# Patient Record
Sex: FEMALE | Race: WHITE | Employment: OTHER | URBAN - NONMETROPOLITAN AREA
[De-identification: names, ages, dates, MRNs, and addresses within clinical notes are randomized per-mention and may not be internally consistent; named-entity substitution may affect disease eponyms.]

---

## 2018-03-07 LAB
ALBUMIN SERPL-MCNC: NORMAL G/DL
ALP BLD-CCNC: NORMAL U/L
ALT SERPL-CCNC: NORMAL U/L
ANION GAP SERPL CALCULATED.3IONS-SCNC: NORMAL MMOL/L
AST SERPL-CCNC: NORMAL U/L
AVERAGE GLUCOSE: NORMAL
BILIRUB SERPL-MCNC: NORMAL MG/DL (ref 0.1–1.4)
BUN BLDV-MCNC: NORMAL MG/DL
CALCIUM SERPL-MCNC: NORMAL MG/DL
CHLORIDE BLD-SCNC: NORMAL MMOL/L
CHOLESTEROL, TOTAL: NORMAL MG/DL
CHOLESTEROL/HDL RATIO: NORMAL
CO2: NORMAL MMOL/L
CREAT SERPL-MCNC: NORMAL MG/DL
CREATININE, URINE: NORMAL
GFR CALCULATED: NORMAL
GLUCOSE BLD-MCNC: NORMAL MG/DL
HBA1C MFR BLD: 8.5 %
HDLC SERPL-MCNC: NORMAL MG/DL (ref 35–70)
LDL CHOLESTEROL CALCULATED: NORMAL MG/DL (ref 0–160)
MICROALBUMIN/CREAT 24H UR: NORMAL MG/G{CREAT}
MICROALBUMIN/CREAT UR-RTO: 104
POTASSIUM SERPL-SCNC: NORMAL MMOL/L
SODIUM BLD-SCNC: NORMAL MMOL/L
TOTAL PROTEIN: NORMAL
TRIGL SERPL-MCNC: NORMAL MG/DL
TSH SERPL DL<=0.05 MIU/L-ACNC: NORMAL UIU/ML
VLDLC SERPL CALC-MCNC: NORMAL MG/DL

## 2019-05-21 ENCOUNTER — OFFICE VISIT (OUTPATIENT)
Dept: CHIROPRACTIC MEDICINE | Age: 69
End: 2019-05-21
Payer: MEDICARE

## 2019-05-21 DIAGNOSIS — M53.86 OTHER SPECIFIED DORSOPATHIES, LUMBAR REGION: ICD-10-CM

## 2019-05-21 DIAGNOSIS — M99.03 SEGMENTAL AND SOMATIC DYSFUNCTION OF LUMBAR REGION: Primary | ICD-10-CM

## 2019-05-21 DIAGNOSIS — M53.3 SACROCOCCYGEAL DISORDERS, NOT ELSEWHERE CLASSIFIED: ICD-10-CM

## 2019-05-21 DIAGNOSIS — M99.05 SEGMENTAL AND SOMATIC DYSFUNCTION OF PELVIC REGION: ICD-10-CM

## 2019-05-21 PROCEDURE — 98940 CHIROPRACT MANJ 1-2 REGIONS: CPT | Performed by: CHIROPRACTOR

## 2019-05-21 NOTE — PROGRESS NOTES
19  [place] : 1950 Sex: female  Age: 77 y.o. Chief Complaint   Patient presents with    Lower Back Pain       HPI:   Pain is Worse-no new injury however. She's been out of town for a couple of months, living in [facility]. He returned last week. No new injury. However, her low back pain has returned. . On average, pain is perceived as moderate (4-6 pain scale). Change in quality of symptoms: no.  Associated symptoms: none. She denies any other symptoms. No current outpatient medications on file. Exam: There is mild midline tenderness L3-S1. Tender SI joints bilaterally, left worse than right. Negative Ely's and Sathya tests. There are hypertonic and tender fibers noted today in the lumbar paraspinal muscles. Joint fixation is noted with motion screening at L3-4, bilateral SI joints. Maday Krause was seen today for lower back pain. Diagnoses and all orders for this visit:    Segmental and somatic dysfunction of lumbar region    Segmental and somatic dysfunction of pelvic region    Other specified dorsopathies, lumbar region    Sacrococcygeal disorders, not elsewhere classified        Treatment Plan:  Continued with Berry flexion distraction manipulation at L3, protocol 1. Then mechanically assisted manipulation of the SI joints. Tolerated well. She can follow up with me as needed for care.  Ice, heat, home remedies as needed until an      Seen By:  Malini Greenwood

## 2019-05-23 ENCOUNTER — OFFICE VISIT (OUTPATIENT)
Dept: CHIROPRACTIC MEDICINE | Age: 69
End: 2019-05-23
Payer: MEDICARE

## 2019-05-23 DIAGNOSIS — M99.03 SEGMENTAL AND SOMATIC DYSFUNCTION OF LUMBAR REGION: Primary | ICD-10-CM

## 2019-05-23 DIAGNOSIS — M99.05 SEGMENTAL AND SOMATIC DYSFUNCTION OF PELVIC REGION: ICD-10-CM

## 2019-05-23 DIAGNOSIS — M53.3 SACROCOCCYGEAL DISORDERS, NOT ELSEWHERE CLASSIFIED: ICD-10-CM

## 2019-05-23 DIAGNOSIS — M53.86 OTHER SPECIFIED DORSOPATHIES, LUMBAR REGION: ICD-10-CM

## 2019-05-23 PROCEDURE — 98940 CHIROPRACT MANJ 1-2 REGIONS: CPT | Performed by: CHIROPRACTOR

## 2019-05-23 NOTE — PROGRESS NOTES
19  France aDwson : 1950 Sex: female  Age: 77 y.o. Chief Complaint   Patient presents with    Lower Back Pain       HPI:   Did well with treatment last time. Stated she felt better. However that night she slept on her mattress-thinks it was too soft. Woke up in the morning with back pain. Did not radiate to the lower extremities. She was able to perform most activities as desired yesterday. Did report increased pain with dressing and putting on shoes. No changes otherwise. Pain at 5/10 today. Ibuprofen provides relief        No current outpatient medications on file. Exam: Ambulates that assistance. Decreased lumbar lordosis. Mild midline tenderness L4-S1. Tender SI joints bilaterally, left worse than right. There are hypertonic and tender fibers noted today in the lumbar paraspinal muscles. Joint fixation is noted with motion screening at bilateral SI joints, L3-4    There are no diagnoses linked to this encounter. Treatment Plan:  Mild exacerbation, which is already improving. Berry flexion distraction manipulation to the L 3 segment using protocol 1 was performed today. Mechanically assisted manipulation to the SI joints. Tolerated well. Follow-up with me as needed.         Seen By:  Vita Mclaughlin DC

## 2019-05-28 ENCOUNTER — OFFICE VISIT (OUTPATIENT)
Dept: CHIROPRACTIC MEDICINE | Age: 69
End: 2019-05-28
Payer: MEDICARE

## 2019-05-28 DIAGNOSIS — M99.03 SEGMENTAL AND SOMATIC DYSFUNCTION OF LUMBAR REGION: Primary | ICD-10-CM

## 2019-05-28 DIAGNOSIS — M99.05 SEGMENTAL AND SOMATIC DYSFUNCTION OF PELVIC REGION: ICD-10-CM

## 2019-05-28 DIAGNOSIS — M53.86 OTHER SPECIFIED DORSOPATHIES, LUMBAR REGION: ICD-10-CM

## 2019-05-28 DIAGNOSIS — M53.3 SACROCOCCYGEAL DISORDERS, NOT ELSEWHERE CLASSIFIED: ICD-10-CM

## 2019-05-28 PROCEDURE — 98940 CHIROPRACT MANJ 1-2 REGIONS: CPT | Performed by: CHIROPRACTOR

## 2019-05-28 NOTE — PROGRESS NOTES
19  Arie Gave : 1950 Sex: female  Age: 77 y.o. Chief Complaint   Patient presents with    Lower Back Pain       HPI:   Pain is better. On average, pain is perceived as mild (1-3  pain scale). Change in quality of symptoms: no.  Associated symptoms: none. She denies any other symptoms. Feels she is aggravated herself when she climbs into the truck. She has been applying ice as needed. No current outpatient medications on file. Exam: Tender left SI joint. Nontender right. There are hypertonic and tender fibers noted today in the bilateral lumbar paraspinal muscles. Joint fixation is noted with motion screening at L3-4, bilateral SI joints. Oscar Pfeiffer was seen today for lower back pain. Diagnoses and all orders for this visit:    Segmental and somatic dysfunction of lumbar region    Segmental and somatic dysfunction of pelvic region    Other specified dorsopathies, lumbar region    Sacrococcygeal disorders, not elsewhere classified        Treatment Plan:  Berry flexion distraction manipulation to the L 3 segment using protocol 1 was performed today. Increases to manipulation of the SI joints. Tolerated well. Continue with home-based self-care. Follow-up with me as needed.       Seen By:  Daniela Garcia DC

## 2019-08-26 ENCOUNTER — OFFICE VISIT (OUTPATIENT)
Dept: CHIROPRACTIC MEDICINE | Age: 69
End: 2019-08-26
Payer: MEDICARE

## 2019-08-26 DIAGNOSIS — M99.03 SEGMENTAL AND SOMATIC DYSFUNCTION OF LUMBAR REGION: Primary | ICD-10-CM

## 2019-08-26 DIAGNOSIS — M99.05 SEGMENTAL AND SOMATIC DYSFUNCTION OF PELVIC REGION: ICD-10-CM

## 2019-08-26 DIAGNOSIS — M53.86 OTHER SPECIFIED DORSOPATHIES, LUMBAR REGION: ICD-10-CM

## 2019-08-26 DIAGNOSIS — M53.3 SACROCOCCYGEAL DISORDERS, NOT ELSEWHERE CLASSIFIED: ICD-10-CM

## 2019-08-26 PROCEDURE — 98940 CHIROPRACT MANJ 1-2 REGIONS: CPT | Performed by: CHIROPRACTOR

## 2019-09-05 ENCOUNTER — OFFICE VISIT (OUTPATIENT)
Dept: CHIROPRACTIC MEDICINE | Age: 69
End: 2019-09-05
Payer: MEDICARE

## 2019-09-05 DIAGNOSIS — M53.86 OTHER SPECIFIED DORSOPATHIES, LUMBAR REGION: ICD-10-CM

## 2019-09-05 DIAGNOSIS — M99.03 SEGMENTAL AND SOMATIC DYSFUNCTION OF LUMBAR REGION: Primary | ICD-10-CM

## 2019-09-05 DIAGNOSIS — M53.3 SACROCOCCYGEAL DISORDERS, NOT ELSEWHERE CLASSIFIED: ICD-10-CM

## 2019-09-05 DIAGNOSIS — M99.05 SEGMENTAL AND SOMATIC DYSFUNCTION OF PELVIC REGION: ICD-10-CM

## 2019-09-05 PROCEDURE — 98940 CHIROPRACT MANJ 1-2 REGIONS: CPT | Performed by: CHIROPRACTOR

## 2019-09-12 ENCOUNTER — OFFICE VISIT (OUTPATIENT)
Dept: CHIROPRACTIC MEDICINE | Age: 69
End: 2019-09-12
Payer: MEDICARE

## 2019-09-12 DIAGNOSIS — M53.86 OTHER SPECIFIED DORSOPATHIES, LUMBAR REGION: ICD-10-CM

## 2019-09-12 DIAGNOSIS — M53.3 SACROCOCCYGEAL DISORDERS, NOT ELSEWHERE CLASSIFIED: ICD-10-CM

## 2019-09-12 DIAGNOSIS — M99.03 SEGMENTAL AND SOMATIC DYSFUNCTION OF LUMBAR REGION: Primary | ICD-10-CM

## 2019-09-12 DIAGNOSIS — M99.05 SEGMENTAL AND SOMATIC DYSFUNCTION OF PELVIC REGION: ICD-10-CM

## 2019-09-12 PROCEDURE — 98940 CHIROPRACT MANJ 1-2 REGIONS: CPT | Performed by: CHIROPRACTOR

## 2019-09-16 ENCOUNTER — OFFICE VISIT (OUTPATIENT)
Dept: CHIROPRACTIC MEDICINE | Age: 69
End: 2019-09-16
Payer: MEDICARE

## 2019-09-16 DIAGNOSIS — M53.3 SACROCOCCYGEAL DISORDERS, NOT ELSEWHERE CLASSIFIED: ICD-10-CM

## 2019-09-16 DIAGNOSIS — M99.05 SEGMENTAL AND SOMATIC DYSFUNCTION OF PELVIC REGION: ICD-10-CM

## 2019-09-16 DIAGNOSIS — M54.04 PANNICULITIS AFFECTING REGIONS OF NECK AND BACK, THORACIC REGION: ICD-10-CM

## 2019-09-16 DIAGNOSIS — M99.03 SEGMENTAL AND SOMATIC DYSFUNCTION OF LUMBAR REGION: Primary | ICD-10-CM

## 2019-09-16 DIAGNOSIS — M99.02 SEGMENTAL AND SOMATIC DYSFUNCTION OF THORACIC REGION: ICD-10-CM

## 2019-09-16 DIAGNOSIS — M53.86 OTHER SPECIFIED DORSOPATHIES, LUMBAR REGION: ICD-10-CM

## 2019-09-16 PROCEDURE — 98940 CHIROPRACT MANJ 1-2 REGIONS: CPT | Performed by: CHIROPRACTOR

## 2019-11-02 RX ORDER — OMEPRAZOLE 20 MG/1
20 CAPSULE, DELAYED RELEASE ORAL DAILY
COMMUNITY
End: 2020-12-01

## 2019-11-02 RX ORDER — PRAVASTATIN SODIUM 20 MG
20 TABLET ORAL DAILY
COMMUNITY
End: 2020-03-06 | Stop reason: ALTCHOICE

## 2019-11-02 RX ORDER — ZOLPIDEM TARTRATE 10 MG/1
5 TABLET ORAL NIGHTLY PRN
COMMUNITY
End: 2020-06-16 | Stop reason: SDUPTHER

## 2019-11-02 RX ORDER — GLIPIZIDE 5 MG/1
10 TABLET ORAL
COMMUNITY
End: 2020-03-06 | Stop reason: SDUPTHER

## 2019-11-02 RX ORDER — IBUPROFEN 200 MG
200 TABLET ORAL DAILY
COMMUNITY
End: 2019-12-18

## 2019-11-02 RX ORDER — HYDROCODONE BITARTRATE AND ACETAMINOPHEN 5; 325 MG/1; MG/1
1 TABLET ORAL 4 TIMES DAILY
COMMUNITY
End: 2019-12-18

## 2019-11-02 RX ORDER — CYCLOBENZAPRINE HCL 10 MG
10 TABLET ORAL DAILY
COMMUNITY
End: 2019-12-18

## 2019-12-04 ENCOUNTER — OFFICE VISIT (OUTPATIENT)
Dept: CHIROPRACTIC MEDICINE | Age: 69
End: 2019-12-04
Payer: MEDICARE

## 2019-12-04 VITALS — SYSTOLIC BLOOD PRESSURE: 130 MMHG | OXYGEN SATURATION: 99 % | HEART RATE: 96 BPM | DIASTOLIC BLOOD PRESSURE: 70 MMHG

## 2019-12-04 DIAGNOSIS — M53.86 OTHER SPECIFIED DORSOPATHIES, LUMBAR REGION: ICD-10-CM

## 2019-12-04 DIAGNOSIS — M99.05 SEGMENTAL AND SOMATIC DYSFUNCTION OF PELVIC REGION: ICD-10-CM

## 2019-12-04 DIAGNOSIS — M99.03 SEGMENTAL AND SOMATIC DYSFUNCTION OF LUMBAR REGION: Primary | ICD-10-CM

## 2019-12-04 DIAGNOSIS — M53.3 SACROCOCCYGEAL DISORDERS, NOT ELSEWHERE CLASSIFIED: ICD-10-CM

## 2019-12-04 DIAGNOSIS — M99.02 SEGMENTAL AND SOMATIC DYSFUNCTION OF THORACIC REGION: ICD-10-CM

## 2019-12-04 DIAGNOSIS — M54.04 PANNICULITIS AFFECTING REGIONS OF NECK AND BACK, THORACIC REGION: ICD-10-CM

## 2019-12-04 PROCEDURE — 98941 CHIROPRACT MANJ 3-4 REGIONS: CPT | Performed by: CHIROPRACTOR

## 2019-12-18 ENCOUNTER — OFFICE VISIT (OUTPATIENT)
Dept: FAMILY MEDICINE CLINIC | Age: 69
End: 2019-12-18
Payer: MEDICARE

## 2019-12-18 VITALS
SYSTOLIC BLOOD PRESSURE: 138 MMHG | BODY MASS INDEX: 35.28 KG/M2 | TEMPERATURE: 97.9 F | HEIGHT: 63 IN | WEIGHT: 199.13 LBS | DIASTOLIC BLOOD PRESSURE: 64 MMHG | HEART RATE: 71 BPM | OXYGEN SATURATION: 96 %

## 2019-12-18 DIAGNOSIS — R53.83 OTHER FATIGUE: ICD-10-CM

## 2019-12-18 DIAGNOSIS — R09.89 BILATERAL CAROTID BRUITS: ICD-10-CM

## 2019-12-18 DIAGNOSIS — Z79.4 TYPE 2 DIABETES MELLITUS WITH HYPERGLYCEMIA, WITH LONG-TERM CURRENT USE OF INSULIN (HCC): ICD-10-CM

## 2019-12-18 DIAGNOSIS — Z12.11 SCREENING FOR MALIGNANT NEOPLASM OF COLON: Primary | ICD-10-CM

## 2019-12-18 DIAGNOSIS — K21.9 GASTROESOPHAGEAL REFLUX DISEASE WITHOUT ESOPHAGITIS: ICD-10-CM

## 2019-12-18 DIAGNOSIS — I10 ESSENTIAL HYPERTENSION: ICD-10-CM

## 2019-12-18 DIAGNOSIS — R79.89 ELEVATED LFTS: ICD-10-CM

## 2019-12-18 DIAGNOSIS — G47.09 OTHER INSOMNIA: ICD-10-CM

## 2019-12-18 DIAGNOSIS — R01.1 MURMUR: ICD-10-CM

## 2019-12-18 DIAGNOSIS — E83.42 HYPOMAGNESEMIA: ICD-10-CM

## 2019-12-18 DIAGNOSIS — E55.9 VITAMIN D DEFICIENCY: ICD-10-CM

## 2019-12-18 DIAGNOSIS — Z79.899 LONG TERM CURRENT USE OF THERAPEUTIC DRUG: ICD-10-CM

## 2019-12-18 DIAGNOSIS — E11.65 TYPE 2 DIABETES MELLITUS WITH HYPERGLYCEMIA, WITH LONG-TERM CURRENT USE OF INSULIN (HCC): ICD-10-CM

## 2019-12-18 DIAGNOSIS — E66.09 CLASS 2 OBESITY DUE TO EXCESS CALORIES WITHOUT SERIOUS COMORBIDITY WITH BODY MASS INDEX (BMI) OF 35.0 TO 35.9 IN ADULT: ICD-10-CM

## 2019-12-18 DIAGNOSIS — E78.2 MIXED HYPERLIPIDEMIA: ICD-10-CM

## 2019-12-18 PROBLEM — E66.812 CLASS 2 OBESITY DUE TO EXCESS CALORIES WITHOUT SERIOUS COMORBIDITY WITH BODY MASS INDEX (BMI) OF 35.0 TO 35.9 IN ADULT: Status: ACTIVE | Noted: 2019-12-18

## 2019-12-18 PROCEDURE — 3046F HEMOGLOBIN A1C LEVEL >9.0%: CPT | Performed by: INTERNAL MEDICINE

## 2019-12-18 PROCEDURE — 2022F DILAT RTA XM EVC RTNOPTHY: CPT | Performed by: INTERNAL MEDICINE

## 2019-12-18 PROCEDURE — 99204 OFFICE O/P NEW MOD 45 MIN: CPT | Performed by: INTERNAL MEDICINE

## 2019-12-18 PROCEDURE — 3017F COLORECTAL CA SCREEN DOC REV: CPT | Performed by: INTERNAL MEDICINE

## 2019-12-18 PROCEDURE — G8400 PT W/DXA NO RESULTS DOC: HCPCS | Performed by: INTERNAL MEDICINE

## 2019-12-18 PROCEDURE — 1090F PRES/ABSN URINE INCON ASSESS: CPT | Performed by: INTERNAL MEDICINE

## 2019-12-18 PROCEDURE — 4040F PNEUMOC VAC/ADMIN/RCVD: CPT | Performed by: INTERNAL MEDICINE

## 2019-12-18 PROCEDURE — G8482 FLU IMMUNIZE ORDER/ADMIN: HCPCS | Performed by: INTERNAL MEDICINE

## 2019-12-18 PROCEDURE — G8427 DOCREV CUR MEDS BY ELIG CLIN: HCPCS | Performed by: INTERNAL MEDICINE

## 2019-12-18 PROCEDURE — 1036F TOBACCO NON-USER: CPT | Performed by: INTERNAL MEDICINE

## 2019-12-18 PROCEDURE — G8417 CALC BMI ABV UP PARAM F/U: HCPCS | Performed by: INTERNAL MEDICINE

## 2019-12-18 PROCEDURE — 1123F ACP DISCUSS/DSCN MKR DOCD: CPT | Performed by: INTERNAL MEDICINE

## 2019-12-18 RX ORDER — ACETAMINOPHEN 160 MG
TABLET,DISINTEGRATING ORAL
COMMUNITY
End: 2020-03-06

## 2019-12-18 RX ORDER — UBIDECARENONE 75 MG
50 CAPSULE ORAL DAILY
COMMUNITY
End: 2020-06-16

## 2019-12-18 RX ORDER — LORAZEPAM 0.5 MG/1
0.5 TABLET ORAL NIGHTLY PRN
COMMUNITY
End: 2020-12-01

## 2019-12-18 RX ORDER — LISINOPRIL 10 MG/1
10 TABLET ORAL DAILY
COMMUNITY
End: 2019-12-18

## 2019-12-18 RX ORDER — LISINOPRIL 20 MG/1
TABLET ORAL
Refills: 0 | COMMUNITY
Start: 2019-09-19 | End: 2020-03-06 | Stop reason: SDUPTHER

## 2019-12-18 SDOH — ECONOMIC STABILITY: TRANSPORTATION INSECURITY
IN THE PAST 12 MONTHS, HAS LACK OF TRANSPORTATION KEPT YOU FROM MEETINGS, WORK, OR FROM GETTING THINGS NEEDED FOR DAILY LIVING?: NO

## 2019-12-18 SDOH — ECONOMIC STABILITY: FOOD INSECURITY: WITHIN THE PAST 12 MONTHS, YOU WORRIED THAT YOUR FOOD WOULD RUN OUT BEFORE YOU GOT MONEY TO BUY MORE.: NEVER TRUE

## 2019-12-18 SDOH — ECONOMIC STABILITY: TRANSPORTATION INSECURITY
IN THE PAST 12 MONTHS, HAS THE LACK OF TRANSPORTATION KEPT YOU FROM MEDICAL APPOINTMENTS OR FROM GETTING MEDICATIONS?: NO

## 2019-12-18 SDOH — ECONOMIC STABILITY: INCOME INSECURITY: HOW HARD IS IT FOR YOU TO PAY FOR THE VERY BASICS LIKE FOOD, HOUSING, MEDICAL CARE, AND HEATING?: NOT HARD AT ALL

## 2019-12-18 SDOH — ECONOMIC STABILITY: FOOD INSECURITY: WITHIN THE PAST 12 MONTHS, THE FOOD YOU BOUGHT JUST DIDN'T LAST AND YOU DIDN'T HAVE MONEY TO GET MORE.: NEVER TRUE

## 2019-12-18 ASSESSMENT — ENCOUNTER SYMPTOMS
CONSTIPATION: 0
NAUSEA: 0
ABDOMINAL PAIN: 0
COUGH: 0
BLOOD IN STOOL: 0
DIARRHEA: 0
SORE THROAT: 0
SHORTNESS OF BREATH: 1
CHEST TIGHTNESS: 0
VOMITING: 0
RHINORRHEA: 0
EYE PAIN: 0

## 2020-01-23 ENCOUNTER — OFFICE VISIT (OUTPATIENT)
Dept: CHIROPRACTIC MEDICINE | Age: 70
End: 2020-01-23
Payer: MEDICARE

## 2020-01-23 PROCEDURE — 98941 CHIROPRACT MANJ 3-4 REGIONS: CPT | Performed by: CHIROPRACTOR

## 2020-01-23 NOTE — PROGRESS NOTES
20  Migdalia Schaumann : 1950 Sex: female  Age: 68 y.o. Chief Complaint   Patient presents with    Back Pain       HPI:   No new issues. Once again having right-sided low back pain and \"hip pain\". Identifies the sacroiliac joint as the area of her hip pain. Mild radiation to the gluteal region. Leaving for [facility] next week and wanted some treatment before she left. She does report increased pain with trying to sleep on her right side. But, she is able to get into and out of the truck without additional pain. States it was worse a few days ago when she made the appointment, doing better today. Current Outpatient Medications:     lisinopril (PRINIVIL;ZESTRIL) 20 MG tablet, take 1 tablet by mouth once daily, Disp: , Rfl: 0    Magnesium 100 MG TABS, Take 1 tablet by mouth daily, Disp: , Rfl:     vitamin B-12 (CYANOCOBALAMIN) 100 MCG tablet, Take 50 mcg by mouth daily, Disp: , Rfl:     Cholecalciferol (VITAMIN D3) 50 MCG (2000) CAPS, Take by mouth, Disp: , Rfl:     LORazepam (ATIVAN) 0.5 MG tablet, Take 0.5 mg by mouth nightly as needed (sleep). , Disp: , Rfl:     zolpidem (AMBIEN) 10 MG tablet, Take 10 mg by mouth nightly. , Disp: , Rfl:     metFORMIN (GLUCOPHAGE) 1000 MG tablet, Take 1,000 mg by mouth 2 times daily (with meals), Disp: , Rfl:     insulin glargine (LANTUS SOLOSTAR) 100 UNIT/ML injection pen, Inject 62 Units into the skin every morning , Disp: , Rfl:     Insulin Pen Needle (B-D UF III MINI PEN NEEDLES) 31G X 5 MM MISC, 1 each by Does not apply route daily, Disp: , Rfl:     glipiZIDE (GLUCOTROL) 5 MG tablet, Take 10 mg by mouth 2 by mouth twice a day , Disp: , Rfl:     omeprazole (PRILOSEC) 20 MG delayed release capsule, Take 20 mg by mouth daily, Disp: , Rfl:     pravastatin (PRAVACHOL) 20 MG tablet, Take 20 mg by mouth daily, Disp: , Rfl:     Exam: Tender right SI joint. Trigger point right quadratus lumborum.     There are hypertonic and tender fibers noted

## 2020-02-26 ENCOUNTER — HOSPITAL ENCOUNTER (OUTPATIENT)
Age: 70
Discharge: HOME | End: 2020-02-28
Payer: MEDICARE

## 2020-02-26 LAB
ALBUMIN SERPL-MCNC: 3.8 G/DL (ref 3.5–5.2)
ALP BLD-CCNC: 60 U/L (ref 35–104)
ALT SERPL-CCNC: 27 U/L (ref 0–32)
ANION GAP SERPL CALCULATED.3IONS-SCNC: 18 MMOL/L (ref 7–16)
AST SERPL-CCNC: 18 U/L (ref 0–31)
BASOPHILS ABSOLUTE: 0.06 E9/L (ref 0–0.2)
BASOPHILS RELATIVE PERCENT: 0.6 % (ref 0–2)
BILIRUB SERPL-MCNC: <0.2 MG/DL (ref 0–1.2)
BUN BLDV-MCNC: 18 MG/DL (ref 8–23)
CALCIUM SERPL-MCNC: 9.6 MG/DL (ref 8.6–10.2)
CHLORIDE BLD-SCNC: 103 MMOL/L (ref 98–107)
CHOLESTEROL, FASTING: 220 MG/DL (ref 0–199)
CO2: 19 MMOL/L (ref 22–29)
CREAT SERPL-MCNC: 0.7 MG/DL (ref 0.5–1)
EOSINOPHILS ABSOLUTE: 0.32 E9/L (ref 0.05–0.5)
EOSINOPHILS RELATIVE PERCENT: 3 % (ref 0–6)
FOLATE: >20 NG/ML (ref 4.8–24.2)
GFR AFRICAN AMERICAN: >60
GFR NON-AFRICAN AMERICAN: >60 ML/MIN/1.73
GLUCOSE BLD-MCNC: 126 MG/DL (ref 74–99)
HBA1C MFR BLD: 9.7 % (ref 4–5.6)
HCT VFR BLD CALC: 35 % (ref 34–48)
HDLC SERPL-MCNC: 58 MG/DL
HEMOGLOBIN: 10.8 G/DL (ref 11.5–15.5)
IMMATURE GRANULOCYTES #: 0.04 E9/L
IMMATURE GRANULOCYTES %: 0.4 % (ref 0–5)
LDL CHOLESTEROL CALCULATED: 132 MG/DL (ref 0–99)
LYMPHOCYTES ABSOLUTE: 3.64 E9/L (ref 1.5–4)
LYMPHOCYTES RELATIVE PERCENT: 34.3 % (ref 20–42)
MAGNESIUM: 1.6 MG/DL (ref 1.6–2.6)
MCH RBC QN AUTO: 27 PG (ref 26–35)
MCHC RBC AUTO-ENTMCNC: 30.9 % (ref 32–34.5)
MCV RBC AUTO: 87.5 FL (ref 80–99.9)
MONOCYTES ABSOLUTE: 0.64 E9/L (ref 0.1–0.95)
MONOCYTES RELATIVE PERCENT: 6 % (ref 2–12)
NEUTROPHILS ABSOLUTE: 5.91 E9/L (ref 1.8–7.3)
NEUTROPHILS RELATIVE PERCENT: 55.7 % (ref 43–80)
PDW BLD-RTO: 13.2 FL (ref 11.5–15)
PLATELET # BLD: 483 E9/L (ref 130–450)
PMV BLD AUTO: 9.5 FL (ref 7–12)
POTASSIUM SERPL-SCNC: 4.3 MMOL/L (ref 3.5–5)
RBC # BLD: 4 E12/L (ref 3.5–5.5)
SODIUM BLD-SCNC: 140 MMOL/L (ref 132–146)
TOTAL PROTEIN: 7.2 G/DL (ref 6.4–8.3)
TRIGLYCERIDE, FASTING: 152 MG/DL (ref 0–149)
TSH SERPL DL<=0.05 MIU/L-ACNC: 3.96 UIU/ML (ref 0.27–4.2)
VITAMIN B-12: 930 PG/ML (ref 211–946)
VITAMIN D 25-HYDROXY: 49 NG/ML (ref 30–100)
VLDLC SERPL CALC-MCNC: 30 MG/DL
WBC # BLD: 10.6 E9/L (ref 4.5–11.5)

## 2020-02-26 PROCEDURE — 36415 COLL VENOUS BLD VENIPUNCTURE: CPT

## 2020-02-26 PROCEDURE — 83735 ASSAY OF MAGNESIUM: CPT

## 2020-02-26 PROCEDURE — 82746 ASSAY OF FOLIC ACID SERUM: CPT

## 2020-02-26 PROCEDURE — 83036 HEMOGLOBIN GLYCOSYLATED A1C: CPT

## 2020-02-26 PROCEDURE — 80061 LIPID PANEL: CPT

## 2020-02-26 PROCEDURE — 82607 VITAMIN B-12: CPT

## 2020-02-26 PROCEDURE — 84443 ASSAY THYROID STIM HORMONE: CPT

## 2020-02-26 PROCEDURE — 82306 VITAMIN D 25 HYDROXY: CPT

## 2020-02-26 PROCEDURE — 85025 COMPLETE CBC W/AUTO DIFF WBC: CPT

## 2020-02-26 PROCEDURE — 80053 COMPREHEN METABOLIC PANEL: CPT

## 2020-02-27 ENCOUNTER — TELEPHONE (OUTPATIENT)
Dept: PRIMARY CARE CLINIC | Age: 70
End: 2020-02-27

## 2020-03-06 ENCOUNTER — HOSPITAL ENCOUNTER (OUTPATIENT)
Age: 70
Discharge: HOME | End: 2020-03-08
Payer: MEDICARE

## 2020-03-06 ENCOUNTER — OFFICE VISIT (OUTPATIENT)
Dept: FAMILY MEDICINE CLINIC | Age: 70
End: 2020-03-06
Payer: MEDICARE

## 2020-03-06 VITALS
DIASTOLIC BLOOD PRESSURE: 60 MMHG | BODY MASS INDEX: 33.84 KG/M2 | TEMPERATURE: 97.7 F | HEIGHT: 63 IN | WEIGHT: 191 LBS | OXYGEN SATURATION: 97 % | SYSTOLIC BLOOD PRESSURE: 130 MMHG | HEART RATE: 105 BPM

## 2020-03-06 PROBLEM — D64.9 ANEMIA: Status: ACTIVE | Noted: 2020-03-06

## 2020-03-06 LAB
BASOPHILS ABSOLUTE: 0.08 E9/L (ref 0–0.2)
BASOPHILS RELATIVE PERCENT: 0.7 % (ref 0–2)
EOSINOPHILS ABSOLUTE: 0.26 E9/L (ref 0.05–0.5)
EOSINOPHILS RELATIVE PERCENT: 2.2 % (ref 0–6)
FERRITIN: 15 NG/ML
HCT VFR BLD CALC: 37 % (ref 34–48)
HEMOGLOBIN: 11.3 G/DL (ref 11.5–15.5)
IMMATURE GRANULOCYTES #: 0.04 E9/L
IMMATURE GRANULOCYTES %: 0.3 % (ref 0–5)
IMMATURE RETIC FRACT: 16.1 % (ref 3–15.9)
IRON SATURATION: 13 % (ref 15–50)
IRON: 46 MCG/DL (ref 37–145)
LYMPHOCYTES ABSOLUTE: 4.03 E9/L (ref 1.5–4)
LYMPHOCYTES RELATIVE PERCENT: 33.7 % (ref 20–42)
MCH RBC QN AUTO: 27.1 PG (ref 26–35)
MCHC RBC AUTO-ENTMCNC: 30.5 % (ref 32–34.5)
MCV RBC AUTO: 88.7 FL (ref 80–99.9)
MONOCYTES ABSOLUTE: 0.69 E9/L (ref 0.1–0.95)
MONOCYTES RELATIVE PERCENT: 5.8 % (ref 2–12)
NEUTROPHILS ABSOLUTE: 6.86 E9/L (ref 1.8–7.3)
NEUTROPHILS RELATIVE PERCENT: 57.3 % (ref 43–80)
PDW BLD-RTO: 13.4 FL (ref 11.5–15)
PLATELET # BLD: 510 E9/L (ref 130–450)
PMV BLD AUTO: 9.6 FL (ref 7–12)
RBC # BLD: 4.17 E12/L (ref 3.5–5.5)
RETIC HGB EQUIVALENT: 33.3 PG (ref 28.2–36.6)
RETICULOCYTE ABSOLUTE COUNT: 0.06 E12/L
RETICULOCYTE COUNT PCT: 1.4 % (ref 0.4–1.9)
TOTAL IRON BINDING CAPACITY: 358 MCG/DL (ref 250–450)
WBC # BLD: 12 E9/L (ref 4.5–11.5)

## 2020-03-06 PROCEDURE — 4040F PNEUMOC VAC/ADMIN/RCVD: CPT | Performed by: INTERNAL MEDICINE

## 2020-03-06 PROCEDURE — 36415 COLL VENOUS BLD VENIPUNCTURE: CPT

## 2020-03-06 PROCEDURE — 1123F ACP DISCUSS/DSCN MKR DOCD: CPT | Performed by: INTERNAL MEDICINE

## 2020-03-06 PROCEDURE — 85025 COMPLETE CBC W/AUTO DIFF WBC: CPT

## 2020-03-06 PROCEDURE — 99214 OFFICE O/P EST MOD 30 MIN: CPT | Performed by: INTERNAL MEDICINE

## 2020-03-06 PROCEDURE — 3017F COLORECTAL CA SCREEN DOC REV: CPT | Performed by: INTERNAL MEDICINE

## 2020-03-06 PROCEDURE — 3046F HEMOGLOBIN A1C LEVEL >9.0%: CPT | Performed by: INTERNAL MEDICINE

## 2020-03-06 PROCEDURE — G8427 DOCREV CUR MEDS BY ELIG CLIN: HCPCS | Performed by: INTERNAL MEDICINE

## 2020-03-06 PROCEDURE — 1090F PRES/ABSN URINE INCON ASSESS: CPT | Performed by: INTERNAL MEDICINE

## 2020-03-06 PROCEDURE — G8400 PT W/DXA NO RESULTS DOC: HCPCS | Performed by: INTERNAL MEDICINE

## 2020-03-06 PROCEDURE — G8482 FLU IMMUNIZE ORDER/ADMIN: HCPCS | Performed by: INTERNAL MEDICINE

## 2020-03-06 PROCEDURE — G8417 CALC BMI ABV UP PARAM F/U: HCPCS | Performed by: INTERNAL MEDICINE

## 2020-03-06 PROCEDURE — 83550 IRON BINDING TEST: CPT

## 2020-03-06 PROCEDURE — 2022F DILAT RTA XM EVC RTNOPTHY: CPT | Performed by: INTERNAL MEDICINE

## 2020-03-06 PROCEDURE — 85045 AUTOMATED RETICULOCYTE COUNT: CPT

## 2020-03-06 PROCEDURE — 1036F TOBACCO NON-USER: CPT | Performed by: INTERNAL MEDICINE

## 2020-03-06 PROCEDURE — 82728 ASSAY OF FERRITIN: CPT

## 2020-03-06 PROCEDURE — 83540 ASSAY OF IRON: CPT

## 2020-03-06 RX ORDER — ATORVASTATIN CALCIUM 40 MG/1
40 TABLET, FILM COATED ORAL DAILY
Qty: 90 TABLET | Refills: 1 | Status: SHIPPED
Start: 2020-03-06 | End: 2020-09-16 | Stop reason: SDUPTHER

## 2020-03-06 RX ORDER — LISINOPRIL 20 MG/1
20 TABLET ORAL DAILY
Qty: 30 TABLET | Refills: 5 | Status: SHIPPED
Start: 2020-03-06 | End: 2020-09-16 | Stop reason: SDUPTHER

## 2020-03-06 RX ORDER — GLIPIZIDE 5 MG/1
10 TABLET ORAL
Qty: 120 TABLET | Refills: 5 | Status: SHIPPED
Start: 2020-03-06 | End: 2020-09-16 | Stop reason: ALTCHOICE

## 2020-03-06 ASSESSMENT — ENCOUNTER SYMPTOMS
SORE THROAT: 0
CHEST TIGHTNESS: 0
DIARRHEA: 0
ABDOMINAL PAIN: 0
RHINORRHEA: 0
NAUSEA: 0
CONSTIPATION: 0
EYE PAIN: 0
BLOOD IN STOOL: 0
COUGH: 0
SHORTNESS OF BREATH: 1
VOMITING: 0

## 2020-03-06 ASSESSMENT — PATIENT HEALTH QUESTIONNAIRE - PHQ9
SUM OF ALL RESPONSES TO PHQ QUESTIONS 1-9: 1
SUM OF ALL RESPONSES TO PHQ9 QUESTIONS 1 & 2: 1
2. FEELING DOWN, DEPRESSED OR HOPELESS: 0
1. LITTLE INTEREST OR PLEASURE IN DOING THINGS: 1
SUM OF ALL RESPONSES TO PHQ QUESTIONS 1-9: 1

## 2020-03-06 NOTE — PROGRESS NOTES
[facility]) Physicians   Internal Medicine     3/6/2020  Cullen Perez : 1950 Sex: female  Age:69 y.o. Chief Complaint   Patient presents with    Cough    Anemia    Diabetes        HPI:   Patient presents to office for review and management of chronic medical conditions.    -  has had bronchitis.  was seen in urgent care in [facility].  was given zpack. States still with cough. States was treated 1 month ago. - States having fatigue.     -  has had weight gain in the last 4 months.  was having back issues and was not moving around a lot.  was also travelling.     -  has been having issues with insomnia. Uncontrolled. States stopped taking lorazepam. States has been taking Ambien - decreased ambien. Still with issues sleeping. Discussed medications and interactions.     -  has diabetes. States trying to watch diet. States has been uncontrolled, last A1c was increased to 9.7 (3/2020) from 8.5 (10/2019). States lantus 62 units daily, glipizide 10mg twice a day and metformin 1000mg twice a day. On lisinopril. On pravastatin. States follows with optho     - States has high blood pressure. States occasioanlly checking blood pressure at home, has wrist cuff - 120's/60's. On lisinopril.     -  has high cholesterol. States trying to watch diet. On pravastatin. No reported side effects. Labs still elevated (2020)     -  has had gastric ulcers and GERD. On omeprazole. -  has vitamin D def. On otc supplement     -  has had low magnesium levels on supplement     -  also takes vit b12 supplement     -  was told had elevated LFT and enlarged liver.      Health Maintenance   - immunizations:   Influenza Vaccination - (2019)   Pneumonia Vaccination  Zoster/Shingles Vaccine  Tetanus Vaccination    - Screenings:   Bone Density Scan   Pelvic/Pap Exam  Mammogram - declines     Colonoscopy - declines   FIT     ROS:  Review of Disp: , Rfl:     No Known Allergies    Past Medical History:   Diagnosis Date    Class 2 obesity due to excess calories without serious comorbidity with body mass index (BMI) of 35.0 to 35.9 in adult 2019    Essential hypertension 2019    GERD (gastroesophageal reflux disease)     H pylori ulcer     Hypomagnesemia 2019    Mixed hyperlipidemia 2019    Other insomnia 2019    Peptic ulcer disease     Type [facility] mellitus [facility]     Vitamin D deficiency 2019       Past Surgical History:   Procedure Laterality Date    BUNIONECTOMY Left     HYSTERECTOMY, VAGINAL      partial in the 80s then bilateral oopherectome 2016    RETINAL DETACHMENT SURGERY Right     vitreous surgery    TUBAL LIGATION         Family History   Problem Relation Age of Onset    Diabetes Mother     Stroke Mother     Kidney Disease Father         Renal Failure       Social History     Socioeconomic History    Marital status:      Spouse name: River Sibley Number of children: 2    Years of education: 15    Highest education level: High school graduate   Occupational History    None   Social Needs    Financial resource strain: Not hard at all   [place] insecurity:     Worry: Never true     Inability: Never true    Transportation needs:     Medical: No     Non-medical: No   Tobacco Use    Smoking status: Former Smoker     Packs/day: 1.00     Years: 12.00     Pack years: 12.00     Types: Cigarettes     Last attempt to quit: 1982     Years since quittin.1    Smokeless tobacco: Never Used   Substance and Sexual Activity    Alcohol use: Yes     Comment: rare    Drug use: None    Sexual activity: None   Lifestyle    Physical activity:     Days per week: None     Minutes per session: None    Stress: None   Relationships    Social connections:     Talks on phone: None     Gets together: None     Attends Yazdanism service: None     Active member of club or

## 2020-03-07 ENCOUNTER — TELEPHONE (OUTPATIENT)
Dept: FAMILY MEDICINE CLINIC | Age: 70
End: 2020-03-07

## 2020-03-07 NOTE — TELEPHONE ENCOUNTER
Please let the patient know that blood work results showed    Blood count was still mildly low (anemia) however, improved when compared to previous results. Iron levels were borderline to low and would recommend iron supplementation 325 mg daily.   Would also suggest Colace 100 mg daily as the medication can cause constipation    Peripheral blood smear is still pending at the time of note    Thanks

## 2020-03-09 ENCOUNTER — TELEPHONE (OUTPATIENT)
Dept: FAMILY MEDICINE CLINIC | Age: 70
End: 2020-03-09

## 2020-03-09 LAB — PATHOLOGIST REVIEW: NORMAL

## 2020-05-12 ENCOUNTER — OFFICE VISIT (OUTPATIENT)
Dept: CHIROPRACTIC MEDICINE | Age: 70
End: 2020-05-12
Payer: MEDICARE

## 2020-05-12 VITALS
HEART RATE: 83 BPM | TEMPERATURE: 97.9 F | OXYGEN SATURATION: 97 % | RESPIRATION RATE: 20 BRPM | BODY MASS INDEX: 33.84 KG/M2 | SYSTOLIC BLOOD PRESSURE: 144 MMHG | DIASTOLIC BLOOD PRESSURE: 60 MMHG | HEIGHT: 63 IN | WEIGHT: 191 LBS

## 2020-05-12 PROCEDURE — 98941 CHIROPRACT MANJ 3-4 REGIONS: CPT | Performed by: CHIROPRACTOR

## 2020-06-01 ENCOUNTER — OFFICE VISIT (OUTPATIENT)
Dept: CHIROPRACTIC MEDICINE | Age: 70
End: 2020-06-01
Payer: MEDICARE

## 2020-06-01 VITALS — RESPIRATION RATE: 16 BRPM

## 2020-06-01 PROCEDURE — 98941 CHIROPRACT MANJ 3-4 REGIONS: CPT | Performed by: CHIROPRACTOR

## 2020-06-01 NOTE — PROGRESS NOTES
20  Astrid Roman : 1950 Sex: female  Age: 68 y.o. Chief Complaint   Patient presents with    Lower Back Pain       HPI:   He returns today for further care. She just got back from [facility] this past weekend. No new issues or injuries. She states it never went fully away from her last visit. Describing mild pain only in the right side of her low back to mid back. Not going down the leg today. She states the 700+ mile drive may have aggravated symptoms. But it is not that bad. She just wanted to get some treatment before it got worse. Current Outpatient Medications:     Dulaglutide 0.75 MG/0.5ML SOPN, Inject 0.75 mg into the skin once a week, Disp: 0.5 mL, Rfl: 2    glipiZIDE (GLUCOTROL) 5 MG tablet, Take 2 tablets by mouth 2 times daily (before meals) 2 by mouth twice a day, Disp: 120 tablet, Rfl: 5    lisinopril (PRINIVIL;ZESTRIL) 20 MG tablet, Take 1 tablet by mouth daily, Disp: 30 tablet, Rfl: 5    atorvastatin (LIPITOR) 40 MG tablet, Take 1 tablet by mouth daily, Disp: 90 tablet, Rfl: 1    vitamin B-12 (CYANOCOBALAMIN) 100 MCG tablet, Take 50 mcg by mouth daily, Disp: , Rfl:     LORazepam (ATIVAN) 0.5 MG tablet, Take 0.5 mg by mouth nightly as needed (sleep). , Disp: , Rfl:     zolpidem (AMBIEN) 10 MG tablet, Take 5 mg by mouth nightly as needed. , Disp: , Rfl:     metFORMIN (GLUCOPHAGE) 1000 MG tablet, Take 1,000 mg by mouth 2 times daily (with meals), Disp: , Rfl:     insulin glargine (LANTUS SOLOSTAR) 100 UNIT/ML injection pen, Inject 62 Units into the skin every morning , Disp: , Rfl:     Insulin Pen Needle (B-D UF III MINI PEN NEEDLES) 31G X 5 MM MISC, 1 each by Does not apply route daily, Disp: , Rfl:     omeprazole (PRILOSEC) 20 MG delayed release capsule, Take 20 mg by mouth daily, Disp: , Rfl:     Exam: Mild tenderness right SI joint with palpation. Trigger point right quadratus lumborum.     There are hypertonic and tender fibers noted today in the bilateral

## 2020-06-16 ENCOUNTER — HOSPITAL ENCOUNTER (OUTPATIENT)
Age: 70
Discharge: HOME | End: 2020-06-18
Payer: MEDICARE

## 2020-06-16 ENCOUNTER — OFFICE VISIT (OUTPATIENT)
Dept: FAMILY MEDICINE CLINIC | Age: 70
End: 2020-06-16
Payer: MEDICARE

## 2020-06-16 VITALS
HEART RATE: 74 BPM | TEMPERATURE: 97.5 F | HEIGHT: 63 IN | DIASTOLIC BLOOD PRESSURE: 56 MMHG | OXYGEN SATURATION: 98 % | WEIGHT: 190.13 LBS | BODY MASS INDEX: 33.69 KG/M2 | SYSTOLIC BLOOD PRESSURE: 106 MMHG

## 2020-06-16 LAB
ALBUMIN SERPL-MCNC: 4.1 G/DL (ref 3.5–5.2)
ALP BLD-CCNC: 59 U/L (ref 35–104)
ALT SERPL-CCNC: 38 U/L (ref 0–32)
ANION GAP SERPL CALCULATED.3IONS-SCNC: 14 MMOL/L (ref 7–16)
AST SERPL-CCNC: 21 U/L (ref 0–31)
BASOPHILS ABSOLUTE: 0.07 E9/L (ref 0–0.2)
BASOPHILS RELATIVE PERCENT: 0.6 % (ref 0–2)
BILIRUB SERPL-MCNC: 0.3 MG/DL (ref 0–1.2)
BUN BLDV-MCNC: 31 MG/DL (ref 8–23)
CALCIUM SERPL-MCNC: 10.1 MG/DL (ref 8.6–10.2)
CHLORIDE BLD-SCNC: 105 MMOL/L (ref 98–107)
CHOLESTEROL, FASTING: 167 MG/DL (ref 0–199)
CO2: 21 MMOL/L (ref 22–29)
CREAT SERPL-MCNC: 0.9 MG/DL (ref 0.5–1)
EOSINOPHILS ABSOLUTE: 0.32 E9/L (ref 0.05–0.5)
EOSINOPHILS RELATIVE PERCENT: 2.8 % (ref 0–6)
FERRITIN: 19 NG/ML
FOLATE: >20 NG/ML (ref 4.8–24.2)
GFR AFRICAN AMERICAN: >60
GFR NON-AFRICAN AMERICAN: >60 ML/MIN/1.73
GLUCOSE BLD-MCNC: 164 MG/DL (ref 74–99)
HBA1C MFR BLD: 8.8 % (ref 4–5.6)
HCT VFR BLD CALC: 36.9 % (ref 34–48)
HDLC SERPL-MCNC: 57 MG/DL
HEMOGLOBIN: 11.9 G/DL (ref 11.5–15.5)
IMMATURE GRANULOCYTES #: 0.05 E9/L
IMMATURE GRANULOCYTES %: 0.4 % (ref 0–5)
IRON SATURATION: 28 % (ref 15–50)
IRON: 103 MCG/DL (ref 37–145)
LDL CHOLESTEROL CALCULATED: 81 MG/DL (ref 0–99)
LYMPHOCYTES ABSOLUTE: 3.31 E9/L (ref 1.5–4)
LYMPHOCYTES RELATIVE PERCENT: 29.3 % (ref 20–42)
MAGNESIUM: 1.4 MG/DL (ref 1.6–2.6)
MCH RBC QN AUTO: 28.7 PG (ref 26–35)
MCHC RBC AUTO-ENTMCNC: 32.2 % (ref 32–34.5)
MCV RBC AUTO: 89.1 FL (ref 80–99.9)
MONOCYTES ABSOLUTE: 0.72 E9/L (ref 0.1–0.95)
MONOCYTES RELATIVE PERCENT: 6.4 % (ref 2–12)
NEUTROPHILS ABSOLUTE: 6.84 E9/L (ref 1.8–7.3)
NEUTROPHILS RELATIVE PERCENT: 60.5 % (ref 43–80)
PDW BLD-RTO: 13.4 FL (ref 11.5–15)
PLATELET # BLD: 446 E9/L (ref 130–450)
PMV BLD AUTO: 9.9 FL (ref 7–12)
POTASSIUM SERPL-SCNC: 5.1 MMOL/L (ref 3.5–5)
RBC # BLD: 4.14 E12/L (ref 3.5–5.5)
SODIUM BLD-SCNC: 140 MMOL/L (ref 132–146)
TOTAL IRON BINDING CAPACITY: 362 MCG/DL (ref 250–450)
TOTAL PROTEIN: 7.9 G/DL (ref 6.4–8.3)
TRIGLYCERIDE, FASTING: 143 MG/DL (ref 0–149)
VITAMIN B-12: 1182 PG/ML (ref 211–946)
VLDLC SERPL CALC-MCNC: 29 MG/DL
WBC # BLD: 11.3 E9/L (ref 4.5–11.5)

## 2020-06-16 PROCEDURE — 82728 ASSAY OF FERRITIN: CPT

## 2020-06-16 PROCEDURE — 85025 COMPLETE CBC W/AUTO DIFF WBC: CPT

## 2020-06-16 PROCEDURE — 1123F ACP DISCUSS/DSCN MKR DOCD: CPT | Performed by: INTERNAL MEDICINE

## 2020-06-16 PROCEDURE — 83540 ASSAY OF IRON: CPT

## 2020-06-16 PROCEDURE — 3017F COLORECTAL CA SCREEN DOC REV: CPT | Performed by: INTERNAL MEDICINE

## 2020-06-16 PROCEDURE — 36415 COLL VENOUS BLD VENIPUNCTURE: CPT

## 2020-06-16 PROCEDURE — 4040F PNEUMOC VAC/ADMIN/RCVD: CPT | Performed by: INTERNAL MEDICINE

## 2020-06-16 PROCEDURE — 83550 IRON BINDING TEST: CPT

## 2020-06-16 PROCEDURE — 82746 ASSAY OF FOLIC ACID SERUM: CPT

## 2020-06-16 PROCEDURE — 83036 HEMOGLOBIN GLYCOSYLATED A1C: CPT

## 2020-06-16 PROCEDURE — G0439 PPPS, SUBSEQ VISIT: HCPCS | Performed by: INTERNAL MEDICINE

## 2020-06-16 PROCEDURE — 3046F HEMOGLOBIN A1C LEVEL >9.0%: CPT | Performed by: INTERNAL MEDICINE

## 2020-06-16 PROCEDURE — 80053 COMPREHEN METABOLIC PANEL: CPT

## 2020-06-16 PROCEDURE — 82607 VITAMIN B-12: CPT

## 2020-06-16 PROCEDURE — 83735 ASSAY OF MAGNESIUM: CPT

## 2020-06-16 PROCEDURE — 80061 LIPID PANEL: CPT

## 2020-06-16 RX ORDER — ZOLPIDEM TARTRATE 10 MG/1
5 TABLET ORAL NIGHTLY PRN
Qty: 15 TABLET | Refills: 2 | Status: SHIPPED | OUTPATIENT
Start: 2020-06-16 | End: 2020-12-01

## 2020-06-16 RX ORDER — INSULIN GLARGINE 100 [IU]/ML
62 INJECTION, SOLUTION SUBCUTANEOUS EVERY MORNING
Qty: 5 PEN | Refills: 5 | Status: SHIPPED
Start: 2020-06-16 | End: 2020-12-01 | Stop reason: SDUPTHER

## 2020-06-16 ASSESSMENT — PATIENT HEALTH QUESTIONNAIRE - PHQ9
SUM OF ALL RESPONSES TO PHQ QUESTIONS 1-9: 0
2. FEELING DOWN, DEPRESSED OR HOPELESS: 0
SUM OF ALL RESPONSES TO PHQ9 QUESTIONS 1 & 2: 0
SUM OF ALL RESPONSES TO PHQ QUESTIONS 1-9: 0
1. LITTLE INTEREST OR PLEASURE IN DOING THINGS: 0

## 2020-06-16 ASSESSMENT — ENCOUNTER SYMPTOMS
SORE THROAT: 0
COUGH: 0
SHORTNESS OF BREATH: 1
CHEST TIGHTNESS: 0
BLOOD IN STOOL: 0
CONSTIPATION: 0
ABDOMINAL PAIN: 0
NAUSEA: 0
DIARRHEA: 0
VOMITING: 0
RHINORRHEA: 0
EYE PAIN: 0

## 2020-06-16 ASSESSMENT — LIFESTYLE VARIABLES: HOW OFTEN DO YOU HAVE A DRINK CONTAINING ALCOHOL: 0

## 2020-06-16 NOTE — PROGRESS NOTES
screen  02/26/2021    Potassium monitoring  02/26/2021    Creatinine monitoring  02/26/2021    Flu vaccine  Completed    Hepatitis A vaccine  Aged Out    Hib vaccine  Aged Out    Meningococcal (ACWY) vaccine  Aged Out     Recommendations for [employer] Due: see orders and patient instructions/AVS.  . Recommended screening schedule for the next 5-10 years is provided to the patient in written form: see Patient Instructions/AVS.    [employer] was seen today for medicare aw.     Diagnoses and all orders for this visit:  Routine general medical examination at a health care facility    Health Maintenance   - immunizations:   Influenza Vaccination - (2019)   Pneumonia Vaccination  Zoster/Shingles Vaccine  Tetanus Vaccination     - Screenings:   Bone Density Scan   Pelvic/Pap Exam  Mammogram - declines      Colonoscopy - for colonoscopy (6/2020)   FIT     Electronically signed by Denis Cadena MD on 6/16/2020 at 9:40 AM

## 2020-06-16 NOTE — PROGRESS NOTES
Allergies    Past Medical History:   Diagnosis Date    Class 2 obesity due to excess calories without serious comorbidity with body mass index (BMI) of 35.0 to 35.9 in adult 2019    Essential hypertension 2019    GERD (gastroesophageal reflux disease)     H pylori ulcer     Hypomagnesemia 2019    Mixed hyperlipidemia 2019    Other insomnia 2019    Peptic ulcer disease     Type [facility] diabetes mellitus [facility] complication ([facility]     Vitamin D deficiency 2019       Past Surgical History:   Procedure Laterality Date    BUNIONECTOMY Left     HYSTERECTOMY, VAGINAL      partial in the 80s then bilateral oopherectome 2016    RETINAL DETACHMENT SURGERY Right     vitreous surgery    TUBAL LIGATION         Family History   Problem Relation Age of Onset    Diabetes Mother     Stroke Mother     Kidney Disease Father         Renal Failure       Social History     Socioeconomic History    Marital status:      Spouse name: Pauline Brunner Number of children: 2    Years of education: 15    Highest education level: High school graduate   Occupational History    Not on file   Social Needs    Financial resource strain: Not hard at all   [place] insecurity     Worry: Never true     Inability: Never true    Transportation needs     Medical: No     Non-medical: No   Tobacco Use    Smoking status: Former Smoker     Packs/day: 1.00     Years: 12.00     Pack years: 12.00     Types: Cigarettes     Last attempt to quit: 1982     Years since quittin.3    Smokeless tobacco: Never Used   Substance and Sexual Activity    Alcohol use: Yes     Comment: rare    Drug use: Not on file    Sexual activity: Not on file   Lifestyle    Physical activity     Days per week: Not on file     Minutes per session: Not on file    Stress: Not on file   Relationships    Social connections     Talks on phone: Not on file     Gets together: Not on file     Attends Oriental orthodox service: Not on file uncertain etiology at present   - recheck labs - last lab (2/2020) - improved     Murmur  - will need to consider echo     Bilateral carotid bruits  - will need to check US - declines at present     Return for Medicare Annual Wellness Visit in 1 year. No orders of the defined types were placed in this encounter. Requested Prescriptions     Signed Prescriptions Disp Refills    Dulaglutide 0.75 MG/0.5ML SOPN 0.5 mL 2     Sig: Inject 0.75 mg into the skin once a week    insulin glargine (LANTUS SOLOSTAR) 100 UNIT/ML injection pen 5 pen 5     Sig: Inject 62 Units into the skin every morning    zolpidem (AMBIEN) 10 MG tablet 15 tablet 2     Sig: Take 0.5 tablets by mouth nightly as needed for Sleep for up to 90 days.         Michael Hopper MD  6/16/2020  10:01 AM

## 2020-06-17 ENCOUNTER — TELEPHONE (OUTPATIENT)
Dept: FAMILY MEDICINE CLINIC | Age: 70
End: 2020-06-17

## 2020-06-17 NOTE — TELEPHONE ENCOUNTER
Please let the patient know the blood work results showed    Labs showed elevated potassium level. Uncertain as to cause. Could be related to lab error or could be related to medication. Would recommend recheck    Labs also showed slight kidney change, could be related to dehydration    Magnesium level was low and would recommend magnesium oxide 400 mg daily    Cholesterol levels were fair and improved when compared to previous recommend to continue present medication and continue to watch diet. Sugar was elevated A1c for 3-month sugar control assessment was. Improved to 8.8 but still uncontrolled. Would recommend to continue current medication but would consider referral to endocrinology for opinion to better control sugar    1 liver function test was slightly elevated, borderline and of uncertain significance.   Would recheck with next blood work    Blood count was improved and back in normal range, still low end of normal and borderline to anemia    Iron levels were also improved and back in the normal range but still lower end of normal    Vitamin E46 and folic acid levels were normal    Other electrolytes were normal    Please send a copy of the blood work results to the patient    Thanks    Orders Placed This Encounter   Procedures    Basic Metabolic Panel     Standing Status:   Future     Standing Expiration Date:   6/17/2021     Repeat lab for potassium placed as above

## 2020-06-22 NOTE — TELEPHONE ENCOUNTER
Pt informed. Has already started on Magnesium Oxide. Will come in for repeat lab work. Does not want a referral to endocrinology at this time.

## 2020-07-28 ENCOUNTER — OFFICE VISIT (OUTPATIENT)
Dept: CHIROPRACTIC MEDICINE | Age: 70
End: 2020-07-28
Payer: MEDICARE

## 2020-07-28 VITALS — TEMPERATURE: 97.8 F | OXYGEN SATURATION: 96 % | HEART RATE: 83 BPM

## 2020-07-28 PROCEDURE — 98941 CHIROPRACT MANJ 3-4 REGIONS: CPT | Performed by: CHIROPRACTOR

## 2020-07-28 NOTE — PROGRESS NOTES
20  Jennifer Stephens : 1950 Sex: female  Age: 68 y.o. Chief Complaint   Patient presents with    Lower Back Pain       HPI:   Chintan Vazquez recently got back from [facility]. She states the drive there and back along with the activities while she was there is seem to have caused a mild flareup of her lower back pain. No specific injury. No rating pain to the extremities. No new issues otherwise. Current Outpatient Medications:     Dulaglutide 0.75 MG/0.5ML SOPN, Inject 0.75 mg into the skin once a week, Disp: 0.5 mL, Rfl: 2    insulin glargine (LANTUS SOLOSTAR) 100 UNIT/ML injection pen, Inject 62 Units into the skin every morning, Disp: 5 pen, Rfl: 5    zolpidem (AMBIEN) 10 MG tablet, Take 0.5 tablets by mouth nightly as needed for Sleep for up to 90 days. , Disp: 15 tablet, Rfl: 2    glipiZIDE (GLUCOTROL) 5 MG tablet, Take 2 tablets by mouth 2 times daily (before meals) 2 by mouth twice a day, Disp: 120 tablet, Rfl: 5    lisinopril (PRINIVIL;ZESTRIL) 20 MG tablet, Take 1 tablet by mouth daily, Disp: 30 tablet, Rfl: 5    atorvastatin (LIPITOR) 40 MG tablet, Take 1 tablet by mouth daily, Disp: 90 tablet, Rfl: 1    LORazepam (ATIVAN) 0.5 MG tablet, Take 0.5 mg by mouth nightly as needed (sleep). , Disp: , Rfl:     metFORMIN (GLUCOPHAGE) 1000 MG tablet, Take 1,000 mg by mouth 2 times daily (with meals), Disp: , Rfl:     Insulin Pen Needle (B-D UF III MINI PEN NEEDLES) 31G X 5 MM MISC, 1 each by Does not apply route daily, Disp: , Rfl:     omeprazole (PRILOSEC) 20 MG delayed release capsule, Take 20 mg by mouth daily, Disp: , Rfl:     Exam:   Vitals:    20 1105   Pulse: 83   Temp: 97.8 °F (36.6 °C)   SpO2: 96%     She appears well. No apparent distress. Point tenderness right SI joint. Mild midline pain L3-4 interspace. There are hypertonic and tender fibers noted today in the lumbar and thoracic paraspinal muscles.  Joint fixation is noted with motion screening at T10-12, L3-4 and right SI joint. Nancy Batista was seen today for lower back pain. Diagnoses and all orders for this visit:    Segmental and somatic dysfunction of lumbar region    Segmental and somatic dysfunction of pelvic region    Segmental and somatic dysfunction of thoracic region    Panniculitis affecting regions of neck and back, thoracic region    Sacrococcygeal disorders, not elsewhere classified    Other specified dorsopathies, lumbar region        Treatment Plan: Berry flexion distraction manipulation at L3, protocol 1. Mechanically assisted manipulation to the listed thoracic and SI segments. Tolerated well.   She can follow-up with me as needed for care      Seen By:  Jorge Luis Bruner

## 2020-08-03 ENCOUNTER — TELEPHONE (OUTPATIENT)
Dept: ADMINISTRATIVE | Age: 70
End: 2020-08-03

## 2020-08-03 NOTE — TELEPHONE ENCOUNTER
Orders Placed This Encounter   Procedures   Lance Bashir DO, [facility] ([facility])     Referral Priority:   Routine     Referral Type:   Eval and Treat     Referral Reason:   Specialty Services Required     Referred to Provider:   Calvin Wilson DO     Requested Specialty:   Endocrinology     Number of Visits Requested:   1     Referral placed

## 2020-08-03 NOTE — TELEPHONE ENCOUNTER
Pt called and stated she would like to be referred to an endocrinologist.  She had discussed this with Dr. Shahriar Julian previously and did not want to schedule. She said she is having difficulty controlling blood sugars and would now like to be referred to [facility] endocrinology. She does not have a preference over which provider. Please contact pt.

## 2020-08-05 ENCOUNTER — OFFICE VISIT (OUTPATIENT)
Dept: CHIROPRACTIC MEDICINE | Age: 70
End: 2020-08-05
Payer: MEDICARE

## 2020-08-05 VITALS — TEMPERATURE: 97.8 F | HEART RATE: 100 BPM | OXYGEN SATURATION: 97 %

## 2020-08-05 PROCEDURE — 98940 CHIROPRACT MANJ 1-2 REGIONS: CPT | Performed by: CHIROPRACTOR

## 2020-08-05 NOTE — PROGRESS NOTES
20  Earl Desouza : 1950 Sex: female  Age: 68 y.o. Chief Complaint   Patient presents with    Lower Back Pain       HPI:    states a couple of evenings ago she was playing cards with her friends. She states she noticed a twinge in her lower back, which is gotten progressively worse of the past couple of days. She cannot recall an injury. States it does not travel down into the legs. She is reporting midline low back pain identified near L3-L5. Katharine Torres She has been using ice and ibuprofen, topical gels for relief. She rates her pain today at 6/10. Current Outpatient Medications:     Dulaglutide 0.75 MG/0.5ML SOPN, Inject 0.75 mg into the skin once a week, Disp: 0.5 mL, Rfl: 2    insulin glargine (LANTUS SOLOSTAR) 100 UNIT/ML injection pen, Inject 62 Units into the skin every morning, Disp: 5 pen, Rfl: 5    zolpidem (AMBIEN) 10 MG tablet, Take 0.5 tablets by mouth nightly as needed for Sleep for up to 90 days. , Disp: 15 tablet, Rfl: 2    glipiZIDE (GLUCOTROL) 5 MG tablet, Take 2 tablets by mouth 2 times daily (before meals) 2 by mouth twice a day, Disp: 120 tablet, Rfl: 5    lisinopril (PRINIVIL;ZESTRIL) 20 MG tablet, Take 1 tablet by mouth daily, Disp: 30 tablet, Rfl: 5    atorvastatin (LIPITOR) 40 MG tablet, Take 1 tablet by mouth daily, Disp: 90 tablet, Rfl: 1    LORazepam (ATIVAN) 0.5 MG tablet, Take 0.5 mg by mouth nightly as needed (sleep). , Disp: , Rfl:     metFORMIN (GLUCOPHAGE) 1000 MG tablet, Take 1,000 mg by mouth 2 times daily (with meals), Disp: , Rfl:     Insulin Pen Needle (B-D UF III MINI PEN NEEDLES) 31G X 5 MM MISC, 1 each by Does not apply route daily, Disp: , Rfl:     omeprazole (PRILOSEC) 20 MG delayed release capsule, Take 20 mg by mouth daily, Disp: , Rfl:     Exam:   Vitals:    20 0934   Pulse: 100   Temp: 97.8 °F (36.6 °C)   SpO2: 97%       She appears well. No apparent distress. Alert and oriented x3. Ambulating without assistance.   Reflexes are +1 and symmetrical throughout the lower extremities. Posterior tibial pulses 2/4. Sensation light touch WNL to the distal lower extremity dermatomes. Spinal contours are unchanged. She has increased pain with overpressure at L4 while prone (physician generated extension). Simulated flexion relieves pain. Sathya and Ely's tests are both negative. No sciatic notch tenderness  There are hypertonic and tender fibers noted today in the bilateral lumbar paraspinal muscles. Joint fixation is noted with motion screening at L3-4, bilateral SI joints. Monisha Nascimento was seen today for lower back pain. Diagnoses and all orders for this visit:    Segmental and somatic dysfunction of pelvic region    Segmental and somatic dysfunction of lumbar region    Strain of lumbar region, initial encounter    Sacrococcygeal disorders, not elsewhere classified        Treatment Plan: Possible mild strain in the lumbar region. Though notes are not available, I believe in the past she did have some degenerative changes of the disc at L3-4. For some reason the records of [facility] are not in her current chart. If symptoms persist we may consider updating her imaging. Today, I performed flexion distraction manipulation at L3, protocol 1. Mechanically assisted manipulation of the SI joints. Introduced seated lumbar flexion range of motion exercises. I want her doing 5-10 couple times per day along with her current self-care regimen.   She is going to see how she does over the next week and follow-up with me if symptoms persist.      Seen By:  Mandeep Bernstein

## 2020-08-11 ENCOUNTER — OFFICE VISIT (OUTPATIENT)
Dept: CHIROPRACTIC MEDICINE | Age: 70
End: 2020-08-11
Payer: MEDICARE

## 2020-08-11 VITALS — HEART RATE: 91 BPM | OXYGEN SATURATION: 95 % | TEMPERATURE: 97.8 F

## 2020-08-11 PROCEDURE — 98940 CHIROPRACT MANJ 1-2 REGIONS: CPT | Performed by: CHIROPRACTOR

## 2020-08-11 NOTE — PROGRESS NOTES
20  Dante Citizen : 1950 Sex: female  Age: 68 y.o. Chief Complaint   Patient presents with    Back Pain       HPI:   Pain is has improved. On average, pain is perceived as severe (6-8 pain scale). States she still some difficulty with sitting for too long, getting up from a seated position or doing a lot of walking. But she is feeling better since the other day. She had one episode of a shot of pain down her leg. Current Outpatient Medications:     Dulaglutide 0.75 MG/0.5ML SOPN, Inject 0.75 mg into the skin once a week, Disp: 0.5 mL, Rfl: 2    insulin glargine (LANTUS SOLOSTAR) 100 UNIT/ML injection pen, Inject 62 Units into the skin every morning, Disp: 5 pen, Rfl: 5    zolpidem (AMBIEN) 10 MG tablet, Take 0.5 tablets by mouth nightly as needed for Sleep for up to 90 days. , Disp: 15 tablet, Rfl: 2    glipiZIDE (GLUCOTROL) 5 MG tablet, Take 2 tablets by mouth 2 times daily (before meals) 2 by mouth twice a day, Disp: 120 tablet, Rfl: 5    lisinopril (PRINIVIL;ZESTRIL) 20 MG tablet, Take 1 tablet by mouth daily, Disp: 30 tablet, Rfl: 5    atorvastatin (LIPITOR) 40 MG tablet, Take 1 tablet by mouth daily, Disp: 90 tablet, Rfl: 1    LORazepam (ATIVAN) 0.5 MG tablet, Take 0.5 mg by mouth nightly as needed (sleep). , Disp: , Rfl:     metFORMIN (GLUCOPHAGE) 1000 MG tablet, Take 1,000 mg by mouth 2 times daily (with meals), Disp: , Rfl:     Insulin Pen Needle (B-D UF III MINI PEN NEEDLES) 31G X 5 MM MISC, 1 each by Does not apply route daily, Disp: , Rfl:     omeprazole (PRILOSEC) 20 MG delayed release capsule, Take 20 mg by mouth daily, Disp: , Rfl:     Exam:   Vitals:    20 1034   Pulse: 91   Temp: 97.8 °F (36.6 °C)   SpO2: 95%         There are hypertonic and tender fibers noted today in the bilateral lumbar paraspinal muscles. Joint fixation is noted with motion screening at L3-4, bilateral SI joints, L5-S1    Annella Nose was seen today for back pain.     Diagnoses and all orders for this visit:    Segmental and somatic dysfunction of pelvic region    Segmental and somatic dysfunction of lumbar region    Strain of lumbar region, initial encounter    Sacrococcygeal disorders, not elsewhere classified        Treatment Plan: Berry flexion distraction manipulation at L3, protocol 1. Mechanically assisted manipulation to the SI joints and L5-S1. Tolerated well. Encouraged her to continue with her walking and lumbar exercises.   I will see her back as needed      Seen By:  Dunia Cedillo

## 2020-08-27 ENCOUNTER — TELEPHONE (OUTPATIENT)
Dept: FAMILY MEDICINE CLINIC | Age: 70
End: 2020-08-27

## 2020-08-27 NOTE — TELEPHONE ENCOUNTER
She is calling about a DWO form from [address] [place] [address] for her testing supplies. They tell her that it was sent.

## 2020-08-27 NOTE — TELEPHONE ENCOUNTER
We did not write for testing supplies. I called the pharmacy, they faxed the request to [facility] on 8/19/20. Pt has been updated.

## 2020-09-16 ENCOUNTER — OFFICE VISIT (OUTPATIENT)
Dept: FAMILY MEDICINE CLINIC | Age: 70
End: 2020-09-16
Payer: MEDICARE

## 2020-09-16 VITALS
HEART RATE: 71 BPM | TEMPERATURE: 97.3 F | OXYGEN SATURATION: 98 % | BODY MASS INDEX: 34.27 KG/M2 | WEIGHT: 193.38 LBS | HEIGHT: 63 IN | DIASTOLIC BLOOD PRESSURE: 60 MMHG | SYSTOLIC BLOOD PRESSURE: 110 MMHG

## 2020-09-16 PROCEDURE — G8417 CALC BMI ABV UP PARAM F/U: HCPCS | Performed by: INTERNAL MEDICINE

## 2020-09-16 PROCEDURE — 2022F DILAT RTA XM EVC RTNOPTHY: CPT | Performed by: INTERNAL MEDICINE

## 2020-09-16 PROCEDURE — 4040F PNEUMOC VAC/ADMIN/RCVD: CPT | Performed by: INTERNAL MEDICINE

## 2020-09-16 PROCEDURE — 99214 OFFICE O/P EST MOD 30 MIN: CPT | Performed by: INTERNAL MEDICINE

## 2020-09-16 PROCEDURE — 3052F HG A1C>EQUAL 8.0%<EQUAL 9.0%: CPT | Performed by: INTERNAL MEDICINE

## 2020-09-16 PROCEDURE — 1090F PRES/ABSN URINE INCON ASSESS: CPT | Performed by: INTERNAL MEDICINE

## 2020-09-16 PROCEDURE — 1036F TOBACCO NON-USER: CPT | Performed by: INTERNAL MEDICINE

## 2020-09-16 PROCEDURE — G8427 DOCREV CUR MEDS BY ELIG CLIN: HCPCS | Performed by: INTERNAL MEDICINE

## 2020-09-16 PROCEDURE — G8400 PT W/DXA NO RESULTS DOC: HCPCS | Performed by: INTERNAL MEDICINE

## 2020-09-16 PROCEDURE — 1123F ACP DISCUSS/DSCN MKR DOCD: CPT | Performed by: INTERNAL MEDICINE

## 2020-09-16 PROCEDURE — 3017F COLORECTAL CA SCREEN DOC REV: CPT | Performed by: INTERNAL MEDICINE

## 2020-09-16 RX ORDER — ZOLPIDEM TARTRATE 10 MG/1
TABLET ORAL
COMMUNITY
End: 2020-09-16 | Stop reason: SDUPTHER

## 2020-09-16 RX ORDER — LISINOPRIL 20 MG/1
20 TABLET ORAL DAILY
Qty: 90 TABLET | Refills: 1 | Status: SHIPPED
Start: 2020-09-16 | End: 2021-04-01 | Stop reason: SDUPTHER

## 2020-09-16 RX ORDER — ATORVASTATIN CALCIUM 40 MG/1
40 TABLET, FILM COATED ORAL DAILY
Qty: 90 TABLET | Refills: 1 | Status: SHIPPED
Start: 2020-09-16 | End: 2021-04-01 | Stop reason: SDUPTHER

## 2020-09-16 RX ORDER — INSULIN LISPRO 100 [IU]/ML
INJECTION, SOLUTION SUBCUTANEOUS 3 TIMES DAILY
COMMUNITY
End: 2021-04-01 | Stop reason: SDUPTHER

## 2020-09-16 RX ORDER — ZOLPIDEM TARTRATE 10 MG/1
10 TABLET ORAL NIGHTLY PRN
Qty: 90 TABLET | Refills: 0 | Status: SHIPPED | OUTPATIENT
Start: 2020-09-16 | End: 2020-12-01 | Stop reason: SDUPTHER

## 2020-09-16 ASSESSMENT — ENCOUNTER SYMPTOMS
COUGH: 0
SHORTNESS OF BREATH: 1
CONSTIPATION: 0
BLOOD IN STOOL: 0
EYE PAIN: 0
VOMITING: 0
CHEST TIGHTNESS: 0
RHINORRHEA: 0
DIARRHEA: 0
NAUSEA: 0
ABDOMINAL PAIN: 0
SORE THROAT: 0

## 2020-09-16 NOTE — PROGRESS NOTES
mouth nightly as needed for Sleep for up to 90 days. , Disp: 15 tablet, Rfl: 2    LORazepam (ATIVAN) 0.5 MG tablet, Take 0.5 mg by mouth nightly as needed (sleep). , Disp: , Rfl:     omeprazole (PRILOSEC) 20 MG delayed release capsule, Take 20 mg by mouth daily, Disp: , Rfl:     No Known Allergies    Past Medical History:   Diagnosis Date    Class 2 obesity due to excess calories without serious comorbidity with body mass index (BMI) of 35.0 to 35.9 in adult 2019    Essential hypertension 2019    GERD (gastroesophageal reflux disease)     H pylori ulcer     Hypomagnesemia 2019    Mixed hyperlipidemia 2019    Other insomnia 2019    Peptic ulcer disease     Type 2 diabetes mellitus without complication [facility]     Vitamin D deficiency 2019       Past Surgical History:   Procedure Laterality Date    BUNIONECTOMY Left     HYSTERECTOMY, VAGINAL      partial in the 80s then bilateral oopherectome 2016    RETINAL DETACHMENT SURGERY Right     vitreous surgery    TUBAL LIGATION         Family History   Problem Relation Age of Onset    Diabetes Mother     Stroke Mother     Kidney Disease Father         Renal Failure       Social History     Socioeconomic History    Marital status:      Spouse name: Alireza Dick Number of children: 2    Years of education: 15    Highest education level: High school graduate   Occupational History    None   Social Needs    Financial resource strain: Not hard at all   [place] insecurity     Worry: Never true     Inability: Never true    Transportation needs     Medical: No     Non-medical: No   Tobacco Use    Smoking status: Former Smoker     Packs/day: 1.00     Years: 12.00     Pack years: 12.00     Types: Cigarettes     Last attempt to quit: 1982     Years since quittin.6    Smokeless tobacco: Never Used   Substance and Sexual Activity    Alcohol use: Yes     Comment: rare    Drug use: None    Sexual activity: None Lifestyle    Physical activity     Days per week: None     Minutes per session: None    Stress: None   Relationships    Social connections     Talks on phone: None     Gets together: None     Attends [employer] service: None     Active member of club or organization: None     Attends meetings of clubs or organizations: None     Relationship status: None    Intimate partner violence     Fear of current or ex partner: None     Emotionally abused: None     Physically abused: None     Forced sexual activity: None   Other Topics Concern    None   Social History Narrative    None        Vitals:    09/16/20 0935   BP: 110/60   Pulse: 71   Temp: 97.3 °F (36.3 °C)   SpO2: 98%   Weight: 193 lb 6 oz (87.7 kg)   Height: 5' 3\" (1.6 m)       Exam:  Physical Exam  Constitutional:       Appearance: She is well-developed. HENT:      Head: Normocephalic and atraumatic. Right Ear: External ear normal.      Left Ear: External ear normal.   Eyes:      Pupils: Pupils are equal, round, and reactive to light. Neck:      Musculoskeletal: Normal range of motion and neck supple. Thyroid: No thyromegaly. Vascular: Carotid bruit present. Cardiovascular:      Rate and Rhythm: Normal rate and regular rhythm. Heart sounds: Murmur present. Systolic murmur present with a grade of 2/6. Pulmonary:      Effort: Pulmonary effort is normal.      Breath sounds: Normal breath sounds. No wheezing. Abdominal:      General: Bowel sounds are normal. There is no distension. Palpations: Abdomen is soft. Tenderness: There is no abdominal tenderness. There is no guarding or rebound. Musculoskeletal: Normal range of motion. Lymphadenopathy:      Cervical: No cervical adenopathy. Skin:     General: Skin is warm and dry. Neurological:      Mental Status: She is alert and oriented to person, place, and time. Cranial Nerves: No cranial nerve deficit.    Psychiatric:         Judgment: Judgment normal. supplement   - follow labs     Hypomagnesemia  - Continue present treatment   - on otc supplement   - follow labs     Elevated LFTs  - uncertain etiology at present   - recheck labs - last lab (2/2020) - improved     Murmur  - will need to consider echo     Bilateral carotid bruits  - will need to check US - declines at present     Return in about 3 months (around 12/16/2020) for check up and review. Orders Placed This Encounter   Procedures    Comprehensive Metabolic Panel     Standing Status:   Future     Standing Expiration Date:   9/16/2021    Magnesium     Standing Status:   Future     Standing Expiration Date:   9/16/2021    Lipid, Fasting     Standing Status:   Future     Standing Expiration Date:   9/16/2021    Hemoglobin A1C     Standing Status:   Future     Standing Expiration Date:   9/16/2021    Vitamin D 25 Hydroxy     Standing Status:   Future     Standing Expiration Date:   9/16/2021    TSH without Reflex     Standing Status:   Future     Standing Expiration Date:   12/16/2020    Urinalysis     Standing Status:   Future     Standing Expiration Date:   9/16/2021    Microalbumin, Ur     Standing Status:   Future     Standing Expiration Date:   9/16/2021    CBC Auto Differential     Standing Status:   Future     Standing Expiration Date:   9/16/2021    Iron and TIBC     Standing Status:   Future     Standing Expiration Date:   9/16/2021     Order Specific Question:   Is Patient Fasting? Answer:   yes     Order Specific Question:   No of Hours?      Answer:   8    Ferritin     Standing Status:   Future     Standing Expiration Date:   9/16/2021     Requested Prescriptions     Signed Prescriptions Disp Refills    lisinopril (PRINIVIL;ZESTRIL) 20 MG tablet 90 tablet 1     Sig: Take 1 tablet by mouth daily    atorvastatin (LIPITOR) 40 MG tablet 90 tablet 1     Sig: Take 1 tablet by mouth daily    zolpidem (AMBIEN) 10 MG tablet 90 tablet 0     Sig: Take 1 tablet by mouth nightly as needed for Sleep for up to 90 days.         Ching Wolrey MD  9/16/2020  12:05 PM

## 2020-09-17 ENCOUNTER — OFFICE VISIT (OUTPATIENT)
Dept: CHIROPRACTIC MEDICINE | Age: 70
End: 2020-09-17
Payer: MEDICARE

## 2020-09-17 VITALS — OXYGEN SATURATION: 97 % | TEMPERATURE: 96.2 F | HEART RATE: 73 BPM

## 2020-09-17 PROCEDURE — 98940 CHIROPRACT MANJ 1-2 REGIONS: CPT | Performed by: CHIROPRACTOR

## 2020-09-17 NOTE — PROGRESS NOTES
20  Shania Torres : 1950 Sex: female  Age: 68 y.o. Chief Complaint   Patient presents with    Hip Pain     right hip - having some pain        HPI:   She has had a worsening of lower back pain over the past couple of weeks. She believes this is due to lack of exercise and sitting too much. No specific injury reported. Pain is across the low back, slightly worse left today. Pain goes into the right hip and will travel to the right posterior thigh, consistent with previous episodes. She leaves for [facility] in a couple of days. Wanted some relief before they left. Current Outpatient Medications:     insulin lispro, 0.5 Unit Dial, (HUMALOG ANDREA KWIKPEN) 100 UNIT/ML SOPN, Inject into the skin 3 times daily Sliding Scale 2-3 times a day, Disp: , Rfl:     lisinopril (PRINIVIL;ZESTRIL) 20 MG tablet, Take 1 tablet by mouth daily, Disp: 90 tablet, Rfl: 1    atorvastatin (LIPITOR) 40 MG tablet, Take 1 tablet by mouth daily, Disp: 90 tablet, Rfl: 1    zolpidem (AMBIEN) 10 MG tablet, Take 1 tablet by mouth nightly as needed for Sleep for up to 90 days. , Disp: 90 tablet, Rfl: 0    insulin glargine (LANTUS SOLOSTAR) 100 UNIT/ML injection pen, Inject 62 Units into the skin every morning, Disp: 5 pen, Rfl: 5    zolpidem (AMBIEN) 10 MG tablet, Take 0.5 tablets by mouth nightly as needed for Sleep for up to 90 days. , Disp: 15 tablet, Rfl: 2    LORazepam (ATIVAN) 0.5 MG tablet, Take 0.5 mg by mouth nightly as needed (sleep). , Disp: , Rfl:     metFORMIN (GLUCOPHAGE) 1000 MG tablet, Take 1,000 mg by mouth 2 times daily (with meals), Disp: , Rfl:     Insulin Pen Needle (B-D UF III MINI PEN NEEDLES) 31G X 5 MM MISC, 1 each by Does not apply route daily, Disp: , Rfl:     omeprazole (PRILOSEC) 20 MG delayed release capsule, Take 20 mg by mouth daily, Disp: , Rfl:     Exam:   Vitals:    20 1129   Pulse: 73   Temp: 96.2 °F (35.7 °C)   SpO2: 97%         There are hypertonic and tender fibers noted today in the bilateral lumbar paraspinal muscles. Joint fixation is noted with motion screening at L3-4, bilateral SI joints. Ray Bermudez was seen today for hip pain. Diagnoses and all orders for this visit:    Segmental and somatic dysfunction of pelvic region    Segmental and somatic dysfunction of lumbar region    Sacrococcygeal disorders, not elsewhere classified    Other specified dorsopathies, lumbar region        Treatment Plan: Continued Berry flexion distraction manipulation today at L3, protocol 1. Mechanically assisted manipulation to the SI joints. Tolerated well. I will see her back as needed for care.       Seen By:  Ирина Martínez DC

## 2020-10-15 ENCOUNTER — OFFICE VISIT (OUTPATIENT)
Dept: CHIROPRACTIC MEDICINE | Age: 70
End: 2020-10-15
Payer: MEDICARE

## 2020-10-15 VITALS — OXYGEN SATURATION: 97 % | TEMPERATURE: 97.8 F | HEART RATE: 88 BPM

## 2020-10-15 PROCEDURE — 98940 CHIROPRACT MANJ 1-2 REGIONS: CPT | Performed by: CHIROPRACTOR

## 2020-10-15 NOTE — PROGRESS NOTES
10/15/20  Barbara Lozano : 1950 Sex: female  Age: 68 y.o. Chief Complaint   Patient presents with    Back Pain     lower    Hip Pain     right       HPI:   She got good relief from her last treatment-was able to travel to [facility] without much additional pain. She was there for a while, came home and self quarantine for 10 or 11 days. Had no issues. But the travel in the sitting around seem to have aggravated the low back pain once again. No new issues. She is describing midline low back pain and right gluteal pain today. Current Outpatient Medications:     insulin lispro, 0.5 Unit Dial, (HUMALOG ANDREA KWIKPEN) 100 UNIT/ML SOPN, Inject into the skin 3 times daily Sliding Scale 2-3 times a day, Disp: , Rfl:     lisinopril (PRINIVIL;ZESTRIL) 20 MG tablet, Take 1 tablet by mouth daily, Disp: 90 tablet, Rfl: 1    atorvastatin (LIPITOR) 40 MG tablet, Take 1 tablet by mouth daily, Disp: 90 tablet, Rfl: 1    zolpidem (AMBIEN) 10 MG tablet, Take 1 tablet by mouth nightly as needed for Sleep for up to 90 days. , Disp: 90 tablet, Rfl: 0    insulin glargine (LANTUS SOLOSTAR) 100 UNIT/ML injection pen, Inject 62 Units into the skin every morning, Disp: 5 pen, Rfl: 5    zolpidem (AMBIEN) 10 MG tablet, Take 0.5 tablets by mouth nightly as needed for Sleep for up to 90 days. , Disp: 15 tablet, Rfl: 2    LORazepam (ATIVAN) 0.5 MG tablet, Take 0.5 mg by mouth nightly as needed (sleep). , Disp: , Rfl:     metFORMIN (GLUCOPHAGE) 1000 MG tablet, Take 1,000 mg by mouth 2 times daily (with meals), Disp: , Rfl:     Insulin Pen Needle (B-D UF III MINI PEN NEEDLES) 31G X 5 MM MISC, 1 each by Does not apply route daily, Disp: , Rfl:     omeprazole (PRILOSEC) 20 MG delayed release capsule, Take 20 mg by mouth daily, Disp: , Rfl:     Exam:   Vitals:    10/15/20 1056   Pulse: 88   Temp: 97.8 °F (36.6 °C)   SpO2: 97%       There is mild midline pain with palpation L3-4. Tenderness over the right SI joint.   She appears well otherwise. No apparent distress. There are hypertonic and tender fibers noted today in the bilateral lumbar paraspinal muscles. Joint fixation is noted with motion screening at L3-4, right SI joint. Royanne Klinefelter was seen today for back pain and hip pain. Diagnoses and all orders for this visit:    Segmental and somatic dysfunction of pelvic region    Segmental and somatic dysfunction of lumbar region    Sacrococcygeal disorders, not elsewhere classified    Other specified dorsopathies, lumbar region        Treatment Plan: Continued Berry flexion distraction manipulation at L3, protocol 1. Mechanically assisted manipulation of the SI joint. Tolerated well. She noted relief following care.   She can follow-up with me on an as-needed basis      Seen By:  Eldon Julian DC

## 2020-11-18 LAB
AVERAGE GLUCOSE: NORMAL
HBA1C MFR BLD: 7.6 %

## 2020-12-01 ENCOUNTER — OFFICE VISIT (OUTPATIENT)
Dept: FAMILY MEDICINE CLINIC | Age: 70
End: 2020-12-01
Payer: MEDICARE

## 2020-12-01 VITALS
BODY MASS INDEX: 34.73 KG/M2 | SYSTOLIC BLOOD PRESSURE: 134 MMHG | HEART RATE: 82 BPM | HEIGHT: 63 IN | DIASTOLIC BLOOD PRESSURE: 60 MMHG | OXYGEN SATURATION: 98 % | WEIGHT: 196 LBS | TEMPERATURE: 97.5 F

## 2020-12-01 PROCEDURE — 4040F PNEUMOC VAC/ADMIN/RCVD: CPT | Performed by: INTERNAL MEDICINE

## 2020-12-01 PROCEDURE — G8484 FLU IMMUNIZE NO ADMIN: HCPCS | Performed by: INTERNAL MEDICINE

## 2020-12-01 PROCEDURE — G8417 CALC BMI ABV UP PARAM F/U: HCPCS | Performed by: INTERNAL MEDICINE

## 2020-12-01 PROCEDURE — G8400 PT W/DXA NO RESULTS DOC: HCPCS | Performed by: INTERNAL MEDICINE

## 2020-12-01 PROCEDURE — 2022F DILAT RTA XM EVC RTNOPTHY: CPT | Performed by: INTERNAL MEDICINE

## 2020-12-01 PROCEDURE — 1036F TOBACCO NON-USER: CPT | Performed by: INTERNAL MEDICINE

## 2020-12-01 PROCEDURE — G8427 DOCREV CUR MEDS BY ELIG CLIN: HCPCS | Performed by: INTERNAL MEDICINE

## 2020-12-01 PROCEDURE — 3017F COLORECTAL CA SCREEN DOC REV: CPT | Performed by: INTERNAL MEDICINE

## 2020-12-01 PROCEDURE — 1090F PRES/ABSN URINE INCON ASSESS: CPT | Performed by: INTERNAL MEDICINE

## 2020-12-01 PROCEDURE — 1123F ACP DISCUSS/DSCN MKR DOCD: CPT | Performed by: INTERNAL MEDICINE

## 2020-12-01 PROCEDURE — 3051F HG A1C>EQUAL 7.0%<8.0%: CPT | Performed by: INTERNAL MEDICINE

## 2020-12-01 PROCEDURE — 99214 OFFICE O/P EST MOD 30 MIN: CPT | Performed by: INTERNAL MEDICINE

## 2020-12-01 RX ORDER — ZOLPIDEM TARTRATE 10 MG/1
10 TABLET ORAL NIGHTLY PRN
Qty: 90 TABLET | Refills: 0 | Status: SHIPPED | OUTPATIENT
Start: 2020-12-01 | End: 2021-04-01 | Stop reason: SDUPTHER

## 2020-12-01 RX ORDER — INSULIN GLARGINE 100 [IU]/ML
65 INJECTION, SOLUTION SUBCUTANEOUS EVERY MORNING
Qty: 15 PEN | Refills: 1 | Status: SHIPPED
Start: 2020-12-01 | End: 2021-04-01 | Stop reason: SDUPTHER

## 2020-12-01 RX ORDER — INSULIN LISPRO 100 [IU]/ML
INJECTION, SOLUTION INTRAVENOUS; SUBCUTANEOUS
COMMUNITY
End: 2021-11-17 | Stop reason: SDUPTHER

## 2020-12-01 ASSESSMENT — ENCOUNTER SYMPTOMS
RHINORRHEA: 0
NAUSEA: 0
EYE PAIN: 0
CHEST TIGHTNESS: 0
BLOOD IN STOOL: 0
CONSTIPATION: 0
SORE THROAT: 0
SHORTNESS OF BREATH: 1
DIARRHEA: 0
VOMITING: 0
ABDOMINAL PAIN: 0
COUGH: 0

## 2020-12-01 NOTE — PROGRESS NOTES
[facility]) Physicians   Internal Medicine     2020  Jhon Valencia : 1950 Sex: female  Age:67 y.o. Chief Complaint   Patient presents with    3 Month Follow-Up    Hypertension        HPI:   Patient presents to office for review and management of chronic medical conditions.    - States still having fatigue.     - Labs showed anemia. States was placed on iron - self stopped due to constipation issues. Has seen GI - s/p EGD and colonoscopy. - States has been having issues with insomnia. Uncontrolled. States stopped taking lorazepam. States has been taking Ambien - decreased ambien. Still with issues sleeping. Discussed medications and interactions.     - States has diabetes. States trying to watch diet. States has been uncontrolled, last A1c was 7.6. States lantus increased to 65 units daily, metformin 1000mg twice a day, added humalog slide scale. Stopped trulicity (cost), Endo stopped glipizide. States one reported hypoglycemia. On lisinopril. On pravastatin. States follows with optho     - States has high blood pressure. States occasioanlly checking blood pressure at home, has wrist cuff - 120's/60's. On lisinopril.     - States has high cholesterol. States trying to watch diet. On pravastatin. No reported side effects. Last labs (2020)     - States has had gastric ulcers and GERD. On omeprazole as needed. Stable. - States has vitamin D def. On otc supplement     - States has had low magnesium levels on supplement     - States stopped vit b12 supplement.     - States was told had elevated LFT and enlarged liver.      - last lab (2020)     Health Maintenance   - immunizations:   Influenza Vaccination - (), (20)  Pneumonia Vaccination  Zoster/Shingles Vaccine  Tetanus Vaccination    - Screenings:   Bone Density Scan   Pelvic/Pap Exam  Mammogram - declines     Colonoscopy - () hemorrhoids, diverticular disease, multiple polyps, tubular and hyperplastic per path, follow up in 3 years     EGD (6/20) -normal    ROS:  Review of Systems   Constitutional: Negative for appetite change, chills, fever and unexpected weight change. HENT: Negative for congestion, rhinorrhea and sore throat. Eyes: Negative for pain and visual disturbance. Respiratory: Positive for shortness of breath. Negative for cough and chest tightness. Cardiovascular: Negative for chest pain and palpitations. Gastrointestinal: Negative for abdominal pain, blood in stool, constipation, diarrhea, nausea and vomiting. Genitourinary: Negative for difficulty urinating, dysuria, frequency, pelvic pain, urgency and vaginal bleeding. Musculoskeletal: Negative for arthralgias and myalgias. Skin: Negative for rash. Neurological: Negative for dizziness, syncope, weakness, light-headedness, numbness and headaches. Hematological: Negative for adenopathy. Psychiatric/Behavioral: Negative for suicidal ideas. The patient is not nervous/anxious. Current Outpatient Medications:     insulin lispro, 1 Unit Dial, (HUMALOG KWIKPEN) 100 UNIT/ML SOPN, Humalog KwikPen (U-100) Insulin 100 unit/mL subcutaneous  Inject up to 12 units QAC TID, Disp: , Rfl:     zolpidem (AMBIEN) 10 MG tablet, Take 1 tablet by mouth nightly as needed for Sleep for up to 90 days. , Disp: 90 tablet, Rfl: 0    insulin glargine (LANTUS SOLOSTAR) 100 UNIT/ML injection pen, Inject 65 Units into the skin every morning, Disp: 15 pen, Rfl: 1    insulin lispro, 0.5 Unit Dial, (HUMALOG ANDREA KWIKPEN) 100 UNIT/ML SOPN, Inject into the skin 3 times daily Sliding Scale 2-3 times a day, Disp: , Rfl:     lisinopril (PRINIVIL;ZESTRIL) 20 MG tablet, Take 1 tablet by mouth daily, Disp: 90 tablet, Rfl: 1    atorvastatin (LIPITOR) 40 MG tablet, Take 1 tablet by mouth daily, Disp: 90 tablet, Rfl: 1    metFORMIN (GLUCOPHAGE) 1000 MG tablet, Take 1,000 mg by mouth 2 times daily (with meals), Disp: , Rfl:     Insulin Pen Needle (B-D UF III MINI PEN NEEDLES) 31G X 5 MM MISC, 1 each by Does not apply route daily, Disp: , Rfl:     No Known Allergies    Past Medical History:   Diagnosis Date    Class 2 obesity due to excess calories without serious comorbidity with body mass index (BMI) of 35.0 to 35.9 in adult 2019    Essential hypertension 2019    GERD (gastroesophageal reflux disease)     H pylori ulcer     Hypomagnesemia 2019    Mixed hyperlipidemia 2019    Other insomnia 2019    Peptic ulcer disease     Type [facility]     Vitamin D deficiency 2019       Past Surgical History:   Procedure Laterality Date    BUNIONECTOMY Left     HYSTERECTOMY, VAGINAL      partial in the 80s then bilateral oopherectome 2016    RETINAL DETACHMENT SURGERY Right     vitreous surgery    TUBAL LIGATION         Family History   Problem Relation Age of Onset    Diabetes Mother     Stroke Mother     Kidney Disease Father         Renal Failure       Social History     Socioeconomic History    Marital status:      Spouse name: Nuvia Abad Number of children: 2    Years of education: 15    Highest education level: High school graduate   Occupational History    Not on file   Social Needs    Financial resource strain: Not hard at all   [place] insecurity     Worry: Never true     Inability: Never true    Transportation needs     Medical: No     Non-medical: No   Tobacco Use    Smoking status: Former Smoker     Packs/day: 1.00     Years: 12.00     Pack years: 12.00     Types: Cigarettes     Last attempt to quit: 1982     Years since quittin.8    Smokeless tobacco: Never Used   Substance and Sexual Activity    Alcohol use: Yes     Comment: rare    Drug use: Not on file    Sexual activity: Not on file   Lifestyle    Physical activity     Days per week: Not on file     Minutes per session: Not on file    Stress: Not on file   Relationships    Social connections     Talks on phone: Not on file     Gets together: Not on file     Attends [employer] service: Not on file     Active member of club or organization: Not on file     Attends meetings of clubs or organizations: Not on file     Relationship status: Not on file    Intimate partner violence     Fear of current or ex partner: Not on file     Emotionally abused: Not on file     Physically abused: Not on file     Forced sexual activity: Not on file   Other Topics Concern    Not on file   Social History Narrative    Not on file        Vitals:    12/01/20 1431   BP: 134/60   Pulse: 82   Temp: 97.5 °F (36.4 °C)   SpO2: 98%   Weight: 196 lb (88.9 kg)   Height: 5' 3\" (1.6 m)       Exam:  Physical Exam  Constitutional:       Appearance: She is well-developed. HENT:      Head: Normocephalic and atraumatic. Right Ear: External ear normal.      Left Ear: External ear normal.   Eyes:      Pupils: Pupils are equal, round, and reactive to light. Neck:      Musculoskeletal: Normal range of motion and neck supple. Thyroid: No thyromegaly. Vascular: Carotid bruit present. Cardiovascular:      Rate and Rhythm: Normal rate and regular rhythm. Heart sounds: Murmur present. Systolic murmur present with a grade of 2/6. Pulmonary:      Effort: Pulmonary effort is normal.      Breath sounds: Normal breath sounds. No wheezing. Abdominal:      General: Bowel sounds are normal. There is no distension. Palpations: Abdomen is soft. Tenderness: There is no abdominal tenderness. There is no guarding or rebound. Musculoskeletal: Normal range of motion. Lymphadenopathy:      Cervical: No cervical adenopathy. Skin:     General: Skin is warm and dry. Neurological:      Mental Status: She is alert and oriented to person, place, and time. Cranial Nerves: No cranial nerve deficit.    Psychiatric:         Judgment: Judgment normal.         Assessment and Plan:    Jessa Branham was seen today for diabetes, other and insomnia. Diagnoses and all orders for this visit:    Anemia  - uncertain cause   - M57 and folic acid was normal   - following with GI   - s/p  EGD and colonoscopy (6/2020) - polyps   - has not tolerated iron - constipation - not helped with stool softener - stopped   - last lab improved (6/2020)     Type 2 diabetes mellitus with hyperglycemia, with long-term current use of insulin (HCC)  - Continue present treatment   - watch diet   - monitor blood sugar at home, goals fasting am <120, 2 hours post meal < 180  - on lantus 65 units   - on metformin 1000mg bid  - endocrinology added humalog slide scale - increased last visit.    - stopped glipizide   - stopped trulicity due to cost   - follow A1c - 7.6 (11/2020)   - now following with endocrinology     On ACE  On statin   Not on aspirin - h/o PUD   Needs follow up with optho     Essential hypertension  - Continue present treatment   - watch diet   - monitor blood pressure at home   - on lisinopril   - imporved today      Mixed hyperlipidemia  - Continue present treatment   - watch diet   - stop pravastatin for higher intensity statin   - on atorvastatin   - follow labs     Gastroesophageal reflux disease without esophagitis  - Continue present treatment   - watch diet   - h/o PUD   - on omeprazole prn   - on tums as needed     Other insomnia  - Continue present treatment   - states has had sleep study in the past - did nto tolerate   - On Ambien  - weaned off  lorazepam   - discussed did not recommend these medications   - continue Ambien for now   - sleep habits handout provided   - consider referral to sleep medicine     Class 1 obesity due to excess calories without serious comorbidity with body mass index (BMI) of 34.0 to 34.9 in adult  - Continue present treatment   - watch diet   - discussed diet and exercises     Vitamin D deficiency  - Continue present treatment   - on otc supplement   - follow labs     Hypomagnesemia  - Continue present treatment   - on otc supplement   - follow labs     Elevated LFTs  - uncertain etiology at present   - recheck labs - last lab (2/2020) - improved     Murmur  - will need to consider echo     Bilateral carotid bruits  - will need to check US - declines at present     Return in about 3 months (around 3/1/2021) for check up and review and labs. No orders of the defined types were placed in this encounter. Requested Prescriptions     Signed Prescriptions Disp Refills    zolpidem (AMBIEN) 10 MG tablet 90 tablet 0     Sig: Take 1 tablet by mouth nightly as needed for Sleep for up to 90 days.     insulin glargine (LANTUS SOLOSTAR) 100 UNIT/ML injection pen 15 pen 1     Sig: Inject 65 Units into the skin every morning        Christian Maddox MD  12/1/2020  4:15 PM

## 2020-12-02 ENCOUNTER — OFFICE VISIT (OUTPATIENT)
Dept: CHIROPRACTIC MEDICINE | Age: 70
End: 2020-12-02
Payer: MEDICARE

## 2020-12-02 VITALS
HEIGHT: 63 IN | HEART RATE: 82 BPM | WEIGHT: 196 LBS | OXYGEN SATURATION: 97 % | BODY MASS INDEX: 34.73 KG/M2 | TEMPERATURE: 98.1 F | RESPIRATION RATE: 16 BRPM

## 2020-12-02 PROCEDURE — 98941 CHIROPRACT MANJ 3-4 REGIONS: CPT | Performed by: CHIROPRACTOR

## 2020-12-02 NOTE — PROGRESS NOTES
20  Alburgh Slim : 1950 Sex: female  Age: 65 y.o. Chief Complaint   Patient presents with    Hip Pain     Left Hip Pain        HPI:   Adriana Price returns today. She states for the past week or so her low back/right hip has gotten worse for no apparent reason. Is traveling down the right posterior thigh, not past the knee. She states she was playing cards the other night and particularly noted it as being worse. She tried doing some of her stretches, use some topical gels and it has not gone away. Decided to get back in for some care. She has no new issues reportedly. Current Outpatient Medications:     insulin lispro, 1 Unit Dial, (HUMALOG KWIKPEN) 100 UNIT/ML SOPN, Humalog KwikPen (U-100) Insulin 100 unit/mL subcutaneous  Inject up to 12 units QAC TID, Disp: , Rfl:     zolpidem (AMBIEN) 10 MG tablet, Take 1 tablet by mouth nightly as needed for Sleep for up to 90 days. , Disp: 90 tablet, Rfl: 0    insulin glargine (LANTUS SOLOSTAR) 100 UNIT/ML injection pen, Inject 65 Units into the skin every morning, Disp: 15 pen, Rfl: 1    insulin lispro, 0.5 Unit Dial, (HUMALOG ANDREA KWIKPEN) 100 UNIT/ML SOPN, Inject into the skin 3 times daily Sliding Scale 2-3 times a day, Disp: , Rfl:     lisinopril (PRINIVIL;ZESTRIL) 20 MG tablet, Take 1 tablet by mouth daily, Disp: 90 tablet, Rfl: 1    atorvastatin (LIPITOR) 40 MG tablet, Take 1 tablet by mouth daily, Disp: 90 tablet, Rfl: 1    metFORMIN (GLUCOPHAGE) 1000 MG tablet, Take 1,000 mg by mouth 2 times daily (with meals), Disp: , Rfl:     Insulin Pen Needle (B-D UF III MINI PEN NEEDLES) 31G X 5 MM MISC, 1 each by Does not apply route daily, Disp: , Rfl:     Exam:   Vitals:    20 1258   Pulse: 82   Resp: 16   Temp: 98.1 °F (36.7 °C)   SpO2: 97%     Ambulating without assistance. No antalgia or list.  There is point tenderness over the right SI joint today.     There are hypertonic and tender fibers noted today in the thoracic and lumbar

## 2021-02-12 DIAGNOSIS — E78.2 MIXED HYPERLIPIDEMIA: ICD-10-CM

## 2021-02-12 DIAGNOSIS — E55.9 VITAMIN D DEFICIENCY: ICD-10-CM

## 2021-02-12 DIAGNOSIS — E87.5 HYPERKALEMIA: ICD-10-CM

## 2021-02-12 DIAGNOSIS — R53.83 OTHER FATIGUE: ICD-10-CM

## 2021-02-12 DIAGNOSIS — E11.65 TYPE 2 DIABETES MELLITUS WITH HYPERGLYCEMIA, WITH LONG-TERM CURRENT USE OF INSULIN (HCC): ICD-10-CM

## 2021-02-12 DIAGNOSIS — Z79.4 TYPE 2 DIABETES MELLITUS WITH HYPERGLYCEMIA, WITH LONG-TERM CURRENT USE OF INSULIN (HCC): ICD-10-CM

## 2021-02-12 DIAGNOSIS — D64.9 ANEMIA, UNSPECIFIED TYPE: ICD-10-CM

## 2021-02-12 DIAGNOSIS — E83.42 HYPOMAGNESEMIA: ICD-10-CM

## 2021-02-12 LAB
ALBUMIN SERPL-MCNC: 3.8 G/DL (ref 3.5–5.2)
ALP BLD-CCNC: 57 U/L (ref 35–104)
ALT SERPL-CCNC: 25 U/L (ref 0–32)
ANION GAP SERPL CALCULATED.3IONS-SCNC: 9 MMOL/L (ref 7–16)
AST SERPL-CCNC: 19 U/L (ref 0–31)
BACTERIA: ABNORMAL /HPF
BASOPHILS ABSOLUTE: 0.07 E9/L (ref 0–0.2)
BASOPHILS RELATIVE PERCENT: 0.8 % (ref 0–2)
BILIRUB SERPL-MCNC: 0.2 MG/DL (ref 0–1.2)
BILIRUBIN URINE: NEGATIVE
BLOOD, URINE: NEGATIVE
BUN BLDV-MCNC: 18 MG/DL (ref 8–23)
CALCIUM SERPL-MCNC: 9.4 MG/DL (ref 8.6–10.2)
CHLORIDE BLD-SCNC: 104 MMOL/L (ref 98–107)
CHOLESTEROL, FASTING: 124 MG/DL (ref 0–199)
CLARITY: CLEAR
CO2: 28 MMOL/L (ref 22–29)
COLOR: YELLOW
CREAT SERPL-MCNC: 0.8 MG/DL (ref 0.5–1)
EOSINOPHILS ABSOLUTE: 0.28 E9/L (ref 0.05–0.5)
EOSINOPHILS RELATIVE PERCENT: 3.1 % (ref 0–6)
EPITHELIAL CELLS, UA: ABNORMAL /HPF
FERRITIN: 19 NG/ML
FOLATE: >20 NG/ML (ref 4.8–24.2)
GFR AFRICAN AMERICAN: >60
GFR NON-AFRICAN AMERICAN: >60 ML/MIN/1.73
GLUCOSE BLD-MCNC: 138 MG/DL (ref 74–99)
GLUCOSE URINE: NEGATIVE MG/DL
HBA1C MFR BLD: 8.2 % (ref 4–5.6)
HCT VFR BLD CALC: 37.2 % (ref 34–48)
HDLC SERPL-MCNC: 53 MG/DL
HEMOGLOBIN: 11.9 G/DL (ref 11.5–15.5)
IMMATURE GRANULOCYTES #: 0.02 E9/L
IMMATURE GRANULOCYTES %: 0.2 % (ref 0–5)
IRON SATURATION: 32 % (ref 15–50)
IRON: 103 MCG/DL (ref 37–145)
KETONES, URINE: NEGATIVE MG/DL
LDL CHOLESTEROL CALCULATED: 50 MG/DL (ref 0–99)
LEUKOCYTE ESTERASE, URINE: ABNORMAL
LYMPHOCYTES ABSOLUTE: 3.23 E9/L (ref 1.5–4)
LYMPHOCYTES RELATIVE PERCENT: 35.8 % (ref 20–42)
MAGNESIUM: 1.3 MG/DL (ref 1.6–2.6)
MCH RBC QN AUTO: 29.5 PG (ref 26–35)
MCHC RBC AUTO-ENTMCNC: 32 % (ref 32–34.5)
MCV RBC AUTO: 92.1 FL (ref 80–99.9)
MICROALBUMIN UR-MCNC: <12 MG/L
MONOCYTES ABSOLUTE: 0.7 E9/L (ref 0.1–0.95)
MONOCYTES RELATIVE PERCENT: 7.8 % (ref 2–12)
NEUTROPHILS ABSOLUTE: 4.73 E9/L (ref 1.8–7.3)
NEUTROPHILS RELATIVE PERCENT: 52.3 % (ref 43–80)
NITRITE, URINE: NEGATIVE
PDW BLD-RTO: 12.4 FL (ref 11.5–15)
PH UA: 6 (ref 5–9)
PLATELET # BLD: 382 E9/L (ref 130–450)
PMV BLD AUTO: 10 FL (ref 7–12)
POTASSIUM SERPL-SCNC: 4.6 MMOL/L (ref 3.5–5)
PROTEIN UA: NEGATIVE MG/DL
RBC # BLD: 4.04 E12/L (ref 3.5–5.5)
RBC UA: ABNORMAL /HPF (ref 0–2)
SODIUM BLD-SCNC: 141 MMOL/L (ref 132–146)
SPECIFIC GRAVITY UA: 1.01 (ref 1–1.03)
TOTAL IRON BINDING CAPACITY: 320 MCG/DL (ref 250–450)
TOTAL PROTEIN: 6.8 G/DL (ref 6.4–8.3)
TRIGLYCERIDE, FASTING: 106 MG/DL (ref 0–149)
TSH SERPL DL<=0.05 MIU/L-ACNC: 3.07 UIU/ML (ref 0.27–4.2)
UROBILINOGEN, URINE: 0.2 E.U./DL
VITAMIN B-12: 1822 PG/ML (ref 211–946)
VITAMIN D 25-HYDROXY: 58 NG/ML (ref 30–100)
VLDLC SERPL CALC-MCNC: 21 MG/DL
WBC # BLD: 9 E9/L (ref 4.5–11.5)
WBC UA: >20 /HPF (ref 0–5)

## 2021-02-14 ENCOUNTER — TELEPHONE (OUTPATIENT)
Dept: FAMILY MEDICINE CLINIC | Age: 71
End: 2021-02-14

## 2021-02-14 NOTE — LETTER
[address] [place] [address] Drive  [place]   Phone: [phone]  Fax: Vick Gallegos MD        February 15, 2021     [address] [place] [facility] [address]      Dear Raymond Carrasco:    Below are the results from your recent visit:    Resulted Orders   Magnesium   Result Value Ref Range    Magnesium 1.3 (L) 1.6 - 2.6 mg/dL    Narrative    CALL  doctor   tel. [phone], FAX [phone]   Vitamin B12 & Folate   Result Value Ref Range    Vitamin B-12 1822 (H) 211 - 946 pg/mL    Folate >20.0 4.8 - 24.2 ng/mL    Narrative    CALL  doctor   tel. [phone], FAX [phone]   CBC Auto Differential   Result Value Ref Range    WBC 9.0 4.5 - 11.5 E9/L    RBC 4.04 3.50 - 5.50 E12/L    Hemoglobin 11.9 11.5 - 15.5 g/dL    Hematocrit 37.2 34.0 - 48.0 %    MCV 92.1 80.0 - 99.9 fL    MCH 29.5 26.0 - 35.0 pg    MCHC 32.0 32.0 - 34.5 %    RDW 12.4 11.5 - 15.0 fL    Platelets 179 802 - 540 E9/L    MPV 10.0 7.0 - 12.0 fL    Neutrophils % 52.3 43.0 - 80.0 %    Immature Granulocytes % 0.2 0.0 - 5.0 %    Lymphocytes % 35.8 20.0 - 42.0 %    Monocytes % 7.8 2.0 - 12.0 %    Eosinophils % 3.1 0.0 - 6.0 %    Basophils % 0.8 0.0 - 2.0 %    Neutrophils Absolute 4.73 1.80 - 7.30 E9/L    Immature Granulocytes # 0.02 E9/L    Lymphocytes Absolute 3.23 1.50 - 4.00 E9/L    Monocytes Absolute 0.70 0.10 - 0.95 E9/L    Eosinophils Absolute 0.28 0.05 - 0.50 E9/L    Basophils Absolute 0.07 0.00 - 0.20 E9/L    Narrative    CALL  doctor   tel. [phone], FAX [phone]   Ferritin   Result Value Ref Range    Ferritin 19 ng/mL      Comment:      FERRITIN Reference Ranges:  Adult Males   20 - 60 years:    30 - 400 ng/mL  Adult females 17 - 60 years:    13 - 150 ng/mL  Adults greater than 60 years:   no established reference range  Pediatrics:                     no established reference range      Narrative CALL  doctor   tel. [phone], FAX [phone]   Iron and TIBC   Result Value Ref Range    Iron 103 37 - 145 mcg/dL    TIBC 320 250 - 450 mcg/dL    Iron Saturation 32 15 - 50 %    Narrative    CALL  doctor   tel. [phone], FAX [phone]   Urinalysis   Result Value Ref Range    Color, UA Yellow Straw/Yellow    Clarity, UA Clear Clear    Glucose, Ur Negative Negative mg/dL    Bilirubin Urine Negative Negative    Ketones, Urine Negative Negative mg/dL    Specific Gravity, UA 1.015 1.005 - 1.030    Blood, Urine Negative Negative    pH, UA 6.0 5.0 - 9.0    Protein, UA Negative Negative mg/dL    Urobilinogen, Urine 0.2 <2.0 E.U./dL    Nitrite, Urine Negative Negative    Leukocyte Esterase, Urine LARGE (A) Negative   TSH without Reflex   Result Value Ref Range    TSH 3.070 0.270 - 4.200 uIU/mL    Narrative    CALL  doctor   tel. [phone], [facility] [phone]   Vitamin D 25 Hydroxy   Result Value Ref Range    Vit D, 25-Hydroxy 58 30 - 100 ng/mL      Comment:      <20 ng/mL. ........... Darletta Toi Deficient  20-30 ng/mL. ......... Darletta Toi Insufficient   ng/mL. ........ Darletta Toi Sufficient  >100 ng/mL. .......... Darletta Toi Toxic      Narrative    CALL  doctor   tel. [phone], FAX [phone]   Microalbumin, Ur   Result Value Ref Range    Microalbumin, Random Urine <12.0 Not Established mg/L   Hemoglobin A1C   Result Value Ref Range    Hemoglobin A1C 8.2 (H) 4.0 - 5.6 %    Narrative    CALL  doctor   tel. [phone], FAX [phone]   Lipid, Fasting   Result Value Ref Range    Cholesterol, Fasting 124 0 - 199 mg/dL    Triglyceride, Fasting 106 0 - 149 mg/dL    HDL 53 >40 mg/dL    LDL Calculated 50 0 - 99 mg/dL    VLDL Cholesterol Calculated 21 mg/dL    Narrative    CALL  doctor   tel. [phone], FAX [phone]   Comprehensive Metabolic Panel   Result Value Ref Range    Sodium 141 132 - 146 mmol/L    Potassium 4.6 3.5 - 5.0 mmol/L    Chloride 104 98 - 107 mmol/L    CO2 28 22 - 29 mmol/L    Anion Gap 9 7 - 16 mmol/L Glucose 138 (H) 74 - 99 mg/dL    BUN 18 8 - 23 mg/dL    CREATININE 0.8 0.5 - 1.0 mg/dL    GFR Non-African American >60 >=60 mL/min/1.73      Comment:      Chronic Kidney Disease: less than 60 ml/min/1.73 sq.m. Kidney Failure: less than 15 ml/min/1.73 sq.m. Results valid for patients 18 years and older. GFR  >60     Calcium 9.4 8.6 - 10.2 mg/dL    Total Protein 6.8 6.4 - 8.3 g/dL    Albumin 3.8 3.5 - 5.2 g/dL    Total Bilirubin 0.2 0.0 - 1.2 mg/dL    Alkaline Phosphatase 57 35 - 104 U/L    ALT 25 0 - 32 U/L    AST 19 0 - 31 U/L    Narrative    CALL  doctor   tel. [phone], FAX [phone]   Microscopic Urinalysis   Result Value Ref Range    WBC, UA >20 (A) 0 - 5 /HPF    RBC, UA NONE 0 - 2 /HPF    Epithelial Cells, UA RARE /HPF    Bacteria, UA FEW (A) None Seen /HPF                                         The test results show:    Sugar levels were elevated. Hemoglobin A1c is a measure 3-month sugar control was more uncontrolled when compared to previous and now at 8.2, will need to consider medication adjustment. Recommend to watch diet from carbohydrates and sugars     Magnesium level was low and would recommend supplementation     Other electrolytes, liver function, and kidney function values were normal     Cholesterol levels were fair. Would recommend to continue present medications     Iron levels including ferritin were normal     Vitamin R24 and folic acid levels were normal     Thyroid levels were normal     Vitamin D levels were normal     Urine analysis showed moderate white cells and few bacteria but otherwise was normal with no evidence of microscopic blood or protein     Blood counts were normal    If you have any questions or concerns, please don't hesitate to call.     Sincerely,        Telly Whiting MD

## 2021-02-14 NOTE — TELEPHONE ENCOUNTER
Please let the patient know that blood work results showed    Sugar levels were elevated. Hemoglobin A1c is a measure 3-month sugar control was more uncontrolled when compared to previous and now at 8.2, will need to consider medication adjustment. Recommend to watch diet from carbohydrates and sugars    Magnesium level was low and would recommend supplementation    Other electrolytes, liver function, and kidney function values were normal    Cholesterol levels were fair.   Would recommend to continue present medications    Iron levels including ferritin were normal    Vitamin G93 and folic acid levels were normal    Thyroid levels were normal    Vitamin D levels were normal    Urine analysis showed moderate white cells and few bacteria but otherwise was normal with no evidence of microscopic blood or protein    Blood counts were normal    Thanks

## 2021-02-15 ENCOUNTER — TELEPHONE (OUTPATIENT)
Dept: CHIROPRACTIC MEDICINE | Age: 71
End: 2021-02-15

## 2021-02-15 RX ORDER — MULTIVITAMIN WITH IRON
250 TABLET ORAL 2 TIMES DAILY
COMMUNITY

## 2021-02-15 NOTE — TELEPHONE ENCOUNTER
Pts  left message requesting an appointment for [facility]. Called to schedule. No answer. Left message to return call for scheduling.

## 2021-02-15 NOTE — TELEPHONE ENCOUNTER
Pt informed. She has an appt coming up w/ endocrinology this month, she will discuss A1c w/ them. She was taking magnesium 250mg qd. She will increase magnesium to 500mg qd.

## 2021-02-17 ENCOUNTER — OFFICE VISIT (OUTPATIENT)
Dept: CHIROPRACTIC MEDICINE | Age: 71
End: 2021-02-17
Payer: MEDICARE

## 2021-02-17 VITALS
HEIGHT: 63 IN | WEIGHT: 196 LBS | RESPIRATION RATE: 16 BRPM | HEART RATE: 76 BPM | BODY MASS INDEX: 34.73 KG/M2 | OXYGEN SATURATION: 98 % | TEMPERATURE: 97.2 F

## 2021-02-17 DIAGNOSIS — M99.02 SEGMENTAL AND SOMATIC DYSFUNCTION OF THORACIC REGION: ICD-10-CM

## 2021-02-17 DIAGNOSIS — M99.03 SEGMENTAL AND SOMATIC DYSFUNCTION OF LUMBAR REGION: ICD-10-CM

## 2021-02-17 DIAGNOSIS — M99.05 SEGMENTAL AND SOMATIC DYSFUNCTION OF PELVIC REGION: Primary | ICD-10-CM

## 2021-02-17 DIAGNOSIS — M54.41 CHRONIC RIGHT-SIDED LOW BACK PAIN WITH RIGHT-SIDED SCIATICA: ICD-10-CM

## 2021-02-17 DIAGNOSIS — M53.3 SACROCOCCYGEAL DISORDERS, NOT ELSEWHERE CLASSIFIED: ICD-10-CM

## 2021-02-17 DIAGNOSIS — M99.01 SEGMENTAL AND SOMATIC DYSFUNCTION OF CERVICAL REGION: ICD-10-CM

## 2021-02-17 DIAGNOSIS — G89.29 CHRONIC RIGHT-SIDED LOW BACK PAIN WITH RIGHT-SIDED SCIATICA: ICD-10-CM

## 2021-02-17 DIAGNOSIS — M54.04 PANNICULITIS AFFECTING REGIONS OF NECK AND BACK, THORACIC REGION: ICD-10-CM

## 2021-02-17 DIAGNOSIS — M54.12 CERVICAL NEURALGIA: ICD-10-CM

## 2021-02-17 PROCEDURE — 98941 CHIROPRACT MANJ 3-4 REGIONS: CPT | Performed by: CHIROPRACTOR

## 2021-02-17 NOTE — PATIENT INSTRUCTIONS
Patient Education        Thoracic Outlet Syndrome: Exercises  Introduction  Here are some examples of exercises for you to try. The exercises may be suggested for a condition or for rehabilitation. Start each exercise slowly. Ease off the exercises if you start to have pain. You will be told when to start these exercises and which ones will work best for you. How to do the exercises  Chin tuck   1. Roll up a towel so it is about 2 to 3 inches thick. 2. Lie on the floor and place the rolled towel under your neck. Your head should be touching the floor. Your lips should be closed and your tongue should rest on the roof of your mouth. 3. Gently nod your head, bringing your chin toward your throat. 4. Be sure to keep the back of your head on the floor. 5. Hold for about 6 seconds. 6. Repeat 8 to 12 times. Shoulder strengthening   1. Sit up straight in a chair with your hands in your lap. 2. Pull your shoulder blades down and back against your rib cage. It may help to imagine that you are lifting your breastbone up a few inches. 3. You should feel your body lengthen and your lower shoulder blade muscles tighten. 4. Hold for 6 seconds. 5. Repeat 8 to 12 times. Chest and shoulder stretches   1. Put a few towels on top of one another and roll them together lengthwise. 2. Lie down, and place the roll of towels along the bones of your spine from your neck to your tailbone. Or lie on a foam roller if you have one. 3. Make sure that your head and tailbone area are supported with the roll of towels or on the foam roller. Be sure the towel roll or foam roller is in line with your spine. 4. Bend your knees to support your lower back. 5. Hold this position while moving your arms into the following positions:  6. Hold each arm position for 15 to 30 seconds. 7. Repeat the entire cycle of arm movements 2 to 4 times. 1. Arms at your sides, with the palms facing up. 2. Arms out to your sides in a \"T\" shape. 3. Arms out to your sides with your elbows bent to 90 degrees, as in a \"goalpost\" shape. 4. Arms stretched over your head. Follow-up care is a key part of your treatment and safety. Be sure to make and go to all appointments, and call your doctor if you are having problems. It's also a good idea to know your test results and keep a list of the medicines you take. Where can you learn more? Go to https://[employer]. org and sign in to your [employer] account. Enter K130 in the [employer] box to learn more about \"Thoracic Outlet Syndrome: Exercises. \"     If you do not have an account, please click on the \"Sign Up Now\" link. Current as of: March 2, 2020               Content Version: 12.6  © 1514-4125 [employer]. Care instructions adapted under license by [facility] ([facility]). If you have questions about a medical condition or this instruction, always ask your healthcare professional. [place] [address] any warranty or liability for your use of this information.

## 2021-02-17 NOTE — PROGRESS NOTES
21  [facility] : 1950 Sex: female  Age: 65 y.o. Chief Complaint   Patient presents with    Lower Back Pain     Right sided low back pain        HPI:   Monet Gallegos returns today for continued care of chronic right-sided lower back pain. No changes with this. She has not been in since before [place], was away in [facility] visiting friends. No new injuries with respect to the lower back. Just feels it worsened as she has not had care. She is also reporting some right upper extremity numbness, tingling at times. Admits she has not been as active, and when she is sitting and reading, or doing other activities in this position it seems to be worse. Involves digits 2-4. Denies weakness. No injury. Current Outpatient Medications:     magnesium (MAGNESIUM-OXIDE) 250 MG TABS tablet, Take 500 mg by mouth daily, Disp: , Rfl:     insulin lispro, 1 Unit Dial, (HUMALOG KWIKPEN) 100 UNIT/ML SOPN, Humalog KwikPen (U-100) Insulin 100 unit/mL subcutaneous  Inject up to 12 units QAC TID, Disp: , Rfl:     zolpidem (AMBIEN) 10 MG tablet, Take 1 tablet by mouth nightly as needed for Sleep for up to 90 days. , Disp: 90 tablet, Rfl: 0    insulin glargine (LANTUS SOLOSTAR) 100 UNIT/ML injection pen, Inject 65 Units into the skin every morning, Disp: 15 pen, Rfl: 1    insulin lispro, 0.5 Unit Dial, (HUMALOG ANDREA KWIKPEN) 100 UNIT/ML SOPN, Inject into the skin 3 times daily Sliding Scale 2-3 times a day, Disp: , Rfl:     lisinopril (PRINIVIL;ZESTRIL) 20 MG tablet, Take 1 tablet by mouth daily, Disp: 90 tablet, Rfl: 1    atorvastatin (LIPITOR) 40 MG tablet, Take 1 tablet by mouth daily, Disp: 90 tablet, Rfl: 1    metFORMIN (GLUCOPHAGE) 1000 MG tablet, Take 1,000 mg by mouth 2 times daily (with meals), Disp: , Rfl:     Insulin Pen Needle (B-D UF III MINI PEN NEEDLES) 31G X 5 MM MISC, 1 each by Does not apply route daily, Disp: , Rfl:     Exam:   Vitals:    21 1107   Pulse: 76   Resp: 16

## 2021-03-03 ENCOUNTER — OFFICE VISIT (OUTPATIENT)
Dept: CHIROPRACTIC MEDICINE | Age: 71
End: 2021-03-03
Payer: MEDICARE

## 2021-03-03 VITALS
HEART RATE: 79 BPM | TEMPERATURE: 97.7 F | HEIGHT: 63 IN | WEIGHT: 196 LBS | RESPIRATION RATE: 18 BRPM | BODY MASS INDEX: 34.73 KG/M2 | OXYGEN SATURATION: 97 %

## 2021-03-03 DIAGNOSIS — G89.29 CHRONIC RIGHT-SIDED LOW BACK PAIN WITH RIGHT-SIDED SCIATICA: ICD-10-CM

## 2021-03-03 DIAGNOSIS — M54.12 CERVICAL NEURALGIA: ICD-10-CM

## 2021-03-03 DIAGNOSIS — M99.02 SEGMENTAL AND SOMATIC DYSFUNCTION OF THORACIC REGION: ICD-10-CM

## 2021-03-03 DIAGNOSIS — M54.41 CHRONIC RIGHT-SIDED LOW BACK PAIN WITH RIGHT-SIDED SCIATICA: ICD-10-CM

## 2021-03-03 DIAGNOSIS — M99.01 SEGMENTAL AND SOMATIC DYSFUNCTION OF CERVICAL REGION: ICD-10-CM

## 2021-03-03 DIAGNOSIS — M54.04 PANNICULITIS AFFECTING REGIONS OF NECK AND BACK, THORACIC REGION: ICD-10-CM

## 2021-03-03 DIAGNOSIS — M53.3 SACROCOCCYGEAL DISORDERS, NOT ELSEWHERE CLASSIFIED: ICD-10-CM

## 2021-03-03 DIAGNOSIS — M99.03 SEGMENTAL AND SOMATIC DYSFUNCTION OF LUMBAR REGION: ICD-10-CM

## 2021-03-03 DIAGNOSIS — M99.05 SEGMENTAL AND SOMATIC DYSFUNCTION OF PELVIC REGION: Primary | ICD-10-CM

## 2021-03-03 PROCEDURE — 98941 CHIROPRACT MANJ 3-4 REGIONS: CPT | Performed by: CHIROPRACTOR

## 2021-03-03 NOTE — PROGRESS NOTES
3/3/21  Sarah Salgado : 1950 Sex: female  Age: 65 y.o. Chief Complaint   Patient presents with    Lower Back Pain     low back/ hip f/u        HPI:   Lower back pain persists. It slightly better today than it was. She continues to feel it in her hip. Does travel down the right posterior thigh. She is also reporting some mild tingling in the right hand consistent with previous visits. She had been doing well, slowly came back on. She states she will raise her arm up and shake it out, and it will go away. Current Outpatient Medications:     magnesium (MAGNESIUM-OXIDE) 250 MG TABS tablet, Take 500 mg by mouth daily, Disp: , Rfl:     insulin lispro, 1 Unit Dial, (HUMALOG KWIKPEN) 100 UNIT/ML SOPN, Humalog KwikPen (U-100) Insulin 100 unit/mL subcutaneous  Inject up to 12 units QAC TID, Disp: , Rfl:     zolpidem (AMBIEN) 10 MG tablet, Take 1 tablet by mouth nightly as needed for Sleep for up to 90 days. , Disp: 90 tablet, Rfl: 0    insulin glargine (LANTUS SOLOSTAR) 100 UNIT/ML injection pen, Inject 65 Units into the skin every morning, Disp: 15 pen, Rfl: 1    insulin lispro, 0.5 Unit Dial, (HUMALOG ANDREA KWIKPEN) 100 UNIT/ML SOPN, Inject into the skin 3 times daily Sliding Scale 2-3 times a day, Disp: , Rfl:     lisinopril (PRINIVIL;ZESTRIL) 20 MG tablet, Take 1 tablet by mouth daily, Disp: 90 tablet, Rfl: 1    atorvastatin (LIPITOR) 40 MG tablet, Take 1 tablet by mouth daily, Disp: 90 tablet, Rfl: 1    metFORMIN (GLUCOPHAGE) 1000 MG tablet, Take 1,000 mg by mouth 2 times daily (with meals), Disp: , Rfl:     Insulin Pen Needle (B-D UF III MINI PEN NEEDLES) 31G X 5 MM MISC, 1 each by Does not apply route daily, Disp: , Rfl:     Exam:   Vitals:    21 1113   Pulse: 79   Resp: 18   Temp: 97.7 °F (36.5 °C)   SpO2: 97% Neck is supple nontender in the midline. Neurovascular compression testing and foraminal compression testing are both negative. Wrights hyperabduction maneuver and east/Yudi are both negative. There are hypertonic and tender fibers noted today in the cervical, thoracic and lumbar paraspinal muscles. Joint fixation is noted with motion screening at C6-T1, T5-6, L3-4 and right SI joint. Larissa Orozco was seen today for lower back pain. Diagnoses and all orders for this visit:    Segmental and somatic dysfunction of pelvic region    Segmental and somatic dysfunction of lumbar region    Segmental and somatic dysfunction of thoracic region    Sacrococcygeal disorders, not elsewhere classified    Chronic right-sided low back pain with right-sided sciatica    Panniculitis affecting regions of neck and back, thoracic region        Treatment Plan: Continued with Berry flexion distraction manipulation at L3 today. Mechanically assisted manipulation to the SI joints, thoracic segments and cervical segments listed. Tolerated well. I will see her back in approximately 2 weeks for continued care.       Seen By:  Bhavani Oseguera DC

## 2021-03-17 ENCOUNTER — OFFICE VISIT (OUTPATIENT)
Dept: CHIROPRACTIC MEDICINE | Age: 71
End: 2021-03-17
Payer: MEDICARE

## 2021-03-17 VITALS
HEIGHT: 63 IN | BODY MASS INDEX: 34.73 KG/M2 | TEMPERATURE: 97 F | WEIGHT: 196 LBS | HEART RATE: 78 BPM | RESPIRATION RATE: 16 BRPM | OXYGEN SATURATION: 98 %

## 2021-03-17 DIAGNOSIS — M54.04 PANNICULITIS AFFECTING REGIONS OF NECK AND BACK, THORACIC REGION: ICD-10-CM

## 2021-03-17 DIAGNOSIS — M54.41 CHRONIC RIGHT-SIDED LOW BACK PAIN WITH RIGHT-SIDED SCIATICA: ICD-10-CM

## 2021-03-17 DIAGNOSIS — M99.02 SEGMENTAL AND SOMATIC DYSFUNCTION OF THORACIC REGION: ICD-10-CM

## 2021-03-17 DIAGNOSIS — M99.05 SEGMENTAL AND SOMATIC DYSFUNCTION OF PELVIC REGION: Primary | ICD-10-CM

## 2021-03-17 DIAGNOSIS — M53.3 SACROCOCCYGEAL DISORDERS, NOT ELSEWHERE CLASSIFIED: ICD-10-CM

## 2021-03-17 DIAGNOSIS — M54.12 CERVICAL NEURALGIA: ICD-10-CM

## 2021-03-17 DIAGNOSIS — M99.03 SEGMENTAL AND SOMATIC DYSFUNCTION OF LUMBAR REGION: ICD-10-CM

## 2021-03-17 DIAGNOSIS — G89.29 CHRONIC RIGHT-SIDED LOW BACK PAIN WITH RIGHT-SIDED SCIATICA: ICD-10-CM

## 2021-03-17 DIAGNOSIS — M99.01 SEGMENTAL AND SOMATIC DYSFUNCTION OF CERVICAL REGION: ICD-10-CM

## 2021-03-17 PROCEDURE — 98941 CHIROPRACT MANJ 3-4 REGIONS: CPT | Performed by: CHIROPRACTOR

## 2021-03-17 NOTE — PROGRESS NOTES
[facility] orders for this visit:    Segmental and somatic dysfunction of pelvic region    Segmental and somatic dysfunction of lumbar region    Segmental and somatic dysfunction of thoracic region    Sacrococcygeal disorders, not elsewhere classified    Chronic right-sided low back pain with right-sided sciatica    Panniculitis affecting regions of neck and back, thoracic region    Segmental and somatic dysfunction of cervical region    Cervical neuralgia        Treatment Plan:  Continued with Berry flexion distraction manipulation at L3 today. Mechanically assisted manipulation to the SI joints, thoracic segments and cervical segments listed. Tolerated well.   I will see her back in approximately 2 weeks for continued care      Seen By:  Maciel Butts

## 2021-03-31 ENCOUNTER — OFFICE VISIT (OUTPATIENT)
Dept: CHIROPRACTIC MEDICINE | Age: 71
End: 2021-03-31
Payer: MEDICARE

## 2021-03-31 VITALS — TEMPERATURE: 97.6 F | RESPIRATION RATE: 16 BRPM | HEART RATE: 93 BPM | OXYGEN SATURATION: 96 %

## 2021-03-31 DIAGNOSIS — M99.03 SEGMENTAL AND SOMATIC DYSFUNCTION OF LUMBAR REGION: ICD-10-CM

## 2021-03-31 DIAGNOSIS — M54.41 CHRONIC RIGHT-SIDED LOW BACK PAIN WITH RIGHT-SIDED SCIATICA: ICD-10-CM

## 2021-03-31 DIAGNOSIS — M99.02 SEGMENTAL AND SOMATIC DYSFUNCTION OF THORACIC REGION: ICD-10-CM

## 2021-03-31 DIAGNOSIS — G89.29 CHRONIC RIGHT-SIDED LOW BACK PAIN WITH RIGHT-SIDED SCIATICA: ICD-10-CM

## 2021-03-31 DIAGNOSIS — M99.05 SEGMENTAL AND SOMATIC DYSFUNCTION OF PELVIC REGION: Primary | ICD-10-CM

## 2021-03-31 DIAGNOSIS — M53.3 SACROCOCCYGEAL DISORDERS, NOT ELSEWHERE CLASSIFIED: ICD-10-CM

## 2021-03-31 DIAGNOSIS — M54.04 PANNICULITIS AFFECTING REGIONS OF NECK AND BACK, THORACIC REGION: ICD-10-CM

## 2021-03-31 PROCEDURE — 98941 CHIROPRACT MANJ 3-4 REGIONS: CPT | Performed by: CHIROPRACTOR

## 2021-03-31 NOTE — PROGRESS NOTES
3/31/21  Rajeev Healy : 1950 Sex: female  Age: 65 y.o. Chief Complaint   Patient presents with    Lower Back Pain       HPI:   Pain is has improved. Continues with lower back pain, knots and tenderness right suprascapular region. She states she carries all of her stress in this area and she has been more concerned, as she has a sick grandchild. No new issues today however. Current Outpatient Medications:     magnesium (MAGNESIUM-OXIDE) 250 MG TABS tablet, Take 500 mg by mouth daily, Disp: , Rfl:     insulin lispro, 1 Unit Dial, (HUMALOG KWIKPEN) 100 UNIT/ML SOPN, Humalog KwikPen (U-100) Insulin 100 unit/mL subcutaneous  Inject up to 12 units QAC TID, Disp: , Rfl:     zolpidem (AMBIEN) 10 MG tablet, Take 1 tablet by mouth nightly as needed for Sleep for up to 90 days. , Disp: 90 tablet, Rfl: 0    insulin glargine (LANTUS SOLOSTAR) 100 UNIT/ML injection pen, Inject 65 Units into the skin every morning, Disp: 15 pen, Rfl: 1    insulin lispro, 0.5 Unit Dial, (HUMALOG ANDREA KWIKPEN) 100 UNIT/ML SOPN, Inject into the skin 3 times daily Sliding Scale 2-3 times a day, Disp: , Rfl:     lisinopril (PRINIVIL;ZESTRIL) 20 MG tablet, Take 1 tablet by mouth daily, Disp: 90 tablet, Rfl: 1    atorvastatin (LIPITOR) 40 MG tablet, Take 1 tablet by mouth daily, Disp: 90 tablet, Rfl: 1    metFORMIN (GLUCOPHAGE) 1000 MG tablet, Take 1,000 mg by mouth 2 times daily (with meals), Disp: , Rfl:     Insulin Pen Needle (B-D UF III MINI PEN NEEDLES) 31G X 5 MM MISC, 1 each by Does not apply route daily, Disp: , Rfl:     Exam:   Vitals:    21 0956   Pulse: 93   Resp: 16   Temp: 97.6 °F (36.4 °C)   SpO2: 96%       Continued midline pain L3-4, tenderness bilateral SI joints. Trigger points right trapezius, right levator scapulae. There are hypertonic and tender fibers noted today in the thoracic and lumbar paraspinal muscles.  Joint fixation is noted with motion screening at T3-5, L3-4, bilateral SI joints. Toery Mandel was seen today for lower back pain. Diagnoses and all orders for this visit:    Segmental and somatic dysfunction of pelvic region    Segmental and somatic dysfunction of lumbar region    Segmental and somatic dysfunction of thoracic region    Sacrococcygeal disorders, not elsewhere classified    Chronic right-sided low back pain with right-sided sciatica    Panniculitis affecting regions of neck and back, thoracic region        Treatment Plan: Continued Berry flexion distraction manipulation at L3, protocol 1. Mechanically assisted manipulation of the SI joints and thoracic segments. Tolerated well. I will have her follow-up with me in 1 month or as needed for further care. .      Seen By:  Familia Neumann DC

## 2021-04-01 ENCOUNTER — OFFICE VISIT (OUTPATIENT)
Dept: FAMILY MEDICINE CLINIC | Age: 71
End: 2021-04-01
Payer: MEDICARE

## 2021-04-01 ENCOUNTER — TELEPHONE (OUTPATIENT)
Dept: ADMINISTRATIVE | Age: 71
End: 2021-04-01

## 2021-04-01 VITALS
BODY MASS INDEX: 34.27 KG/M2 | OXYGEN SATURATION: 98 % | TEMPERATURE: 97.6 F | DIASTOLIC BLOOD PRESSURE: 68 MMHG | WEIGHT: 193.38 LBS | HEIGHT: 63 IN | HEART RATE: 68 BPM | SYSTOLIC BLOOD PRESSURE: 114 MMHG

## 2021-04-01 DIAGNOSIS — I10 ESSENTIAL HYPERTENSION: ICD-10-CM

## 2021-04-01 DIAGNOSIS — R79.89 ELEVATED LFTS: ICD-10-CM

## 2021-04-01 DIAGNOSIS — E66.09 CLASS 1 OBESITY DUE TO EXCESS CALORIES WITHOUT SERIOUS COMORBIDITY WITH BODY MASS INDEX (BMI) OF 34.0 TO 34.9 IN ADULT: ICD-10-CM

## 2021-04-01 DIAGNOSIS — E55.9 VITAMIN D DEFICIENCY: ICD-10-CM

## 2021-04-01 DIAGNOSIS — R01.1 MURMUR: ICD-10-CM

## 2021-04-01 DIAGNOSIS — K21.9 GASTROESOPHAGEAL REFLUX DISEASE WITHOUT ESOPHAGITIS: ICD-10-CM

## 2021-04-01 DIAGNOSIS — E78.2 MIXED HYPERLIPIDEMIA: ICD-10-CM

## 2021-04-01 DIAGNOSIS — E11.65 TYPE 2 DIABETES MELLITUS WITH HYPERGLYCEMIA, WITH LONG-TERM CURRENT USE OF INSULIN (HCC): ICD-10-CM

## 2021-04-01 DIAGNOSIS — G47.09 OTHER INSOMNIA: ICD-10-CM

## 2021-04-01 DIAGNOSIS — D64.9 ANEMIA, UNSPECIFIED TYPE: Primary | ICD-10-CM

## 2021-04-01 DIAGNOSIS — E83.42 HYPOMAGNESEMIA: ICD-10-CM

## 2021-04-01 DIAGNOSIS — Z79.4 TYPE 2 DIABETES MELLITUS WITH HYPERGLYCEMIA, WITH LONG-TERM CURRENT USE OF INSULIN (HCC): ICD-10-CM

## 2021-04-01 PROCEDURE — G8400 PT W/DXA NO RESULTS DOC: HCPCS | Performed by: INTERNAL MEDICINE

## 2021-04-01 PROCEDURE — 3017F COLORECTAL CA SCREEN DOC REV: CPT | Performed by: INTERNAL MEDICINE

## 2021-04-01 PROCEDURE — 99214 OFFICE O/P EST MOD 30 MIN: CPT | Performed by: INTERNAL MEDICINE

## 2021-04-01 PROCEDURE — 1036F TOBACCO NON-USER: CPT | Performed by: INTERNAL MEDICINE

## 2021-04-01 PROCEDURE — G8427 DOCREV CUR MEDS BY ELIG CLIN: HCPCS | Performed by: INTERNAL MEDICINE

## 2021-04-01 PROCEDURE — 1123F ACP DISCUSS/DSCN MKR DOCD: CPT | Performed by: INTERNAL MEDICINE

## 2021-04-01 PROCEDURE — 3052F HG A1C>EQUAL 8.0%<EQUAL 9.0%: CPT | Performed by: INTERNAL MEDICINE

## 2021-04-01 PROCEDURE — 2022F DILAT RTA XM EVC RTNOPTHY: CPT | Performed by: INTERNAL MEDICINE

## 2021-04-01 PROCEDURE — G8417 CALC BMI ABV UP PARAM F/U: HCPCS | Performed by: INTERNAL MEDICINE

## 2021-04-01 PROCEDURE — 1090F PRES/ABSN URINE INCON ASSESS: CPT | Performed by: INTERNAL MEDICINE

## 2021-04-01 PROCEDURE — 4040F PNEUMOC VAC/ADMIN/RCVD: CPT | Performed by: INTERNAL MEDICINE

## 2021-04-01 RX ORDER — ZOLPIDEM TARTRATE 10 MG/1
10 TABLET ORAL NIGHTLY PRN
Qty: 90 TABLET | Refills: 0 | Status: SHIPPED | OUTPATIENT
Start: 2021-04-01 | End: 2021-07-12 | Stop reason: SDUPTHER

## 2021-04-01 RX ORDER — ATORVASTATIN CALCIUM 40 MG/1
40 TABLET, FILM COATED ORAL DAILY
Qty: 90 TABLET | Refills: 1 | Status: SHIPPED
Start: 2021-04-01 | End: 2021-07-12 | Stop reason: SDUPTHER

## 2021-04-01 RX ORDER — INSULIN GLARGINE 100 [IU]/ML
65 INJECTION, SOLUTION SUBCUTANEOUS EVERY MORNING
Qty: 15 PEN | Refills: 1 | Status: SHIPPED | OUTPATIENT
Start: 2021-04-01

## 2021-04-01 RX ORDER — LISINOPRIL 20 MG/1
20 TABLET ORAL DAILY
Qty: 90 TABLET | Refills: 1 | Status: SHIPPED
Start: 2021-04-01 | End: 2021-07-12 | Stop reason: SDUPTHER

## 2021-04-01 ASSESSMENT — ENCOUNTER SYMPTOMS
BLOOD IN STOOL: 0
COUGH: 0
EYE PAIN: 0
RHINORRHEA: 0
ABDOMINAL PAIN: 0
CHEST TIGHTNESS: 0
VOMITING: 0
DIARRHEA: 0
SHORTNESS OF BREATH: 1
SORE THROAT: 0
CONSTIPATION: 0
NAUSEA: 0

## 2021-04-01 ASSESSMENT — PATIENT HEALTH QUESTIONNAIRE - PHQ9
SUM OF ALL RESPONSES TO PHQ9 QUESTIONS 1 & 2: 0
2. FEELING DOWN, DEPRESSED OR HOPELESS: 0
SUM OF ALL RESPONSES TO PHQ QUESTIONS 1-9: 0
1. LITTLE INTEREST OR PLEASURE IN DOING THINGS: 0
SUM OF ALL RESPONSES TO PHQ QUESTIONS 1-9: 0

## 2021-04-01 NOTE — PROGRESS NOTES
[facility]) Physicians   Internal Medicine     2021  Elsa File : 1950 Sex: female  Age:67 y.o. Chief Complaint   Patient presents with    3 Month Follow-Up     Over the last month she has no energy. Wants to sit and sleep all day. States that she is sleeping well at night.  Diabetes     2-3 weeks ago fasting blood sugars were high for 3 days in a row (300's) and then returned to normal     Allergies     Spring allergies     Gastroesophageal Reflux    Insomnia        HPI:   Patient presents to office for evaluation of the following medical conditions.    - States still having fatigue.     - Labs showed anemia. States was placed on iron - self stopped due to constipation issues. Has seen GI - s/p EGD and colonoscopy. Last ab (2021) was improved. - States has been having issues with insomnia. Uncontrolled. States stopped taking lorazepam. States has been taking Ambien - decreased ambien. Still with issues sleeping. Discussed medications and interactions.     - States has diabetes. States trying to watch diet. States checking blood sugars at home , did have a few > 200. last A1c was 8.2 (2021). States lantus 65 units daily, metformin 1000mg twice a day, added humalog slide scale. Stopped trulicity (cost), Endo stopped glipizide. States one reported hypoglycemia. On lisinopril. On pravastatin. States follows with optho     - States has high blood pressure. States occasioanlly checking blood pressure at home, has wrist cuff - 120's/60's. On lisinopril.     - States has high cholesterol. States trying to watch diet. On pravastatin. No reported side effects. Last labs (2021)     - States has had gastric ulcers and GERD. On omeprazole as needed. Stable. - States has vitamin D def. On otc supplement     - States has had low magnesium levels on supplement     - States stopped vit b12 supplement.     - States was told had elevated LFT and enlarged liver.      - last lab (2/2021)     Health Maintenance   - immunizations:   Influenza Vaccination - (2019), (9/16/20)  Pneumonia Vaccination  Zoster/Shingles Vaccine  Tetanus Vaccination  covid     - Screenings:   Bone Density Scan   Pelvic/Pap Exam  Mammogram - declines     Colonoscopy - (6/20) hemorrhoids, diverticular disease, multiple polyps, tubular and hyperplastic per path, follow up in 3 years     EGD (6/20) -normal    optho (3/21) - for cataract     ROS:  Review of Systems   Constitutional: Negative for appetite change, chills, fever and unexpected weight change. HENT: Negative for congestion, rhinorrhea and sore throat. Eyes: Negative for pain and visual disturbance. Respiratory: Positive for shortness of breath. Negative for cough and chest tightness. Cardiovascular: Negative for chest pain and palpitations. Gastrointestinal: Negative for abdominal pain, blood in stool, constipation, diarrhea, nausea and vomiting. Genitourinary: Negative for difficulty urinating, dysuria, frequency, pelvic pain, urgency and vaginal bleeding. Musculoskeletal: Negative for arthralgias and myalgias. Skin: Negative for rash. Neurological: Negative for dizziness, syncope, weakness, light-headedness, numbness and headaches. Hematological: Negative for adenopathy. Psychiatric/Behavioral: Negative for suicidal ideas. The patient is not nervous/anxious. Current Outpatient Medications:     ELDERBERRY PO, Take 2 each by mouth every evening Elderberry Zinc and Vit C, Disp: , Rfl:     atorvastatin (LIPITOR) 40 MG tablet, Take 1 tablet by mouth daily, Disp: 90 tablet, Rfl: 1    insulin glargine (LANTUS SOLOSTAR) 100 UNIT/ML injection pen, Inject 65 Units into the skin every morning, Disp: 15 pen, Rfl: 1    lisinopril (PRINIVIL;ZESTRIL) 20 MG tablet, Take 1 tablet by mouth daily, Disp: 90 tablet, Rfl: 1    zolpidem (AMBIEN) 10 MG tablet, Take 1 tablet by mouth nightly as needed for Sleep for up to 90 days. , Disp: 90 tablet, Rfl: 0    metFORMIN (GLUCOPHAGE) 1000 MG tablet, Take 1 tablet by mouth 2 times daily (with meals), Disp: 180 tablet, Rfl: 1    magnesium (MAGNESIUM-OXIDE) 250 MG TABS tablet, Take 500 mg by mouth daily, Disp: , Rfl:     insulin lispro, 1 Unit Dial, (HUMALOG KWIKPEN) 100 UNIT/ML SOPN, Humalog KwikPen (U-100) Insulin 100 unit/mL subcutaneous  Inject up to 12 units QAC TID, Disp: , Rfl:     Insulin Pen Needle (B-D UF III MINI PEN NEEDLES) 31G X 5 MM MISC, 1 each by Does not apply route daily, Disp: , Rfl:     No Known Allergies    Past Medical History:   Diagnosis Date    Class 2 obesity due to excess calories without serious comorbidity with body mass index (BMI) of 35.0 to 35.9 in adult 12/18/2019    Essential hypertension 12/18/2019    GERD (gastroesophageal reflux disease)     H pylori ulcer     Hypomagnesemia 12/18/2019    Mixed hyperlipidemia 12/18/2019    Other insomnia 12/18/2019    Peptic ulcer disease     Type 2 diabetes mellitus without complication ([facility] [address]     Vitamin D deficiency 12/18/2019       Past Surgical History:   Procedure Laterality Date    BUNIONECTOMY Left     HYSTERECTOMY, VAGINAL      partial in the 80s then bilateral oopherectome 2016    RETINAL DETACHMENT SURGERY Right     vitreous surgery    TUBAL LIGATION         Family History   Problem Relation Age of Onset    Diabetes Mother     Stroke Mother     Kidney Disease Father         Renal Failure       Social History     Socioeconomic History    Marital status:      Spouse name: Jose Chang Number of children: 2    Years of education: 15    Highest education level: High school graduate   Occupational History    None   Social Needs    Financial resource strain: Not hard at all   [place] insecurity     Worry: Never true     Inability: Never true    Transportation needs     Medical: No     Non-medical: No   Tobacco Use    Smoking status: Former Smoker     Packs/day: 1.00     Years: 12.00     Pack years: motion. Lymphadenopathy:      Cervical: No cervical adenopathy. Skin:     General: Skin is warm and dry. Neurological:      Mental Status: She is alert and oriented to person, place, and time. Cranial Nerves: No cranial nerve deficit. Psychiatric:         Judgment: Judgment normal.         Assessment and Plan:    Diagnoses and all orders for this visit:    Anemia  - uncertain cause   - E35 and folic acid was normal   - following with GI   - s/p  EGD and colonoscopy (6/2020) - polyps   - has not tolerated iron - constipation - not helped with stool softener - stopped   - last lab improved (2/2021)     Type 2 diabetes mellitus with hyperglycemia, with long-term current use of insulin (HCC)  - watch diet   - monitor blood sugar at home, goals fasting am <120, 2 hours post meal < 180  - following with endocrinology   - on lantus 65 units   - on metformin 1000mg bid  - endocrinology added humalog slide scale - increased last visit.    - stopped glipizide   - stopped trulicity due to cost   - follow A1c - 8.2 (2/2021)       On ACE  On statin   Not on aspirin - h/o PUD   optho (3/2021) - no Retinopathy     Essential hypertension  - watch diet   - monitor blood pressure at home   - on lisinopril   - stable 4/1/2021    Mixed hyperlipidemia  - Continue present treatment   - watch diet   - stop pravastatin for higher intensity statin   - on atorvastatin   - follow labs   - last lab (2/2021) - stable     Gastroesophageal reflux disease without esophagitis   - watch diet   - h/o PUD   - on omeprazole prn   - on tums as needed     Other insomnia  - states has had sleep study in the past - did nto tolerate   - On Ambien  - weaned off  lorazepam   - discussed did not recommend these medications   - continue Ambien for now   - sleep habits handout provided   - consider referral to sleep medicine     Class 1 obesity due to excess calories without serious comorbidity with body mass index (BMI) of 34.0 to 34.9 in adult  -

## 2021-04-01 NOTE — TELEPHONE ENCOUNTER
NP scheduled from the [facility] with Cardiology. Patient Appointment Form:      PCP: Dr. Myesha Stiles  Referring: Dr. Myesha Stiles    Has the Patient:    Seen a Cardiologist? No    Had a heart catheterization? No    Had heart surgery? No    Had a stress test or nuclear stress test? Yes over 10 years ago or more    Had an echocardiogram? No    Had a vascular ultrasound? No    Had a 24/48 heart monitor or extended cardiac event monitor? No    Had recent blood work in the last 6 months? Yes 2/12/21 and future order in Epic - PCP     Had a pacemaker/ICD/ILR implant? No    Seen an Electrophysiologist? No        Will send records via: No prior cardiology recs or hx - stress too old to obtain.         Date & time of appointment:  5/13/21 @ 2:30 with Dr. Miguel Matias - pt taye, May

## 2021-05-04 DIAGNOSIS — E11.65 TYPE 2 DIABETES MELLITUS WITH HYPERGLYCEMIA, WITH LONG-TERM CURRENT USE OF INSULIN (HCC): ICD-10-CM

## 2021-05-04 DIAGNOSIS — Z79.4 TYPE 2 DIABETES MELLITUS WITH HYPERGLYCEMIA, WITH LONG-TERM CURRENT USE OF INSULIN (HCC): ICD-10-CM

## 2021-05-04 DIAGNOSIS — E78.2 MIXED HYPERLIPIDEMIA: ICD-10-CM

## 2021-05-04 LAB
ALBUMIN SERPL-MCNC: 3.7 G/DL (ref 3.5–5.2)
ALP BLD-CCNC: 57 U/L (ref 35–104)
ALT SERPL-CCNC: 27 U/L (ref 0–32)
ANION GAP SERPL CALCULATED.3IONS-SCNC: 15 MMOL/L (ref 7–16)
AST SERPL-CCNC: 18 U/L (ref 0–31)
BASOPHILS ABSOLUTE: 0.09 E9/L (ref 0–0.2)
BASOPHILS RELATIVE PERCENT: 0.8 % (ref 0–2)
BILIRUB SERPL-MCNC: <0.2 MG/DL (ref 0–1.2)
BUN BLDV-MCNC: 23 MG/DL (ref 6–23)
CALCIUM SERPL-MCNC: 10.3 MG/DL (ref 8.6–10.2)
CHLORIDE BLD-SCNC: 103 MMOL/L (ref 98–107)
CO2: 21 MMOL/L (ref 22–29)
CREAT SERPL-MCNC: 0.9 MG/DL (ref 0.5–1)
EOSINOPHILS ABSOLUTE: 0.32 E9/L (ref 0.05–0.5)
EOSINOPHILS RELATIVE PERCENT: 2.9 % (ref 0–6)
GFR AFRICAN AMERICAN: >60
GFR NON-AFRICAN AMERICAN: >60 ML/MIN/1.73
GLUCOSE BLD-MCNC: 119 MG/DL (ref 74–99)
HBA1C MFR BLD: 7.8 % (ref 4–5.6)
HCT VFR BLD CALC: 37.4 % (ref 34–48)
HEMOGLOBIN: 12 G/DL (ref 11.5–15.5)
IMMATURE GRANULOCYTES #: 0.04 E9/L
IMMATURE GRANULOCYTES %: 0.4 % (ref 0–5)
LYMPHOCYTES ABSOLUTE: 4.41 E9/L (ref 1.5–4)
LYMPHOCYTES RELATIVE PERCENT: 39.9 % (ref 20–42)
MAGNESIUM: 1.4 MG/DL (ref 1.6–2.6)
MCH RBC QN AUTO: 29.9 PG (ref 26–35)
MCHC RBC AUTO-ENTMCNC: 32.1 % (ref 32–34.5)
MCV RBC AUTO: 93.3 FL (ref 80–99.9)
MONOCYTES ABSOLUTE: 0.77 E9/L (ref 0.1–0.95)
MONOCYTES RELATIVE PERCENT: 7 % (ref 2–12)
NEUTROPHILS ABSOLUTE: 5.43 E9/L (ref 1.8–7.3)
NEUTROPHILS RELATIVE PERCENT: 49 % (ref 43–80)
PDW BLD-RTO: 12.6 FL (ref 11.5–15)
PLATELET # BLD: 352 E9/L (ref 130–450)
PMV BLD AUTO: 9.6 FL (ref 7–12)
POTASSIUM SERPL-SCNC: 4.6 MMOL/L (ref 3.5–5)
RBC # BLD: 4.01 E12/L (ref 3.5–5.5)
SODIUM BLD-SCNC: 139 MMOL/L (ref 132–146)
TOTAL PROTEIN: 6.7 G/DL (ref 6.4–8.3)
WBC # BLD: 11.1 E9/L (ref 4.5–11.5)

## 2021-05-05 ENCOUNTER — OFFICE VISIT (OUTPATIENT)
Dept: FAMILY MEDICINE CLINIC | Age: 71
End: 2021-05-05
Payer: MEDICARE

## 2021-05-05 VITALS
OXYGEN SATURATION: 98 % | TEMPERATURE: 97.8 F | HEIGHT: 63 IN | BODY MASS INDEX: 34.2 KG/M2 | WEIGHT: 193 LBS | DIASTOLIC BLOOD PRESSURE: 60 MMHG | SYSTOLIC BLOOD PRESSURE: 114 MMHG | HEART RATE: 77 BPM

## 2021-05-05 DIAGNOSIS — Z01.818 PRE-OP EVALUATION: Primary | ICD-10-CM

## 2021-05-05 PROCEDURE — 3017F COLORECTAL CA SCREEN DOC REV: CPT | Performed by: INTERNAL MEDICINE

## 2021-05-05 PROCEDURE — 1123F ACP DISCUSS/DSCN MKR DOCD: CPT | Performed by: INTERNAL MEDICINE

## 2021-05-05 PROCEDURE — 4040F PNEUMOC VAC/ADMIN/RCVD: CPT | Performed by: INTERNAL MEDICINE

## 2021-05-05 PROCEDURE — G8400 PT W/DXA NO RESULTS DOC: HCPCS | Performed by: INTERNAL MEDICINE

## 2021-05-05 PROCEDURE — 1090F PRES/ABSN URINE INCON ASSESS: CPT | Performed by: INTERNAL MEDICINE

## 2021-05-05 PROCEDURE — G8427 DOCREV CUR MEDS BY ELIG CLIN: HCPCS | Performed by: INTERNAL MEDICINE

## 2021-05-05 PROCEDURE — 99214 OFFICE O/P EST MOD 30 MIN: CPT | Performed by: INTERNAL MEDICINE

## 2021-05-05 PROCEDURE — G8417 CALC BMI ABV UP PARAM F/U: HCPCS | Performed by: INTERNAL MEDICINE

## 2021-05-05 PROCEDURE — 1036F TOBACCO NON-USER: CPT | Performed by: INTERNAL MEDICINE

## 2021-05-05 PROCEDURE — 93000 ELECTROCARDIOGRAM COMPLETE: CPT | Performed by: INTERNAL MEDICINE

## 2021-05-05 ASSESSMENT — ENCOUNTER SYMPTOMS
RHINORRHEA: 0
CONSTIPATION: 0
COUGH: 0
NAUSEA: 0
EYE PAIN: 0
DIARRHEA: 0
VOMITING: 0
ABDOMINAL PAIN: 0
BLOOD IN STOOL: 0
CHEST TIGHTNESS: 0
SHORTNESS OF BREATH: 1
SORE THROAT: 0

## 2021-05-05 NOTE — PROGRESS NOTES
[facility]) Physicians   Internal Medicine     2021  Sandi Manpreet : 1950 Sex: female  Age:67 y.o. Chief Complaint   Patient presents with   [place]     Left cataract Dr Tremaine Santoyo 21        HPI:   Patient presents to office for evaluation of the following medical concerns. - States needs preop evaluation for left eye cataract     - States still having fatigue.     - Labs showed anemia. States was placed on iron - self stopped due to constipation issues. Has seen GI - s/p EGD and colonoscopy. Last ab (2021) was improved. - States has been having issues with insomnia. Uncontrolled. States stopped taking lorazepam. States has been taking Ambien - decreased ambien. Still with issues sleeping. Discussed medications and interactions.     - States has diabetes. States trying to watch diet. States checking blood sugars at home , did have a few > 200. last A1c was 8.2 (2021). States lantus 65 units daily, metformin 1000mg twice a day, added humalog slide scale. Stopped trulicity (cost), Endo stopped glipizide. States one reported hypoglycemia. On lisinopril. On pravastatin. States follows with optho     - States has high blood pressure. States occasioanlly checking blood pressure at home, has wrist cuff - 120's/60's. On lisinopril.     - States has high cholesterol. States trying to watch diet. On pravastatin. No reported side effects. Last labs (2021)     - States has had gastric ulcers and GERD. On omeprazole as needed. Stable. - States has vitamin D def. On otc supplement     - States has had low magnesium levels on supplement     - States stopped vit b12 supplement.     - States was told had elevated LFT and enlarged liver.      - last lab from 5/3/2021 reviewed with patient 2021      Health Maintenance   - immunizations:   Influenza Vaccination - (2019), (20)  Pneumonia Vaccination  Zoster/Shingles Vaccine  Tetanus Vaccination  covid     - Screenings:   Bone Density Scan   Pelvic/Pap Exam  Mammogram - declines     Colonoscopy - (6/20) hemorrhoids, diverticular disease, multiple polyps, tubular and hyperplastic per path, follow up in 3 years     EGD (6/20) -normal    optho (3/21) - for cataract     ROS:  Review of Systems   Constitutional: Negative for appetite change, chills, fever and unexpected weight change. HENT: Negative for congestion, rhinorrhea and sore throat. Eyes: Negative for pain and visual disturbance. Respiratory: Positive for shortness of breath. Negative for cough and chest tightness. Cardiovascular: Negative for chest pain and palpitations. Gastrointestinal: Negative for abdominal pain, blood in stool, constipation, diarrhea, nausea and vomiting. Genitourinary: Negative for difficulty urinating, dysuria, frequency, pelvic pain, urgency and vaginal bleeding. Musculoskeletal: Negative for arthralgias and myalgias. Skin: Negative for rash. Neurological: Negative for dizziness, syncope, weakness, light-headedness, numbness and headaches. Hematological: Negative for adenopathy. Psychiatric/Behavioral: Negative for suicidal ideas. The patient is not nervous/anxious. Current Outpatient Medications:     zinc 50 MG CAPS, Take 1 capsule by mouth, Disp: , Rfl:     atorvastatin (LIPITOR) 40 MG tablet, Take 1 tablet by mouth daily, Disp: 90 tablet, Rfl: 1    insulin glargine (LANTUS SOLOSTAR) 100 UNIT/ML injection pen, Inject 65 Units into the skin every morning, Disp: 15 pen, Rfl: 1    lisinopril (PRINIVIL;ZESTRIL) 20 MG tablet, Take 1 tablet by mouth daily, Disp: 90 tablet, Rfl: 1    zolpidem (AMBIEN) 10 MG tablet, Take 1 tablet by mouth nightly as needed for Sleep for up to 90 days. , Disp: 90 tablet, Rfl: 0    metFORMIN (GLUCOPHAGE) 1000 MG tablet, Take 1 tablet by mouth 2 times daily (with meals), Disp: 180 tablet, Rfl: 1    magnesium (MAGNESIUM-OXIDE) 250 MG TABS tablet, Take 250 mg by mouth daily , Disp: , Rfl:    insulin lispro, 1 Unit Dial, (HUMALOG KWIKPEN) 100 UNIT/ML SOPN, Humalog KwikPen (U-100) Insulin 100 unit/mL subcutaneous  Inject up to 12 units QAC TID, Disp: , Rfl:     Insulin Pen Needle (B-D UF III MINI PEN NEEDLES) 31G X 5 MM MISC, 1 each by Does not apply route daily, Disp: , Rfl:     No Known Allergies    Past Medical History:   Diagnosis Date    Class 2 obesity due to excess calories without serious comorbidity with body mass index (BMI) of 35.0 to 35.9 in adult 2019    Essential hypertension 2019    GERD (gastroesophageal reflux disease)     H pylori ulcer     Hypomagnesemia 2019    Mixed hyperlipidemia 2019    Other insomnia 2019    Peptic ulcer disease     [facility]     Vitamin D deficiency 2019       Past Surgical History:   Procedure Laterality Date    BUNIONECTOMY Left     HYSTERECTOMY, VAGINAL      partial in the 80s then bilateral oopherectome 2016    RETINAL DETACHMENT SURGERY Right     vitreous surgery    TUBAL LIGATION         Family History   Problem Relation Age of Onset    Diabetes Mother     Stroke Mother     Kidney Disease Father         Renal Failure       Social History     Socioeconomic History    Marital status:      Spouse name: Gaviota Eason Number of children: 2    Years of education: 15    Highest education level: High school graduate   Occupational History    Not on file   Social Needs    Financial resource strain: Not hard at all   [place] insecurity     Worry: Never true     Inability: Never true    Transportation needs     Medical: No     Non-medical: No   Tobacco Use    Smoking status: Former Smoker     Packs/day: 1.00     Years: 12.00     Pack years: 12.00     Types: Cigarettes     Quit date: 1982     Years since quittin.2    Smokeless tobacco: Never Used   Substance and Sexual Activity    Alcohol use: Yes     Comment: rare    Drug use: Not on file    Sexual Neurological:      Mental Status: She is alert and oriented to person, place, and time. Cranial Nerves: No cranial nerve deficit. Psychiatric:         Judgment: Judgment normal.         Assessment and Plan:    Diagnoses and all orders for this visit:    Pre-op evaluation    - Procedure: left eye cataract     ACC/AHA  - no active cardiac conditions. Moderate MET of 9 based on duke activity index   - EKG: normal sinus rhythm, nonspecific ST and T wave changes, no old to compare  - Revised cardiac index -1 risk factors giving 1.0 to 1.3% risk   - has undiagnosed murmur   - to see cardio  - overall lower risk from cardiovascular standpoint     Pulmonary:  ARISCAT pulmonary risk 3.6% risk of complications - low risk   - low to intermediate sleep apnea risk based on Stop bang score of 2-3    American college of surgeons risk Calculator   - no matching procedure - unable to assess    - overall average risk     Medications:   Ok to take medication up to day of surgery   Ok to take lantus insulin after surgery   Hold aspirin and NSAIDs 1 week before procedure    Ok to proceed with procedure - lower risk    Chronic conditions   Anemia  - uncertain cause   - N41 and folic acid was normal   - following with GI   - s/p  EGD and colonoscopy (6/2020) - polyps   - has not tolerated iron - constipation - not helped with stool softener - stopped   - last lab (5/2021) - normal    Type 2 diabetes mellitus with hyperglycemia, with long-term current use of insulin ([facility])  - watch diet   - monitor blood sugar at home, goals fasting am <120, 2 hours post meal < 180  - following with endocrinology   - on lantus 65 units   - on metformin 1000mg bid  - endocrinology added humalog slide scale - increased last visit.    - stopped glipizide   - stopped trulicity due to cost   - follow A1c - 7.8 (5/2021)       On ACE  On statin   Not on aspirin - h/o PUD   optho (3/2021) - no Retinopathy     Essential hypertension  - watch diet   - monitor blood pressure at home   - on lisinopril   - stable 5/5/2021    Mixed hyperlipidemia  - Continue present treatment   - watch diet   - stop pravastatin for higher intensity statin   - on atorvastatin   - follow labs   - last lab (2/2021) - stable     Gastroesophageal reflux disease without esophagitis   - watch diet   - h/o PUD   - on omeprazole prn   - on tums as needed     Other insomnia  - states has had sleep study in the past - did nto tolerate   - On Ambien  - weaned off  lorazepam   - discussed did not recommend these medications   - continue Ambien for now   - sleep habits handout provided   - consider referral to sleep medicine     Class 1 obesity due to excess calories without serious comorbidity with body mass index (BMI) of 34.0 to 34.9 in adult  - watch diet   - discussed diet and exercises     Vitamin D deficiency  - on otc supplement   - follow labs     Hypomagnesemia  - on otc supplement   - follow labs     Elevated LFTs  - uncertain etiology at present   - recheck labs - last lab (2/2020) - improved     Murmur  - will need to consider echo     Bilateral carotid bruits  - will need to check US - declines at present     Return for check up and review, as scheduled.     Orders Placed This Encounter   Procedures    EKG 12 Lead     Order Specific Question:   Reason for Exam?     Answer:   Chest pain     Requested Prescriptions      No prescriptions requested or ordered in this encounter        Gena Dubon MD  5/5/2021  4:34 PM

## 2021-05-14 ENCOUNTER — OFFICE VISIT (OUTPATIENT)
Dept: CARDIOLOGY CLINIC | Age: 71
End: 2021-05-14
Payer: MEDICARE

## 2021-05-14 VITALS
WEIGHT: 193.7 LBS | OXYGEN SATURATION: 97 % | DIASTOLIC BLOOD PRESSURE: 60 MMHG | HEIGHT: 63 IN | SYSTOLIC BLOOD PRESSURE: 128 MMHG | RESPIRATION RATE: 16 BRPM | BODY MASS INDEX: 34.32 KG/M2 | HEART RATE: 85 BPM

## 2021-05-14 DIAGNOSIS — R01.1 MURMUR: ICD-10-CM

## 2021-05-14 DIAGNOSIS — I77.9 CAROTID ARTERY DISEASE, UNSPECIFIED LATERALITY, UNSPECIFIED TYPE (HCC): ICD-10-CM

## 2021-05-14 DIAGNOSIS — I10 ESSENTIAL HYPERTENSION: Primary | ICD-10-CM

## 2021-05-14 PROCEDURE — 1123F ACP DISCUSS/DSCN MKR DOCD: CPT | Performed by: INTERNAL MEDICINE

## 2021-05-14 PROCEDURE — 4040F PNEUMOC VAC/ADMIN/RCVD: CPT | Performed by: INTERNAL MEDICINE

## 2021-05-14 PROCEDURE — G8417 CALC BMI ABV UP PARAM F/U: HCPCS | Performed by: INTERNAL MEDICINE

## 2021-05-14 PROCEDURE — G8427 DOCREV CUR MEDS BY ELIG CLIN: HCPCS | Performed by: INTERNAL MEDICINE

## 2021-05-14 PROCEDURE — 1036F TOBACCO NON-USER: CPT | Performed by: INTERNAL MEDICINE

## 2021-05-14 PROCEDURE — G8400 PT W/DXA NO RESULTS DOC: HCPCS | Performed by: INTERNAL MEDICINE

## 2021-05-14 PROCEDURE — 99214 OFFICE O/P EST MOD 30 MIN: CPT | Performed by: INTERNAL MEDICINE

## 2021-05-14 PROCEDURE — 3017F COLORECTAL CA SCREEN DOC REV: CPT | Performed by: INTERNAL MEDICINE

## 2021-05-14 PROCEDURE — 93000 ELECTROCARDIOGRAM COMPLETE: CPT | Performed by: INTERNAL MEDICINE

## 2021-05-14 PROCEDURE — 1090F PRES/ABSN URINE INCON ASSESS: CPT | Performed by: INTERNAL MEDICINE

## 2021-05-17 ENCOUNTER — TELEPHONE (OUTPATIENT)
Dept: CARDIOLOGY CLINIC | Age: 71
End: 2021-05-17

## 2021-05-17 DIAGNOSIS — R09.89 BILATERAL CAROTID BRUITS: Primary | ICD-10-CM

## 2021-05-17 NOTE — TELEPHONE ENCOUNTER
Patient is scheduled for a carotid US on 5/20 at 4:00pm  Instructed patient to arrive by 3:30pm to [facility]  No auth required(Medicare Ins.)  Patient was notified and understood instructions.

## 2021-05-20 ENCOUNTER — HOSPITAL ENCOUNTER (OUTPATIENT)
Dept: ULTRASOUND IMAGING | Age: 71
Discharge: HOME | End: 2021-05-22
Payer: MEDICARE

## 2021-05-20 DIAGNOSIS — R09.89 BILATERAL CAROTID BRUITS: ICD-10-CM

## 2021-05-20 PROCEDURE — 93880 EXTRACRANIAL BILAT STUDY: CPT

## 2021-05-21 ENCOUNTER — TELEPHONE (OUTPATIENT)
Dept: CARDIOLOGY CLINIC | Age: 71
End: 2021-05-21

## 2021-05-21 NOTE — TELEPHONE ENCOUNTER
----- Message from Albert Olivarez DO sent at 5/21/2021  9:03 AM EDT -----  Carotids with moderate blockage on both sides, just needs watched. Repeat US in 1 year. Continue atorvastatin. Add ECASA 81 mg daily. Albert Olivarez D.O.   Cardiologist  Cardiology, [address] [facility]

## 2021-06-16 ENCOUNTER — OFFICE VISIT (OUTPATIENT)
Dept: CHIROPRACTIC MEDICINE | Age: 71
End: 2021-06-16
Payer: MEDICARE

## 2021-06-16 VITALS — RESPIRATION RATE: 16 BRPM | TEMPERATURE: 97.9 F | HEART RATE: 83 BPM | OXYGEN SATURATION: 96 %

## 2021-06-16 DIAGNOSIS — M54.41 CHRONIC RIGHT-SIDED LOW BACK PAIN WITH RIGHT-SIDED SCIATICA: ICD-10-CM

## 2021-06-16 DIAGNOSIS — M53.3 SACROCOCCYGEAL DISORDERS, NOT ELSEWHERE CLASSIFIED: ICD-10-CM

## 2021-06-16 DIAGNOSIS — M99.05 SEGMENTAL AND SOMATIC DYSFUNCTION OF PELVIC REGION: ICD-10-CM

## 2021-06-16 DIAGNOSIS — M99.02 SEGMENTAL AND SOMATIC DYSFUNCTION OF THORACIC REGION: ICD-10-CM

## 2021-06-16 DIAGNOSIS — G89.29 CHRONIC RIGHT-SIDED LOW BACK PAIN WITH RIGHT-SIDED SCIATICA: ICD-10-CM

## 2021-06-16 DIAGNOSIS — S93.492A SPRAIN OF ANTERIOR TALOFIBULAR LIGAMENT OF LEFT ANKLE, INITIAL ENCOUNTER: Primary | ICD-10-CM

## 2021-06-16 DIAGNOSIS — M54.04 PANNICULITIS AFFECTING REGIONS OF NECK AND BACK, THORACIC REGION: ICD-10-CM

## 2021-06-16 DIAGNOSIS — M99.03 SEGMENTAL AND SOMATIC DYSFUNCTION OF LUMBAR REGION: ICD-10-CM

## 2021-06-16 PROCEDURE — 98941 CHIROPRACT MANJ 3-4 REGIONS: CPT | Performed by: CHIROPRACTOR

## 2021-06-16 PROCEDURE — 99999 PR OFFICE/OUTPT VISIT,PROCEDURE ONLY: CPT | Performed by: CHIROPRACTOR

## 2021-06-16 NOTE — PROGRESS NOTES
21  Domingo Sykes : 1950 Sex: female  Age: 65 y.o. Chief Complaint   Patient presents with    Lower Back Pain    Back Pain     fell about a week and a half of ago       HPI:   Steve Suarez had a slip and fall about a week ago, wet tile causing her to lose her balance. She is having left lateral ankle pain, pain near the right medial scapular border and lower back pain today. She has not seen anyone for the problem yet. She has been using ice, Ace wrap and elevation at home for the ankle and has given her good relief. She states it is much better than it was, but still has some pain. Definitely not a swollen she notes. Current Outpatient Medications:     ASPIRIN 81 PO, Take 81 mg by mouth daily, Disp: , Rfl:     zinc 50 MG CAPS, Take 1 capsule by mouth, Disp: , Rfl:     atorvastatin (LIPITOR) 40 MG tablet, Take 1 tablet by mouth daily, Disp: 90 tablet, Rfl: 1    insulin glargine (LANTUS SOLOSTAR) 100 UNIT/ML injection pen, Inject 65 Units into the skin every morning, Disp: 15 pen, Rfl: 1    lisinopril (PRINIVIL;ZESTRIL) 20 MG tablet, Take 1 tablet by mouth daily, Disp: 90 tablet, Rfl: 1    zolpidem (AMBIEN) 10 MG tablet, Take 1 tablet by mouth nightly as needed for Sleep for up to 90 days. , Disp: 90 tablet, Rfl: 0    metFORMIN (GLUCOPHAGE) 1000 MG tablet, Take 1 tablet by mouth 2 times daily (with meals), Disp: 180 tablet, Rfl: 1    magnesium (MAGNESIUM-OXIDE) 250 MG TABS tablet, Take 250 mg by mouth daily , Disp: , Rfl:     insulin lispro, 1 Unit Dial, (HUMALOG KWIKPEN) 100 UNIT/ML SOPN, Humalog KwikPen (U-100) Insulin 100 unit/mL subcutaneous  Inject up to 12 units QAC TID, Disp: , Rfl:     Insulin Pen Needle (B-D UF III MINI PEN NEEDLES) 31G X 5 MM MISC, 1 each by Does not apply route daily, Disp: , Rfl:     Exam:   Vitals:    21 1305   Pulse: 83   Resp: 16   Temp: 97.9 °F (36.6 °C)   SpO2: 96%     She is ambulating still favoring the left ankle, with a decreased stance phase of gait with limitation during heel strike and toe off. There is tenderness over the ATFL and CF ligaments. No true edema or discoloration about the ankle. Posterior tibial and dorsal pedis pulses are 2/4. Inversion stress and calcaneal stress tests are both positive. There are hypertonic and tender fibers noted today in the thoracic and lumbar paraspinal muscles. Active trigger point right rhomboid joint fixation is noted with motion screening at right SI joint, L3-4 and T4-6. Sivan Long was seen today for lower back pain and back pain. Diagnoses and all orders for this visit:    Sprain of anterior talofibular ligament of left ankle, initial encounter    Segmental and somatic dysfunction of pelvic region    Segmental and somatic dysfunction of lumbar region    Segmental and somatic dysfunction of thoracic region    Panniculitis affecting regions of neck and back, thoracic region    Chronic right-sided low back pain with right-sided sciatica    Sacrococcygeal disorders, not elsewhere classified        Treatment Plan: I continued Berry flexion distraction manipulation at L3, protocol 1. Mechanically assisted manipulation of the SI joint and thoracic segments listed. Tolerated well. I explained to her that Medicare prohibits me from treating her ankle. She is already doing acute phase care at home. Discussed follow-up with PCP and/or podiatry which was declined today. She is going to continue the home based self-care. Advised her to add in some ankle pumps and ABCs, see how she does.  If not better in a week I want her to contact her PCP      Seen By:  Chadwick Meza DC

## 2021-06-16 NOTE — PATIENT INSTRUCTIONS
Patient Education        Ankle Sprain: Care Instructions  Your Care Instructions     An ankle sprain can happen when you twist your ankle. The ligaments that support the ankle can get stretched and torn. Often the ankle is swollen and painful. Ankle sprains may take from several weeks to several months to heal. Usually, the more pain and swelling you have, the more severe your ankle sprain is and the longer it will take to heal. You can heal faster and regain strength in your ankle with good home treatment. It is very important to give your ankle time to heal completely, so that you do not easily hurt your ankle again. Follow-up care is a key part of your treatment and safety. Be sure to make and go to all appointments, and call your doctor if you are having problems. It's also a good idea to know your test results and keep a list of the medicines you take. How can you care for yourself at home? · Prop up your foot on pillows as much as possible for the next 3 days. Try to keep your ankle above the level of your heart. This will help reduce the swelling. · Follow your doctor's directions for wearing a splint or elastic bandage. Wrapping the ankle may help reduce or prevent swelling. · Your doctor may give you a splint, a brace, an air stirrup, or another form of ankle support to protect your ankle until it is healed. Wear it as directed while your ankle is healing. Do not remove it unless your doctor tells you to. After your ankle has healed, ask your doctor whether you should wear the brace when you exercise. · Put ice or cold packs on your injured ankle for 10 to 20 minutes at a time. Try to do this every 1 to 2 hours for the next 3 days (when you are awake) or until the swelling goes down. Put a thin cloth between the ice and your skin. · You may need to use crutches until you can walk without pain. If you do use crutches, try to bear some weight on your injured ankle if you can do so without pain.  This helps the ankle heal.  · Take pain medicines exactly as directed. ? If the doctor gave you a prescription medicine for pain, take it as prescribed. ? If you are not taking a prescription pain medicine, ask your doctor if you can take an over-the-counter medicine. · If you have been given ankle exercises to do at home, do them exactly as instructed. These can promote healing and help prevent lasting weakness. When should you call for help? Call your doctor now or seek immediate medical care if:    · Your pain is getting worse.     · Your swelling is getting worse.     · Your splint feels too tight or you are unable to loosen it. Watch closely for changes in your health, and be sure to contact your doctor if:    · You are not getting better after 1 week. Where can you learn more? Go to [email or web address] and sign in to your "[employer] account. Enter B012 in the [employer] box to learn more about \"Ankle Sprain: Care Instructions. \"     If you do not have an account, please click on the \"Sign Up Now\" link. Current as of: November 16, 2020               Content Version: 12.9  © 2006-2021 [employer]wise[employer] Incorporated. Care instructions adapted under license by [facility] ([facility]). If you have questions about a medical condition or this instruction, always ask your healthcare professional. [address] any warranty or liability for your use of this information.

## 2021-07-12 ENCOUNTER — TELEPHONE (OUTPATIENT)
Dept: FAMILY MEDICINE CLINIC | Age: 71
End: 2021-07-12

## 2021-07-12 DIAGNOSIS — G47.09 OTHER INSOMNIA: ICD-10-CM

## 2021-07-12 DIAGNOSIS — E78.2 MIXED HYPERLIPIDEMIA: ICD-10-CM

## 2021-07-12 DIAGNOSIS — I10 ESSENTIAL HYPERTENSION: ICD-10-CM

## 2021-07-12 RX ORDER — ZOLPIDEM TARTRATE 10 MG/1
10 TABLET ORAL NIGHTLY PRN
Qty: 90 TABLET | Refills: 0 | Status: SHIPPED
Start: 2021-07-12 | End: 2021-08-26 | Stop reason: SDUPTHER

## 2021-07-12 RX ORDER — LISINOPRIL 20 MG/1
20 TABLET ORAL DAILY
Qty: 90 TABLET | Refills: 1 | Status: SHIPPED
Start: 2021-07-12 | End: 2021-11-17 | Stop reason: SDUPTHER

## 2021-07-12 RX ORDER — ATORVASTATIN CALCIUM 40 MG/1
40 TABLET, FILM COATED ORAL DAILY
Qty: 90 TABLET | Refills: 1 | Status: SHIPPED
Start: 2021-07-12 | End: 2021-11-17 | Stop reason: SDUPTHER

## 2021-07-12 NOTE — TELEPHONE ENCOUNTER
Pt called and said she missed the call from the office. Please call her back. She said she usually has voicemail and not sure why it didn't work.

## 2021-07-12 NOTE — TELEPHONE ENCOUNTER
Requested Prescriptions     Signed Prescriptions Disp Refills    zolpidem (AMBIEN) 10 MG tablet 90 tablet 0     Sig: Take 1 tablet by mouth nightly as needed for Sleep for up to 90 days.      Authorizing Provider: Garrison MCCLURE    atorvastatin (LIPITOR) 40 MG tablet 90 tablet 1     Sig: Take 1 tablet by mouth daily     Authorizing Provider: Romana Kato    lisinopril (PRINIVIL;ZESTRIL) 20 MG tablet 90 tablet 1     Sig: Take 1 tablet by mouth daily     Authorizing Provider: Dorinda Mills

## 2021-07-13 ENCOUNTER — OFFICE VISIT (OUTPATIENT)
Dept: CHIROPRACTIC MEDICINE | Age: 71
End: 2021-07-13
Payer: MEDICARE

## 2021-07-13 VITALS — TEMPERATURE: 97.8 F | HEART RATE: 96 BPM | OXYGEN SATURATION: 96 %

## 2021-07-13 DIAGNOSIS — M99.01 SEGMENTAL AND SOMATIC DYSFUNCTION OF CERVICAL REGION: ICD-10-CM

## 2021-07-13 DIAGNOSIS — M99.05 SEGMENTAL AND SOMATIC DYSFUNCTION OF PELVIC REGION: Primary | ICD-10-CM

## 2021-07-13 DIAGNOSIS — M54.2 CERVICALGIA: ICD-10-CM

## 2021-07-13 DIAGNOSIS — M54.04 PANNICULITIS AFFECTING REGIONS OF NECK AND BACK, THORACIC REGION: ICD-10-CM

## 2021-07-13 PROCEDURE — 99999 PR OFFICE/OUTPT VISIT,PROCEDURE ONLY: CPT | Performed by: CHIROPRACTOR

## 2021-07-13 PROCEDURE — 98940 CHIROPRACT MANJ 1-2 REGIONS: CPT | Performed by: CHIROPRACTOR

## 2021-07-13 NOTE — PROGRESS NOTES
21  Delmita Prince : 1950 Sex: female  Age: 65 y.o. Chief Complaint   Patient presents with    Neck Pain       HPI:   Mina Serra returns today. She states about 3 weeks ago she began to have right-sided shoulder blade pain traveling up the side of her neck. She ended up seeing a physician while she was away in [facility] who provided her with a muscle relaxer which provided mild relief only. Yesterday was the best day she has had in a while. Previously pain was up to 9/10. She has been using heat, ice, gels, OTC NSAIDs and a neck brace while driving in the car. She reportedly has trouble turning her head. She got back last Friday from [facility] and presents today. She is not seeing any other provider since she is back. Current Outpatient Medications:     zolpidem (AMBIEN) 10 MG tablet, Take 1 tablet by mouth nightly as needed for Sleep for up to 90 days. , Disp: 90 tablet, Rfl: 0    atorvastatin (LIPITOR) 40 MG tablet, Take 1 tablet by mouth daily, Disp: 90 tablet, Rfl: 1    lisinopril (PRINIVIL;ZESTRIL) 20 MG tablet, Take 1 tablet by mouth daily, Disp: 90 tablet, Rfl: 1    ASPIRIN 81 PO, Take 81 mg by mouth daily, Disp: , Rfl:     zinc 50 MG CAPS, Take 1 capsule by mouth, Disp: , Rfl:     insulin glargine (LANTUS SOLOSTAR) 100 UNIT/ML injection pen, Inject 65 Units into the skin every morning, Disp: 15 pen, Rfl: 1    metFORMIN (GLUCOPHAGE) 1000 MG tablet, Take 1 tablet by mouth 2 times daily (with meals), Disp: 180 tablet, Rfl: 1    magnesium (MAGNESIUM-OXIDE) 250 MG TABS tablet, Take 250 mg by mouth daily , Disp: , Rfl:     insulin lispro, 1 Unit Dial, (HUMALOG KWIKPEN) 100 UNIT/ML SOPN, Humalog KwikPen (U-100) Insulin 100 unit/mL subcutaneous  Inject up to 12 units QAC TID, Disp: , Rfl:     Insulin Pen Needle (B-D UF III MINI PEN NEEDLES) 31G X 5 MM MISC, 1 each by Does not apply route daily, Disp: , Rfl:     Exam:   Vitals:    21 1314   Pulse: 96   Temp: 97.8 °F (36.6 °C) SpO2: 96%     There is limited motion in the cervical spine in all planes. She appears in no distress. No antalgia. No midline pain in the cervical region. There is an active trigger point in the right levator scapulae today, right trapezius as well. Palpation here reproduces symptoms up into the right side of the neck. There is trigger point as well in the right suboccipital group. There are hypertonic and tender fibers noted today in the cervical and thoracic paraspinal muscles. Joint fixation is noted with motion screening at C6-T2, T4-6. Jessa Branham was seen today for neck pain. Diagnoses and all orders for this visit:    Segmental and somatic dysfunction of pelvic region    Panniculitis affecting regions of neck and back, thoracic region    Segmental and somatic dysfunction of cervical region    Cervicalgia        Treatment Plan: Trigger point myofascial pain noted. Likely had strain/capsulitis now with resultant issues. Vibratory massage is performed today to the thoracic region. Trigger point therapy to the affected muscle, not meeting time requirement for care. Mechanically assisted manipulation to listed cervical and thoracic segments. Tolerated well. Encouraged her to continue with heat for 10-20 minutes or use alternating heat-ice-heat at 10-minute intervals each. Continue with the topical gels. I want her to do some gentle cervical motions as well. Have her follow-up with me in 1 week.       Seen By:  Denise Ogden DC

## 2021-07-20 ENCOUNTER — OFFICE VISIT (OUTPATIENT)
Dept: CHIROPRACTIC MEDICINE | Age: 71
End: 2021-07-20
Payer: MEDICARE

## 2021-07-20 ENCOUNTER — OFFICE VISIT (OUTPATIENT)
Dept: FAMILY MEDICINE CLINIC | Age: 71
End: 2021-07-20
Payer: MEDICARE

## 2021-07-20 VITALS
TEMPERATURE: 97.3 F | SYSTOLIC BLOOD PRESSURE: 140 MMHG | HEART RATE: 100 BPM | BODY MASS INDEX: 34.2 KG/M2 | WEIGHT: 193 LBS | RESPIRATION RATE: 20 BRPM | HEIGHT: 63 IN | OXYGEN SATURATION: 97 % | DIASTOLIC BLOOD PRESSURE: 72 MMHG

## 2021-07-20 VITALS — HEART RATE: 105 BPM | OXYGEN SATURATION: 96 % | TEMPERATURE: 98.7 F | RESPIRATION RATE: 18 BRPM

## 2021-07-20 DIAGNOSIS — M99.02 SEGMENTAL AND SOMATIC DYSFUNCTION OF THORACIC REGION: ICD-10-CM

## 2021-07-20 DIAGNOSIS — M54.2 CERVICALGIA: Primary | ICD-10-CM

## 2021-07-20 DIAGNOSIS — M54.04 PANNICULITIS AFFECTING REGIONS OF NECK AND BACK, THORACIC REGION: ICD-10-CM

## 2021-07-20 DIAGNOSIS — M54.12 CERVICAL RADICULAR PAIN: Primary | ICD-10-CM

## 2021-07-20 DIAGNOSIS — M54.9 UPPER BACK PAIN: ICD-10-CM

## 2021-07-20 DIAGNOSIS — M99.01 SEGMENTAL AND SOMATIC DYSFUNCTION OF CERVICAL REGION: ICD-10-CM

## 2021-07-20 PROCEDURE — 1036F TOBACCO NON-USER: CPT | Performed by: PHYSICIAN ASSISTANT

## 2021-07-20 PROCEDURE — 99203 OFFICE O/P NEW LOW 30 MIN: CPT | Performed by: PHYSICIAN ASSISTANT

## 2021-07-20 PROCEDURE — 1090F PRES/ABSN URINE INCON ASSESS: CPT | Performed by: PHYSICIAN ASSISTANT

## 2021-07-20 PROCEDURE — G8417 CALC BMI ABV UP PARAM F/U: HCPCS | Performed by: PHYSICIAN ASSISTANT

## 2021-07-20 PROCEDURE — 1123F ACP DISCUSS/DSCN MKR DOCD: CPT | Performed by: PHYSICIAN ASSISTANT

## 2021-07-20 PROCEDURE — 4040F PNEUMOC VAC/ADMIN/RCVD: CPT | Performed by: PHYSICIAN ASSISTANT

## 2021-07-20 PROCEDURE — 96372 THER/PROPH/DIAG INJ SC/IM: CPT | Performed by: PHYSICIAN ASSISTANT

## 2021-07-20 PROCEDURE — G8427 DOCREV CUR MEDS BY ELIG CLIN: HCPCS | Performed by: PHYSICIAN ASSISTANT

## 2021-07-20 PROCEDURE — 99999 PR OFFICE/OUTPT VISIT,PROCEDURE ONLY: CPT | Performed by: CHIROPRACTOR

## 2021-07-20 PROCEDURE — G8400 PT W/DXA NO RESULTS DOC: HCPCS | Performed by: PHYSICIAN ASSISTANT

## 2021-07-20 PROCEDURE — 3017F COLORECTAL CA SCREEN DOC REV: CPT | Performed by: PHYSICIAN ASSISTANT

## 2021-07-20 PROCEDURE — 98940 CHIROPRACT MANJ 1-2 REGIONS: CPT | Performed by: CHIROPRACTOR

## 2021-07-20 RX ORDER — METHYLPREDNISOLONE ACETATE 80 MG/ML
60 INJECTION, SUSPENSION INTRA-ARTICULAR; INTRALESIONAL; INTRAMUSCULAR; SOFT TISSUE ONCE
Status: COMPLETED | OUTPATIENT
Start: 2021-07-20 | End: 2021-07-20

## 2021-07-20 RX ORDER — LIDOCAINE 50 MG/G
1 PATCH TOPICAL DAILY
Qty: 10 PATCH | Refills: 0 | Status: SHIPPED | OUTPATIENT
Start: 2021-07-20 | End: 2021-07-30

## 2021-07-20 RX ORDER — TIZANIDINE 4 MG/1
4 TABLET ORAL 4 TIMES DAILY PRN
Qty: 20 TABLET | Refills: 0 | Status: SHIPPED
Start: 2021-07-20 | End: 2021-08-26 | Stop reason: ALTCHOICE

## 2021-07-20 RX ADMIN — METHYLPREDNISOLONE ACETATE 60 MG: 80 INJECTION, SUSPENSION INTRA-ARTICULAR; INTRALESIONAL; INTRAMUSCULAR; SOFT TISSUE at 11:30

## 2021-07-20 NOTE — PROGRESS NOTES
the clavicle, acromioclavicular joint, intertubercular groove of the humerus, greater tuberosity and subacromial space. Neer impingement is negative. Cervical compression is negative. Distraction provides some mild relief. There are hypertonic and tender fibers noted today in the cervical and thoracic paraspinal muscles. Joint fixation is noted with motion screening at C6-T2, T-5-7. Jatinder Hassan was seen today for neck pain. Diagnoses and all orders for this visit:    Cervicalgia    Panniculitis affecting regions of neck and back, thoracic region    Segmental and somatic dysfunction of cervical region    Segmental and somatic dysfunction of thoracic region        Treatment Plan: In a seated position today, I perform mechanically assisted manipulation to the affected segments. I did offer her express care for imaging and medications, and she would like to see someone. Apparently she is not able to see her PCP until mid August.      I will have her follow-up with me after she completes her medication regime and discuss treatment options from there.   She previously had seen physical medicineDrCharly Jeffery at [address] [employer] [facility] By:  Miranda Chaudhari DC

## 2021-07-20 NOTE — PROGRESS NOTES
21  Chelly Suazo : 1950 Sex: female  Age 65 y.o. Subjective:  Chief Complaint   Patient presents with    Neck Pain     neck pain for about a month         HPI:   Chelly Suazo , 65 y.o. female presents to express care for evaluation of right-sided neck and back pain    HPI  57-year-old female with a history of diabetes, GERD, hypomagnesium, hyperlipidemia, peptic ulcer disease presents to express care for evaluation of right-sided back pain. The patient was sent from chiropractor for evaluation of the right-sided back pain. The patient has had the symptoms ongoing for about a month and a half. The patient seems to be largely uncomfortable. She was down in [facility] when this initially started. The patient states that she went to a urgent care there and they had given her a muscle relaxant. From the description of the medication and the frequency and a milligram sounds as if she was on Flexeril. The patient denies any traumatic injury or falls. She does not recall anything that she picked up. ROS:   Unless otherwise stated in this report the patient's positive and negative responses for review of systems for constitutional, eyes, ENT, cardiovascular, respiratory, gastrointestinal, neurological, , musculoskeletal, and integument systems and related systems to the presenting problem are either stated in the history of present illness or were not pertinent or were negative for the symptoms and/or complaints related to the presenting medical problem. Positives and pertinent negatives as per HPI. All others reviewed and are negative.       PMH:     Past Medical History:   Diagnosis Date    Class 2 obesity due to excess calories without serious comorbidity with body mass index (BMI) of 35.0 to 35.9 in adult 2019    Essential hypertension 2019    GERD (gastroesophageal reflux disease)     H pylori ulcer     Hypomagnesemia 2019    Mixed hyperlipidemia 12/18/2019    Other insomnia 12/18/2019    Peptic ulcer disease     Type 2 diabetes mellitus without complication (HCC)     Vitamin D deficiency 12/18/2019       Past Surgical History:   Procedure Laterality Date    BUNIONECTOMY Left     CATARACT REMOVAL WITH IMPLANT      HYSTERECTOMY, VAGINAL      partial in the 80s then bilateral oopherectome 2016    RETINAL DETACHMENT SURGERY Right     vitreous surgery    TUBAL LIGATION         Family History   Problem Relation Age of Onset    Diabetes Mother     Stroke Mother     Kidney Disease Father         Renal Failure       Medications:     Current Outpatient Medications:     tiZANidine (ZANAFLEX) 4 MG tablet, Take 1 tablet by mouth 4 times daily as needed (pain), Disp: 20 tablet, Rfl: 0    lidocaine (LIDODERM) 5 %, Place 1 patch onto the skin daily for 10 days 12 hours on, 12 hours off., Disp: 10 patch, Rfl: 0    zolpidem (AMBIEN) 10 MG tablet, Take 1 tablet by mouth nightly as needed for Sleep for up to 90 days. , Disp: 90 tablet, Rfl: 0    atorvastatin (LIPITOR) 40 MG tablet, Take 1 tablet by mouth daily, Disp: 90 tablet, Rfl: 1    lisinopril (PRINIVIL;ZESTRIL) 20 MG tablet, Take 1 tablet by mouth daily, Disp: 90 tablet, Rfl: 1    ASPIRIN 81 PO, Take 81 mg by mouth daily, Disp: , Rfl:     zinc 50 MG CAPS, Take 1 capsule by mouth, Disp: , Rfl:     insulin glargine (LANTUS SOLOSTAR) 100 UNIT/ML injection pen, Inject 65 Units into the skin every morning, Disp: 15 pen, Rfl: 1    metFORMIN (GLUCOPHAGE) 1000 MG tablet, Take 1 tablet by mouth 2 times daily (with meals), Disp: 180 tablet, Rfl: 1    magnesium (MAGNESIUM-OXIDE) 250 MG TABS tablet, Take 250 mg by mouth daily , Disp: , Rfl:     insulin lispro, 1 Unit Dial, (HUMALOG KWIKPEN) 100 UNIT/ML SOPN, Humalog KwikPen (U-100) Insulin 100 unit/mL subcutaneous  Inject up to 12 units QAC TID, Disp: , Rfl:     Insulin Pen Needle (B-D UF III MINI PEN NEEDLES) 31G X 5 MM MISC, 1 each by Does not apply route daily, Disp: , Rfl:     Allergies:   No Known Allergies    Social History:     Social History     Tobacco Use    Smoking status: Former Smoker     Packs/day: 1.00     Years: 12.00     Pack years: 12.00     Types: Cigarettes     Quit date: 1982     Years since quittin.4    Smokeless tobacco: Never Used   Substance Use Topics    Alcohol use: Yes     Comment: rare    Drug use: Not on file       Patient lives at home. Physical Exam:     Vitals:    21 1119   BP: (!) 140/72   Pulse: 100   Resp: 20   Temp: 97.3 °F (36.3 °C)   SpO2: 97%   Weight: 193 lb (87.5 kg)   Height: 5' 3\" (1.6 m)       Exam:  Physical Exam  Nurses note and vital signs reviewed and patient is not hypoxic. General: The patient appears well and in no apparent distress. Patient is resting comfortably on cart. Skin: Warm, dry, no pallor noted. There is no rash noted. Head: Normocephalic, atraumatic. Eye: Normal conjunctiva  Ears, Nose, Mouth, and Throat: Wearing a mask  Neck: The patient does have reproducible pain to the right paracervical musculature into the right trapezius. The patient also had some pain down into the right parathoracic musculature. There is no significant swelling otherwise noted. There is no lymphadenopathy noted. Cardiovascular: Regular Rate and Rhythm  Respiratory: Patient is in no distress, no accessory muscle use, lungs are clear to auscultation, no wheezing, crackles or rhonchi  Back: No obvious deformity noted to the back area, no rashes detected. The patient does have reproducible pain to the right para thoracic and para cervical musculature. It does seem to go into the trapezius area. There is some tenderness along the scapular area as well.   Musculoskeletal: Normal equal  strength, pulses symmetrical at radius 2+, does have some increased pain with movement of the right upper extremity  Neurological: A&O x4, normal speech      Testing:     XR CERVICAL SPINE (2-3 VIEWS)    Result Date:

## 2021-07-29 ENCOUNTER — OFFICE VISIT (OUTPATIENT)
Dept: CHIROPRACTIC MEDICINE | Age: 71
End: 2021-07-29
Payer: MEDICARE

## 2021-07-29 VITALS — TEMPERATURE: 98.3 F | RESPIRATION RATE: 16 BRPM | OXYGEN SATURATION: 95 % | HEART RATE: 95 BPM

## 2021-07-29 DIAGNOSIS — M99.02 SEGMENTAL AND SOMATIC DYSFUNCTION OF THORACIC REGION: ICD-10-CM

## 2021-07-29 DIAGNOSIS — M47.22 CERVICAL SPONDYLOSIS WITH RADICULOPATHY: ICD-10-CM

## 2021-07-29 DIAGNOSIS — M99.01 SEGMENTAL AND SOMATIC DYSFUNCTION OF CERVICAL REGION: Primary | ICD-10-CM

## 2021-07-29 DIAGNOSIS — M54.04 PANNICULITIS AFFECTING REGIONS OF NECK AND BACK, THORACIC REGION: ICD-10-CM

## 2021-07-29 PROCEDURE — 98940 CHIROPRACT MANJ 1-2 REGIONS: CPT | Performed by: CHIROPRACTOR

## 2021-07-29 PROCEDURE — 99999 PR OFFICE/OUTPT VISIT,PROCEDURE ONLY: CPT | Performed by: CHIROPRACTOR

## 2021-07-29 NOTE — PROGRESS NOTES
21  Melvin Perez : 1950 Sex: female  Age: 65 y.o. Chief Complaint   Patient presents with    Neck Pain       HPI:   Grace Rinne returns today. She did see express care following her last visit. She had x-rays taken of her cervical and thoracic regions. She continues to have pain in the midline cervical thoracic region traveling to the right lateral shoulder. She also has some pain right medial scapular border. She has no pain down the arm. No loss of function in the arms, numbness or weakness. She was provided with an IM injection of a steroid she believes. She started take a muscle relaxer as well, but has stopped as it made her feel groggy. She was on some topical Lidoderm patches, and states she had a skin reaction/rash and has stopped using them for the past 4 days. She has been using some topical cortisone over the rash. She has switched to some ice and heat. She has tried some neck rotation/range of motion exercises. Despite this, pain level still near 8/10. Current Outpatient Medications:     tiZANidine (ZANAFLEX) 4 MG tablet, Take 1 tablet by mouth 4 times daily as needed (pain), Disp: 20 tablet, Rfl: 0    lidocaine (LIDODERM) 5 %, Place 1 patch onto the skin daily for 10 days 12 hours on, 12 hours off., Disp: 10 patch, Rfl: 0    zolpidem (AMBIEN) 10 MG tablet, Take 1 tablet by mouth nightly as needed for Sleep for up to 90 days. , Disp: 90 tablet, Rfl: 0    atorvastatin (LIPITOR) 40 MG tablet, Take 1 tablet by mouth daily, Disp: 90 tablet, Rfl: 1    lisinopril (PRINIVIL;ZESTRIL) 20 MG tablet, Take 1 tablet by mouth daily, Disp: 90 tablet, Rfl: 1    ASPIRIN 81 PO, Take 81 mg by mouth daily, Disp: , Rfl:     zinc 50 MG CAPS, Take 1 capsule by mouth, Disp: , Rfl:     insulin glargine (LANTUS SOLOSTAR) 100 UNIT/ML injection pen, Inject 65 Units into the skin every morning, Disp: 15 pen, Rfl: 1    metFORMIN (GLUCOPHAGE) 1000 MG tablet, Take 1 tablet by mouth 2 times daily (with meals), Disp: 180 tablet, Rfl: 1    magnesium (MAGNESIUM-OXIDE) 250 MG TABS tablet, Take 250 mg by mouth daily , Disp: , Rfl:     insulin lispro, 1 Unit Dial, (HUMALOG KWIKPEN) 100 UNIT/ML SOPN, Humalog KwikPen (U-100) Insulin 100 unit/mL subcutaneous  Inject up to 12 units QAC TID, Disp: , Rfl:     Insulin Pen Needle (B-D UF III MINI PEN NEEDLES) 31G X 5 MM MISC, 1 each by Does not apply route daily, Disp: , Rfl:     Exam:   Vitals:    07/29/21 1348   Pulse: 95   Resp: 16   Temp: 98.3 °F (36.8 °C)   SpO2: 95%     She appears in no acute distress. She does have limited cervical active range of motion throughout, with rotation most affected at approximately 45 degrees bilaterally with endrange pain reported. She has some midline palpatory discomfort over the T1-3 spinous processes as well as the interspinous spaces. Active trigger point right levator scapulae today. Hypertonic and tender fibers cervical and thoracic paraspinal muscles. Joint fixation with motion screening at T3-5. Henry Villanueva was seen today for neck pain. Diagnoses and all orders for this visit:    Segmental and somatic dysfunction of cervical region  -     [facility] - Physical Therapy, Arash Diaz  -     External Referral To Physical Medicine Rehab    Segmental and somatic dysfunction of thoracic region  -     [facility] - Physical Therapy, Arash Diaz  -     External Referral To Physical Medicine Rehab    Cervical spondylosis with radiculopathy  -     [facility] - Physical Therapy, Arash Diaz  -     External Referral To Physical Medicine Rehab    Panniculitis affecting regions of neck and back, thoracic region  -     [facility] - Physical Therapy, Arash Diaz  -     External Referral To Physical Medicine Rehab    I did review the cervical and thoracic plain films taken on 7/20/2021. Treatment Plan: I did perform mechanically assisted manipulation to the affected segments and vibratory massage to the thoracic musculature. Tolerated well.  They are aware that with their Medicare program, I am limited to manipulation only. I am going to refer her over to physical therapy in [place] for some additional concurrent treatment. Also make a special referral to Dr. Hlaima Castillo at [facility]  has seen him in the past and she would prefer to see him again. I will send the referral to their office, and she was provided with a paper copy. If she needs x-rays, she should let us know and we will have a DVD made.       Seen By:  Nicki Solorzano DC

## 2021-08-04 ENCOUNTER — EVALUATION (OUTPATIENT)
Dept: PHYSICAL THERAPY | Age: 71
End: 2021-08-04
Payer: MEDICARE

## 2021-08-04 DIAGNOSIS — M99.02 SEGMENTAL AND SOMATIC DYSFUNCTION OF THORACIC REGION: ICD-10-CM

## 2021-08-04 DIAGNOSIS — M47.22 OTHER SPONDYLOSIS WITH RADICULOPATHY, CERVICAL REGION: ICD-10-CM

## 2021-08-04 DIAGNOSIS — M99.01 SEGMENTAL AND SOMATIC DYSFUNCTION OF CERVICAL REGION: Primary | ICD-10-CM

## 2021-08-04 DIAGNOSIS — M54.04 PANNICULITIS AFFECTING REGIONS OF NECK AND BACK, THORACIC REGION: ICD-10-CM

## 2021-08-04 PROCEDURE — 97161 PT EVAL LOW COMPLEX 20 MIN: CPT | Performed by: PHYSICAL THERAPIST

## 2021-08-04 NOTE — PROGRESS NOTES
[address] [facility]   Phone: [phone]   Fax: [phone]      Physical Therapy Daily Treatment Note    Date: 2021  Patient Name: Miguel Angel Rainey  : 1950   MRN: [id number]  DOInjury: 2 months   DOSx: NA   Referring Provider: Miranda Chaudhari, [facility],  [facility]     Medical Diagnosis:   M99.01 (ICD-10-CM) - Segmental and somatic dysfunction of cervical region   M99.02 (ICD-10-CM) - Segmental and somatic dysfunction of thoracic region   M47.22 (ICD-10-CM) - Cervical spondylosis with radiculopathy   M54.04 (ICD-10-CM) - Panniculitis affecting regions of neck and back, thoracic region       Outcome Measure:  NDI 24%    S: See eval.   O:  Time 1007- 1034     Visit  Repeat outcome measure at mid point and end. Pain 7/10     ROM      Modalities      MH + ES      Ice            Manual                  Stretch                              Exercise      UBE  or      Bike      ROWS: H      ROWS: M      ROWS: L      Shoulder ER      Cross country ski      Shrugs      Shoulder Press      Cervical isometrics      Chin tucks  1 x 10 supine  1 x 10 sitting  HEP te         A:  Tolerated well. Above added to written HEP. Pt instructed to do 1 x 10 roughly every 2 hours awake either supine or sitting and to discontinue if symptoms worsen down back or arm with exercise. Pt demonstrates understanding.     P: Continue with rehab plan  Kaylyn Cramer, PT DPT, PT [id number]     Treatment Charges: Mins Units   Initial Evaluation 22 1   Re-Evaluation     Ther Exercise         TE 5    Manual Therapy     MT     Ther Activities        TA     Gait Training          GT     Neuro Re-education NR     Modalities     Non-Billable Service Time     Other     Total Time/Units 27 1

## 2021-08-04 NOTE — PROGRESS NOTES
sitting in the recliner, ibuprofen , biofreeze     Disturbed Sleep: yes; reports doesn't sleep well normally but now worse than normal.     Imaging results: XR CERVICAL SPINE (2-3 VIEWS)    Result Date: 7/20/2021  EXAMINATION: XRAY VIEWS OF THE CERVICAL SPINE 7/20/2021 11:50 am COMPARISON: None. HISTORY: ORDERING SYSTEM PROVIDED HISTORY: Cervical radicular pain TECHNOLOGIST PROVIDED HISTORY: Reason for exam:->right sided neck and shoulder pain FINDINGS: No evidence of fracture or malalignment of the cervical spine. Large anterior osteophytes are present at C4/C5, C5/C6, and C6/C7. No prevertebral soft tissue edema. Vertebral body height and interspaces are well maintained. Calcifications are located in left neck soft tissue. Odontoid view is unremarkable. 1. No fracture or malalignment. 2. Calcifications are associated with left neck soft tissue likely related to atherosclerotic calcifications at level of left carotid artery. XR THORACIC SPINE (3 VIEWS)    Result Date: 7/20/2021  EXAMINATION: THREE XRAY VIEWS OF THE THORACIC SPINE 7/20/2021 11:50 am COMPARISON: None. HISTORY: ORDERING SYSTEM PROVIDED HISTORY: Upper back pain TECHNOLOGIST PROVIDED HISTORY: Reason for exam:->upper back pain FINDINGS: Radiographs of the thoracic spine demonstrate no evidence of fracture or focal lesion. Moderate endplate osteophytosis is seen in the mid and lower thoracic spine. Mild dextroscoliotic curvature of the thoracic spine is noted. 1. No fracture or joint dislocation is seen. 2. Degenerative changes, as described.        Past Medical History:  Past Medical History:   Diagnosis Date    Class 2 obesity due to excess calories without serious comorbidity with body mass index (BMI) of 35.0 to 35.9 in adult 12/18/2019    Essential hypertension 12/18/2019    GERD (gastroesophageal reflux disease)     H pylori ulcer     Hypomagnesemia 12/18/2019    Mixed hyperlipidemia 12/18/2019    Other insomnia 12/18/2019  Peptic ulcer disease     Type 2 diabetes mellitus without complication ([facility] [address]     Vitamin D deficiency 12/18/2019     Past Surgical History:   Procedure Laterality Date    BUNIONECTOMY Left     CATARACT REMOVAL WITH IMPLANT      HYSTERECTOMY, VAGINAL      partial in the 80s then bilateral oopherectome 2016    RETINAL DETACHMENT SURGERY Right     vitreous surgery    TUBAL LIGATION         Medications:   Current Outpatient Medications   Medication Sig Dispense Refill    tiZANidine (ZANAFLEX) 4 MG tablet Take 1 tablet by mouth 4 times daily as needed (pain) 20 tablet 0    zolpidem (AMBIEN) 10 MG tablet Take 1 tablet by mouth nightly as needed for Sleep for up to 90 days. 90 tablet 0    atorvastatin (LIPITOR) 40 MG tablet Take 1 tablet by mouth daily 90 tablet 1    lisinopril (PRINIVIL;ZESTRIL) 20 MG tablet Take 1 tablet by mouth daily 90 tablet 1    ASPIRIN 81 PO Take 81 mg by mouth daily      zinc 50 MG CAPS Take 1 capsule by mouth      insulin glargine (LANTUS SOLOSTAR) 100 UNIT/ML injection pen Inject 65 Units into the skin every morning 15 pen 1    metFORMIN (GLUCOPHAGE) 1000 MG tablet Take 1 tablet by mouth 2 times daily (with meals) 180 tablet 1    magnesium (MAGNESIUM-OXIDE) 250 MG TABS tablet Take 250 mg by mouth daily       insulin lispro, 1 Unit Dial, (HUMALOG KWIKPEN) 100 UNIT/ML SOPN Humalog KwikPen (U-100) Insulin 100 unit/mL subcutaneous   Inject up to 12 units QAC TID      Insulin Pen Needle (B-D UF III MINI PEN NEEDLES) 31G X 5 MM MISC 1 each by Does not apply route daily       No current facility-administered medications for this visit. Occupation: retired.      Exercise regimen: none    Hobbies: buying/selling antiques     Patient Goals: pain relief    Contraindications/Precautions: none    OBJECTIVE:     Observations: well nourished female        Range of Motion:    Neck: note pt's resting posture is slight tilt to L while sitting/resting   Flexion:  [x] Normal   [] Limited Extension:  [] Normal   [x] Limited 50%    Right Rotation: [] Normal   [x] Limited 30%   Left Rotation:  [] Normal   [x] Limited 30%   Right Side Bending: [] Normal   [x] Limited 50%   Left Side Bending: [] Normal   [x] Limited 30% pain with     Upper Extremity:   Right:   [x] Normal   [] Limited    Left:   [x] Normal   [] Limited     Strength:     Neck: decreased ROM, decreased strength   R UE: 4/5   L UE: 4/5    Palpation: Tender to palpation base of neck into B upper traps and between B scapula worse on R than L ,     Sensation: intact to light touch and temperature. Special Tests:   [] Nerve Root Compression           Right []+ / [] -    Left []+ / [] -  [] Cervical Distraction [x]+ / [] -    [] Spurling's Test           Right [x]+ / [] -    Left []+ / [x] -     [] Flexion/rotation test :           Right []+ / [] -    Left []+ / [] -    [] Other: []+ / [] -        ASSESSMENT     Outcome Measure:   Neck Disability Index 24% disability     Problems:    Pain reported 5-8/10   ROM decreased   Strength decreased    Decreased functional ability with walking, sitting, standing, sleep    [x] There are no barriers affecting plan of care or recovery    [] Barriers to this patient's plan of care or recovery include. Domestic Concerns:  [x] No  [] Yes:    Short Term goals (3 weeks)   Decrease reported pain to 0-5/10   Increase ROM by 10%   Increase Strength to 4+/5    Able to perform/complete the following functions/tasks: pt able to sleep 3+ hours with minor pain/limitation. Pt able to walk 20+ minutes with minor pain/limitation. Pt able to sit 30+ minutes with minor pain/limitation.  Neck Disability Index (NDI) 20%    Long Term goals (6 weeks)   Decrease reported pain to 0-3/10   Increase ROM to WNL   Increase Strength to 5-/5    Able to perform/complete the following functions/tasks: pt able to sleep 5+ hours with no pain/limitation. Pt able to walk 30+ minutes with no pain/limitation.   Pt able to sit 60+ minutes with no pain/limitation.  NDI 15%   Independent with Home Exercise Programs    Rehab Potential: [x] Good  [] Fair  [] Poor    PLAN       Treatment Plan:   [x] Therapeutic Exercise  [x] Therapeutic Activity  [x] Neuromuscular Re-education   [] Gait Training  [] Balance Training  [] Aerobic conditioning  [x] Manual Therapy  [x] Massage/Fascial release   [] Work/Sport specific activities    [] Pain Neuroscience [x] Cold/hotpack  [] Vasocompression  [x] Electrical Stimulation  [x] Lumbar/Cervical Traction  [x] Ultrasound   [] Iontophoresis: 4 mg/mL Dexamethasone Sodium Phosphate 40-80 mAmin        [x] Instruction in HEP      []  Medication allergies reviewed for use of Dexamethasone Sodium Phosphate 4mg/ml  with iontophoresis treatments. Patient is not allergic. The following CPT codes are likely to be used in the care of this patient: 55831 PT Evaluation: Low Complexity , 12336 PT Re-Evaluation , 79244 Therapeutic Exercise , 23596 Neuromuscular Re-Education , 84385 Therapeutic Activities , 68749 Manual Therapy , 66258 Mechanical Traction ,  Electrical Stimulation, 87975 US      Suggested Professional Referral: [x] No  [] Yes:     Patient Education:  [x] Plans/Goals, Risks/Benefits discussed  [x] Home exercise program  Method of Education: [x] Verbal  [x] Demo  [x] Written  Comprehension of Education:  [x] Verbalizes understanding. [x] Demonstrates understanding. [] Needs Review. [] Demonstrates/verbalizes understanding of HEP/Ed previously given. Frequency:  2 days per week for 6 weeks    Patient understands diagnosis/prognosis and consents to treatment, plan and goals: [x] Yes    [] No     Thank you for the opportunity to work with your patient. If you have questions or comments, please contact me at number listed above. Electronically signed by: Denny Bazan, PT PT DPT [id number]         Please sign Physician's Certification and return to:   [facility] [address] [facility] PT  [address] [facility] [phone] [id number]  Dept: [phone]  Dept Fax: [phone] PT , DPT [id number]    MANJEET Certification / Comments     Frequency/Duration 2 days per week for 6 weeks. Certification period from 8/4/2021  to 9/17/2021. I have reviewed the Plan of Care established for skilled therapy services and certify that the services are required and that they will be provided while the patient is under my care.     Physician's Comments/Revisions:               Physician's Printed Name:                                           [de-identified] Signature:                                                               Date:

## 2021-08-05 ENCOUNTER — TREATMENT (OUTPATIENT)
Dept: PHYSICAL THERAPY | Age: 71
End: 2021-08-05
Payer: MEDICARE

## 2021-08-05 DIAGNOSIS — M99.01 SEGMENTAL AND SOMATIC DYSFUNCTION OF CERVICAL REGION: Primary | ICD-10-CM

## 2021-08-05 DIAGNOSIS — M99.02 SEGMENTAL AND SOMATIC DYSFUNCTION OF THORACIC REGION: ICD-10-CM

## 2021-08-05 PROCEDURE — 97110 THERAPEUTIC EXERCISES: CPT

## 2021-08-05 NOTE — PROGRESS NOTES
[address] [facility]   Phone: [phone]   Fax: [phone]      Physical Therapy Daily Treatment Note    Date: 2021  Patient Name: Chelly Suazo  : 1950   MRN: [id number]  DOInjury: 2 months   DOSx: NA   Referring Provider: Alayna Mccormick, [address] [place] [address] [address]     Medical Diagnosis:   M99.01 (ICD-10-CM) - Segmental and somatic dysfunction of cervical region   M99.02 (ICD-10-CM) - Segmental and somatic dysfunction of thoracic region   M47.22 (ICD-10-CM) - Cervical spondylosis with radiculopathy   M54.04 (ICD-10-CM) - Panniculitis affecting regions of neck and back, thoracic region       Outcome Measure:  NDI 24%    S: 6/10 in neck and upper back. O:  Time 1421- 1459     Visit 12 Repeat outcome measure at mid point and end. Pain 6/10     ROM      Modalities      MH + ES      Ice            Manual      Massage CFM and trigger point upper and mid trap.  5 min. Stretch      Upper trap stretch 10 x 10s hold     Rhomboid stretch seated hands clasped 10 x 10s hold                 Exercise      UBE  or      Bike      Lat pull down 2 x 10 OTB    ROWS: M 2 x 10 OTB    ROWS: L      Shoulder ER      Scap retraction 2 x 10     Shrugs 2 x 10     Shoulder rolls fwd/bk X 10 ea     Back extension with ball behind back seated  X 10     Cervical isometrics X 10 each     Chin tucks  1 x 10 supine  1 x 10 sitting  HEP te         A:  Tolerated well. Pt reports after treatment \"I feel loosened up\". Sensitivity noted in R mid/upper trap. Pt pleasant and motivated. Added above to HEP with written hand out.        P: Continue with rehab plan    Khadra Barcenas, PTA [id number]     Treatment Charges: Mins Units   Initial Evaluation     Re-Evaluation     Ther Exercise         TE 38 3   Manual Therapy     MT     Ther Activities        TA     Gait Training          GT     Neuro Re-education NR     Modalities     Non-Billable Service Time     Other     Total Time/Units 38 3

## 2021-08-10 ENCOUNTER — TELEPHONE (OUTPATIENT)
Dept: CARDIOLOGY | Age: 71
End: 2021-08-10

## 2021-08-10 ENCOUNTER — TREATMENT (OUTPATIENT)
Dept: PHYSICAL THERAPY | Age: 71
End: 2021-08-10
Payer: MEDICARE

## 2021-08-10 DIAGNOSIS — M99.01 SEGMENTAL AND SOMATIC DYSFUNCTION OF CERVICAL REGION: Primary | ICD-10-CM

## 2021-08-10 PROCEDURE — 97140 MANUAL THERAPY 1/> REGIONS: CPT

## 2021-08-10 PROCEDURE — 97110 THERAPEUTIC EXERCISES: CPT

## 2021-08-10 NOTE — PROGRESS NOTES
[facility] [facility]   Phone: [phone]   Fax: [phone]      Physical Therapy Daily Treatment Note    Date: 8/10/2021  Patient Name: Barbara Lozano  : 1950   MRN: [id number]  DOInjury: 2 months   DOSx: NA   Referring Provider: Max West, [address] [place] [address],  [facility] E Sabi      Medical Diagnosis:   M99.01 (ICD-10-CM) - Segmental and somatic dysfunction of cervical region   M99.02 (ICD-10-CM) - Segmental and somatic dysfunction of thoracic region   M47.22 (ICD-10-CM) - Cervical spondylosis with radiculopathy   M54.04 (ICD-10-CM) - Panniculitis affecting regions of neck and back, thoracic region       Outcome Measure:  NDI 24%    S: 4-5/10 in neck and upper back. Reports pain overall is improved at home. Pt also this date reports pain in L wrist/hand. O:  Time 1121- 1157     Visit 3/12 Repeat outcome measure at mid point and end. Pain 4-5/10     ROM      Modalities      MH + ES      Ice            Manual      Massage CFM and trigger point upper and mid trap. 10 min. Stretch      Upper trap stretch 10 x 10s hold     Rhomboid stretch seated hands clasped 10 x 10s hold                 Exercise      UBE  or      Bike      Lat pull down 2 x 10 OTB    ROWS: M 2 x 10 OTB    ROWS: L      Shoulder ER      Scap retraction 2 x 10     Shrugs 2 x 10     Shoulder rolls fwd/bk X 10 ea     Back extension with ball behind back seated       Cervical isometrics X 10 each     Chin tucks    1 x 10 sitting  HEP te         A:  Tolerated well. Pt reports after treatment feels loosened up and pain is about the same.    P: Continue with rehab plan    Irina Haywood, PTA [id number]     Treatment Charges: Mins Units   Initial Evaluation     Re-Evaluation     Ther Exercise         TE 26 1   Manual Therapy     MT 10 1   Ther Activities        TA     Gait Training          GT     Neuro Re-education NR     Modalities     Non-Billable Service Time     Other     Total Time/Units

## 2021-08-12 ENCOUNTER — TREATMENT (OUTPATIENT)
Dept: PHYSICAL THERAPY | Age: 71
End: 2021-08-12
Payer: MEDICARE

## 2021-08-12 DIAGNOSIS — M99.01 SEGMENTAL AND SOMATIC DYSFUNCTION OF CERVICAL REGION: Primary | ICD-10-CM

## 2021-08-12 DIAGNOSIS — M47.22 OTHER SPONDYLOSIS WITH RADICULOPATHY, CERVICAL REGION: ICD-10-CM

## 2021-08-12 DIAGNOSIS — M99.02 SEGMENTAL AND SOMATIC DYSFUNCTION OF THORACIC REGION: ICD-10-CM

## 2021-08-12 DIAGNOSIS — M54.04 PANNICULITIS AFFECTING REGIONS OF NECK AND BACK, THORACIC REGION: ICD-10-CM

## 2021-08-12 PROCEDURE — 97110 THERAPEUTIC EXERCISES: CPT | Performed by: PHYSICAL THERAPIST

## 2021-08-12 PROCEDURE — 97140 MANUAL THERAPY 1/> REGIONS: CPT | Performed by: PHYSICAL THERAPIST

## 2021-08-12 NOTE — PROGRESS NOTES
[address] [facility]   Phone: [phone]   Fax: [phone]      Physical Therapy Daily Treatment Note    Date: 2021  Patient Name: Mark Anthony Hayes  : 1950   MRN: [id number]  DOInjury: 2 months   DOSx: NA   Referring Provider: Betina Calvo, [facility],  [facility] E [facility]     Medical Diagnosis:   M99.01 (ICD-10-CM) - Segmental and somatic dysfunction of cervical region   M99.02 (ICD-10-CM) - Segmental and somatic dysfunction of thoracic region   M47.22 (ICD-10-CM) - Cervical spondylosis with radiculopathy   M54.04 (ICD-10-CM) - Panniculitis affecting regions of neck and back, thoracic region       Outcome Measure:  NDI 24%    S: pt reports felt she was improving but thinks d/t barometric pressure that neck hurts more today. No number provided. O:  Time 1200- 1236     Visit  Repeat outcome measure at mid point and end. Pain See above. ROM      Modalities      MH + ES      Ice            Manual      Massage CFM and trigger point upper and mid trap. 10 min. Stretch      Upper trap stretch 10 x 10s hold     Rhomboid stretch seated hands clasped 10 x 10s hold                 Exercise      UBE  or      Bike      Lat pull down 2 x 10 OTB    ROWS: M 2 x 10 OTB    ROWS: L      Shoulder ER      Scap retraction 2 x 10     Shrugs 2 x 10     Shoulder rolls fwd/bk X 10 ea     Back extension with ball behind back seated       Cervical isometrics X 10 each     Chin tucks    1 x 10 sitting  HEP te   Traction  5 minutes      A:  Tolerated well. Reports more loosened up end of session.      P: Continue with rehab plan    [place], [place] [id number]     Treatment Charges: Mins Units   Initial Evaluation     Re-Evaluation     Ther Exercise         TE 26 1   Manual Therapy     MT 10 1   Ther Activities        TA     Gait Training          GT     Neuro Re-education NR     Modalities     Non-Billable Service Time     Other     Total Time/Units 36 2

## 2021-08-16 ENCOUNTER — TREATMENT (OUTPATIENT)
Dept: PHYSICAL THERAPY | Age: 71
End: 2021-08-16
Payer: MEDICARE

## 2021-08-16 DIAGNOSIS — M99.01 SEGMENTAL AND SOMATIC DYSFUNCTION OF CERVICAL REGION: Primary | ICD-10-CM

## 2021-08-16 DIAGNOSIS — M99.02 SEGMENTAL AND SOMATIC DYSFUNCTION OF THORACIC REGION: ICD-10-CM

## 2021-08-16 PROCEDURE — 97110 THERAPEUTIC EXERCISES: CPT | Performed by: PHYSICAL THERAPIST

## 2021-08-16 PROCEDURE — 97140 MANUAL THERAPY 1/> REGIONS: CPT | Performed by: PHYSICAL THERAPIST

## 2021-08-16 NOTE — PROGRESS NOTES
[address] [facility]   Phone: [phone]   Fax: [phone]      Physical Therapy Daily Treatment Note    Date: 2021  Patient Name: Timothy Becerra  : 1950   MRN: [id number]  DOInjury: 2 months   DOSx: NA   Referring Provider: Osei Sheth, [address] [facility],  [facility]     Medical Diagnosis:   M99.01 (ICD-10-CM) - Segmental and somatic dysfunction of cervical region   M99.02 (ICD-10-CM) - Segmental and somatic dysfunction of thoracic region   M47.22 (ICD-10-CM) - Cervical spondylosis with radiculopathy   M54.04 (ICD-10-CM) - Panniculitis affecting regions of neck and back, thoracic region       Outcome Measure:  NDI 24%    S: pt reports neck \"feels good, today despite the rain\"   O:  Time 1120- 1158     Visit  Repeat outcome measure at mid point and end. Pain See above. ROM      Modalities      MH + ES      Ice            Manual      Massage CFM and trigger point upper and mid trap. 10 min. Stretch      Upper trap stretch 10 x 10s hold     Rhomboid stretch seated hands clasped 10 x 10s hold                 Exercise      UBE  or      Bike      Lat pull down 2 x 10 GTB    ROWS: M 2 x 10 GTB    ROWS: L      Shoulder ER      Scap retraction 2 x 10     Shrugs 2 x 10     Shoulder rolls fwd/bk X 10 ea     Back extension with ball behind back seated  X 10     Cervical isometrics X 10 each     Chin tucks    1 x 10 sitting  HEP te   Cervical Traction device 10 minutes      A:  Tolerated well. Pt sensitive with massage this date.      P: Continue with rehab plan    Maya Espino, PTA 22346     Treatment Charges: Mins Units   Initial Evaluation     Re-Evaluation     Ther Exercise         TE 28 2   Manual Therapy     MT 10 1   Ther Activities        TA     Gait Training          GT     Neuro Re-education NR     Modalities     Non-Billable Service Time     Other     Total Time/Units 38 3

## 2021-08-19 ENCOUNTER — TREATMENT (OUTPATIENT)
Dept: PHYSICAL THERAPY | Age: 71
End: 2021-08-19
Payer: MEDICARE

## 2021-08-19 DIAGNOSIS — M99.01 SEGMENTAL AND SOMATIC DYSFUNCTION OF CERVICAL REGION: Primary | ICD-10-CM

## 2021-08-19 PROCEDURE — 97140 MANUAL THERAPY 1/> REGIONS: CPT

## 2021-08-19 PROCEDURE — 97110 THERAPEUTIC EXERCISES: CPT

## 2021-08-19 NOTE — PROGRESS NOTES
[facility] [facility]   Phone: [phone]   Fax: [phone]      Physical Therapy Daily Treatment Note    Date: 2021  Patient Name: Mark Anthony Hayes  : 1950   MRN: [id number]  DOInjury: 2 months   DOSx: NA   Referring Provider: Betina Calvo, [address] [place] [address]     Medical Diagnosis:   M99.01 (ICD-10-CM) - Segmental and somatic dysfunction of cervical region   M99.02 (ICD-10-CM) - Segmental and somatic dysfunction of thoracic region   M47.22 (ICD-10-CM) - Cervical spondylosis with radiculopathy   M54.04 (ICD-10-CM) - Panniculitis affecting regions of neck and back, thoracic region       Outcome Measure:  NDI 24%    S: pt reports neck \"soreness\" bilaterally this date. O:  Time 1120- 1158     Visit  Repeat outcome measure at mid point and end. Pain See above. ROM      Modalities      MH + ES      Ice            Manual      Massage CFM and trigger point upper and mid trap. 10 min. Stretch      Upper trap stretch 10 x 10s hold     Rhomboid stretch seated hands clasped 10 x 10s hold                 Exercise      UBE  or      Bike      Lat pull down 2 x 10 GTB    ROWS: M 2 x 10 GTB    ROWS: L      SB roll up  X 10     Scap retraction 2 x 10     Shrugs 2 x 10     Shoulder rolls fwd/bk X 10 ea     Back extension with ball behind back seated  X 10     Cervical flexion/extension X 10 each     cervical rotation  X 10 each     Cervical isometrics X 10 each     Chin tucks    1 x 10 sitting  HEP te   Cervical Traction device      A:  Tolerated well. No c/o increased pain.      P: Continue with rehab plan    Ro Coleman, PTA [address]     Treatment Charges: Mins Units   Initial Evaluation     Re-Evaluation     Ther Exercise         TE 28 2   Manual Therapy     MT 10 1   Ther Activities        TA     Gait Training          GT     Neuro Re-education NR     Modalities     Non-Billable Service Time     Other     Total Time/Units 38 3

## 2021-08-23 ENCOUNTER — HOSPITAL ENCOUNTER (OUTPATIENT)
Dept: CARDIOLOGY | Age: 71
Discharge: HOME | End: 2021-08-23
Payer: MEDICARE

## 2021-08-23 DIAGNOSIS — R01.1 MURMUR: ICD-10-CM

## 2021-08-23 LAB
LV EF: 63 %
LVEF MODALITY: NORMAL

## 2021-08-23 PROCEDURE — 93306 TTE W/DOPPLER COMPLETE: CPT | Performed by: PSYCHIATRY & NEUROLOGY

## 2021-08-26 ENCOUNTER — OFFICE VISIT (OUTPATIENT)
Dept: FAMILY MEDICINE CLINIC | Age: 71
End: 2021-08-26
Payer: MEDICARE

## 2021-08-26 ENCOUNTER — OFFICE VISIT (OUTPATIENT)
Dept: CHIROPRACTIC MEDICINE | Age: 71
End: 2021-08-26
Payer: MEDICARE

## 2021-08-26 VITALS — HEART RATE: 68 BPM | RESPIRATION RATE: 16 BRPM | OXYGEN SATURATION: 97 % | TEMPERATURE: 98 F

## 2021-08-26 VITALS
TEMPERATURE: 97.5 F | OXYGEN SATURATION: 97 % | SYSTOLIC BLOOD PRESSURE: 134 MMHG | HEART RATE: 97 BPM | HEIGHT: 63 IN | DIASTOLIC BLOOD PRESSURE: 64 MMHG | WEIGHT: 196 LBS | BODY MASS INDEX: 34.73 KG/M2

## 2021-08-26 DIAGNOSIS — E78.2 MIXED HYPERLIPIDEMIA: ICD-10-CM

## 2021-08-26 DIAGNOSIS — M99.01 SEGMENTAL AND SOMATIC DYSFUNCTION OF CERVICAL REGION: Primary | ICD-10-CM

## 2021-08-26 DIAGNOSIS — R79.89 ELEVATED LFTS: ICD-10-CM

## 2021-08-26 DIAGNOSIS — M25.572 ACUTE LEFT ANKLE PAIN: Primary | ICD-10-CM

## 2021-08-26 DIAGNOSIS — M99.03 SEGMENTAL AND SOMATIC DYSFUNCTION OF LUMBAR REGION: ICD-10-CM

## 2021-08-26 DIAGNOSIS — G89.29 CHRONIC RIGHT-SIDED LOW BACK PAIN WITH RIGHT-SIDED SCIATICA: ICD-10-CM

## 2021-08-26 DIAGNOSIS — R53.83 OTHER FATIGUE: ICD-10-CM

## 2021-08-26 DIAGNOSIS — E11.65 TYPE 2 DIABETES MELLITUS WITH HYPERGLYCEMIA, WITH LONG-TERM CURRENT USE OF INSULIN (HCC): ICD-10-CM

## 2021-08-26 DIAGNOSIS — G47.09 OTHER INSOMNIA: ICD-10-CM

## 2021-08-26 DIAGNOSIS — K21.9 GASTROESOPHAGEAL REFLUX DISEASE WITHOUT ESOPHAGITIS: ICD-10-CM

## 2021-08-26 DIAGNOSIS — E66.09 CLASS 1 OBESITY DUE TO EXCESS CALORIES WITHOUT SERIOUS COMORBIDITY WITH BODY MASS INDEX (BMI) OF 34.0 TO 34.9 IN ADULT: ICD-10-CM

## 2021-08-26 DIAGNOSIS — M54.41 CHRONIC RIGHT-SIDED LOW BACK PAIN WITH RIGHT-SIDED SCIATICA: ICD-10-CM

## 2021-08-26 DIAGNOSIS — M99.02 SEGMENTAL AND SOMATIC DYSFUNCTION OF THORACIC REGION: ICD-10-CM

## 2021-08-26 DIAGNOSIS — R09.89 BILATERAL CAROTID BRUITS: ICD-10-CM

## 2021-08-26 DIAGNOSIS — D64.9 ANEMIA, UNSPECIFIED TYPE: ICD-10-CM

## 2021-08-26 DIAGNOSIS — E83.42 HYPOMAGNESEMIA: ICD-10-CM

## 2021-08-26 DIAGNOSIS — I10 ESSENTIAL HYPERTENSION: ICD-10-CM

## 2021-08-26 DIAGNOSIS — M47.22 CERVICAL SPONDYLOSIS WITH RADICULOPATHY: ICD-10-CM

## 2021-08-26 DIAGNOSIS — M54.04 PANNICULITIS AFFECTING REGIONS OF NECK AND BACK, THORACIC REGION: ICD-10-CM

## 2021-08-26 DIAGNOSIS — E55.9 VITAMIN D DEFICIENCY: ICD-10-CM

## 2021-08-26 DIAGNOSIS — R01.1 MURMUR: ICD-10-CM

## 2021-08-26 DIAGNOSIS — Z79.4 TYPE 2 DIABETES MELLITUS WITH HYPERGLYCEMIA, WITH LONG-TERM CURRENT USE OF INSULIN (HCC): ICD-10-CM

## 2021-08-26 PROCEDURE — 99214 OFFICE O/P EST MOD 30 MIN: CPT | Performed by: INTERNAL MEDICINE

## 2021-08-26 PROCEDURE — 3017F COLORECTAL CA SCREEN DOC REV: CPT | Performed by: INTERNAL MEDICINE

## 2021-08-26 PROCEDURE — 1123F ACP DISCUSS/DSCN MKR DOCD: CPT | Performed by: INTERNAL MEDICINE

## 2021-08-26 PROCEDURE — 2022F DILAT RTA XM EVC RTNOPTHY: CPT | Performed by: INTERNAL MEDICINE

## 2021-08-26 PROCEDURE — 3051F HG A1C>EQUAL 7.0%<8.0%: CPT | Performed by: INTERNAL MEDICINE

## 2021-08-26 PROCEDURE — 1036F TOBACCO NON-USER: CPT | Performed by: INTERNAL MEDICINE

## 2021-08-26 PROCEDURE — G8427 DOCREV CUR MEDS BY ELIG CLIN: HCPCS | Performed by: INTERNAL MEDICINE

## 2021-08-26 PROCEDURE — 4040F PNEUMOC VAC/ADMIN/RCVD: CPT | Performed by: INTERNAL MEDICINE

## 2021-08-26 PROCEDURE — 98941 CHIROPRACT MANJ 3-4 REGIONS: CPT | Performed by: CHIROPRACTOR

## 2021-08-26 PROCEDURE — 1090F PRES/ABSN URINE INCON ASSESS: CPT | Performed by: INTERNAL MEDICINE

## 2021-08-26 PROCEDURE — G8400 PT W/DXA NO RESULTS DOC: HCPCS | Performed by: INTERNAL MEDICINE

## 2021-08-26 PROCEDURE — G8417 CALC BMI ABV UP PARAM F/U: HCPCS | Performed by: INTERNAL MEDICINE

## 2021-08-26 RX ORDER — LANOLIN ALCOHOL/MO/W.PET/CERES
1000 CREAM (GRAM) TOPICAL DAILY
COMMUNITY
End: 2022-04-06

## 2021-08-26 RX ORDER — ZOLPIDEM TARTRATE 10 MG/1
10 TABLET ORAL NIGHTLY PRN
Qty: 90 TABLET | Refills: 0 | Status: SHIPPED | OUTPATIENT
Start: 2021-08-26 | End: 2021-11-17 | Stop reason: SDUPTHER

## 2021-08-26 SDOH — ECONOMIC STABILITY: FOOD INSECURITY: WITHIN THE PAST 12 MONTHS, THE FOOD YOU BOUGHT JUST DIDN'T LAST AND YOU DIDN'T HAVE MONEY TO GET MORE.: NEVER TRUE

## 2021-08-26 SDOH — ECONOMIC STABILITY: FOOD INSECURITY: WITHIN THE PAST 12 MONTHS, YOU WORRIED THAT YOUR FOOD WOULD RUN OUT BEFORE YOU GOT MONEY TO BUY MORE.: NEVER TRUE

## 2021-08-26 ASSESSMENT — ENCOUNTER SYMPTOMS
SHORTNESS OF BREATH: 1
DIARRHEA: 0
VOMITING: 0
ABDOMINAL PAIN: 0
SORE THROAT: 0
CHEST TIGHTNESS: 0
EYE PAIN: 0
NAUSEA: 0
RHINORRHEA: 0
CONSTIPATION: 0
BLOOD IN STOOL: 0
COUGH: 0

## 2021-08-26 ASSESSMENT — SOCIAL DETERMINANTS OF HEALTH (SDOH): HOW HARD IS IT FOR YOU TO PAY FOR THE VERY BASICS LIKE FOOD, HOUSING, MEDICAL CARE, AND HEATING?: NOT HARD AT ALL

## 2021-08-26 NOTE — PATIENT INSTRUCTIONS
Patient Education        Ankle Sprain: Rehab Exercises  Introduction  Here are some examples of exercises for you to try. The exercises may be suggested for a condition or for rehabilitation. Start each exercise slowly. Ease off the exercises if you start to have pain. You will be told when to start these exercises and which ones will work best for you. How to do the exercises  'Alphabet' exercise   1. Trace the alphabet with your toe. This helps your ankle move in all directions. Side-to-side knee swing exercise   1. Sit in a chair with your foot flat on the floor. 2. Slowly move your knee from side to side. Keep your foot pressed flat. 3. Continue this exercise for 2 to 3 minutes. Towel curl   1. While sitting, place your foot on a towel on the floor. Scrunch the towel toward you with your toes. 2. Then use your toes to push the towel away from you. 3. To make this exercise more challenging you can put something on the other end of the towel. A can of soup is about the right weight for this. Towel stretch   1. Sit with your legs extended and knees straight. 2. Place a towel around your foot just under the toes. 3. Hold each end of the towel in each hand, with your hands above your knees. 4. Pull back with the towel so that your foot stretches toward you. 5. Hold the position for at least 15 to 30 seconds. 6. Repeat 2 to 4 times a session. Do up to 5 sessions a day. Ankle eversion exercise   1. Start by sitting with your foot flat on the floor. Push your foot outward against a wall or a piece of furniture that doesn't move. Hold for about 6 seconds, and relax. Repeat 8 to 12 times. 2. After you feel comfortable with this, try using rubber tubing looped around the outside of your feet for resistance. Push your foot out to the side against the tubing, and then count to 10 as you slowly bring your foot back to the middle. Repeat 8 to 12 times. Isometric opposition exercises   1.  While sitting, put your feet together flat on the floor. 2. Press your injured foot inward against your other foot. Hold for about 6 seconds, and relax. Repeat 8 to 12 times. 3. Then place the heel of your other foot on top of the injured one. Push down with the top heel while trying to push up with your injured foot. Hold for about 6 seconds, and relax. Repeat 8 to 12 times. Resisted ankle inversion   1. Sit on the floor with your good leg crossed over your other leg. 2. Hold both ends of an exercise band and loop the band around the inside of your affected foot. Then press your other foot against the band. 3. Keeping your legs crossed, slowly push your affected foot against the band so that foot moves away from your other foot. Then slowly relax. 4. Repeat 8 to 12 times. Resisted ankle eversion   1. Sit on the floor with your legs straight. 2. Hold both ends of an exercise band and loop the band around the outside of your affected foot. Then press your other foot against the band. 3. Keeping your leg straight, slowly push your affected foot outward against the band and away from your other foot without letting your leg rotate. Then slowly relax. 4. Repeat 8 to 12 times. Resisted ankle dorsiflexion   1. Tie the ends of an exercise band together to form a loop. Attach one end of the loop to a secure object or shut a door on it to hold it in place. (Or you can have someone hold one end of the loop to provide resistance.)  2. While sitting on the floor or in a chair, loop the other end of the band over the top of your affected foot. 3. Keeping your knee and leg straight, slowly flex your foot to pull back on the exercise band, and then slowly relax. 4. Repeat 8 to 12 times. Single-leg balance   1. Stand on a flat surface with your arms stretched out to your sides like you are making the letter \"T. \" Then lift your good leg off the floor, bending it at the knee.  If you are not steady on your feet, use one hand to hold on to a chair, counter, or wall. 2. Standing on the leg with your affected ankle, keep that knee straight. Try to balance on that leg for up to 30 seconds. Then rest for up to 10 seconds. 3. Repeat 6 to 8 times. 4. When you can balance on your affected leg for 30 seconds with your eyes open, try to balance on it with your eyes closed. 5. When you can do this exercise with your eyes closed for 30 seconds and with ease and no pain, try standing on a pillow or piece of foam, and repeat steps 1 through 4. Follow-up care is a key part of your treatment and safety. Be sure to make and go to all appointments, and call your doctor if you are having problems. It's also a good idea to know your test results and keep a list of the medicines you take. Where can you learn more? Go to [email or web address] and sign in to your [email or web address] account. Enter Ilan Mariano in the [employer] box to learn more about \"Ankle Sprain: Rehab Exercises. \"     If you do not have an account, please click on the \"Sign Up Now\" link. Current as of: November 16, 2020               Content Version: 12.9  © 2006-2021 Healthwise, Incorporated. Care instructions adapted under license by [facility] [place] ([facility]). If you have questions about a medical condition or this instruction, always ask your healthcare professional. [place] [address] any warranty or liability for your use of this information.

## 2021-08-26 NOTE — PROGRESS NOTES
On lisinopril.     - States has high cholesterol. States trying to watch diet. On pravastatin. No reported side effects. Last labs (2/2021)     - States has had gastric ulcers and GERD. On omeprazole as needed. Stable. - States has vitamin D def. On otc supplement     - States has had low magnesium levels on supplement     - States stopped vit b12 supplement.     - States was told had elevated LFT and enlarged liver. - last lab from 5/3/2021 reviewed with patient 8/26/2021      Health Maintenance   - immunizations:   Influenza Vaccination - (2019), (9/16/20)  Pneumonia Vaccination  Zoster/Shingles Vaccine  Tetanus Vaccination  covid (3/4/2021) #1, (4/1/2021) #2 - moderna     - Screenings:   Bone Density Scan   Pelvic/Pap Exam  Mammogram - declines     Colonoscopy - (6/20) hemorrhoids, diverticular disease, multiple polyps, tubular and hyperplastic per path, follow up in 3 years     EGD (6/20) -normal    optho (3/21) - for cataract     ROS:  Review of Systems   Constitutional: Negative for appetite change, chills, fever and unexpected weight change. HENT: Negative for congestion, rhinorrhea and sore throat. Eyes: Negative for pain and visual disturbance. Respiratory: Positive for shortness of breath. Negative for cough and chest tightness. Cardiovascular: Negative for chest pain and palpitations. Gastrointestinal: Negative for abdominal pain, blood in stool, constipation, diarrhea, nausea and vomiting. Genitourinary: Negative for difficulty urinating, dysuria, frequency, pelvic pain, urgency and vaginal bleeding. Musculoskeletal: Negative for arthralgias and myalgias. Skin: Negative for rash. Neurological: Negative for dizziness, syncope, weakness, light-headedness, numbness and headaches. Hematological: Negative for adenopathy. Psychiatric/Behavioral: Negative for suicidal ideas. The patient is not nervous/anxious.           Current Outpatient Medications:     vitamin B-12 (CYANOCOBALAMIN) 1000 MCG tablet, Take 1,000 mcg by mouth daily, Disp: , Rfl:     zolpidem (AMBIEN) 10 MG tablet, Take 1 tablet by mouth nightly as needed for Sleep for up to 90 days. , Disp: 90 tablet, Rfl: 0    atorvastatin (LIPITOR) 40 MG tablet, Take 1 tablet by mouth daily, Disp: 90 tablet, Rfl: 1    lisinopril (PRINIVIL;ZESTRIL) 20 MG tablet, Take 1 tablet by mouth daily, Disp: 90 tablet, Rfl: 1    zinc 50 MG CAPS, Take 1 capsule by mouth three times a week , Disp: , Rfl:     insulin glargine (LANTUS SOLOSTAR) 100 UNIT/ML injection pen, Inject 65 Units into the skin every morning, Disp: 15 pen, Rfl: 1    metFORMIN (GLUCOPHAGE) 1000 MG tablet, Take 1 tablet by mouth 2 times daily (with meals), Disp: 180 tablet, Rfl: 1    magnesium (MAGNESIUM-OXIDE) 250 MG TABS tablet, Take 250 mg by mouth daily , Disp: , Rfl:     insulin lispro, 1 Unit Dial, (HUMALOG KWIKPEN) 100 UNIT/ML SOPN, Humalog KwikPen (U-100) Insulin 100 unit/mL subcutaneous  Inject up to 12 units QAC TID, Disp: , Rfl:     Insulin Pen Needle (B-D UF III MINI PEN NEEDLES) 31G X 5 MM MISC, 1 each by Does not apply route daily, Disp: , Rfl:     No Known Allergies    Past Medical History:   Diagnosis Date    Class 2 obesity due to excess calories without serious comorbidity with body mass index (BMI) of 35.0 to 35.9 in adult 12/18/2019    Essential hypertension 12/18/2019    GERD (gastroesophageal reflux disease)     H pylori ulcer     Hypomagnesemia 12/18/2019    Mixed hyperlipidemia 12/18/2019    Other insomnia 12/18/2019    Peptic ulcer disease     Type 2 diabetes mellitus without complication ([facility] [address]     Vitamin D deficiency 12/18/2019       Past Surgical History:   Procedure Laterality Date    BUNIONECTOMY Left     CATARACT REMOVAL WITH IMPLANT      HYSTERECTOMY, VAGINAL      partial in the 80s then bilateral oopherectome 2016    RETINAL DETACHMENT SURGERY Right     vitreous surgery    TUBAL LIGATION         Family History Problem Relation Age of Onset    Diabetes Mother     Stroke Mother     Kidney Disease Father         Renal Failure       Social History     Socioeconomic History    Marital status:      Spouse name: Ramesh Rodríguez Number of children: 2    Years of education: 15    Highest education level: High school graduate   Occupational History    Not on file   Tobacco Use    Smoking status: Former Smoker     Packs/day: 1.00     Years: 12.00     Pack years: 12.00     Types: Cigarettes     Quit date: 1982     Years since quittin.5    Smokeless tobacco: Never Used   Substance and Sexual Activity    Alcohol use: Yes     Comment: rare    Drug use: Not on file    Sexual activity: Not on file   Other Topics Concern    Not on file   Social History Narrative    Not on file     Social Determinants of Health     Financial Resource Strain: Low Risk     Difficulty of Paying Living Expenses: Not hard at all   Food Insecurity: No Food Insecurity    Worried About [address] in the Last Year: Never true    [facility] in the Last Year: Never true   Transportation Needs:     Lack of Transportation (Medical):      Lack of Transportation (Non-Medical):    Physical Activity:     Days of Exercise per Week:     Minutes of Exercise per Session:    Stress:     Feeling of Stress :    Social Connections:     Frequency of Communication with Friends and Family:     Frequency of Social Gatherings with Friends and Family:     Attends [employer] Services:     Active Member of Clubs or Organizations:     Attends Club or Organization Meetings:     Marital Status:    Intimate Partner Violence:     Fear of Current or Ex-Partner:     Emotionally Abused:     Physically Abused:     Sexually Abused:         Vitals:    21 1453   BP: 134/64   Site: Left Upper Arm   Position: Sitting   Cuff Size: Large Adult   Pulse: 97   Temp: 97.5 °F (36.4 °C)   SpO2: 97%   Weight: 196 lb (88.9 kg)   Height: 5' 3\" (1.6 m) Exam:  Physical Exam  Constitutional:       Appearance: She is well-developed. HENT:      Head: Normocephalic and atraumatic. Right Ear: External ear normal.      Left Ear: External ear normal.   Eyes:      Pupils: Pupils are equal, round, and reactive to light. Neck:      Thyroid: No thyromegaly. Vascular: Carotid bruit present. Cardiovascular:      Rate and Rhythm: Normal rate and regular rhythm. Heart sounds: Murmur heard. Systolic murmur is present with a grade of 2/6. Pulmonary:      Effort: Pulmonary effort is normal.      Breath sounds: Normal breath sounds. No wheezing. Abdominal:      General: Bowel sounds are normal. There is no distension. Palpations: Abdomen is soft. Tenderness: There is no abdominal tenderness. There is no guarding or rebound. Musculoskeletal:         General: Normal range of motion. Cervical back: Normal range of motion and neck supple. Lymphadenopathy:      Cervical: No cervical adenopathy. Skin:     General: Skin is warm and dry. Neurological:      Mental Status: She is alert and oriented to person, place, and time. Cranial Nerves: No cranial nerve deficit.    Psychiatric:         Judgment: Judgment normal.         Assessment and Plan:    Diagnoses and all orders for this visit:    Acute left ankle pain  - declines xray   - declines referral   - will give home exercises   - continue ankle brace     Anemia  - uncertain cause   - S53 and folic acid was normal   - following with GI   - s/p  EGD and colonoscopy (6/2020) - polyps   - has not tolerated iron - constipation - not helped with stool softener - stopped   - last lab (5/2021) - normal    Type 2 diabetes mellitus with hyperglycemia, with long-term current use of insulin ([facility])  - watch diet   - monitor blood sugar at home, goals fasting am <120, 2 hours post meal < 180  - following with endocrinology   - on lantus 65 units   - on metformin 1000mg bid  - endocrinology added humalog slide scale  - stopped glipizide   - stopped trulicity due to cost   - follow A1c - 7.8 (5/2021)     On ACE  On statin   Not on aspirin - h/o PUD   optho (3/2021) - no Retinopathy     Essential hypertension  - watch diet   - monitor blood pressure at home   - on lisinopril   - stable 8/26/2021    Mixed hyperlipidemia  - Continue present treatment   - watch diet   - stop pravastatin for higher intensity statin   - on atorvastatin   - follow labs   - last lab (2/2021) - stable     Gastroesophageal reflux disease without esophagitis   - watch diet   - h/o PUD   - on omeprazole prn   - on tums as needed     Other insomnia  - states has had sleep study in the past - did nto tolerate   - On Ambien  - weaned off lorazepam   - discussed did not recommend these medications   - continue Ambien for now   - sleep habits handout provided   - consider referral to sleep medicine     Class 1 obesity due to excess calories without serious comorbidity with body mass index (BMI) of 34.0 to 34.9 in adult  - watch diet   - discussed diet and exercises     Vitamin D deficiency  - on otc supplement   - follow labs     Hypomagnesemia  - on otc supplement   - follow labs     Elevated LFTs  - uncertain etiology at present   - recheck labs - last lab (2/2020) - improved     Murmur  - has seen cardiology (5/2021)   - had echo (8/2021) - ef 60-65%, no wall motion abnormalities, trace MR     Bilateral carotid bruits  -US carotid (5/2021) - 50-69% stenosis b/l    - repeat in 1 year     Return in about 3 months (around 11/26/2021) for check up and review and labs. No orders of the defined types were placed in this encounter. Requested Prescriptions     Pending Prescriptions Disp Refills    zolpidem (AMBIEN) 10 MG tablet 90 tablet 0     Sig: Take 1 tablet by mouth nightly as needed for Sleep for up to 90 days.         Radha Saenz MD  8/26/2021  3:54 PM

## 2021-08-26 NOTE — PROGRESS NOTES
spondylosis with radiculopathy    Panniculitis affecting regions of neck and back, thoracic region    Segmental and somatic dysfunction of lumbar region    Chronic right-sided low back pain with right-sided sciatica        Treatment Plan: Berry flexion distraction manipulation at L3 today, protocol 1. Mechanically assisted manipulation of the SI joint and thoracic, cervical segments listed. Tolerated well.   She can follow-up with me as needed for further care      Seen By:  Ирина Holt

## 2021-08-30 LAB
AVERAGE GLUCOSE: NORMAL
HBA1C MFR BLD: 7.5 %

## 2021-10-13 ENCOUNTER — TELEPHONE (OUTPATIENT)
Dept: FAMILY MEDICINE CLINIC | Age: 71
End: 2021-10-13

## 2021-10-13 NOTE — TELEPHONE ENCOUNTER
Please let the patient know that blood work results showed    Magnesium level was still low and would consider increase in supplement    Urine analysis was abnormal showing white blood cells, bacteria, and microscopic blood and slight amount of protein    Bicarbonate level was slightly low of uncertain cause or significance    Albumin protein level was also mildly low which could be an assessment of nutrition    Other electrolytes, liver functions, kidney functions were normal    Thyroid function was normal    Vitamin D levels were normal and unchanged when compared to previous    Cholesterol levels were fair, HDL or good cholesterol is borderline to beneficial.  Would recommend to continue present medications and continue to watch diet    Blood counts were normal with some just nonspecific changes in particular amounts of cells    Please send a copy of reports to the patient    Thanks

## 2021-10-14 NOTE — TELEPHONE ENCOUNTER
No specific concerns    However if having symptoms of increased frequency urgency, pain or burning with urination, lower abdominal pressure, fever chills, would consider sending urine for culture to look for infection    If having persistent issues would consider urology referral

## 2021-10-14 NOTE — TELEPHONE ENCOUNTER
Notified patient. Pt wanted to know more about: Urine analysis was abnormal showing white blood cells, bacteria, and microscopic blood and slight amount of protein. Pt wanted to know if you had any other concerns? Mailed copy of labs to patient.

## 2021-11-03 ENCOUNTER — OFFICE VISIT (OUTPATIENT)
Dept: CHIROPRACTIC MEDICINE | Age: 71
End: 2021-11-03
Payer: MEDICARE

## 2021-11-03 VITALS
TEMPERATURE: 98 F | BODY MASS INDEX: 34.73 KG/M2 | OXYGEN SATURATION: 97 % | HEIGHT: 63 IN | HEART RATE: 83 BPM | WEIGHT: 196 LBS

## 2021-11-03 DIAGNOSIS — M79.641 PAIN IN BOTH HANDS: ICD-10-CM

## 2021-11-03 DIAGNOSIS — M53.3 SACROCOCCYGEAL DISORDERS, NOT ELSEWHERE CLASSIFIED: ICD-10-CM

## 2021-11-03 DIAGNOSIS — M79.642 PAIN IN BOTH HANDS: ICD-10-CM

## 2021-11-03 DIAGNOSIS — M54.41 CHRONIC RIGHT-SIDED LOW BACK PAIN WITH RIGHT-SIDED SCIATICA: ICD-10-CM

## 2021-11-03 DIAGNOSIS — G89.29 CHRONIC RIGHT-SIDED LOW BACK PAIN WITH RIGHT-SIDED SCIATICA: ICD-10-CM

## 2021-11-03 DIAGNOSIS — M99.03 SEGMENTAL AND SOMATIC DYSFUNCTION OF LUMBAR REGION: Primary | ICD-10-CM

## 2021-11-03 DIAGNOSIS — M99.05 SEGMENTAL AND SOMATIC DYSFUNCTION OF PELVIC REGION: ICD-10-CM

## 2021-11-03 PROCEDURE — 98940 CHIROPRACT MANJ 1-2 REGIONS: CPT | Performed by: CHIROPRACTOR

## 2021-11-03 NOTE — PROGRESS NOTES
Patient is here for adjustment.  Bisi Rajan MD  Electronically signed by Liz Rocha LPN on 54/1/4093 at 3:76 PM

## 2021-11-03 NOTE — PROGRESS NOTES
11/3/21  Quinn Kellogg : 1950 Sex: female  Age: 66 y.o. Chief Complaint   Patient presents with    Other     adjustment    Lower Back Pain    Hand Pain     both       HPI:   Yara Haile returns a for continued care of chronic lower back complaints. She states it is worse on her right side, feels driving back from the [place] made it worse. She has had no new activities or injuries to account for increased pain. She is also reporting issues involving the upper extremities. She states that particularly yesterday she was having pains from the elbow down into her hands, and weakness with her  strength. She took some Tylenol arthritis and it seems to have improved. She denies injury. Denies swelling or redness of the digits at the wrist and hand. Current Outpatient Medications:     vitamin B-12 (CYANOCOBALAMIN) 1000 MCG tablet, Take 1,000 mcg by mouth daily, Disp: , Rfl:     zolpidem (AMBIEN) 10 MG tablet, Take 1 tablet by mouth nightly as needed for Sleep for up to 90 days. , Disp: 90 tablet, Rfl: 0    atorvastatin (LIPITOR) 40 MG tablet, Take 1 tablet by mouth daily, Disp: 90 tablet, Rfl: 1    lisinopril (PRINIVIL;ZESTRIL) 20 MG tablet, Take 1 tablet by mouth daily, Disp: 90 tablet, Rfl: 1    zinc 50 MG CAPS, Take 1 capsule by mouth three times a week , Disp: , Rfl:     insulin glargine (LANTUS SOLOSTAR) 100 UNIT/ML injection pen, Inject 65 Units into the skin every morning, Disp: 15 pen, Rfl: 1    metFORMIN (GLUCOPHAGE) 1000 MG tablet, Take 1 tablet by mouth 2 times daily (with meals), Disp: 180 tablet, Rfl: 1    magnesium (MAGNESIUM-OXIDE) 250 MG TABS tablet, Take 250 mg by mouth daily , Disp: , Rfl:     insulin lispro, 1 Unit Dial, (HUMALOG KWIKPEN) 100 UNIT/ML SOPN, Humalog KwikPen (U-100) Insulin 100 unit/mL subcutaneous  Inject up to 12 units QAC TID, Disp: , Rfl:     Insulin Pen Needle (B-D UF III MINI PEN NEEDLES) 31G X 5 MM MISC, 1 each by Does not apply route daily, Disp: , Rfl:     Exam:   Vitals:    11/03/21 1502   Pulse: 83   Temp: 98 °F (36.7 °C)   SpO2: 97%     She appears well per no apparent distress per alert and oriented x3. Ambulating without difficulty. Continued midline low back pain L4-S1. Tender right SI joint. Nontender greater trochanters. Hypertonic and tender fibers lumbar paraspinals bilateral.  Joint fixation L3-4, bilateral SI joints. In the upper extremities, she has tenderness over the first metacarpal phalangeal joints. Mild weakness with pinch strength and  strength is noted. Finger abductor's are 5 5. Mild tenderness in the extensor wad's. Negative Rincon and Cozen's tests b/l. Salina Valenzuela was seen today for other, lower back pain and hand pain. Diagnoses and all orders for this visit:    Segmental and somatic dysfunction of lumbar region    Chronic right-sided low back pain with right-sided sciatica    Segmental and somatic dysfunction of pelvic region    Sacrococcygeal disorders, not elsewhere classified        Treatment Plan: Today continued with Berry flexion distraction manipulation at L3, protocol 1. Mechanically assisted manipulation of the SI joints. Tolerated well. With respect to her upper extremity complaints-she is doing better today. She is going to monitor symptoms. She is scheduled to see Dr. Bibi Oviedo on the 17th. Any worsening symptoms I want her to contact him sooner. I explained that with Medicare, imaging and/or lab work would need to be performed by him.       Seen By:  Sharonda Fournier DC

## 2021-11-17 ENCOUNTER — OFFICE VISIT (OUTPATIENT)
Dept: FAMILY MEDICINE CLINIC | Age: 71
End: 2021-11-17
Payer: MEDICARE

## 2021-11-17 VITALS
DIASTOLIC BLOOD PRESSURE: 64 MMHG | SYSTOLIC BLOOD PRESSURE: 120 MMHG | HEIGHT: 63 IN | BODY MASS INDEX: 34.95 KG/M2 | HEART RATE: 84 BPM | TEMPERATURE: 97.3 F | WEIGHT: 197.25 LBS

## 2021-11-17 VITALS
WEIGHT: 197.25 LBS | HEART RATE: 84 BPM | SYSTOLIC BLOOD PRESSURE: 120 MMHG | TEMPERATURE: 97.3 F | HEIGHT: 63 IN | OXYGEN SATURATION: 98 % | BODY MASS INDEX: 34.95 KG/M2 | DIASTOLIC BLOOD PRESSURE: 64 MMHG

## 2021-11-17 DIAGNOSIS — E78.2 MIXED HYPERLIPIDEMIA: ICD-10-CM

## 2021-11-17 DIAGNOSIS — Z79.4 TYPE 2 DIABETES MELLITUS WITH HYPERGLYCEMIA, WITH LONG-TERM CURRENT USE OF INSULIN (HCC): ICD-10-CM

## 2021-11-17 DIAGNOSIS — E83.42 HYPOMAGNESEMIA: ICD-10-CM

## 2021-11-17 DIAGNOSIS — E55.9 VITAMIN D DEFICIENCY: ICD-10-CM

## 2021-11-17 DIAGNOSIS — R79.89 ELEVATED LFTS: ICD-10-CM

## 2021-11-17 DIAGNOSIS — R01.1 MURMUR: ICD-10-CM

## 2021-11-17 DIAGNOSIS — M25.572 ACUTE LEFT ANKLE PAIN: ICD-10-CM

## 2021-11-17 DIAGNOSIS — G47.09 OTHER INSOMNIA: ICD-10-CM

## 2021-11-17 DIAGNOSIS — R09.89 BILATERAL CAROTID BRUITS: ICD-10-CM

## 2021-11-17 DIAGNOSIS — Z00.00 ROUTINE GENERAL MEDICAL EXAMINATION AT A HEALTH CARE FACILITY: Primary | ICD-10-CM

## 2021-11-17 DIAGNOSIS — M79.601 PAIN IN BOTH UPPER EXTREMITIES: Primary | ICD-10-CM

## 2021-11-17 DIAGNOSIS — K21.9 GASTROESOPHAGEAL REFLUX DISEASE WITHOUT ESOPHAGITIS: ICD-10-CM

## 2021-11-17 DIAGNOSIS — F41.9 ANXIETY: ICD-10-CM

## 2021-11-17 DIAGNOSIS — E66.09 CLASS 1 OBESITY DUE TO EXCESS CALORIES WITHOUT SERIOUS COMORBIDITY WITH BODY MASS INDEX (BMI) OF 34.0 TO 34.9 IN ADULT: ICD-10-CM

## 2021-11-17 DIAGNOSIS — E11.65 TYPE 2 DIABETES MELLITUS WITH HYPERGLYCEMIA, WITH LONG-TERM CURRENT USE OF INSULIN (HCC): ICD-10-CM

## 2021-11-17 DIAGNOSIS — D64.9 ANEMIA, UNSPECIFIED TYPE: ICD-10-CM

## 2021-11-17 DIAGNOSIS — M79.602 PAIN IN BOTH UPPER EXTREMITIES: Primary | ICD-10-CM

## 2021-11-17 DIAGNOSIS — I10 ESSENTIAL HYPERTENSION: ICD-10-CM

## 2021-11-17 PROCEDURE — 4040F PNEUMOC VAC/ADMIN/RCVD: CPT | Performed by: INTERNAL MEDICINE

## 2021-11-17 PROCEDURE — 1090F PRES/ABSN URINE INCON ASSESS: CPT | Performed by: INTERNAL MEDICINE

## 2021-11-17 PROCEDURE — G8400 PT W/DXA NO RESULTS DOC: HCPCS | Performed by: INTERNAL MEDICINE

## 2021-11-17 PROCEDURE — 1036F TOBACCO NON-USER: CPT | Performed by: INTERNAL MEDICINE

## 2021-11-17 PROCEDURE — 2022F DILAT RTA XM EVC RTNOPTHY: CPT | Performed by: INTERNAL MEDICINE

## 2021-11-17 PROCEDURE — G8484 FLU IMMUNIZE NO ADMIN: HCPCS | Performed by: INTERNAL MEDICINE

## 2021-11-17 PROCEDURE — 1123F ACP DISCUSS/DSCN MKR DOCD: CPT | Performed by: INTERNAL MEDICINE

## 2021-11-17 PROCEDURE — 99214 OFFICE O/P EST MOD 30 MIN: CPT | Performed by: INTERNAL MEDICINE

## 2021-11-17 PROCEDURE — 3051F HG A1C>EQUAL 7.0%<8.0%: CPT | Performed by: INTERNAL MEDICINE

## 2021-11-17 PROCEDURE — 3017F COLORECTAL CA SCREEN DOC REV: CPT | Performed by: INTERNAL MEDICINE

## 2021-11-17 PROCEDURE — G8417 CALC BMI ABV UP PARAM F/U: HCPCS | Performed by: INTERNAL MEDICINE

## 2021-11-17 PROCEDURE — G0439 PPPS, SUBSEQ VISIT: HCPCS | Performed by: INTERNAL MEDICINE

## 2021-11-17 PROCEDURE — G8427 DOCREV CUR MEDS BY ELIG CLIN: HCPCS | Performed by: INTERNAL MEDICINE

## 2021-11-17 RX ORDER — ALPRAZOLAM 0.25 MG/1
0.25 TABLET ORAL DAILY PRN
Qty: 4 TABLET | Refills: 0 | Status: SHIPPED | OUTPATIENT
Start: 2021-11-17 | End: 2021-12-17

## 2021-11-17 RX ORDER — ATORVASTATIN CALCIUM 40 MG/1
40 TABLET, FILM COATED ORAL DAILY
Qty: 90 TABLET | Refills: 1 | Status: SHIPPED
Start: 2021-11-17 | End: 2022-01-04 | Stop reason: SDUPTHER

## 2021-11-17 RX ORDER — INSULIN LISPRO 100 [IU]/ML
INJECTION, SOLUTION INTRAVENOUS; SUBCUTANEOUS
Qty: 15 PEN | Refills: 1 | Status: SHIPPED | OUTPATIENT
Start: 2021-11-17

## 2021-11-17 RX ORDER — ZOLPIDEM TARTRATE 10 MG/1
10 TABLET ORAL NIGHTLY PRN
Qty: 90 TABLET | Refills: 0 | Status: SHIPPED | OUTPATIENT
Start: 2021-11-17 | End: 2022-01-11 | Stop reason: SDUPTHER

## 2021-11-17 RX ORDER — ASPIRIN 81 MG/1
81 TABLET ORAL DAILY
COMMUNITY

## 2021-11-17 RX ORDER — LISINOPRIL 20 MG/1
20 TABLET ORAL DAILY
Qty: 90 TABLET | Refills: 1 | Status: SHIPPED
Start: 2021-11-17 | End: 2022-07-11 | Stop reason: SDUPTHER

## 2021-11-17 ASSESSMENT — ENCOUNTER SYMPTOMS
BLOOD IN STOOL: 0
ABDOMINAL PAIN: 0
DIARRHEA: 0
SORE THROAT: 0
CHEST TIGHTNESS: 0
CONSTIPATION: 0
EYE PAIN: 0
COUGH: 0
SHORTNESS OF BREATH: 1
NAUSEA: 0
RHINORRHEA: 0
VOMITING: 0

## 2021-11-17 ASSESSMENT — PATIENT HEALTH QUESTIONNAIRE - PHQ9
SUM OF ALL RESPONSES TO PHQ9 QUESTIONS 1 & 2: 0
SUM OF ALL RESPONSES TO PHQ QUESTIONS 1-9: 0
1. LITTLE INTEREST OR PLEASURE IN DOING THINGS: 0
SUM OF ALL RESPONSES TO PHQ QUESTIONS 1-9: 0
SUM OF ALL RESPONSES TO PHQ QUESTIONS 1-9: 0
2. FEELING DOWN, DEPRESSED OR HOPELESS: 0

## 2021-11-17 ASSESSMENT — LIFESTYLE VARIABLES: HOW OFTEN DO YOU HAVE A DRINK CONTAINING ALCOHOL: 0

## 2021-11-17 NOTE — PROGRESS NOTES
[facility]) Physicians   Internal Medicine     2021  Amy Marino : 1950 Sex: female  Age:74 y.o. Chief Complaint   Patient presents with    3 Month Follow-Up    Arm Pain     Bilateral arm pain. Starts at the elbow and extends into the fingers. Comes and goes. Has a hard time holding objects/does not have enough strength    Edema     B/L edema in feet L>R. Worse on days when she walks. HPI:   Patient presents to office for evaluation of the following medical concerns. -  has had pain in b/l UE. States pain from elbow to fingers. States started 3 weeks ago. States describes has ache type pain. States also weakness. States varied in intensity. States had had difficulty gripping things. States needs to hod cup with hands. No trauma or injury.     - States feels has been retaining water. States swelling in Left ankle. States has noticed more since started walking. No pain in legs or foot. States did had a fall in grocery store. States sprained ankle. States did not seek care. - asking for anxiety medication for stress of driving on long trip to [place]. - States had neck pain. States had decreased ROM. States was seen by chiropractor. Referred to to urgent care. States recommend physical therapy and has helped. - States still having fatigue. States having body aches. No fever or chills     - States has murmur. Following with cardiology. Last visit was (2021). Last Echo (2021)  EF 60-65%. - Has carotid bruit. Had US carotid (2021) - b/l stenosis 50-69%. - Labs showed anemia.  was placed on iron - self stopped due to constipation issues. Has seen GI - s/p EGD and colonoscopy. Last lab (10/2021) wasstable . - States has been having issues with insomnia. Uncontrolled. States stopped taking lorazepam. States has been taking Ambien - decreased ambien. Still with issues sleeping. Discussed medications and interactions.     -  has diabetes. States trying to watch diet. States checking blood sugars at home , did have a few > 200. States lantus 65 units daily, metformin 1000mg twice a day, added humalog slide scale. Stopped trulicity (cost), . States occasional reported hypoglycemia. On lisinopril. On pravastatin. States follows with optho. Last visit with optho per reviewed note (8/21) - A1c 7.5, continue Lantus and insulin sliding scale    - States has high blood pressure. States occasioanlly checking blood pressure at home, has wrist cuff - 120's/60's. On lisinopril.     - States has high cholesterol. States trying to watch diet. On pravastatin. No reported side effects. Last labs (10/2021)     - States has had gastric ulcers and GERD. On omeprazole as needed. Stable. - States has vitamin D def. On otc supplement     - States has had low magnesium levels on supplement. Last lab (10/2021) - increased to twice     - States stopped vit b12 supplement.     - States was told had elevated LFT and enlarged liver. - last lab from 10/12/2021 reviewed with patient 11/17/2021    Health Maintenance   - immunizations:   Influenza Vaccination - (2019), (9/16/20)  Pneumonia Vaccination  Zoster/Shingles Vaccine  Tetanus Vaccination  covid (3/4/2021) #1, (4/1/2021) #2 - moderna     - Screenings:   Bone Density Scan   Pelvic/Pap Exam  Mammogram - declines     Colonoscopy - (6/20) hemorrhoids, diverticular disease, multiple polyps, tubular and hyperplastic per path, follow up in 3 years     EGD (6/20) -normal    optho (3/21) - for cataract     ROS:  Review of Systems   Constitutional: Negative for appetite change, chills, fever and unexpected weight change. HENT: Negative for congestion, rhinorrhea and sore throat. Eyes: Negative for pain and visual disturbance. Respiratory: Positive for shortness of breath. Negative for cough and chest tightness. Cardiovascular: Negative for chest pain and palpitations.    Gastrointestinal: Negative for abdominal pain, blood in stool, constipation, diarrhea, nausea and vomiting. Genitourinary: Negative for difficulty urinating, dysuria, frequency, pelvic pain, urgency and vaginal bleeding. Musculoskeletal: Negative for arthralgias and myalgias. Skin: Negative for rash. Neurological: Negative for dizziness, syncope, weakness, light-headedness, numbness and headaches. Hematological: Negative for adenopathy. Psychiatric/Behavioral: Negative for suicidal ideas. The patient is not nervous/anxious. Current Outpatient Medications:     aspirin 81 MG EC tablet, Take 81 mg by mouth daily, Disp: , Rfl:     metFORMIN (GLUCOPHAGE) 1000 MG tablet, Take 1 tablet by mouth 2 times daily (with meals), Disp: 180 tablet, Rfl: 1    zolpidem (AMBIEN) 10 MG tablet, Take 1 tablet by mouth nightly as needed for Sleep for up to 90 days. , Disp: 90 tablet, Rfl: 0    lisinopril (PRINIVIL;ZESTRIL) 20 MG tablet, Take 1 tablet by mouth daily, Disp: 90 tablet, Rfl: 1    atorvastatin (LIPITOR) 40 MG tablet, Take 1 tablet by mouth daily, Disp: 90 tablet, Rfl: 1    insulin lispro, 1 Unit Dial, (HUMALOG KWIKPEN) 100 UNIT/ML SOPN, Inject up to 12 units QAC TID, Disp: 15 pen, Rfl: 1    ALPRAZolam (XANAX) 0.25 MG tablet, Take 1 tablet by mouth daily as needed for Anxiety for up to 30 days. , Disp: 4 tablet, Rfl: 0    zinc 50 MG CAPS, Take 1 capsule by mouth three times a week , Disp: , Rfl:     insulin glargine (LANTUS SOLOSTAR) 100 UNIT/ML injection pen, Inject 65 Units into the skin every morning, Disp: 15 pen, Rfl: 1    magnesium (MAGNESIUM-OXIDE) 250 MG TABS tablet, Take 250 mg by mouth daily , Disp: , Rfl:     Insulin Pen Needle (B-D UF III MINI PEN NEEDLES) 31G X 5 MM MISC, 1 each by Does not apply route daily, Disp: , Rfl:     vitamin B-12 (CYANOCOBALAMIN) 1000 MCG tablet, Take 1,000 mcg by mouth daily (Patient not taking: Reported on 11/17/2021), Disp: , Rfl:     No Known Allergies    Past Medical History:   Diagnosis Date  Class 2 obesity due to excess calories without serious comorbidity with body mass index (BMI) of 35.0 to 35.9 in adult 2019    Essential hypertension 2019    GERD (gastroesophageal reflux disease)     H pylori ulcer     Hypomagnesemia 2019    Mixed hyperlipidemia 2019    Other insomnia 2019    Peptic ulcer disease     Type 2 diabetes mellitus without complication ([facility] [address]     Vitamin D deficiency 2019       Past Surgical History:   Procedure Laterality Date    BUNIONECTOMY Left     CATARACT REMOVAL WITH IMPLANT      HYSTERECTOMY, VAGINAL      partial in the 80s then bilateral oopherectome 2016    RETINAL DETACHMENT SURGERY Right     vitreous surgery    TUBAL LIGATION         Family History   Problem Relation Age of Onset    Diabetes Mother     Stroke Mother     Kidney Disease Father         Renal Failure       Social History     Socioeconomic History    Marital status:      Spouse name: Jacquelyn Tirado Number of children: 2    Years of education: 15    Highest education level: High school graduate   Occupational History    Not on file   Tobacco Use    Smoking status: Former Smoker     Packs/day: 1.00     Years: 12.00     Pack years: 12.00     Types: Cigarettes     Quit date: 1982     Years since quittin.8    Smokeless tobacco: Never Used   Substance and Sexual Activity    Alcohol use: Yes     Comment: rare    Drug use: Not on file    Sexual activity: Not on file   Other Topics Concern    Not on file   Social History Narrative    Not on file     Social Determinants of Health     Financial Resource Strain: Low Risk     Difficulty of Paying Living Expenses: Not hard at all   Food Insecurity: No Food Insecurity    Worried About Running Out of Food in the Last Year: Never true    [address] in the Last Year: Never true   Transportation Needs:     Lack of Transportation (Medical): Not on file    Lack of Transportation (Non-Medical):  Not on file   Physical Activity:     Days of Exercise per Week: Not on file    Minutes of Exercise per Session: Not on file   Stress:     Feeling of Stress : Not on file   Social Connections:     Frequency of Communication with Friends and Family: Not on file    Frequency of Social Gatherings with Friends and Family: Not on file    Attends [employer]ars[employer] Services: Not on file    Active Member of [address] [place] [address] or Organizations: Not on file    Attends Club or Organization Meetings: Not on file    Marital Status: Not on file   Intimate Partner Violence:     Fear of Current or Ex-Partner: Not on file    Emotionally Abused: Not on file    Physically Abused: Not on file    Sexually Abused: Not on file   Housing Stability:     Unable to Pay for Housing in the Last Year: Not on file    Number of [place] in the Last Year: Not on file    Unstable Housing in the Last Year: Not on file        Vitals:    11/17/21 1510   BP: 120/64   Pulse: 84   Temp: 97.3 °F (36.3 °C)   SpO2: 98%   Weight: 197 lb 4 oz (89.5 kg)   Height: 5' 3\" (1.6 m)       Exam:  Physical Exam  Constitutional:       Appearance: She is well-developed. HENT:      Head: Normocephalic and atraumatic. Right Ear: External ear normal.      Left Ear: External ear normal.   Eyes:      Pupils: Pupils are equal, round, and reactive to light. Neck:      Thyroid: No thyromegaly. Vascular: Carotid bruit present. Cardiovascular:      Rate and Rhythm: Normal rate and regular rhythm. Heart sounds: Murmur heard. Systolic murmur is present with a grade of 2/6. Pulmonary:      Effort: Pulmonary effort is normal.      Breath sounds: Normal breath sounds. No wheezing. Abdominal:      General: Bowel sounds are normal. There is no distension. Palpations: Abdomen is soft. Tenderness: There is no abdominal tenderness. There is no guarding or rebound. Musculoskeletal:         General: Normal range of motion.       Cervical back: Normal range of motion and neck supple. Lymphadenopathy:      Cervical: No cervical adenopathy. Skin:     General: Skin is warm and dry. Neurological:      Mental Status: She is alert and oriented to person, place, and time. Cranial Nerves: No cranial nerve deficit.    Psychiatric:         Judgment: Judgment normal.         Assessment and Plan:    Diagnoses and all orders for this visit:    Anxiety   - situational   - xanax prn travel     Pain in both upper extremities  - uncertain etiology   - r/o carpal tunnel or other - order EMG   - xrays   - call if changes or worsening     Acute left ankle pain  - declines xray   - declines referral   - will give home exercises   - continue ankle brace   - now having swelling   - ice   - elevated, compression     Anemia  - uncertain cause   - Z62 and folic acid was normal   - following with GI   - s/p  EGD and colonoscopy (6/2020) - polyps   - has not tolerated iron - constipation - not helped with stool softener - stopped   - last lab (10/2021) - normal    Type 2 diabetes mellitus with hyperglycemia, with long-term current use of insulin ([facility] [address]  - watch diet   - monitor blood sugar at home, goals fasting am <120, 2 hours post meal < 180  - following with endocrinology   - on lantus 65 units   - on metformin 1000mg bid  - endocrinology added humalog slide scale  - stopped glipizide   - stopped trulicity due to cost   - follow A1c - 7.5 (8/2021)     On ACE  On statin   Not on aspirin - h/o PUD   optho (3/2021) - no Retinopathy     Essential hypertension  - watch diet   - monitor blood pressure at home   - on lisinopril   - stable 11/17/2021    Mixed hyperlipidemia  - Continue present treatment   - watch diet   - stop pravastatin for higher intensity statin   - on atorvastatin   - follow labs   - last lab (10/2021) - stable     Gastroesophageal reflux disease without esophagitis   - watch diet   - h/o PUD   - on omeprazole prn   - on tums as needed     Other insomnia  - states has had sleep study in the past - did nto tolerate   - On Ambien  - weaned off lorazepam   - discussed did not recommend these medications   - continue Ambien for now   - sleep habits handout provided   - consider referral to sleep medicine   - discussed sleep apnea testing - declines at present (11/2021)    Class 1 obesity due to excess calories without serious comorbidity with body mass index (BMI) of 34.0 to 34.9 in adult  - watch diet   - discussed diet and exercises     Vitamin D deficiency  - on otc supplement   - follow labs     Hypomagnesemia  - on otc supplement   - follow labs   - last lab (10/2021) - low   - increased magnesium     Elevated LFTs  - uncertain etiology at present   - recheck labs - last lab (10/2020) - improved     Murmur  - has seen cardiology (5/2021)   - had echo (8/2021) - ef 60-65%, no wall motion abnormalities, trace MR     Bilateral carotid bruits  -US carotid (5/2021) - 50-69% stenosis b/l    - repeat in 1 year     Return for check up and review.     Orders Placed This Encounter   Procedures    XR ELBOW RIGHT (MIN 3 VIEWS)     Standing Status:   Future     Number of Occurrences:   1     Standing Expiration Date:   11/17/2022     Order Specific Question:   Reason for exam:     Answer:   pain and weakness    XR HAND LEFT (MIN 3 VIEWS)     Standing Status:   Future     Number of Occurrences:   1     Standing Expiration Date:   11/17/2022     Order Specific Question:   Reason for exam:     Answer:   pain and weakness    XR HAND RIGHT (MIN 3 VIEWS)     Standing Status:   Future     Number of Occurrences:   1     Standing Expiration Date:   11/17/2022     Order Specific Question:   Reason for exam:     Answer:   pain and weakness    XR ELBOW LEFT (MIN 3 VIEWS)     Standing Status:   Future     Number of Occurrences:   1     Standing Expiration Date:   11/17/2022     Order Specific Question:   Reason for exam:     Answer:   pain and weakness    XR ANKLE LEFT (MIN 3 VIEWS)     Standing Status:   Future     Number of Occurrences:   1     Standing Expiration Date:   11/17/2022     Order Specific Question:   Reason for exam:     Answer:   pain and sweelling    EMG     Standing Status:   Future     Standing Expiration Date:   1/16/2022     Order Specific Question:   Which body part? Answer:   bilateral Upper extremity     Requested Prescriptions     Signed Prescriptions Disp Refills    metFORMIN (GLUCOPHAGE) 1000 MG tablet 180 tablet 1     Sig: Take 1 tablet by mouth 2 times daily (with meals)    zolpidem (AMBIEN) 10 MG tablet 90 tablet 0     Sig: Take 1 tablet by mouth nightly as needed for Sleep for up to 90 days.  lisinopril (PRINIVIL;ZESTRIL) 20 MG tablet 90 tablet 1     Sig: Take 1 tablet by mouth daily    atorvastatin (LIPITOR) 40 MG tablet 90 tablet 1     Sig: Take 1 tablet by mouth daily    insulin lispro, 1 Unit Dial, (HUMALOG KWIKPEN) 100 UNIT/ML SOPN 15 pen 1     Sig: Inject up to 12 units QAC TID    ALPRAZolam (XANAX) 0.25 MG tablet 4 tablet 0     Sig: Take 1 tablet by mouth daily as needed for Anxiety for up to 30 days.         Shannan Gates MD  11/17/2021  4:56 PM

## 2021-11-17 NOTE — PATIENT INSTRUCTIONS
Personalized Preventive Plan for Aye Negron - 11/17/2021  Medicare offers a range of preventive health benefits. Some of the tests and screenings are paid in full while other may be subject to a deductible, co-insurance, and/or copay. Some of these benefits include a comprehensive review of your medical history including lifestyle, illnesses that may run in your family, and various assessments and screenings as appropriate. After reviewing your medical record and screening and assessments performed today your provider may have ordered immunizations, labs, imaging, and/or referrals for you. A list of these orders (if applicable) as well as your Preventive Care list are included within your After Visit Summary for your review. Other Preventive Recommendations:    · A preventive eye exam performed by an eye specialist is recommended every 1-2 years to screen for glaucoma; cataracts, macular degeneration, and other eye disorders. · A preventive dental visit is recommended every 6 months. · Try to get at least 150 minutes of exercise per week or 10,000 steps per day on a pedometer . · Order or download the FREE \"Exercise & Physical Activity: Your Everyday Guide\" from The "[employer]. Call 3-317.726.6365 or search The "[employer] online. · You need 8527-4502 mg of calcium and 1770-7599 IU of vitamin D per day. It is possible to meet your calcium requirement with diet alone, but a vitamin D supplement is usually necessary to meet this goal.  · When exposed to the sun, use a sunscreen that protects against both UVA and UVB radiation with an SPF of 30 or greater. Reapply every 2 to 3 hours or after sweating, drying off with a towel, or swimming. · Always wear a seat belt when traveling in a car. Always wear a helmet when riding a bicycle or motorcycle. Heart-Healthy Diet   Sodium, Fat, and Cholesterol Controlled Diet       What Is a Heart Healthy Diet?    A heart-healthy diet is one that limits sodium , certain types of fat , and cholesterol . This type of diet is recommended for:   People with any form of cardiovascular disease (eg, coronary heart disease , peripheral vascular disease , previous heart attack , previous stroke )   People with risk factors for cardiovascular disease, such as high blood pressure , high cholesterol , or diabetes   Anyone who wants to lower their risk of developing cardiovascular disease   Sodium    Sodium is a mineral found in many foods. In general, most people consume much more sodium than they need. Diets high in sodium can increase blood pressure and lead to edema (water retention). On a heart-healthy diet, you should consume no more than 2,300 mg (milligrams) of sodium per dayabout the amount in one teaspoon of table salt. The foods highest in sodium include table salt (about 50% sodium), processed foods, convenience foods, and preserved foods. Cholesterol    Cholesterol is a fat-like, waxy substance in your blood. Our bodies make some cholesterol. It is also found in animal products, with the highest amounts in fatty meat, egg yolks, whole milk, cheese, shellfish, and organ meats. On a heart-healthy diet, you should limit your cholesterol intake to less than 200 mg per day. It is normal and important to have some cholesterol in your bloodstream. But too much cholesterol can cause plaque to build up within your arteries, which can eventually lead to a heart attack or stroke. The two types of cholesterol that are most commonly referred to are:   Low-density lipoprotein (LDL) cholesterol  Also known as bad cholesterol, this is the cholesterol that tends to build up along your arteries. Bad cholesterol levels are increased by eating fats that are saturated or hydrogenated. Optimal level of this cholesterol is less than 100. Over 130 starts to get risky for heart disease.    High-density lipoprotein (HDL) cholesterol  Also known as good cholesterol, this type of cholesterol actually carries cholesterol away from your arteries and may, therefore, help lower your risk of having a heart attack. You want this level to be high (ideally greater than 60). It is a risk to have a level less than 40. You can raise this good cholesterol by eating olive oil, canola oil, avocados, or nuts. Exercise raises this level, too. Fat    Fat is calorie dense and packs a lot of calories into a small amount of food. Even though fats should be limited due to their high calorie content, not all fats are bad. In fact, some fats are quite healthful. Fat can be broken down into four main types. The good-for-you fats are:   Monounsaturated fat  found in oils such as olive and canola, avocados, and nuts and natural nut butters; can decrease cholesterol levels, while keeping levels of HDL cholesterol high   Polyunsaturated fat  found in oils such as safflower, sunflower, soybean, corn, and sesame; can decrease total cholesterol and LDL cholesterol   Omega-3 fatty acids  particularly those found in fatty fish (such as salmon, trout, tuna, mackerel, herring, and sardines); can decrease risk of arrhythmias, decrease triglyceride levels, and slightly lower blood pressure   The fats that you want to limit are:   Saturated fat  found in animal products, many fast foods, and a few vegetables; increases total blood cholesterol, including LDL levels   Animal fats that are saturated include: butter, lard, whole-milk dairy products, meat fat, and poultry skin   Vegetable fats that are saturated include: hydrogenated shortening, palm oil, coconut oil, cocoa butter   Hydrogenated or trans fat  found in margarine and vegetable shortening, most shelf stable snack foods, and fried foods; increases LDL and decreases HDL     It is generally recommended that you limit your total fat for the day to less than 30% of your total calories.  If you follow an 1800-calorie heart healthy diet, for example, this would mean 60 grams of fat or less per day. Saturated fat and trans fat in your diet raises your blood cholesterol the most, much more than dietary cholesterol does. For this reason, on a heart-healthy diet, less than 7% of your calories should come from saturated fat and ideally 0% from trans fat. On an 1800-calorie diet, this translates into less than 14 grams of saturated fat per day, leaving 46 grams of fat to come from mono- and polyunsaturated fats.    Food Choices on a Heart Healthy Diet   Food Category   Foods Recommended   Foods to Avoid   Grains   Breads and rolls without salted tops Most dry and cooked cereals Unsalted crackers and breadsticks Low-sodium or homemade breadcrumbs or stuffing All rice and pastas   Breads, rolls, and crackers with salted tops High-fat baked goods (eg, muffins, donuts, pastries) Quick breads, self-rising flour, and biscuit mixes Regular bread crumbs Instant hot cereals Commercially prepared rice, pasta, or stuffing mixes   Vegetables   Most fresh, frozen, and low-sodium canned vegetables Low-sodium and salt-free vegetable juices Canned vegetables if unsalted or rinsed   Regular canned vegetables and juices, including sauerkraut and pickled vegetables Frozen vegetables with sauces Commercially prepared potato and vegetable mixes   Fruits   Most fresh, frozen, and canned fruits All fruit juices   Fruits processed with salt or sodium   Milk   Nonfat or low-fat (1%) milk Nonfat or low-fat yogurt Cottage cheese, low-fat ricotta, cheeses labeled as low-fat and low-sodium   Whole milk Reduced-fat (2%) milk Malted and chocolate milk Full fat yogurt Most cheeses (unless low-fat and low salt) Buttermilk (no more than 1 cup per week)   Meats and Beans   Lean cuts of fresh or frozen beef, veal, lamb, or pork (look for the word loin) Fresh or frozen poultry without the skin Fresh or frozen fish and some shellfish Egg whites and egg substitutes (Limit whole eggs to three per week) Tofu Nuts or seeds (unsalted, dry-roasted), low-sodium peanut butter Dried peas, beans, and lentils   Any smoked, cured, salted, or canned meat, fish, or poultry (including doyle, chipped beef, cold cuts, hot dogs, sausages, sardines, and anchovies) Poultry skins Breaded and/or fried fish or meats Canned peas, beans, and lentils Salted nuts   Fats and Oils   Olive oil and canola oil Low-sodium, low-fat salad dressings and mayonnaise   Butter, margarine, coconut and palm oils, doyle fat   Snacks, Sweets, and Condiments   Low-sodium or unsalted versions of broths, soups, soy sauce, and condiments Pepper, herbs, and spices; vinegar, lemon, or lime juice Low-fat frozen desserts (yogurt, sherbet, fruit bars) Sugar, cocoa powder, honey, syrup, jam, and preserves Low-fat, trans-fat free cookies, cakes, and pies Jan and animal crackers, fig bars, jillian snaps   High-fat desserts Broth, soups, gravies, and sauces, made from instant mixes or other high-sodium ingredients Salted snack foods Canned olives Meat tenderizers, seasoning salt, and most flavored vinegars   Beverages   Low-sodium carbonated beverages Tea and coffee in moderation Soy milk   Commercially softened water   Suggestions   Make whole grains, fruits, and vegetables the base of your diet. Choose heart-healthy fats such as canola, olive, and flaxseed oil, and foods high in heart-healthy fats, such as nuts, seeds, soybeans, tofu, and fish. Eat fish at least twice per week; the fish highest in omega-3 fatty acids and lowest in mercury include salmon, herring, mackerel, sardines, and canned chunk light tuna. If you eat fish less than twice per week or have high triglycerides, talk to your doctor about taking fish oil supplements. Read food labels.    For products low in fat and cholesterol, look for fat free, low-fat, cholesterol free, saturated fat free, and trans fat freeAlso scan the Nutrition Facts Label, which lists saturated fat, trans fat, and cholesterol amounts. For products low in sodium, look for sodium free, very low sodium, low sodium, no added salt, and unsalted   Skip the salt when cooking or at the table; if food needs more flavor, get creative and try out different herbs and spices. Garlic and onion also add substantial flavor to foods. Trim any visible fat off meat and poultry before cooking, and drain the fat off after etienne. Use cooking methods that require little or no added fat, such as grilling, boiling, baking, poaching, broiling, roasting, steaming, stir-frying, and sauting. Avoid fast food and convenience food. They tend to be high in saturated and trans fat and have a lot of added salt. Talk to a registered dietitian for individualized diet advice. Last Reviewed: March 2011 Johnny Christian MS, MPH, RD   Updated: 3/29/2011   ·     Keep Your Memory Bruno Corado       Many factors can affect your ability to remembera hectic lifestyle, aging, stress, chronic disease, and certain medicines. But, there are steps you can take to sharpen your mind and help preserve your memory. Challenge Your Brain   Regularly challenging your mind may help keeps it in top shape. Good mental exercises include:   Crossword puzzlesUse a dictionary if you need it; you will learn more that way. Brainteasers Try some! Crafts, such as wood working and sewing   Hobbies, such as gardening and building model airplanes   SocializingVisit old friends or join groups to meet new ones. Reading   Learning a new language   Taking a class, whether it be art history or carlo chi   TravelingExperience the food, history, and culture of your destination   Learning to use a computer   Going to museums, the theater, or thought-provoking movies   Changing things in your daily life, such as reversing your pattern in the grocery store or brushing your teeth using your nondominant hand   Use Memory Aids   There is no need to remember every detail on your own.  These memory aids can help:   Calendars and day planners   Electronic organizers to store all sorts of helpful informationThese devices can \"beep\" to remind you of appointments. A book of days to record birthdays, anniversaries, and other occasions that occur on the same date every year   Detailed \"to-do\" lists and strategically placed sticky notes   Quick \"study\" sessionsBefore a gathering, review who will be there so their names will be fresh in your mind. Establish routinesFor example, keep your keys, wallet, and umbrella in the same place all the time or take medicine with your 8:00 AM glass of juice   Live a Healthy Life   Many actions that will keep your body strong will do the same for your mind. For example:   Talk to Your Doctor About Herbs and Supplements    Malnutrition and vitamin deficiencies can impair your mental function. For example, vitamin B12 deficiency can cause a range of symptoms, including confusion. But, what if your nutritional needs are being met? Can herbs and supplements still offer a benefit? Researchers have investigated a range of natural remedies, such as ginkgo , ginseng , and the supplement phosphatidylserine (PS). So far, though, the evidence is inconsistent as to whether these products can improve memory or thinking. If you are interested in taking herbs and supplements, talk to your doctor first because they may interact with other medicines that you are taking. Exercise Regularly    Among the many benefits of regular exercise are increased blood flow to the brain and decreased risk of certain diseases that can interfere with memory function. One study found that even moderate exercise has a beneficial effect. Examples of \"moderate\" exercise include:   Playing 18 holes of golf once a week, without a cart   Playing tennis twice a week   Walking one mile per day   Manage Stress    It can be tough to remember what is important when your mind is cluttered.  Make time for relaxation. Choose activities that calm you down, and make it routine. Manage Chronic Conditions    Side effects of high blood pressure , diabetes, and heart disease can interfere with mental function. Many of the lifestyle steps discussed here can help manage these conditions. Strive to eat a healthy diet, exercise regularly, get stress under control, and follow your doctor's advice for your condition. Minimize Medications    Talk to your doctor about the medicines that you take. Some may be unnecessary. Also, healthy lifestyle habits may lower the need for certain drugs. Last Reviewed: April 2010 Jae Lincoln MD   Updated: 4/13/2010   ·     [address]       As we get older, changes in balance, gait, strength, vision, hearing, and cognition make even the most youthful senior more prone to accidents. Falls are one of the leading health risks for older people. This increased risk of falling is related to:   Aging process (eg, decreased muscle strength, slowed reflexes)   Higher incidence of chronic health problems (eg, arthritis, diabetes) that may limit mobility, agility or sensory awareness   Side effects of medicine (eg, dizziness, blurred vision)especially medicines like prescription pain medicines and drugs used to treat mental health conditions   Depending on the brittleness of your bones, the consequences of a fall can be serious and long lasting. Home Life   Research by the [employer] [facility]) shows that some home accidents among older adults can be prevented by making simple lifestyle changes and basic modifications and repairs to the home environment. Here are some lifestyle changes that experts recommend:   Have your hearing and vision checked regularly. Be sure to wear prescription glasses that are right for you. Speak to your doctor or pharmacist about the possible side effects of your medicines. A number of medicines can cause dizziness.    If you have problems with sleep, talk to your doctor. Limit your intake of alcohol. If necessary, use a cane or walker to help maintain your balance. Wear supportive, rubber-soled shoes, even at home. If you live in a region that gets wintry weather, you may want to put special cleats on your shoes to prevent you from slipping on the snow and ice. Exercise regularly to help maintain muscle tone, agility, and balance. Always hold the banister when going up or down stairs. Also, use  bars when getting in or out of the bath or shower, or using the toilet. To avoid dizziness, get up slowly from a lying down position. Sit up first, dangling your legs for a minute or two before rising to a standing position. Overall Home Safety Check   According to the Consumer [employer] Safety [employer]'s \"Older Consumer Home Safety Checklist,\" it is important to check for potential hazards in each room. And remember, proper lighting is an essential factor in home safety. If you cannot see clearly, you are more likely to fall. Important questions to ask yourself include:   Are lamp, electric, extension, and telephone cords placed out of the flow of traffic and maintained in good condition? Have frayed cords been replaced? Are all small rugs and runners slip resistant? If not, you can secure them to the floor with a special double-sided carpet tape. Are smoke detectors properly locatedone on every floor of your home and one outside of every sleeping area? Are they in good working order? Are batteries replaced at least once a year? Do you have a well-maintained carbon monoxide detector outside every sleeping are in your home? Does your furniture layout leave plenty of space to maneuver between and around chairs, tables, beds, and sofas? Are hallways, stairs and passages between rooms well lit? Can you reach a lamp without getting out of bed? Are floor surfaces well maintained?  Shag rugs, high-pile carpeting, tile floors, and polished wood floors can be particularly slippery. Stairs should always have handrails and be carpeted or fitted with a non-skid tread. Is your telephone easily reachable. Is the cord safely tucked away? Room by Room   According to the Association of Aging, bathrooms and joyce are the two most potentially hazardous rooms in your home. In the Kitchen    Be sure your stove is in proper working order and always make sure burners and the oven are off before you go out or go to sleep. Keep pots on the back burners, turn handles away from the front of the stove, and keep stove clean and free of grease build-up. Kitchen ventilation systems and range exhausts should be working properly. Keep flammable objects such as towels and pot holders away from the cooking area except when in use. Make sure kitchen curtains are tied back. Move cords and appliances away from the sink and hot surfaces. If extension cords are needed, install wiring guides so they do not hang over the sink, range, or working areas. Look for coffee pots, kettles and toaster ovens with automatic shut-offs. Keep a mop handy in the kitchen so you can wipe up spills instantly. You should also have a small fire extinguisher. Arrange your kitchen with frequently used items on lower shelves to avoid the need to stand on a stepstool to reach them. Make sure countertops are well-lit to avoid injuries while cutting and preparing food. In the Bathroom    Use a non-slip mat or decals in the tub and shower, since wet, soapy tile or porcelain surfaces are extremely slippery. Make sure bathroom rugs are non-skid or tape them firmly to the floor. Bathtubs should have at least one, preferably two, grab bars, firmly attached to structural supports in the wall. (Do not use built-in soap holders or glass shower doors as grab bars.)    Tub seats fitted with non-slip material on the legs allow you to wash sitting down.  For people with limited mobility, bathtub transfer benches allow you to slide safely into the tub. Raised toilet seats and toilet safety rails are helpful for those with knee or hip problems. In the [facility]    Make sure you use a nightlight and that the area around your bed is clear of potential obstacles. Be careful with electric blankets and never go to sleep with a heating pad, which can cause serious burns even if on a low setting. Use fire-resistant mattress covers and pillows, and NEVER smoke in bed. Keep a phone next to the bed that is programmed to dial 911 at the push of a button. If you have a chronic condition, you may want to sign on with an automatic call-in service. Typically the system includes a small pendant that connects directly to an emergency medical voice-response system. You should also make arrangements to stay in contact with someonefriend, neighbor, family memberon a regular schedule. Fire Prevention   According to the [employer] (Smoke Alarms for Every) [address] [place] [address], senior citizens are one of the two highest risk groups for death and serious injuries due to residential fires. When cooking, wear short-sleeved items, never a bulky long-sleeved robe. The [facility]'s Safety Checklist for Older Consumers emphasizes the importance of checking basements, garages, workshops and storage areas for fire hazards, such as volatile liquids, piles of old rags or clothing and overloaded circuits. Never smoke in bed or when lying down on a couch or recliner chair. Small portable electric or kerosene heaters are responsible for many home fires and should be used cautiously if at all. If you do use one, be sure to keep them away from flammable materials. In case of fire, make sure you have a pre-established emergency exit plan. Have a professional check your fireplace and other fuel-burning appliances yearly.     Helping Hands   Baby boomers entering the christopher years will continue to see the development of new products to help older adults live safely and independently in spite of age-related changes. Making Life More Livable  , by Leonidas Conteh, lists over 1,000 products for \"living well in the mature years,\" such as bathing and mobility aids, household security devices, ergonomically designed knives and peelers, and faucet valves and knobs for temperature control. Medical supply stores and organizations are good sources of information about products that improve your quality of life and insure your safety. Last Reviewed: November 2009 Kyle Gibbs MD   Updated: 3/7/2011     ·        Advance Care Planning: Care Instructions  Your Care Instructions     It can be hard to live with an illness that cannot be cured. But if your health is getting worse, you may want to make decisions about end-of-life care. Planning for the end of your life does not mean that you are giving up. It is a way to make sure that your wishes are met. Clearly stating your wishes can make it easier for your loved ones. Making plans while you are still able may also ease your mind and make your final days less stressful and more meaningful. Follow-up care is a key part of your treatment and safety. Be sure to make and go to all appointments, and call your doctor if you are having problems. It's also a good idea to know your test results and keep a list of the medicines you take. What can you do to plan for the end of life? You can bring these issues up with your doctor. You do not need to wait until your doctor starts the conversation. You might start with, \"What makes life worth living for me is. Laquita Valencia \" And then follow it with, \"I would not be willing to live with . Laquita Valencia \" When you complete this sentence it helps your doctor understand your wishes. Talk openly and honestly with your doctor. This is the best way to understand the decisions you will need to make as your health changes.  Know that you can always change your mind.  Ask your doctor about commonly used life-support measures. These include tube feedings, breathing machines, and fluids given through a vein (IV). Understanding these treatments will help you decide whether you want them. You may choose to have these life-supporting treatments for a limited time. This allows a trial period to see whether they will help you. You may also decide that you want your doctor to take only certain measures to keep you alive. It may help to think about the big picture, like what makes life worth living for you or what your values and goals are. Talk to your doctor about how long you are likely to live. Your doctor may be able to give you an idea of what usually happens with your specific illness. Think about preparing papers that state your wishes. These papers are called advance directives. If you do this early and review them often, there will not be any confusion about what you want. You can change your instructions at any time. Which papers should you prepare? Advance directives are legal papers that tell doctors how you want to be cared for at the end of your life. You do not need a  to write these papers. Ask your doctor or your state [facility] department for information on how to write your advance directives. They may have the forms for each of these types of papers. Make sure your doctor has a copy of these on file, and give a copy to a family member or close friend. Consider a do-not-resuscitate order (DNR). This order asks that no extra treatments be done if your heart stops or you stop breathing. Extra treatments may include cardiopulmonary resuscitation (CPR), electrical shock to restart your heart, or a machine to breathe for you. If you decide to have a DNR order, ask your doctor to explain and write it. Place the order in your home where everyone can easily see it. Consider a living will.  A living will explains your wishes about life support and other treatments at the end of your life if you become unable to speak for yourself. Living mahoney tell doctors to use or not use treatments that would keep you alive. You must have one or two witnesses or a notary present when you sign this form. A living will may be called something else in your state. Consider a medical power of . This form allows you to name a person to make decisions about your care if you are not able to. Most people ask a close friend or family member. Talk to this person about the kinds of treatments you want and those that you do not want. Make sure this person understands your wishes. A medical power of  may be called something else in your state. These legal papers are simple to change. Tell your doctor what you want to change, and ask him or her to make a note in your medical file. Give your family updated copies of the papers. Where can you learn more? Go to https://MillicanpeFactor Technology Groupeweb.Haolianluo. org and sign in to your [employer] account. Enter P184 in the [employer] box to learn more about \"Advance Care Planning: Care Instructions. \"     If you do not have an account, please click on the \"Sign Up Now\" link. Current as of: March 17, 2021               Content Version: 13.0  © 9441-5068 Healthwise, Incorporated. Care instructions adapted under license by [facility] ([facility]). If you have questions about a medical condition or this instruction, always ask your healthcare professional. [place] [address] any warranty or liability for your use of this information. ·        Learning About Living Carrie Lozano  What is a living will? A living will, also called a declaration, is a legal form. It tells your family and your doctor your wishes when you can't speak for yourself. It's used by the health professionals who will treat you as you near the end of your life or if you get seriously hurt or ill.   If you put your wishes in writing, your loved ones and others will know what kind of care you want. They won't need to guess. This can ease your mind and be helpful to others. And you can change or cancel your living will at any time. A living will is not the same as an estate or property will. An estate will explains what you want to happen with your money and property after you die. How do you use it? A living will is used to describe the kinds of treatment or life support you want as you near the end of your life or if you get seriously hurt or ill. Keep these facts in mind about living mahoney. Your living will is used only if you can't speak or make decisions for yourself. Most often, one or more doctors must certify that you can't speak or decide for yourself before your living will takes effect. If you get better and can speak for yourself again, you can accept or refuse any treatment. It doesn't matter what you said in your living will. Some states may limit your right to refuse treatment in certain cases. For example, you may need to clearly state in your living will that you don't want artificial hydration and nutrition, such as being fed through a tube. Is a living will a legal document? A living will is a legal document. Each state has its own laws about living mahoney. And a living will may be called something else in your state. Here are some things to know about living mhaoney. You don't need an  to complete a living will. But legal advice can be helpful if your state's laws are unclear. It can also help if your health history is complicated or your family can't agree on what should be in your living will. You can change your living will at any time. Some people find that their wishes about end-of-life care change as their health changes. If you make big changes to your living will, complete a new form. If you move to another state, make sure that your living will is legal in the state where you now live.  In most cases, doctors will respect your wishes even if you have a form from a different state. You might use a universal form that has been approved by many states. This kind of form can sometimes be filled out and stored online. Your digital copy will then be available wherever you have a connection to the internet. The doctors and nurses who need to treat you can find it right away. Your state may offer an online registry. This is another place where you can store your living will online. It's a good idea to get your living will notarized. This means using a person called a [employer] to watch two people sign, or witness, your living will. What should you know when you create a living will? Here are some questions to ask yourself as you make your living will:  Do you know enough about life support methods that might be used? If not, talk to your doctor so you know what might be done if you can't breathe on your own, your heart stops, or you can't swallow. What things would you still want to be able to do after you receive life-support methods? Would you want to be able to walk? To speak? To eat on your own? To live without the help of machines? Do you want certain [employer] practices performed if you become very ill? If you have a choice, where do you want to be cared for? In your home? At a hospital or nursing home? If you have a choice at the end of your life, where would you prefer to die? At home? In a hospital or nursing home? Somewhere else? Would you prefer to be buried or cremated? Do you want your organs to be donated after you die? What should you do with your living will? Make sure that your family members and your health care agent have copies of your living will (also called a declaration). Give your doctor a copy of your living will. Ask him or her to keep it as part of your medical record. If you have more than one doctor, make sure that each one has a copy. Put a copy of your living will where it can be easily found. For example, some people may put a copy on their refrigerator door. If you are using a digital copy, be sure your doctor, family members, and health care agent know how to find and access it. Where can you learn more? Go to https://chpesaeeweb.[email or web address] Mapper. org and sign in to your [employer] account. Enter G729 in the [facility] box to learn more about \"Learning About Living Manny Perry. \"     If you do not have an account, please click on the \"Sign Up Now\" link. Current as of: March 17, 2021               Content Version: 13.0  © 1919-3098 [employer]. Care instructions adapted under license by [facility] ([facility]). If you have questions about a medical condition or this instruction, always ask your healthcare professional. [address] any warranty or liability for your use of this information. ·        Learning About Medical Power of   What is a medical power of ? A medical power of , also called a durable power of  for health care, is one type of the legal forms called advance directives. It lets you name the person you want to make treatment decisions for you if you can't speak or decide for yourself. The person you choose is called your health care agent. This person is also called a health care proxy or health care surrogate. A medical power of  may be called something else in your state. How do you choose a health care agent? Choose your health care agent carefully. This person may or may not be a family member. Talk to the person before you make your final decision. Make sure he or she is comfortable with this responsibility. It's a good idea to choose someone who:  Is at least 25years old. Knows you well and understands what makes life meaningful for you. Understands your Yarsani and moral values. Will do what you want, not what he or she wants. Will be able to make difficult choices at a stressful time.   Will be able to refuse or stop treatment, if that is what you would want, even if you could die. Will be firm and confident with health professionals if needed. Will ask questions to get needed information. Lives near you or agrees to travel to you if needed. Your family may help you make medical decisions while you can still be part of that process. But it's important to choose one person to be your health care agent in case you aren't able to make decisions for yourself. If you don't fill out the legal form and name a health care agent, the decisions your family can make may be limited. A health care agent may be called something else in your state. Who will make decisions for you if you don't have a health care agent? If you don't have a health care agent or a living will, you may not get the care you want. Decisions may be made by family members who disagree about your medical care. Or decisions may be made by a medical professional who doesn't know you well. In some cases, a  makes the decisions. When you name a health care agent, it is very clear who has the power to make health decisions for you. How do you name a health care agent? You name your health care agent on a legal form. This form is usually called a medical power of . Ask your hospital, state bar association, or office on aging where to find these forms. You must sign the form to make it legal. Some states require you to get the form notarized. This means that a person called a  watches you sign the form and then he or she signs the form. Some states also require that two or more witnesses sign the form. Be sure to tell your family members and doctors who your health care agent is. Where can you learn more? Go to https://demetrice.healthMuseStorm. org and sign in to your [employer] account. Enter [id number] in the Pigeonly box to learn more about \"Learning About [address]. \"     If you do not have an account, please click on the \"Sign Up Now\" link. Current as of: March 17, 2021               Content Version: 13.0  © 2006-2021 Healthwise, Incorporated. Care instructions adapted under license by [facility] ([facility]). If you have questions about a medical condition or this instruction, always ask your healthcare professional. [address] any warranty or liability for your use of this information.     ·

## 2021-11-17 NOTE — PROGRESS NOTES
Medicare Annual Wellness Visit  Name: Dino Vinson Date: 2021   MRN: [id number] Sex: Female   Age: 66 y.o. Ethnicity: Non- / Non    : 1950 Race: White (non-)      Hiro Lezama is here for Medicare AWV    Screenings for behavioral, psychosocial and functional/safety risks, and cognitive dysfunction are all negative except as indicated below. These results, as well as other patient data from the [address] form, are documented in Flowsheets linked to this Encounter. No Known Allergies    Prior to Visit Medications    Medication Sig Taking? Authorizing Provider   aspirin 81 MG EC tablet Take 81 mg by mouth daily  Historical Provider, MD   metFORMIN (GLUCOPHAGE) 1000 MG tablet Take 1 tablet by mouth 2 times daily (with meals)  Kannan Roberts MD   zolpidem (AMBIEN) 10 MG tablet Take 1 tablet by mouth nightly as needed for Sleep for up to 90 days. Kannan Roberts MD   lisinopril (PRINIVIL;ZESTRIL) 20 MG tablet Take 1 tablet by mouth daily  Kannan Roberts MD   atorvastatin (LIPITOR) 40 MG tablet Take 1 tablet by mouth daily  Kannan Roberts MD   insulin lispro, 1 Unit Dial, (HUMALOG KWIKPEN) 100 UNIT/ML SOPN Inject up to 12 units QAC TID  Kannan Roberts MD   ALPRAZolam Valaria Paling) 0.25 MG tablet Take 1 tablet by mouth daily as needed for Anxiety for up to 30 days.   Kannan Roberts MD   vitamin B-12 (CYANOCOBALAMIN) 1000 MCG tablet Take 1,000 mcg by mouth daily  Patient not taking: Reported on 2021  Historical Provider, MD   zinc 50 MG CAPS Take 1 capsule by mouth three times a week   Historical Provider, MD   insulin glargine (LANTUS SOLOSTAR) 100 UNIT/ML injection pen Inject 65 Units into the skin every morning  Kannan Roberts MD   magnesium (MAGNESIUM-OXIDE) 250 MG TABS tablet Take 250 mg by mouth daily   Historical Provider, MD   Insulin Pen Needle (B-D UF III MINI PEN NEEDLES) 31G X 5 MM MISC 1 each by Does not apply route daily  Historical Provider, MD       Past Medical History:   Diagnosis Date    Class 2 obesity due to excess calories without serious comorbidity with body mass index (BMI) of 35.0 to 35.9 in adult 12/18/2019    Essential hypertension 12/18/2019    GERD (gastroesophageal reflux disease)     H pylori ulcer     Hypomagnesemia 12/18/2019    Mixed hyperlipidemia 12/18/2019    Other insomnia 12/18/2019    Peptic ulcer disease     Type 2 diabetes mellitus without complication ([facility] [address]     Vitamin D deficiency 12/18/2019       Past Surgical History:   Procedure Laterality Date    BUNIONECTOMY Left     CATARACT REMOVAL WITH IMPLANT      HYSTERECTOMY, VAGINAL      partial in the 80s then bilateral oopherectome 2016    RETINAL DETACHMENT SURGERY Right     vitreous surgery    TUBAL LIGATION         Family History   Problem Relation Age of Onset    Diabetes Mother     Stroke Mother     Kidney Disease Father         Renal Failure       CareTeam (Including outside providers/suppliers regularly involved in providing care):   Patient Care Team:  Makayla Ragland MD as PCP - General (Internal Medicine)  Makayla Ragland MD as PCP - [facility] Empaneled Provider    Wt Readings from Last 3 Encounters:   11/17/21 197 lb 4 oz (89.5 kg)   11/17/21 197 lb 4 oz (89.5 kg)   11/03/21 196 lb (88.9 kg)     Vitals:    11/17/21 1522   BP: 120/64   Pulse: 84   Temp: 97.3 °F (36.3 °C)   Weight: 197 lb 4 oz (89.5 kg)   Height: 5' 3\" (1.6 m)     Body mass index is 34.94 kg/m². Based upon direct observation of the patient, evaluation of cognition reveals recent and remote memory intact. Patient's complete Health Risk Assessment and screening values have been reviewed and are found in Flowsheets. The following problems were reviewed today and where indicated follow up appointments were made and/or referrals ordered. Positive Risk Factor Screenings with Interventions:     Fall Risk:  Timed Up and Go Test > 12 seconds?  (Complete if either Fall Risk answers are Yes): no  2 or more falls in past year?: no  Fall with injury in past year?: (!) yes  Fall Risk Interventions:    · Home safety tips provided        General Health and ACP:  General  In general, how would you say your health is?: Fair  In the past 7 days, have you experienced any of the following?  New or Increased Pain, New or Increased Fatigue, Loneliness, Social Isolation, Stress or Anger?: (!) New or Increased Pain (Elbow to fingers)  Do you get the social and emotional support that you need?: Yes  Do you have a Living Will?: (!) No  Advance Directives     Power of  Living Will ACP-Advance Directive ACP-Power of     Not on File Not on File Not on File Not on File      General Health Risk Interventions:  · Pain issues: xray and EMg ordered   · No Living Will: information provided     Health Habits/Nutrition:  Health Habits/Nutrition  Do you exercise for at least 20 minutes 2-3 times per week?: (!) No  Have you lost any weight without trying in the past 3 months?: No  Do you eat only one meal per day?: No  Have you seen the dentist within the past year?: Yes  Body mass index: (!) 34.94  Health Habits/Nutrition Interventions:  · Nutritional issues:  educational materials to promote weight loss provided     Safety:  Safety  Do you have working smoke detectors?: Yes  Have all throw rugs been removed or fastened?: Yes  Do you have non-slip mats or surfaces in all bathtubs/showers?: (!) No  Do all of your stairways have a railing or banister?: Yes  Are your doorways, halls and stairs free of clutter?: Yes  Do you always fasten your seatbelt when you are in a car?: Yes  Safety Interventions:  · Home safety tips provided     Personalized Preventive Plan   Current Health Maintenance Status  Immunization History   Administered Date(s) Administered    Influenza Vaccine, unspecified formulation 09/07/2017    Influenza Virus Vaccine 09/07/2017, 09/16/2020    Influenza, Triv, inactivated, subunit, adjuvanted, IM (Fluad 65 yrs and older) 09/25/2019        Health Maintenance   Topic Date Due    Hepatitis C screen  Never done    Diabetic foot exam  Never done    Diabetic retinal exam  Never done    COVID-19 Vaccine (1) Never done    DTaP/Tdap/Td vaccine (1 - Tdap) Never done    Breast cancer screen  Never done    Shingles Vaccine (1 of 2) Never done    DEXA (modify frequency per FRAX score)  Never done    Pneumococcal 65+ years Vaccine (1 of 1 - PPSV23) Never done   [employer] Visit (AWV)  06/17/2021    A1C test (Diabetic or Prediabetic)  08/30/2022    Diabetic microalbuminuria test  10/12/2022    Lipid screen  10/12/2022    Potassium monitoring  10/12/2022    Creatinine monitoring  10/12/2022    Colon cancer screen colonoscopy  06/24/2023    Flu vaccine  Completed    Hepatitis A vaccine  Aged Out    Hib vaccine  Aged Out    Meningococcal (ACWY) vaccine  Aged Out     Recommendations for [facility] Due: see orders and patient instructions/AVS.  . Recommended screening schedule for the next 5-10 years is provided to the patient in written form: see Patient Instructions/AVS.    There are no diagnoses linked to this encounter. Health Maintenance   - immunizations:   Influenza Vaccination - (2019), (9/16/20), (2021)   Pneumonia Vaccination  Zoster/Shingles Vaccine  Tetanus Vaccination  covid (3/4/2021) #1, (4/1/2021) #2 - moderna     - Screenings:   Bone Density Scan   Pelvic/Pap Exam  Mammogram - declines     Colonoscopy - (6/20) hemorrhoids, diverticular disease, multiple polyps, tubular and hyperplastic per path, follow up in 3 years     No follow-ups on file. No orders of the defined types were placed in this encounter.     Requested Prescriptions      No prescriptions requested or ordered in this encounter     Electronically signed by Brynn Clarke MD on 11/17/2021 at 4:00 PM

## 2021-11-18 ENCOUNTER — TELEPHONE (OUTPATIENT)
Dept: FAMILY MEDICINE CLINIC | Age: 71
End: 2021-11-18

## 2021-11-18 NOTE — TELEPHONE ENCOUNTER
Patient was informed and gave a verbal understanding.  Patient stated that she is going to [facility] for Thanksgiving and when she gets back she will call our office back regarding the podiatry, EMG, and ortho hand specialist.

## 2021-11-30 ENCOUNTER — TELEPHONE (OUTPATIENT)
Dept: FAMILY MEDICINE CLINIC | Age: 71
End: 2021-11-30

## 2021-11-30 NOTE — TELEPHONE ENCOUNTER
Patient called. She said she was around her grandchildren who have bronchitis. She now has it. She is  Coughing, phlegm is yellow, and she is  fatigued. Wants something called into [facility] in [place] if possible.

## 2021-12-01 ENCOUNTER — OFFICE VISIT (OUTPATIENT)
Dept: FAMILY MEDICINE CLINIC | Age: 71
End: 2021-12-01
Payer: MEDICARE

## 2021-12-01 VITALS
OXYGEN SATURATION: 96 % | SYSTOLIC BLOOD PRESSURE: 144 MMHG | HEART RATE: 110 BPM | TEMPERATURE: 97.7 F | DIASTOLIC BLOOD PRESSURE: 78 MMHG

## 2021-12-01 DIAGNOSIS — Z20.822 SUSPECTED COVID-19 VIRUS INFECTION: Primary | ICD-10-CM

## 2021-12-01 LAB
Lab: NORMAL
PERFORMING INSTRUMENT: NORMAL
QC PASS/FAIL: NORMAL
SARS-COV-2, POC: NORMAL

## 2021-12-01 PROCEDURE — 1090F PRES/ABSN URINE INCON ASSESS: CPT | Performed by: FAMILY MEDICINE

## 2021-12-01 PROCEDURE — 1036F TOBACCO NON-USER: CPT | Performed by: FAMILY MEDICINE

## 2021-12-01 PROCEDURE — G8484 FLU IMMUNIZE NO ADMIN: HCPCS | Performed by: FAMILY MEDICINE

## 2021-12-01 PROCEDURE — 4040F PNEUMOC VAC/ADMIN/RCVD: CPT | Performed by: FAMILY MEDICINE

## 2021-12-01 PROCEDURE — 87426 SARSCOV CORONAVIRUS AG IA: CPT | Performed by: FAMILY MEDICINE

## 2021-12-01 PROCEDURE — G8417 CALC BMI ABV UP PARAM F/U: HCPCS | Performed by: FAMILY MEDICINE

## 2021-12-01 PROCEDURE — G8427 DOCREV CUR MEDS BY ELIG CLIN: HCPCS | Performed by: FAMILY MEDICINE

## 2021-12-01 PROCEDURE — 3017F COLORECTAL CA SCREEN DOC REV: CPT | Performed by: FAMILY MEDICINE

## 2021-12-01 PROCEDURE — G8400 PT W/DXA NO RESULTS DOC: HCPCS | Performed by: FAMILY MEDICINE

## 2021-12-01 PROCEDURE — 1123F ACP DISCUSS/DSCN MKR DOCD: CPT | Performed by: FAMILY MEDICINE

## 2021-12-01 PROCEDURE — 99213 OFFICE O/P EST LOW 20 MIN: CPT | Performed by: FAMILY MEDICINE

## 2021-12-01 RX ORDER — AZITHROMYCIN 250 MG/1
250 TABLET, FILM COATED ORAL SEE ADMIN INSTRUCTIONS
Qty: 6 TABLET | Refills: 0 | Status: SHIPPED | OUTPATIENT
Start: 2021-12-01 | End: 2021-12-06

## 2021-12-01 RX ORDER — METHYLPREDNISOLONE 4 MG/1
TABLET ORAL
Qty: 1 KIT | Refills: 0 | Status: SHIPPED
Start: 2021-12-01 | End: 2022-02-28 | Stop reason: ALTCHOICE

## 2021-12-01 RX ORDER — GUAIFENESIN AND CODEINE PHOSPHATE 100; 10 MG/5ML; MG/5ML
5 SOLUTION ORAL EVERY 4 HOURS PRN
Qty: 237 ML | Refills: 0 | Status: SHIPPED | OUTPATIENT
Start: 2021-12-01 | End: 2021-12-09

## 2021-12-01 NOTE — PROGRESS NOTES
Endy Zhang (:  1950) is a 66 y.o. female,Established patient, here for evaluation of the following chief complaint(s):  Cough (sxs started 4 days ago - exposed 10 days ago), Pharyngitis, and Chest Congestion         ASSESSMENT/PLAN:  1. Suspected COVID-19 virus infection  -     POCT COVID-19, Antigen  -     COVID-19 Ambulatory; Future  -     azithromycin (ZITHROMAX) 250 MG tablet; Take 1 tablet by mouth See Admin Instructions for 5 days 500mg on day 1 followed by 250mg on days 2 - 5, Disp-6 tablet, R-0Normal  -     methylPREDNISolone (MEDROL DOSEPACK) 4 MG tablet; Take by mouth., Disp-1 kit, R-0Normal  -     guaiFENesin-codeine (CHERATUSSIN AC) 100-10 MG/5ML syrup; Take 5 mLs by mouth every 4 hours as needed for Cough for up to 8 days. , Disp-237 mL, R-0Normal  In office testing negative. Send out test obtained. Quarantine until results have returned. Red flags discussed with patient. If these occur she is to go directly to the emergency department. Patient voiced understanding. No follow-ups on file. Subjective   SUBJECTIVE/OBJECTIVE:  HPI  Patient presents today for evaluation of 4 days of cough, sore throat, and chest congestion. Patient states she was potentially exposed roughly 10 days ago but had no symptoms initially. Patient denies any fever chills. Denies any nausea vomiting or diarrhea. Denies any loss of taste or smell. Review of Systems   Constitutional: Negative for fever. HENT: Positive for congestion, postnasal drip, sinus pressure, sinus pain and sore throat. Negative for trouble swallowing. Eyes: Negative. Respiratory: Positive for cough. Cardiovascular: Negative. Gastrointestinal: Negative. Musculoskeletal: Negative for neck pain. Skin: Negative for rash. Neurological: Negative for headaches. Hematological: Negative for adenopathy. All other systems reviewed and are negative.          Current Outpatient Medications:     azithromycin (ZITHROMAX) 250 MG tablet, Take 1 tablet by mouth See Admin Instructions for 5 days 500mg on day 1 followed by 250mg on days 2 - 5, Disp: 6 tablet, Rfl: 0    methylPREDNISolone (MEDROL DOSEPACK) 4 MG tablet, Take by mouth., Disp: 1 kit, Rfl: 0    guaiFENesin-codeine (CHERATUSSIN AC) 100-10 MG/5ML syrup, Take 5 mLs by mouth every 4 hours as needed for Cough for up to 8 days. , Disp: 237 mL, Rfl: 0    aspirin 81 MG EC tablet, Take 81 mg by mouth daily, Disp: , Rfl:     metFORMIN (GLUCOPHAGE) 1000 MG tablet, Take 1 tablet by mouth 2 times daily (with meals), Disp: 180 tablet, Rfl: 1    zolpidem (AMBIEN) 10 MG tablet, Take 1 tablet by mouth nightly as needed for Sleep for up to 90 days. , Disp: 90 tablet, Rfl: 0    lisinopril (PRINIVIL;ZESTRIL) 20 MG tablet, Take 1 tablet by mouth daily, Disp: 90 tablet, Rfl: 1    atorvastatin (LIPITOR) 40 MG tablet, Take 1 tablet by mouth daily, Disp: 90 tablet, Rfl: 1    insulin lispro, 1 Unit Dial, (HUMALOG KWIKPEN) 100 UNIT/ML SOPN, Inject up to 12 units QAC TID, Disp: 15 pen, Rfl: 1    zinc 50 MG CAPS, Take 1 capsule by mouth three times a week , Disp: , Rfl:     insulin glargine (LANTUS SOLOSTAR) 100 UNIT/ML injection pen, Inject 65 Units into the skin every morning, Disp: 15 pen, Rfl: 1    magnesium (MAGNESIUM-OXIDE) 250 MG TABS tablet, Take 250 mg by mouth daily , Disp: , Rfl:     Insulin Pen Needle (B-D UF III MINI PEN NEEDLES) 31G X 5 MM MISC, 1 each by Does not apply route daily, Disp: , Rfl:     ALPRAZolam (XANAX) 0.25 MG tablet, Take 1 tablet by mouth daily as needed for Anxiety for up to 30 days.  (Patient not taking: Reported on 12/1/2021), Disp: 4 tablet, Rfl: 0    vitamin B-12 (CYANOCOBALAMIN) 1000 MCG tablet, Take 1,000 mcg by mouth daily (Patient not taking: Reported on 11/17/2021), Disp: , Rfl:    Patient Active Problem List   Diagnosis    Type 2 diabetes mellitus with hyperglycemia, with long-term current use of insulin ([facility]    Essential hypertension    Mixed hyperlipidemia    Gastroesophageal reflux disease without esophagitis    Other insomnia    Class 2 obesity due to excess calories without serious comorbidity with body mass index (BMI) of 35.0 to 35.9 in adult    Vitamin D deficiency    Hypomagnesemia    Elevated LFTs    Murmur    Bilateral carotid bruits    Anemia    Suspected COVID-19 virus infection     Past Medical History:   Diagnosis Date    Class 2 obesity due to excess calories without serious comorbidity with body mass index (BMI) of 35.0 to 35.9 in adult 2019    Essential hypertension 2019    GERD (gastroesophageal reflux disease)     H pylori ulcer     Hypomagnesemia 2019    Mixed hyperlipidemia 2019    Other insomnia 2019    Peptic ulcer disease     Type 2 diabetes mellitus without complication ([facility]     Vitamin D deficiency 2019     Past Surgical History:   Procedure Laterality Date    BUNIONECTOMY Left     CATARACT REMOVAL WITH IMPLANT      HYSTERECTOMY, VAGINAL      partial in the 80s then bilateral oopherectome 2016    RETINAL DETACHMENT SURGERY Right     vitreous surgery    TUBAL LIGATION       Social History     Socioeconomic History    Marital status:      Spouse name: Germán Fonseca Number of children: 2    Years of education: 15    Highest education level: High school graduate   Occupational History    Not on file   Tobacco Use    Smoking status: Former Smoker     Packs/day: 1.00     Years: 12.00     Pack years: 12.00     Types: Cigarettes     Quit date: 1982     Years since quittin.8    Smokeless tobacco: Never Used   Substance and Sexual Activity    Alcohol use: Yes     Comment: rare    Drug use: Not on file    Sexual activity: Not on file   Other Topics Concern    Not on file   Social History Narrative    Not on file     Social Determinants of Health     Financial Resource Strain: Low Risk     Difficulty of Paying Living Expenses: Not hard at all   Food Insecurity: No Food Insecurity    Worried About Running Out of Food in the Last Year: Never true    Ran Out of Food in the Last Year: Never true   Transportation Needs:     Lack of Transportation (Medical): Not on file    Lack of Transportation (Non-Medical): Not on file   Physical Activity:     Days of Exercise per Week: Not on file    Minutes of Exercise per Session: Not on file   Stress:     Feeling of Stress : Not on file   Social Connections:     Frequency of Communication with Friends and Family: Not on file    Frequency of Social Gatherings with Friends and Family: Not on file    Attends Orthodoxy Services: Not on file    Active Member of [address] [place] [address] [employer] or Organizations: Not on file    Attends Club or Organization Meetings: Not on file    Marital Status: Not on file   Intimate Partner Violence:     Fear of Current or Ex-Partner: Not on file    Emotionally Abused: Not on file    Physically Abused: Not on file    Sexually Abused: Not on file   Housing Stability:     Unable to Pay for Housing in the Last Year: Not on file    Number of [place] in the Last Year: Not on file    Unstable Housing in the Last Year: Not on file     Family History   Problem Relation Age of Onset    Diabetes Mother     Stroke Mother     Kidney Disease Father         Renal Failure      There are no preventive care reminders to display for this patient.   Health Maintenance Due   Topic Date Due    DEXA (modify frequency per FRAX score)  Never done      Diabetes Management   Topic Date Due    Diabetic foot exam  Never done    Diabetic retinal exam  Never done      Health Maintenance Due   Topic    DTaP/Tdap/Td vaccine (1 - Tdap)    Shingles Vaccine (1 of 2)    Pneumococcal 65+ years Vaccine (1 of 1 - PPSV23)      Health Maintenance   Topic Date Due    Hepatitis C screen  Never done    Diabetic foot exam  Never done    Diabetic retinal exam  Never done    DTaP/Tdap/Td vaccine (1 - Tdap) Never done    Breast cancer screen  Never done    Shingles Vaccine (1 of 2) Never done    DEXA (modify frequency per FRAX score)  Never done    Pneumococcal 65+ years Vaccine (1 of 1 - PPSV23) Never done    COVID-19 Vaccine (3 - Booster for Moderna series) 10/01/2021    A1C test (Diabetic or Prediabetic)  08/30/2022    Diabetic microalbuminuria test  10/12/2022    Lipid screen  10/12/2022    Potassium monitoring  10/12/2022    Creatinine monitoring  10/12/2022    Annual Wellness Visit (AWV)  11/18/2022    Colon cancer screen colonoscopy  06/24/2023    Flu vaccine  Completed    Hepatitis A vaccine  Aged Out    Hib vaccine  Aged Out    Meningococcal (ACWY) vaccine  Aged Out      There are no preventive care reminders to display for this patient. There are no preventive care reminders to display for this patient. BP (!) 144/78   Pulse 110   Temp 97.7 °F (36.5 °C)   SpO2 96%     Objective   Physical Exam  Vitals reviewed. Constitutional:       Appearance: She is well-developed. She is obese. She is ill-appearing. HENT:      Head: Normocephalic and atraumatic. Right Ear: External ear normal.      Left Ear: External ear normal.      Nose: Nose normal.      Mouth/Throat:      Pharynx: Posterior oropharyngeal erythema present. Eyes:      Conjunctiva/sclera: Conjunctivae normal.      Pupils: Pupils are equal, round, and reactive to light. Cardiovascular:      Rate and Rhythm: Regular rhythm. Tachycardia present. Heart sounds: Normal heart sounds. Pulmonary:      Effort: Pulmonary effort is normal.      Breath sounds: Decreased breath sounds and wheezing present. Abdominal:      General: Bowel sounds are normal.      Palpations: Abdomen is soft. Musculoskeletal:         General: Normal range of motion. Cervical back: Normal range of motion and neck supple. Skin:     General: Skin is warm and dry. Findings: No erythema.    Neurological:      Mental Status: She is alert and oriented to person, place, and time. Cranial Nerves: No cranial nerve deficit. Psychiatric:         Behavior: Behavior normal.         Judgment: Judgment normal.                  An electronic signature was used to authenticate this note.     --Koki Pacheco, DO

## 2021-12-02 DIAGNOSIS — Z20.822 SUSPECTED COVID-19 VIRUS INFECTION: ICD-10-CM

## 2021-12-02 ASSESSMENT — ENCOUNTER SYMPTOMS
SINUS PAIN: 1
EYES NEGATIVE: 1
GASTROINTESTINAL NEGATIVE: 1
SORE THROAT: 1
COUGH: 1
SINUS PRESSURE: 1
TROUBLE SWALLOWING: 0

## 2021-12-03 LAB
SARS-COV-2: NOT DETECTED
SOURCE: NORMAL

## 2021-12-07 ENCOUNTER — OFFICE VISIT (OUTPATIENT)
Dept: NEUROLOGY | Age: 71
End: 2021-12-07
Payer: MEDICARE

## 2021-12-07 ENCOUNTER — TELEPHONE (OUTPATIENT)
Dept: FAMILY MEDICINE CLINIC | Age: 71
End: 2021-12-07

## 2021-12-07 VITALS
WEIGHT: 194 LBS | HEIGHT: 63 IN | SYSTOLIC BLOOD PRESSURE: 140 MMHG | DIASTOLIC BLOOD PRESSURE: 60 MMHG | BODY MASS INDEX: 34.38 KG/M2

## 2021-12-07 DIAGNOSIS — M79.602 PAIN IN BOTH UPPER EXTREMITIES: ICD-10-CM

## 2021-12-07 DIAGNOSIS — M47.812 CERVICAL SPONDYLOSIS: Chronic | ICD-10-CM

## 2021-12-07 DIAGNOSIS — M79.601 PAIN IN BOTH UPPER EXTREMITIES: ICD-10-CM

## 2021-12-07 DIAGNOSIS — G56.21 ULNAR NEUROPATHY OF RIGHT UPPER EXTREMITY: Primary | ICD-10-CM

## 2021-12-07 PROCEDURE — 95909 NRV CNDJ TST 5-6 STUDIES: CPT | Performed by: PSYCHIATRY & NEUROLOGY

## 2021-12-07 NOTE — TELEPHONE ENCOUNTER
Please let the patient know that EMG per neurology report suggested    Chronic right ulnar nerve (elbow) neuropathy    Also suggesting possible right carpal tunnel type syndrome versus a lower neck nerve pinch    Given the finding of the elbow and possibly carpal tunnel would recommend referral to Ortho hand    May need to consider physical therapy regarding the neck and or further evaluation of the neck

## 2021-12-07 NOTE — PROGRESS NOTES
[facility]  Electrodiagnostic Laboratory  *Accredited by the [facility] with exemplary status  [address] [facility]  Phone: [phone]  Fax: [phone]    Referring Provider: Kandi Brunner, MD  Primary Care Physician: Jimmie Joyce MD  Patient Name: Dev Avina  Patient YOB: 1950  Gender: female  BMI: Body mass index is 34.37 kg/m². Blood pressure (!) 140/60, height 5' 3\" (1.6 m), weight 194 lb (88 kg). 12/7/2021    Description of clinical problem: Bilt. UE study; pain in both arms; hx IDDM. Chief Complaint   Patient presents with    Procedure     EMG BUE     Pain Yes-previous in right hand but denies pain currently   ; Numbness/tingling  No; Weakness  No     Brief physical exam:   Sensory deficit No; Weakness No; Atrophy  No.    Note: Patient did not want to complete NCS study nor rest of exam and did not tolerate nerve conduction studies due to pain, withdrawal of limb. Study Limitations: pain, anxiety. Full Name: Zenia Silva Gender: Female  MRN: [id number] YOB: 1950      Visit Date: 12/7/2021 10:47  Age: 66 Years 3 Months Old  Examining Physician: Dr. Jesus Harrison   Referring Physician: Dr. Jomar Espinal  Technician: [employer]   Height: 5 feet 3 inch  Weight: 197 lbs  Notes: BUE pain      Motor NCS      Nerve / Sites Latency Amplitude Amp. 1-2 Distance Lat Diff Velocity Temp.    ms mV % cm ms m/s °C   R Median - APB      Wrist 5.52 5.2 100 8   32.5      Elbow 10.42 4.3 82.9 19 4.90 39 32.5   R Ulnar - ADM      Wrist 1.56 5.9 100 8   33      B. Elbow 4.95 5.6 95.7 17 3.39 50 33      A. Elbow 6.93 5.6 96.2 10 1.98 51 33           Sensory NCS      Nerve / Sites Onset Lat Peak Lat PP Amp Amp. 1-2 Distance Velocity Temp.    ms ms µV % cm m/s °C   R Median - Digit II (Antidromic)      Mid Palm 1.51 2.40 9.2 100 7 46 33      Wrist NR NR NR NR 14 NR 33   R Ulnar - Digit V (Antidromic)      Wrist 3.02 3.75 11.1 100 14 46 33   R Radial - Anatomical  (Forearm)      Forearm 2.03 2.66 20.7 100 10 49 33             F  Wave      Nerve F Lat M Lat F-M Lat    ms ms ms   R Median- APB 30.1 3.1 27.0   R Ulnar- ADM 28.5 2.4 26.1       Summary of Findings:   Nerve conduction studies:   · The following nerve conduction studies were abnormal:   · The right median sensory nerve conduction recording from the second digit could not be tested as patient withdrew her arm and response could not be recorded. · The right ulnar and radial sensory nerve conductions are normal in distal latency and amplitude. · The right median motor nerve conductions (recording from the abductor pollicis brevis muscle) is prolonged in distal latency with normal amplitude and slowed motor nerve conduction velocity. · The right ulnar motor nerve conductions (recording from the abductor digit minimi muscle) is normal in distal latency with reduced amplitudes and normal motor nerve conduction velocities. · All other nerve conduction studies listed in the table above were normal in latency, amplitude and conduction velocity. Needle EMG:   · Needle EMG was not performed as patient refused further testing. Diagnostic Interpretation: This study was abnormal.   Electrodiagnosis: Limited nerve conduction studies performed of the right upper extremity due to the patient's intolerance of pain, discomfort related to nerve conduction study testing and withdrawal of limb shows electrodiagnostic evidence suggestive of the followin. A chronic right ulnar neuropathy. 2. A chronic right median mononeuropathy at the wrist (I.e., carpal tunnel syndrome) versus a chronic right C8/T1 cervical radiculopathy cannot be excluded underlying the above. Clinical correlation is recommended. Previous Study: None recent known.     Technologist: SC  Physician: Poli Soares MD    Nerve conduction studies and electromyography were performed according to our laboratory policies and procedures which can be provided upon request. All abnormal values are identified in the table.  Laboratory normal values can also be provided upon request.       Cc: MD Leonides Miller MD

## 2021-12-07 NOTE — TELEPHONE ENCOUNTER
Reviewed results with Liliana. She wants to hold off on a referral and physical for now. States that the EMG was painful and would like time to recover. She will let us know when she is ready.

## 2022-01-04 ENCOUNTER — TELEPHONE (OUTPATIENT)
Dept: FAMILY MEDICINE CLINIC | Age: 72
End: 2022-01-04

## 2022-01-04 DIAGNOSIS — G56.21 ULNAR NEUROPATHY OF RIGHT UPPER EXTREMITY: ICD-10-CM

## 2022-01-04 DIAGNOSIS — G56.01 CARPAL TUNNEL SYNDROME OF RIGHT WRIST: Primary | ICD-10-CM

## 2022-01-04 DIAGNOSIS — E78.2 MIXED HYPERLIPIDEMIA: ICD-10-CM

## 2022-01-04 RX ORDER — ATORVASTATIN CALCIUM 40 MG/1
40 TABLET, FILM COATED ORAL DAILY
Qty: 90 TABLET | Refills: 1 | Status: SHIPPED
Start: 2022-01-04 | End: 2022-06-30 | Stop reason: SDUPTHER

## 2022-01-04 NOTE — TELEPHONE ENCOUNTER
Orders Placed This Encounter   Procedures    Mel Magdaleno MD, Orthopaedics (hand & upper extremities), [place]     Referral Priority:   Routine     Referral Type:   Eval and Treat     Referral Reason:   Specialty Services Required     Referred to Provider:   Megan Awad MD     Requested Specialty:   Hand Surgery     Number of Visits Requested:   1     Referral placed

## 2022-01-04 NOTE — TELEPHONE ENCOUNTER
Last Appointment:  11/17/2021  Future Appointments   Date Time Provider Dionna Riddle   2/28/2022  9:30 AM Katherine Aj  [address] [facility] [address]

## 2022-01-04 NOTE — TELEPHONE ENCOUNTER
Patient called and stated she had an EMG test done. She said she is ready for the neurology referral to be sent and put in. Please advise.

## 2022-01-10 ENCOUNTER — TELEPHONE (OUTPATIENT)
Dept: FAMILY MEDICINE CLINIC | Age: 72
End: 2022-01-10

## 2022-01-10 DIAGNOSIS — E11.65 TYPE 2 DIABETES MELLITUS WITH HYPERGLYCEMIA, WITH LONG-TERM CURRENT USE OF INSULIN (HCC): ICD-10-CM

## 2022-01-10 DIAGNOSIS — R53.83 OTHER FATIGUE: ICD-10-CM

## 2022-01-10 DIAGNOSIS — E55.9 VITAMIN D DEFICIENCY: ICD-10-CM

## 2022-01-10 DIAGNOSIS — Z79.4 TYPE 2 DIABETES MELLITUS WITH HYPERGLYCEMIA, WITH LONG-TERM CURRENT USE OF INSULIN (HCC): ICD-10-CM

## 2022-01-10 DIAGNOSIS — E78.2 MIXED HYPERLIPIDEMIA: Primary | ICD-10-CM

## 2022-01-10 NOTE — TELEPHONE ENCOUNTER
----- Message from Ayaz Moserler sent at 1/10/2022 10:52 AM EST -----  Subject: Referral Request    QUESTIONS   Reason for referral request? Patient is requesting to have bloodwork done   before her appointment. Has the physician seen you for this condition before? No   Preferred Specialist (if applicable)? Do you already have an appointment scheduled? Yes  Select Scheduled Date? 2022-02-28  Select Scheduled Physician? Additional Information for Provider?   ---------------------------------------------------------------------------  --------------  CALL BACK INFO  What is the best way for the office to contact you?  OK to leave message on   voicemail  Preferred Call Back Phone Number? [phone]

## 2022-01-10 NOTE — TELEPHONE ENCOUNTER
Orders Placed This Encounter   Procedures    Comprehensive Metabolic Panel     Standing Status:   Future     Standing Expiration Date:   1/10/2023    Magnesium     Standing Status:   Future     Standing Expiration Date:   1/10/2023    Lipid, Fasting     Standing Status:   Future     Standing Expiration Date:   1/10/2023    Hemoglobin A1C     Standing Status:   Future     Standing Expiration Date:   1/10/2023    Vitamin D 25 Hydroxy     Standing Status:   Future     Standing Expiration Date:   1/10/2023    TSH without Reflex     Standing Status:   Future     Standing Expiration Date:   1/10/2023    Urinalysis     Standing Status:   Future     Standing Expiration Date:   1/10/2023    Microalbumin, Ur     Standing Status:   Future     Standing Expiration Date:   1/10/2023    CBC Auto Differential     Standing Status:   Future     Standing Expiration Date:   1/10/2023     Orders placed

## 2022-01-11 DIAGNOSIS — G47.09 OTHER INSOMNIA: ICD-10-CM

## 2022-01-11 RX ORDER — ZOLPIDEM TARTRATE 10 MG/1
10 TABLET ORAL NIGHTLY PRN
Qty: 90 TABLET | Refills: 0 | Status: SHIPPED
Start: 2022-01-11 | End: 2022-02-28 | Stop reason: SDUPTHER

## 2022-01-13 LAB — DIABETIC RETINOPATHY: POSITIVE

## 2022-01-24 ENCOUNTER — OFFICE VISIT (OUTPATIENT)
Dept: CHIROPRACTIC MEDICINE | Age: 72
End: 2022-01-24
Payer: MEDICARE

## 2022-01-24 VITALS
WEIGHT: 194 LBS | TEMPERATURE: 97 F | BODY MASS INDEX: 34.38 KG/M2 | OXYGEN SATURATION: 97 % | HEIGHT: 63 IN | HEART RATE: 83 BPM

## 2022-01-24 DIAGNOSIS — M99.05 SEGMENTAL AND SOMATIC DYSFUNCTION OF PELVIC REGION: ICD-10-CM

## 2022-01-24 DIAGNOSIS — M54.04 PANNICULITIS AFFECTING REGIONS OF NECK AND BACK, THORACIC REGION: ICD-10-CM

## 2022-01-24 DIAGNOSIS — M99.02 SEGMENTAL AND SOMATIC DYSFUNCTION OF THORACIC REGION: ICD-10-CM

## 2022-01-24 DIAGNOSIS — M99.03 SEGMENTAL AND SOMATIC DYSFUNCTION OF LUMBAR REGION: Primary | ICD-10-CM

## 2022-01-24 DIAGNOSIS — G89.29 CHRONIC RIGHT-SIDED LOW BACK PAIN WITH RIGHT-SIDED SCIATICA: ICD-10-CM

## 2022-01-24 DIAGNOSIS — M53.3 SACROCOCCYGEAL DISORDERS, NOT ELSEWHERE CLASSIFIED: ICD-10-CM

## 2022-01-24 DIAGNOSIS — M54.41 CHRONIC RIGHT-SIDED LOW BACK PAIN WITH RIGHT-SIDED SCIATICA: ICD-10-CM

## 2022-01-24 PROCEDURE — 99999 PR OFFICE/OUTPT VISIT,PROCEDURE ONLY: CPT | Performed by: CHIROPRACTOR

## 2022-01-24 PROCEDURE — 98941 CHIROPRACT MANJ 3-4 REGIONS: CPT | Performed by: CHIROPRACTOR

## 2022-01-24 NOTE — PROGRESS NOTES
SpO2: 97%       There are hypertonic and tender fibers noted today in the thoracic, lumbar paraspinal muscles. Joint fixation is noted with motion screening at L3-4, bilateral SI joints, T2-5. Suhail Gimenez was seen today for lower back pain. Diagnoses and all orders for this visit:    Segmental and somatic dysfunction of lumbar region    Chronic right-sided low back pain with right-sided sciatica    Segmental and somatic dysfunction of pelvic region    Sacrococcygeal disorders, not elsewhere classified    Segmental and somatic dysfunction of thoracic region    Panniculitis affecting regions of neck and back, thoracic region        Treatment Plan: Continued with Berry flexion distraction manipulation at L3, protocol to today. Mechanically assisted manipulation of the thoracic segments, SI joints. Tolerated well.   I will see her back as needed for further care      Seen By:  Trice Fraga

## 2022-02-09 ENCOUNTER — TELEPHONE (OUTPATIENT)
Dept: FAMILY MEDICINE CLINIC | Age: 72
End: 2022-02-09

## 2022-02-09 DIAGNOSIS — E11.65 TYPE 2 DIABETES MELLITUS WITH HYPERGLYCEMIA, WITH LONG-TERM CURRENT USE OF INSULIN (HCC): ICD-10-CM

## 2022-02-09 DIAGNOSIS — Z79.4 TYPE 2 DIABETES MELLITUS WITH HYPERGLYCEMIA, WITH LONG-TERM CURRENT USE OF INSULIN (HCC): ICD-10-CM

## 2022-02-09 LAB
AVERAGE GLUCOSE: NORMAL
HBA1C MFR BLD: 8.7 %

## 2022-02-09 NOTE — TELEPHONE ENCOUNTER
Please let the patient know that at hemoglobin A1c is a measure 3-month sugar control was more uncontrolled now at 8.7. We will need to review medications and potentially adjust to try to get A1c better controlled.   Recommend to watch diet from carbohydrates and sugars

## 2022-02-11 NOTE — TELEPHONE ENCOUNTER
Kimberli Elias called back and was advised. She saw Dr Chelsea Mathew on Wednesday for this. She is going to treat her Diabetes going forward.

## 2022-02-24 DIAGNOSIS — R53.83 OTHER FATIGUE: ICD-10-CM

## 2022-02-24 DIAGNOSIS — E78.2 MIXED HYPERLIPIDEMIA: ICD-10-CM

## 2022-02-24 DIAGNOSIS — E11.65 TYPE 2 DIABETES MELLITUS WITH HYPERGLYCEMIA, WITH LONG-TERM CURRENT USE OF INSULIN (HCC): ICD-10-CM

## 2022-02-24 DIAGNOSIS — Z79.4 TYPE 2 DIABETES MELLITUS WITH HYPERGLYCEMIA, WITH LONG-TERM CURRENT USE OF INSULIN (HCC): ICD-10-CM

## 2022-02-24 DIAGNOSIS — E55.9 VITAMIN D DEFICIENCY: ICD-10-CM

## 2022-02-24 LAB
ALBUMIN SERPL-MCNC: 3.6 G/DL (ref 3.5–5.2)
ALP BLD-CCNC: 77 U/L (ref 35–104)
ALT SERPL-CCNC: 29 U/L (ref 0–32)
ANION GAP SERPL CALCULATED.3IONS-SCNC: 15 MMOL/L (ref 7–16)
AST SERPL-CCNC: 25 U/L (ref 0–31)
BACTERIA: ABNORMAL /HPF
BASOPHILS ABSOLUTE: 0.07 E9/L (ref 0–0.2)
BASOPHILS RELATIVE PERCENT: 0.6 % (ref 0–2)
BILIRUB SERPL-MCNC: <0.2 MG/DL (ref 0–1.2)
BILIRUBIN URINE: NEGATIVE
BLOOD, URINE: NEGATIVE
BUN BLDV-MCNC: 17 MG/DL (ref 6–23)
CALCIUM SERPL-MCNC: 9.2 MG/DL (ref 8.6–10.2)
CHLORIDE BLD-SCNC: 103 MMOL/L (ref 98–107)
CHOLESTEROL, FASTING: 141 MG/DL (ref 0–199)
CLARITY: CLEAR
CO2: 20 MMOL/L (ref 22–29)
COLOR: YELLOW
CREAT SERPL-MCNC: 0.7 MG/DL (ref 0.5–1)
CREATININE URINE: 44 MG/DL (ref 29–226)
EOSINOPHILS ABSOLUTE: 0.32 E9/L (ref 0.05–0.5)
EOSINOPHILS RELATIVE PERCENT: 2.7 % (ref 0–6)
GFR AFRICAN AMERICAN: >60
GFR NON-AFRICAN AMERICAN: >60 ML/MIN/1.73
GLUCOSE BLD-MCNC: 144 MG/DL (ref 74–99)
GLUCOSE URINE: NEGATIVE MG/DL
HCT VFR BLD CALC: 37.3 % (ref 34–48)
HDLC SERPL-MCNC: 49 MG/DL
HEMOGLOBIN: 11.4 G/DL (ref 11.5–15.5)
IMMATURE GRANULOCYTES #: 0.04 E9/L
IMMATURE GRANULOCYTES %: 0.3 % (ref 0–5)
KETONES, URINE: NEGATIVE MG/DL
LDL CHOLESTEROL CALCULATED: 76 MG/DL (ref 0–99)
LEUKOCYTE ESTERASE, URINE: ABNORMAL
LYMPHOCYTES ABSOLUTE: 3.84 E9/L (ref 1.5–4)
LYMPHOCYTES RELATIVE PERCENT: 32 % (ref 20–42)
MAGNESIUM: 1.5 MG/DL (ref 1.6–2.6)
MCH RBC QN AUTO: 29.3 PG (ref 26–35)
MCHC RBC AUTO-ENTMCNC: 30.6 % (ref 32–34.5)
MCV RBC AUTO: 95.9 FL (ref 80–99.9)
MICROALBUMIN UR-MCNC: <12 MG/L
MICROALBUMIN/CREAT UR-RTO: ABNORMAL (ref 0–30)
MONOCYTES ABSOLUTE: 0.69 E9/L (ref 0.1–0.95)
MONOCYTES RELATIVE PERCENT: 5.7 % (ref 2–12)
NEUTROPHILS ABSOLUTE: 7.05 E9/L (ref 1.8–7.3)
NEUTROPHILS RELATIVE PERCENT: 58.7 % (ref 43–80)
NITRITE, URINE: NEGATIVE
PDW BLD-RTO: 13 FL (ref 11.5–15)
PH UA: 6 (ref 5–9)
PLATELET # BLD: 434 E9/L (ref 130–450)
PMV BLD AUTO: 9.7 FL (ref 7–12)
POTASSIUM SERPL-SCNC: 5.1 MMOL/L (ref 3.5–5)
PROTEIN UA: NEGATIVE MG/DL
RBC # BLD: 3.89 E12/L (ref 3.5–5.5)
RBC UA: ABNORMAL /HPF (ref 0–2)
SODIUM BLD-SCNC: 138 MMOL/L (ref 132–146)
SPECIFIC GRAVITY UA: <=1.005 (ref 1–1.03)
TOTAL PROTEIN: 6.9 G/DL (ref 6.4–8.3)
TRIGLYCERIDE, FASTING: 79 MG/DL (ref 0–149)
TSH SERPL DL<=0.05 MIU/L-ACNC: 3.77 UIU/ML (ref 0.27–4.2)
UROBILINOGEN, URINE: 0.2 E.U./DL
VITAMIN D 25-HYDROXY: 70 NG/ML (ref 30–100)
VLDLC SERPL CALC-MCNC: 16 MG/DL
WBC # BLD: 12 E9/L (ref 4.5–11.5)
WBC UA: ABNORMAL /HPF (ref 0–5)

## 2022-02-28 ENCOUNTER — OFFICE VISIT (OUTPATIENT)
Dept: FAMILY MEDICINE CLINIC | Age: 72
End: 2022-02-28
Payer: MEDICARE

## 2022-02-28 VITALS
TEMPERATURE: 97.7 F | HEIGHT: 63 IN | WEIGHT: 197.13 LBS | HEART RATE: 73 BPM | BODY MASS INDEX: 34.93 KG/M2 | SYSTOLIC BLOOD PRESSURE: 134 MMHG | OXYGEN SATURATION: 97 % | DIASTOLIC BLOOD PRESSURE: 66 MMHG

## 2022-02-28 DIAGNOSIS — R79.89 ELEVATED LFTS: ICD-10-CM

## 2022-02-28 DIAGNOSIS — I10 ESSENTIAL HYPERTENSION: ICD-10-CM

## 2022-02-28 DIAGNOSIS — I77.9 CAROTID ARTERY DISEASE, UNSPECIFIED LATERALITY, UNSPECIFIED TYPE (HCC): ICD-10-CM

## 2022-02-28 DIAGNOSIS — E83.42 HYPOMAGNESEMIA: ICD-10-CM

## 2022-02-28 DIAGNOSIS — D64.9 ANEMIA, UNSPECIFIED TYPE: ICD-10-CM

## 2022-02-28 DIAGNOSIS — M79.602 PAIN IN BOTH UPPER EXTREMITIES: Primary | ICD-10-CM

## 2022-02-28 DIAGNOSIS — E11.65 TYPE 2 DIABETES MELLITUS WITH HYPERGLYCEMIA, WITH LONG-TERM CURRENT USE OF INSULIN (HCC): ICD-10-CM

## 2022-02-28 DIAGNOSIS — E66.09 CLASS 1 OBESITY DUE TO EXCESS CALORIES WITHOUT SERIOUS COMORBIDITY WITH BODY MASS INDEX (BMI) OF 34.0 TO 34.9 IN ADULT: ICD-10-CM

## 2022-02-28 DIAGNOSIS — K21.9 GASTROESOPHAGEAL REFLUX DISEASE WITHOUT ESOPHAGITIS: ICD-10-CM

## 2022-02-28 DIAGNOSIS — Z79.4 TYPE 2 DIABETES MELLITUS WITH HYPERGLYCEMIA, WITH LONG-TERM CURRENT USE OF INSULIN (HCC): ICD-10-CM

## 2022-02-28 DIAGNOSIS — E78.2 MIXED HYPERLIPIDEMIA: ICD-10-CM

## 2022-02-28 DIAGNOSIS — G47.33 OSA (OBSTRUCTIVE SLEEP APNEA): ICD-10-CM

## 2022-02-28 DIAGNOSIS — R01.1 MURMUR: ICD-10-CM

## 2022-02-28 DIAGNOSIS — G47.09 OTHER INSOMNIA: ICD-10-CM

## 2022-02-28 DIAGNOSIS — M79.601 PAIN IN BOTH UPPER EXTREMITIES: Primary | ICD-10-CM

## 2022-02-28 PROCEDURE — 1036F TOBACCO NON-USER: CPT | Performed by: INTERNAL MEDICINE

## 2022-02-28 PROCEDURE — 3052F HG A1C>EQUAL 8.0%<EQUAL 9.0%: CPT | Performed by: INTERNAL MEDICINE

## 2022-02-28 PROCEDURE — G8417 CALC BMI ABV UP PARAM F/U: HCPCS | Performed by: INTERNAL MEDICINE

## 2022-02-28 PROCEDURE — 2022F DILAT RTA XM EVC RTNOPTHY: CPT | Performed by: INTERNAL MEDICINE

## 2022-02-28 PROCEDURE — G8427 DOCREV CUR MEDS BY ELIG CLIN: HCPCS | Performed by: INTERNAL MEDICINE

## 2022-02-28 PROCEDURE — G8484 FLU IMMUNIZE NO ADMIN: HCPCS | Performed by: INTERNAL MEDICINE

## 2022-02-28 PROCEDURE — 99214 OFFICE O/P EST MOD 30 MIN: CPT | Performed by: INTERNAL MEDICINE

## 2022-02-28 PROCEDURE — 4040F PNEUMOC VAC/ADMIN/RCVD: CPT | Performed by: INTERNAL MEDICINE

## 2022-02-28 PROCEDURE — G8400 PT W/DXA NO RESULTS DOC: HCPCS | Performed by: INTERNAL MEDICINE

## 2022-02-28 PROCEDURE — 1090F PRES/ABSN URINE INCON ASSESS: CPT | Performed by: INTERNAL MEDICINE

## 2022-02-28 PROCEDURE — 3017F COLORECTAL CA SCREEN DOC REV: CPT | Performed by: INTERNAL MEDICINE

## 2022-02-28 PROCEDURE — 1123F ACP DISCUSS/DSCN MKR DOCD: CPT | Performed by: INTERNAL MEDICINE

## 2022-02-28 RX ORDER — ZOLPIDEM TARTRATE 10 MG/1
10 TABLET ORAL NIGHTLY PRN
Qty: 90 TABLET | Refills: 0 | Status: SHIPPED
Start: 2022-02-28 | End: 2022-05-23 | Stop reason: SDUPTHER

## 2022-02-28 ASSESSMENT — ENCOUNTER SYMPTOMS
BLOOD IN STOOL: 0
CHEST TIGHTNESS: 0
SORE THROAT: 0
EYE PAIN: 0
NAUSEA: 0
DIARRHEA: 0
CONSTIPATION: 0
VOMITING: 0
COUGH: 0
SHORTNESS OF BREATH: 1
RHINORRHEA: 0
ABDOMINAL PAIN: 0

## 2022-02-28 NOTE — PROGRESS NOTES
[facility]) Physicians   Internal Medicine     2022  Salomon Muniz : 1950 Sex: female  Age:74 y.o. Chief Complaint   Patient presents with   Sondra Montero Diabetes     Seeing Dr Gurvinder Siddiqui    Hypertension    Hyperlipidemia    Insomnia        HPI:   Patient presents to office for evaluation of the following medical concerns. - States has had some depression since last visit. States became less complaint with treatment of medical conditions. Last PHQ score was 0 (2021). Discussed treatment - psychology and or medication - declines (2022)     - States has had pain in b/l UE. States pain from elbow to fingers. States started 3 weeks ago. States describes has ache type pain. States also weakness. States varied in intensity. States had had difficulty gripping things. States needs to hod cup with hands. No trauma or injury. EMG () - A chronic right ulnar neuropathy. A chronic right median mononeuropathy at the wrist (I.e., carpal tunnel syndrome) versus a chronic right C8/T1 cervical radiculopathy cannot be excluded underlying the above    - States had neck pain. States had decreased ROM. States was seen by chiropractor. Referred to to urgent care. States s/pphysical therapy and has helped. - States still having fatigue. States having body aches. No fever or chills     - States has murmur. Following with cardiology. Last visit was (2021). Last Echo (2021)  EF 60-65%. - Has carotid bruit. Had US carotid (2021) - b/l stenosis 50-69%. - Labs showed anemia. States was placed on iron - self stopped due to constipation issues. Has seen GI - s/p EGD and colonoscopy. Last lab (2022) was stable . - States has been having issues with insomnia. Uncontrolled. States stopped taking lorazepam. States has been taking Ambien - decreased ambien. Still with issues sleeping. Discussed medications and interactions. Has sleep apnea. Not wearing cpap. - States has diabetes. States trying to watch diet. States checking blood sugars at home , did have a few > 200. States lantus 65 units daily, metformin 1000mg twice a day, added humalog slide scale. Stopped trulicity (cost). States occasional reported hypoglycemia. On lisinopril. On pravastatin. States follows with optho. Last visit with endo per reviewed note (2/2022) - A1c 8.7, continue Lantus and insulin sliding scale    - States has high blood pressure. States occasioanlly checking blood pressure at home, has wrist cuff - 120's/60's. On lisinopril.     - States has high cholesterol. States trying to watch diet. On pravastatin. No reported side effects. Last labs (2/2022)     - States has had gastric ulcers and GERD. On omeprazole as needed. Stable. - States has vitamin D def. On otc supplement     - States has had low magnesium levels on supplement. Last lab low  (2/2022) - increased to twice (51/4957) - uncertain if taking     - States stopped vit b12 supplement.     - States was told had elevated LFT and enlarged liver. Last lab (2/2022) was normal.     optho (1/22) -mild bilateral diabetic retinopathy without significant edema damage due to macular edema in the left eye after cataract surgery no significant edema present    - States feels has been retaining water. States swelling in Left ankle. States has noticed more since started walking. No pain in legs or foot. States did had a fall in grocery store. States sprained ankle. States did not seek care.     - last lab from 10/12/2021 reviewed with patient 2/28/2022    Health Maintenance   - immunizations:   Influenza Vaccination - (2019), (9/16/20)  Pneumonia Vaccination  Zoster/Shingles Vaccine  Tetanus Vaccination  covid (3/4/2021) #1, (4/1/2021) #2 - moderna     - Screenings:   Bone Density Scan   Pelvic/Pap Exam  Mammogram - declines     Colonoscopy - (6/20) hemorrhoids, diverticular disease, multiple polyps, tubular and hyperplastic per path, follow up in 3 years EGD (6/20) -normal    optho (3/21) - for cataract     ROS:  Review of Systems   Constitutional: Negative for appetite change, chills, fever and unexpected weight change. HENT: Negative for congestion, rhinorrhea and sore throat. Eyes: Negative for pain and visual disturbance. Respiratory: Positive for shortness of breath. Negative for cough and chest tightness. Cardiovascular: Negative for chest pain and palpitations. Gastrointestinal: Negative for abdominal pain, blood in stool, constipation, diarrhea, nausea and vomiting. Genitourinary: Negative for difficulty urinating, dysuria, frequency, pelvic pain, urgency and vaginal bleeding. Musculoskeletal: Negative for arthralgias and myalgias. Skin: Negative for rash. Neurological: Negative for dizziness, syncope, weakness, light-headedness, numbness and headaches. Hematological: Negative for adenopathy. Psychiatric/Behavioral: Negative for suicidal ideas. The patient is not nervous/anxious. Current Outpatient Medications:     zolpidem (AMBIEN) 10 MG tablet, Take 1 tablet by mouth nightly as needed for Sleep for up to 90 days. , Disp: 90 tablet, Rfl: 0    atorvastatin (LIPITOR) 40 MG tablet, Take 1 tablet by mouth daily, Disp: 90 tablet, Rfl: 1    aspirin 81 MG EC tablet, Take 81 mg by mouth daily, Disp: , Rfl:     metFORMIN (GLUCOPHAGE) 1000 MG tablet, Take 1 tablet by mouth 2 times daily (with meals), Disp: 180 tablet, Rfl: 1    lisinopril (PRINIVIL;ZESTRIL) 20 MG tablet, Take 1 tablet by mouth daily, Disp: 90 tablet, Rfl: 1    insulin lispro, 1 Unit Dial, (HUMALOG KWIKPEN) 100 UNIT/ML SOPN, Inject up to 12 units QAC TID, Disp: 15 pen, Rfl: 1    vitamin B-12 (CYANOCOBALAMIN) 1000 MCG tablet, Take 1,000 mcg by mouth daily , Disp: , Rfl:     zinc 50 MG CAPS, Take 1 capsule by mouth three times a week , Disp: , Rfl:     insulin glargine (LANTUS SOLOSTAR) 100 UNIT/ML injection pen, Inject 65 Units into the skin every morning, Disp: 15 pen, Rfl: 1    magnesium (MAGNESIUM-OXIDE) 250 MG TABS tablet, Take 250 mg by mouth in the morning and at bedtime, Disp: , Rfl:     Insulin Pen Needle (B-D UF III MINI PEN NEEDLES) 31G X 5 MM MISC, 1 each by Does not apply route daily, Disp: , Rfl:     No Known Allergies    Past Medical History:   Diagnosis Date    Cervical spondylosis 2021    Class 2 obesity due to excess calories without serious comorbidity with body mass index (BMI) of 35.0 to 35.9 in adult 2019    Essential hypertension 2019    GERD (gastroesophageal reflux disease)     H pylori ulcer     Hypomagnesemia 2019    Mixed hyperlipidemia 2019    Other insomnia 2019    Peptic ulcer disease     Type [facility] mellitus [facility]     Vitamin D deficiency 2019       Past Surgical History:   Procedure Laterality Date    BUNIONECTOMY Left     CATARACT REMOVAL WITH IMPLANT      HYSTERECTOMY, VAGINAL      partial in the 80s then bilateral oopherectome 2016    RETINAL DETACHMENT SURGERY Right     vitreous surgery    TUBAL LIGATION         Family History   Problem Relation Age of Onset    Diabetes Mother     Stroke Mother     Kidney Disease Father         Renal Failure       Social History     Socioeconomic History    Marital status:      Spouse name: Nissa Reynolds Number of children: 2    Years of education: 15    Highest education level: High school graduate   Occupational History    Not on file   Tobacco Use    Smoking status: Former Smoker     Packs/day: 1.00     Years: 12.00     Pack years: 12.00     Types: Cigarettes     Quit date: 1982     Years since quittin.1    Smokeless tobacco: Never Used   Substance and Sexual Activity    Alcohol use: Yes     Comment: rare    Drug use: Not on file    Sexual activity: Not on file   Other Topics Concern    Not on file   Social History Narrative    Not on file     Social Determinants of Health     Financial Resource Strain: Low Risk     Difficulty of Paying Living Expenses: Not hard at all   Food Insecurity: No Food Insecurity    Worried About Running Out of Food in the Last Year: Never true    Shaniqua of Food in the Last Year: Never true   Transportation Needs:     Lack of Transportation (Medical): Not on file    Lack of Transportation (Non-Medical): Not on file   Physical Activity:     Days of Exercise per Week: Not on file    Minutes of Exercise per Session: Not on file   Stress:     Feeling of Stress : Not on file   Social Connections:     Frequency of Communication with Friends and Family: Not on file    Frequency of Social Gatherings with Friends and Family: Not on file    Attends Confucianism Services: Not on file    Active Member of [address] [place] [address] [employer] or Organizations: Not on file    Attends Club or Organization Meetings: Not on file    Marital Status: Not on file   Intimate Partner Violence:     Fear of Current or Ex-Partner: Not on file    Emotionally Abused: Not on file    Physically Abused: Not on file    Sexually Abused: Not on file   Housing Stability:     Unable to Pay for Housing in the Last Year: Not on file    Number of [place] in the Last Year: Not on file    Unstable Housing in the Last Year: Not on file        Vitals:    02/28/22 0927   BP: 134/66   Pulse: 73   Temp: 97.7 °F (36.5 °C)   SpO2: 97%   Weight: 197 lb 2 oz (89.4 kg)   Height: 5' 3\" (1.6 m)       Exam:  Physical Exam  Constitutional:       Appearance: She is well-developed. HENT:      Head: Normocephalic and atraumatic. Right Ear: External ear normal.      Left Ear: External ear normal.   Eyes:      Pupils: Pupils are equal, round, and reactive to light. Neck:      Thyroid: No thyromegaly. Vascular: Carotid bruit present. Cardiovascular:      Rate and Rhythm: Normal rate and regular rhythm. Heart sounds: Murmur heard. Systolic murmur is present with a grade of 2/6.       Pulmonary:      Effort: Pulmonary effort is normal.      Breath sounds: Normal breath sounds. No wheezing. Abdominal:      General: Bowel sounds are normal. There is no distension. Palpations: Abdomen is soft. Tenderness: There is no abdominal tenderness. There is no guarding or rebound. Musculoskeletal:         General: Normal range of motion. Cervical back: Normal range of motion and neck supple. Lymphadenopathy:      Cervical: No cervical adenopathy. Skin:     General: Skin is warm and dry. Neurological:      Mental Status: She is alert and oriented to person, place, and time. Cranial Nerves: No cranial nerve deficit. Psychiatric:         Judgment: Judgment normal.         Assessment and Plan:    Diagnoses and all orders for this visit:    Pain in both upper extremities  - uncertain etiology   - EMG (12/21) - A chronic right ulnar neuropathy.   A chronic right median mononeuropathy at the wrist (I.e., carpal tunnel syndrome) versus a chronic right C8/T1 cervical radiculopathy cannot be excluded underlying the above   - to see ortho hand      Anemia  - uncertain cause   - Y30 and folic acid was normal   - following with GI   - s/p  EGD and colonoscopy (6/2020) - polyps   - has not tolerated iron - constipation - not helped with stool softener - stopped   - last lab (10/2021) - normal    Type 2 diabetes mellitus with hyperglycemia, with long-term current use of insulin ([facility])  - watch diet   - monitor blood sugar at home, goals fasting am <120, 2 hours post meal < 180  - following with endocrinology   - on lantus 65 units   - on metformin 1000mg bid  - endocrinology added humalog slide scale  - stopped glipizide   - stopped trulicity due to cost   - follow A1c - 8.7 (2/2022)     On ACE  On statin   Not on aspirin - h/o PUD   optho (1/22) -mild bilateral diabetic retinopathy without significant edema receptor damage due to macular edema in the left eye after cataract surgery no significant edema present    Essential hypertension  - watch diet   - monitor blood pressure at home   - on lisinopril   - stable 2/28/2022    Mixed hyperlipidemia  - Continue present treatment   - watch diet   - stop pravastatin for higher intensity statin   - on atorvastatin   - follow labs   - last lab (10/2021) - stable     Gastroesophageal reflux disease without esophagitis   - watch diet   - h/o PUD   - on omeprazole prn   - on tums as needed     Other insomnia  - states has had sleep study in the past - did nto tolerate   - On Ambien  - weaned off lorazepam   - discussed did not recommend these medications   - continue Ambien for now   - sleep habits handout provided   - referral to sleep medicine   - discussed sleep apnea testing  - referral placed     Class 1 obesity due to excess calories without serious comorbidity with body mass index (BMI) of 34.0 to 34.9 in adult  - watch diet   - discussed diet and exercises     Vitamin D deficiency  - on otc supplement   - follow labs     Hypomagnesemia  - on otc supplement   - follow labs   - last lab (10/2021) - low   - increased magnesium supplement to twice a day     Elevated LFTs  - uncertain etiology at present   - recheck labs - last lab (2/2022) - Stable and normal     Murmur  - has seen cardiology (5/2021)   - had echo (8/2021) - ef 60-65%, no wall motion abnormalities, trace MR     Carotid artery disease, unspecified laterality, unspecified type ([facility]  -US carotid (5/2021) - 50-69% stenosis b/l    - repeat in 1 year     Return in about 3 months (around 5/28/2022) for check up and review.     Orders Placed This Encounter   Procedures   [address], [place], Sleep Medicine, [facility]     Referral Priority:   Routine     Referral Type:   Eval and Treat     Referral Reason:   Specialty Services Required     Referred to Provider:   Mona Mane MD     Requested Specialty:   Sleep Medicine     Number of Visits Requested:   1     Requested Prescriptions     Signed Prescriptions Disp Refills    zolpidem (AMBIEN) 10 MG tablet 90 tablet 0     Sig: Take 1 tablet by mouth nightly as needed for Sleep for up to 90 days.         Donny Sweeney MD  2/28/2022  10:32 AM

## 2022-03-03 ENCOUNTER — OFFICE VISIT (OUTPATIENT)
Dept: ORTHOPEDIC SURGERY | Age: 72
End: 2022-03-03
Payer: MEDICARE

## 2022-03-03 VITALS — BODY MASS INDEX: 34.91 KG/M2 | HEIGHT: 63 IN | RESPIRATION RATE: 20 BRPM | WEIGHT: 197 LBS

## 2022-03-03 DIAGNOSIS — M79.643 INTERMITTENT PAIN AND SWELLING OF HAND: Primary | ICD-10-CM

## 2022-03-03 DIAGNOSIS — M24.131 DEGENERATIVE TEAR OF TRIANGULAR FIBROCARTILAGE COMPLEX (TFCC) OF RIGHT WRIST: ICD-10-CM

## 2022-03-03 DIAGNOSIS — G56.01 RIGHT CARPAL TUNNEL SYNDROME: ICD-10-CM

## 2022-03-03 DIAGNOSIS — M79.89 INTERMITTENT PAIN AND SWELLING OF HAND: Primary | ICD-10-CM

## 2022-03-03 DIAGNOSIS — M15.2 DEGENERATIVE ARTHRITIS OF PROXIMAL INTERPHALANGEAL JOINT OF INDEX FINGER OF RIGHT HAND: ICD-10-CM

## 2022-03-03 DIAGNOSIS — G56.21 ULNAR NEUROPATHY AT ELBOW OF RIGHT UPPER EXTREMITY: ICD-10-CM

## 2022-03-03 PROCEDURE — 99204 OFFICE O/P NEW MOD 45 MIN: CPT | Performed by: ORTHOPAEDIC SURGERY

## 2022-03-03 PROCEDURE — 1090F PRES/ABSN URINE INCON ASSESS: CPT | Performed by: ORTHOPAEDIC SURGERY

## 2022-03-03 PROCEDURE — G8484 FLU IMMUNIZE NO ADMIN: HCPCS | Performed by: ORTHOPAEDIC SURGERY

## 2022-03-03 PROCEDURE — 1036F TOBACCO NON-USER: CPT | Performed by: ORTHOPAEDIC SURGERY

## 2022-03-03 PROCEDURE — 1123F ACP DISCUSS/DSCN MKR DOCD: CPT | Performed by: ORTHOPAEDIC SURGERY

## 2022-03-03 PROCEDURE — 3017F COLORECTAL CA SCREEN DOC REV: CPT | Performed by: ORTHOPAEDIC SURGERY

## 2022-03-03 PROCEDURE — 4040F PNEUMOC VAC/ADMIN/RCVD: CPT | Performed by: ORTHOPAEDIC SURGERY

## 2022-03-03 PROCEDURE — G8400 PT W/DXA NO RESULTS DOC: HCPCS | Performed by: ORTHOPAEDIC SURGERY

## 2022-03-03 PROCEDURE — G8427 DOCREV CUR MEDS BY ELIG CLIN: HCPCS | Performed by: ORTHOPAEDIC SURGERY

## 2022-03-03 PROCEDURE — G8417 CALC BMI ABV UP PARAM F/U: HCPCS | Performed by: ORTHOPAEDIC SURGERY

## 2022-03-03 NOTE — PATIENT INSTRUCTIONS
Patient Education        Carpal Tunnel Syndrome: Care Instructions  Overview     Carpal tunnel syndrome is numbness, tingling, weakness, and pain in your hand, wrist, and sometimes forearm. It is caused by pressure on the median nerve. This nerve and several tough tissues called tendons run through a space in the wrist. This space is called the carpal tunnel. The repeated hand motions used in work and some hobbies and sports can put pressure on the median nerve. Pregnancy can cause carpal tunnel syndrome. Several conditions, such as diabetes, arthritis, and an underactive thyroid, can also cause it. You may be able to limit an activity or change the way you do it to reduce your symptoms. You also can take other steps to feel better. If your symptoms are mild, 1 to 2 weeks of home treatment are likely to ease your pain. Surgery is needed only if other treatments do not work. Follow-up care is a key part of your treatment and safety. Be sure to make and go to all appointments, and call your doctor if you are having problems. It's also a good idea to know your test results and keep a list of the medicines you take. How can you care for yourself at home? · If possible, stop or reduce the activity that causes your symptoms. If you cannot stop the activity, take frequent breaks to rest and stretch or change hand positions to do a task. Try switching hands, such as when using a computer mouse. · Try to avoid bending or twisting your wrists. · Ask your doctor if you can take an over-the-counter pain medicine, such as acetaminophen (Tylenol), ibuprofen (Advil, Motrin), or naproxen (Aleve). Be safe with medicines. Read and follow all instructions on the label. · If your doctor prescribes corticosteroid medicine to help reduce pain and swelling, take it exactly as prescribed. Call your doctor if you think you are having a problem with your medicine.   · Put ice or a cold pack on your wrist for 10 to 20 minutes at a time to ease pain. Put a thin cloth between the ice and your skin. · If your doctor or your physical or occupational therapist tells you to wear a wrist splint, wear it as directed to keep your wrist in a neutral position. This also eases pressure on your median nerve. · Ask your doctor whether you should have physical or occupational therapy to learn how to do tasks differently. · Try a yoga class to stretch your muscles and build strength in your hands and wrists. Yoga has been shown to ease carpal tunnel symptoms. To prevent carpal tunnel  · When working at a computer, keep your hands and wrists in line with your forearms. Hold your elbows close to your sides. Take a break every 10 to 15 minutes. · Try these exercises:  ? Warm up: Rotate your wrist up, down, and from side to side. Repeat this 4 times. Stretch your fingers far apart, relax them, then stretch them again. Repeat 4 times. Stretch your thumb by pulling it back gently, holding it, and then releasing it. Repeat 4 times. ? Prayer stretch: Start with your palms together in front of your chest just below your chin. Slowly lower your hands toward your waistline while keeping your hands close to your stomach and your palms together until you feel a mild to moderate stretch under your forearms. Hold for 10 to 20 seconds. Repeat 4 times. ? Wrist flexor stretch: Hold your arm in front of you with your palm up. Bend your wrist, pointing your hand toward the floor. With your other hand, gently bend your wrist further until you feel a mild to moderate stretch in your forearm. Hold for 10 to 20 seconds. Repeat 4 times. ? Wrist extensor stretch: Repeat the steps for the wrist flexor stretch, but begin with your extended hand palm down. · Squeeze a rubber exercise ball several times a day to keep your hands and fingers strong. · Avoid holding objects (such as a book) in one position for a long time. When possible, use your whole hand to grasp an object.  Using just the thumb and index finger can put stress on the wrist.  · Do not smoke. It can make this condition worse by reducing blood flow to the median nerve. If you need help quitting, talk to your doctor about stop-smoking programs and medicines. These can increase your chances of quitting for good. When should you call for help? Watch closely for changes in your health, and be sure to contact your doctor if:    · Your pain or other problems do not get better with home care.     · You want more information about physical or occupational therapy.     · You have side effects of your corticosteroid medicine, such as:  ? Weight gain. ? Mood changes. ? Trouble sleeping. ? Bruising easily.     · You have any other problems with your medicine. Where can you learn more? Go to [email or web address] and sign in to your [employer] account. Enter R432 in the [employer].com box to learn more about \"Carpal Tunnel Syndrome: Care Instructions. \"     If you do not have an account, please click on the \"Sign Up Now\" link. Current as of: July 1, 2021               Content Version: 13.1  © 0600-3669 [employer]. Care instructions adapted under license by [employer]. If you have questions about a medical condition or this instruction, always ask your healthcare professional. [place] [address] any warranty or liability for your use of this information. Patient Education        Carpal Tunnel Syndrome: Exercises  Introduction  Here are some examples of exercises for you to try. The exercises may be suggested for a condition or for rehabilitation. Start each exercise slowly. Ease off the exercises if you start to have pain. You will be told when to start these exercises and which ones will work best for you. Warm-up stretches  When you no longer have pain or numbness, you can do exercises to help prevent carpal tunnel syndrome from coming back.  Do not do any stretch or any warranty or liability for your use of this information. Patient Education        Carpal Tunnel Release: Before Your Surgery  What is carpal tunnel release? Carpal tunnel surgery reduces the pressure on a nerve in the wrist. Your doctor will cut a ligament that presses on the nerve. This lets the nerve pass freely through the tunnel without being squeezed. This is also called carpal tunnel release surgery. The surgery can be open or endoscopic. In open surgery, your doctor makes a small cut in the palm of your hand. This cut is called an incision. In endoscopic surgery, your doctor makes one small incision in the wrist. Or you may have one small incision in the wrist and one in the palm. Your doctor puts a thin tube with a camera attached (endoscope) into the incision. Surgical tools are put in along with the endoscope. In both types of surgeries, the incisions are closed with stitches. The incisions leave scars that usually fade in time. You may be asleep during the surgery. Or you may be awake and have medicine to numb your hand and arm so you won't feel pain. After surgery, your wrist and hand pain should start to go away. It usually takes 3 to 4 months to recover and 1 year before your hand strength returns. How much hand strength returns is different for each person. You will go home the same day as the surgery. When you can go back to work depends on the type of work you do. Follow-up care is a key part of your treatment and safety. Be sure to make and go to all appointments, and call your doctor if you are having problems. It's also a good idea to know your test results and keep a list of the medicines you take. How do you prepare for surgery? Surgery can be stressful. This information will help you understand what you can expect. And it will help you safely prepare for surgery. Preparing for surgery    · Be sure you have someone to take you home.  Anesthesia and pain medicine will make it unsafe for you to drive or get home on your own.     · Understand exactly what surgery is planned, along with the risks, benefits, and other options.     · Tell your doctor ALL the medicines, vitamins, supplements, and herbal remedies you take. Some may increase the risk of problems during your surgery. Your doctor will tell you if you should stop taking any of them before the surgery and how soon to do it.     · If you take aspirin or some other blood thinner, ask your doctor if you should stop taking it before your surgery. Make sure that you understand exactly what your doctor wants you to do. These medicines increase the risk of bleeding.     · Make sure your doctor and the hospital have a copy of your advance directive. If you don't have one, you may want to prepare one. It lets others know your health care wishes. It's a good thing to have before any type of surgery or procedure. What happens on the day of surgery? · Follow the instructions exactly about when to stop eating and drinking. If you don't, your surgery may be canceled. If your doctor told you to take your medicines on the day of surgery, take them with only a sip of water.     · Take a bath or shower before you come in for your surgery. Do not apply lotions, perfumes, deodorants, or nail polish.     · Do not shave the surgical site yourself.     · Take off all jewelry and piercings. And take out contact lenses, if you wear them. At the hospital or surgery center   · Bring a picture ID.     · The area for surgery is often marked to make sure there are no errors.     · You will be kept comfortable and safe by your anesthesia provider. The anesthesia may make you sleep. Or it may just numb the area being worked on.     · The surgery will take about 15 to 60 minutes. When should you call your doctor?    · You have questions or concerns.     · You don't understand how to prepare for your surgery.     · You become ill before the surgery (such as fever, flu, or a cold).     · You need to reschedule or have changed your mind about having the surgery. Where can you learn more? Go to [email or web address]://[email or web address] org and sign in to your [employer] account. Enter O903 in the [facility] box to learn more about \"Carpal Tunnel Release: Before Your Surgery. \"     If you do not have an account, please click on the \"Sign Up Now\" link. Current as of: July 1, 2021               Content Version: 13.1  © 2006-2021 [employer]. Care instructions adapted under license by [facility] ([facility]). If you have questions about a medical condition or this instruction, always ask your healthcare professional. [address] any warranty or liability for your use of this information. Patient Education        Wrist Injury (Triangular Fibrocartilage Complex): Rehab Exercises  Introduction  Here are some examples of exercises for you to try. The exercises may be suggested for a condition or for rehabilitation. Start each exercise slowly. Ease off the exercises if you start to have pain. You will be told when to start these exercises and which ones will work best for you. How to do the exercises  Wrist flexion and extension    1. Place your forearm on a table. Your affected hand and wrist should extend beyond the table, palm down. 2. Bend your wrist to move your hand upward and allow your hand to close into a fist. Now lower your hand and allow your fingers to relax. Hold each position for about 6 seconds. 3. Repeat 8 to 12 times. Hand flips    1. While seated, place your forearm and injured wrist on your thigh. Your palm should face down. 2. Flip your hand over so the back of your hand rests on your thigh and your palm is up. Alternate between palm up and palm down while keeping your forearm on your thigh. 3. Repeat 8 to 12 times. Wrist radial and ulnar deviation    1. Place your forearm on a table.  Hold your hand and affected wrist over the edge of the table. 2. Gently bend your wrist to one side. Don't move your forearm. 3. Hold for about 6 seconds. 4. Gently bend the wrist to the other side. 5. Repeat 8 to 12 times. Follow-up care is a key part of your treatment and safety. Be sure to make and go to all appointments, and call your doctor if you are having problems. It's also a good idea to know your test results and keep a list of the medicines you take. Where can you learn more? Go to [email or web address] and sign in to your [employer] account. Enter M683 in the [employer] box to learn more about \"Wrist Injury (Triangular Fibrocartilage Complex): Rehab Exercises. \"     If you do not have an account, please click on the \"Sign Up Now\" link. Current as of: July 1, 2021               Content Version: 13.1  © 2006-2021 [employer]. Care instructions adapted under license by [facility] ([facility]). If you have questions about a medical condition or this instruction, always ask your healthcare professional. [place] [address] any warranty or liability for your use of this information.

## 2022-03-03 NOTE — PROGRESS NOTES
Department of Orthopedic Surgery  History and Physical      CHIEF COMPLAINT:  Right hand pain, tingling     HISTORY OF PRESENT ILLNESS:                The patient is a 66 y.o. female, right hand dominant, who presents with several months of right hand and wrist pain which extends up to the elbow. She did have a fall in her bathroom where she landed on outstretched hands approximately one year prior but does not feel it that this specifically led to her symptoms. Her main complaint today is tingling in the digits of the hand and pain which radiates to the ulnar side of the elbow. She has had weakness of her right hand causing her to frequently drop things. She is occasionally awoken at night due to symptoms in the hand. She has had no intervention for her symptoms, but did complete a nerve conduction study on 12/7/2021 suggestive of carpal tunnel syndrome and questionable ulnar nerve compressive neuropathy. She was unable to complete the EMG portion at that time due to discomfort. Medical history is significant for depression and diabetes with a recent hemoglobin A1c of 8.7. No personal or family history of inflammatory arthritis.     Patient had an EMG/NCS dated 12/7/21    Right median nerve motor latency: 5.52  Left median nerve motor latency: Not completed  Right ulnar nerve velocity: 50 below elbow, 51 above elbow  Left ulnar nerve velocity: Not completed  Right median nerve sensory latency: 2.40  Left median nerve sensory latency: Not completed    EMG portion of the exam was not completed as patient did not tolerate    Interpretation of the NCS included chronic right ulnar neuropathy, chronic right median mononeuropathy versus/and chronic right C8/T1 cervical radiculopathy      Past Medical History:        Diagnosis Date    Cervical spondylosis 12/7/2021    Class 2 obesity due to excess calories without serious comorbidity with body mass index (BMI) of 35.0 to 35.9 in adult 12/18/2019   [facility] Essential hypertension 12/18/2019    GERD (gastroesophageal reflux disease)     H pylori ulcer     Hypomagnesemia 12/18/2019    Mixed hyperlipidemia 12/18/2019    Other insomnia 12/18/2019    Peptic ulcer disease     [facility]     Vitamin D deficiency 12/18/2019     Past Surgical History:        Procedure Laterality Date    BUNIONECTOMY Left     CATARACT REMOVAL WITH IMPLANT      HYSTERECTOMY, VAGINAL      partial in the 80s then bilateral oopherectome 2016    RETINAL DETACHMENT SURGERY Right     vitreous surgery    TUBAL LIGATION       Current Medications:   No current facility-administered medications for this visit. Allergies:  Patient has no known allergies. Social History:   TOBACCO:   reports that she quit smoking about 40 years ago. Her smoking use included cigarettes. She has a 12.00 pack-year smoking history. She has never used smokeless tobacco.  ETOH:   reports current alcohol use. DRUGS:   has no history on file for drug use.   ACTIVITIES OF DAILY LIVING: Community ambulator  OCCUPATION: Retired from /[employer]     Family History:       Problem Relation Age of Onset    Diabetes Mother     Stroke Mother     Kidney Disease Father         Renal Failure       REVIEW OF SYSTEMS:  CONSTITUTIONAL:  negative  RESPIRATORY:  negative  CARDIOVASCULAR:  negative  MUSCULOSKELETAL:  positive for tingling and weakness in the hand, pain radiating proximally to the medial elbow     PHYSICAL EXAM:    VITALS:  Resp 20   Ht 5' 3\" (1.6 m)   Wt 197 lb (89.4 kg)   BMI 34.90 kg/m²   CONSTITUTIONAL:  awake, alert, cooperative, no apparent distress, and appears stated age  EYES:  Lids and lashes normal, pupils equal, round and reactive to light, extra ocular muscles intact, sclera clear, conjunctiva normal  ENT:  Normocephalic, without obvious abnormality, atraumatic, sinuses nontender on palpation, external ears without lesions, oral pharynx with moist mucus Inflammatory arthritis involving small joints, calcifications in the TFCC    Xray: x-rays of the right elbow from 11/17 were reviewed with the patient. 3 views: AP, lateral and oblique : demonstrate no acute fractures or dislocations. Calcifications about the radiocapitellar joint with calcific changes at the lateral epicondyle. Chronic appearing changes also present at the biceps tuberosity of the proximal radius    Impression: Calcifications of the radiocapitellar joint and lateral epicondyle     IMPRESSION:  · Right carpal tunnel syndrome  · Right cubital tunnel syndrome  · Right small digit inflammatory arthritis, with acute involvement of index finger PIPJ  · Right TFCC chondrocalcinosis  · Diabetes  · Depression    PLAN:  Physical examination and imaging findings were discussed with the patient. We discussed both surgical and non-surgical treatments with her. She was offered a corticosteroid injection for the right index PIPJ and right TFCC today which she declined. We discussed surgical treatment of her right carpal tunnel and cubital tunnel syndrome for which she was agreeable. If her wrist and index finger are symptomatic at the time of surgery, we would proceed with injections of corticosteroid at the time of surgery. I explained the risks, benefits, alternatives and complications of surgery with the patient including but not limited to the risks of infection, possible damage to nerves, vessels, or tendons, stiffness, loss of range of motion, scar sensitivity, wound healing complications, worsening symptoms, possible need for therapy, as well as the possible need further surgery and unanticipated complications. The patient voiced understanding and all questions were answered. The patient elected to proceed with surgical intervention. Jillian Jones DO  3/3/2022      I have seen and evaluated the patient and agree with the above assessment and plan on today's visit.  I have performed the key components of the history and physical examination with significant findings of severe end-stage right carpal tunnel syndrome, right ulnar neuropathy, right ulnar-sided wrist pain consistent with TFCC pathology, and right index finger chronic proximal phalangeal joint inflammation. Treatment option explained discussed per Dr. Keon Nascimento electrocerebral surgical invention from a right carpal tunnel release, right in situ ulnar nerve decompression at elbow with possible transposition, TFCC cortisone injection and index finger PIP joint cortisone injections. . I concur with the findings and plan as documented.     Ho Ho MD  3/3/2022

## 2022-03-04 ENCOUNTER — TELEPHONE (OUTPATIENT)
Dept: CARDIOLOGY CLINIC | Age: 72
End: 2022-03-04

## 2022-03-04 NOTE — TELEPHONE ENCOUNTER
Dr Vargas Orcamila Requesting surgical clearance for right carpal tunnel release on 5/4/22. Please advise.

## 2022-03-15 ENCOUNTER — TELEPHONE (OUTPATIENT)
Dept: FAMILY MEDICINE CLINIC | Age: 72
End: 2022-03-15

## 2022-03-15 NOTE — TELEPHONE ENCOUNTER
----- Message from Chilo Smith sent at 3/15/2022  9:20 AM EDT -----  Subject: Message to Provider    QUESTIONS  Information for Provider? Pt is calling in about her sleep study test. Pt   is calling in for information that she have not received .   ---------------------------------------------------------------------------  --------------  CALL BACK INFO  What is the best way for the office to contact you?  OK to leave message on   voicemail  Preferred Call Back Phone Number? [phone]  ---------------------------------------------------------------------------  --------------  SCRIPT ANSWERS  undefined

## 2022-04-06 ENCOUNTER — OFFICE VISIT (OUTPATIENT)
Dept: FAMILY MEDICINE CLINIC | Age: 72
End: 2022-04-06
Payer: MEDICARE

## 2022-04-06 VITALS
HEIGHT: 63 IN | BODY MASS INDEX: 34.55 KG/M2 | SYSTOLIC BLOOD PRESSURE: 132 MMHG | OXYGEN SATURATION: 97 % | WEIGHT: 195 LBS | TEMPERATURE: 97.1 F | RESPIRATION RATE: 16 BRPM | DIASTOLIC BLOOD PRESSURE: 60 MMHG | HEART RATE: 84 BPM

## 2022-04-06 DIAGNOSIS — M79.89 INTERMITTENT PAIN AND SWELLING OF HAND: ICD-10-CM

## 2022-04-06 DIAGNOSIS — Z01.818 PREOP EXAMINATION: Primary | ICD-10-CM

## 2022-04-06 DIAGNOSIS — M79.643 INTERMITTENT PAIN AND SWELLING OF HAND: ICD-10-CM

## 2022-04-06 LAB
ALBUMIN SERPL-MCNC: 4 G/DL (ref 3.5–5.2)
ALP BLD-CCNC: 70 U/L (ref 35–104)
ALT SERPL-CCNC: 30 U/L (ref 0–32)
ANION GAP SERPL CALCULATED.3IONS-SCNC: 17 MMOL/L (ref 7–16)
AST SERPL-CCNC: 18 U/L (ref 0–31)
BILIRUB SERPL-MCNC: <0.2 MG/DL (ref 0–1.2)
BILIRUBIN DIRECT: <0.2 MG/DL (ref 0–0.3)
BILIRUBIN, INDIRECT: NORMAL MG/DL (ref 0–1)
BUN BLDV-MCNC: 24 MG/DL (ref 6–23)
C-REACTIVE PROTEIN: 0.4 MG/DL (ref 0–0.4)
CALCIUM SERPL-MCNC: 10.2 MG/DL (ref 8.6–10.2)
CHLORIDE BLD-SCNC: 101 MMOL/L (ref 98–107)
CO2: 20 MMOL/L (ref 22–29)
CREAT SERPL-MCNC: 0.8 MG/DL (ref 0.5–1)
GFR AFRICAN AMERICAN: >60
GFR NON-AFRICAN AMERICAN: >60 ML/MIN/1.73
GLUCOSE BLD-MCNC: 153 MG/DL (ref 74–99)
HCT VFR BLD CALC: 37.6 % (ref 34–48)
HEMOGLOBIN: 12.1 G/DL (ref 11.5–15.5)
MCH RBC QN AUTO: 29.5 PG (ref 26–35)
MCHC RBC AUTO-ENTMCNC: 32.2 % (ref 32–34.5)
MCV RBC AUTO: 91.7 FL (ref 80–99.9)
PDW BLD-RTO: 12.6 FL (ref 11.5–15)
PLATELET # BLD: 461 E9/L (ref 130–450)
PMV BLD AUTO: 9.6 FL (ref 7–12)
POTASSIUM SERPL-SCNC: 4.9 MMOL/L (ref 3.5–5)
RBC # BLD: 4.1 E12/L (ref 3.5–5.5)
RHEUMATOID FACTOR: <10 IU/ML (ref 0–13)
SEDIMENTATION RATE, ERYTHROCYTE: 47 MM/HR (ref 0–20)
SODIUM BLD-SCNC: 138 MMOL/L (ref 132–146)
TOTAL PROTEIN: 7.2 G/DL (ref 6.4–8.3)
URIC ACID, SERUM: 6.4 MG/DL (ref 2.4–5.7)
WBC # BLD: 11.3 E9/L (ref 4.5–11.5)

## 2022-04-06 PROCEDURE — 93000 ELECTROCARDIOGRAM COMPLETE: CPT | Performed by: INTERNAL MEDICINE

## 2022-04-06 PROCEDURE — G8417 CALC BMI ABV UP PARAM F/U: HCPCS | Performed by: INTERNAL MEDICINE

## 2022-04-06 PROCEDURE — G8400 PT W/DXA NO RESULTS DOC: HCPCS | Performed by: INTERNAL MEDICINE

## 2022-04-06 PROCEDURE — 4040F PNEUMOC VAC/ADMIN/RCVD: CPT | Performed by: INTERNAL MEDICINE

## 2022-04-06 PROCEDURE — 1036F TOBACCO NON-USER: CPT | Performed by: INTERNAL MEDICINE

## 2022-04-06 PROCEDURE — G8427 DOCREV CUR MEDS BY ELIG CLIN: HCPCS | Performed by: INTERNAL MEDICINE

## 2022-04-06 PROCEDURE — 1090F PRES/ABSN URINE INCON ASSESS: CPT | Performed by: INTERNAL MEDICINE

## 2022-04-06 PROCEDURE — 3017F COLORECTAL CA SCREEN DOC REV: CPT | Performed by: INTERNAL MEDICINE

## 2022-04-06 PROCEDURE — 99213 OFFICE O/P EST LOW 20 MIN: CPT | Performed by: INTERNAL MEDICINE

## 2022-04-06 PROCEDURE — 1123F ACP DISCUSS/DSCN MKR DOCD: CPT | Performed by: INTERNAL MEDICINE

## 2022-04-06 ASSESSMENT — ENCOUNTER SYMPTOMS
BLOOD IN STOOL: 0
SORE THROAT: 0
ABDOMINAL PAIN: 0
CONSTIPATION: 0
EYE PAIN: 0
SHORTNESS OF BREATH: 1
CHEST TIGHTNESS: 0
DIARRHEA: 0
RHINORRHEA: 0
COUGH: 0
NAUSEA: 0
VOMITING: 0

## 2022-04-06 NOTE — PROGRESS NOTES
[facility]   Internal Medicine     2022  Zane Nguyen : 1950 Sex: female  Age:74 y.o. Chief Complaint   Patient presents with   Leah Montemayor        HPI:   Patient presents to office for evaluation of the following medical concerns. - States has had pain in b/l UE. States pain from elbow to fingers. States started 3 weeks ago. States describes has ache type pain. States also weakness. States varied in intensity. States had had difficulty gripping things. States needs to hod cup with hands. No trauma or injury. EMG () - A chronic right ulnar neuropathy. A chronic right median mononeuropathy at the wrist (I.e., carpal tunnel syndrome) versus a chronic right C8/T1 cervical radiculopathy cannot be excluded underlying the above. For right side carpal tunnel and cunital tunnel release     - States has had some depression since last visit. States became less complaint with treatment of medical conditions. Last PHQ score was 0 (2021). Discussed treatment - psychology and or medication - declines (2022)      - States had neck pain. States had decreased ROM. States was seen by chiropractor. Referred to to urgent care. States s/pphysical therapy and has helped. - States still having fatigue. States having body aches. No fever or chills     - States has murmur. Following with cardiology. Last visit was (2021). Last Echo (2021)  EF 60-65%. - Has carotid bruit. Had US carotid (2021) - b/l stenosis 50-69%. - Labs showed anemia. States was placed on iron - self stopped due to constipation issues. Has seen GI - s/p EGD and colonoscopy. Last lab (2022) was stable . - States has been having issues with insomnia. Uncontrolled. States stopped taking lorazepam. States has been taking Ambien - decreased ambien. Still with issues sleeping. Discussed medications and interactions. Has sleep apnea. Not wearing cpap. - States has diabetes.  States trying to watch diet. States checking blood sugars at home , did have a few > 200. States lantus 65 units daily, metformin 1000mg twice a day, added humalog slide scale. Stopped trulicity (cost). States occasional reported hypoglycemia. On lisinopril. On pravastatin. States follows with optho. Last visit with endo per reviewed note (2/2022) - A1c 8.7, continue Lantus and insulin sliding scale    - States has high blood pressure. States occasioanlly checking blood pressure at home, has wrist cuff - 120's/60's. On lisinopril.     - States has high cholesterol. States trying to watch diet. On pravastatin. No reported side effects. Last labs (2/2022)     - States has had gastric ulcers and GERD. On omeprazole as needed. Stable. - States has vitamin D def. On otc supplement     - States has had low magnesium levels on supplement. Last lab low  (2/2022) - increased to twice (88/7151) - uncertain if taking     - States stopped vit b12 supplement.     - States was told had elevated LFT and enlarged liver. Last lab (2/2022) was normal.     optho (1/22) -mild bilateral diabetic retinopathy without significant edema damage due to macular edema in the left eye after cataract surgery no significant edema present    - States feels has been retaining water. States swelling in Left ankle. States has noticed more since started walking. No pain in legs or foot. States did had a fall in grocery store. States sprained ankle. States did not seek care.     - last lab from 10/12/2021 reviewed with patient 4/6/2022    Health Maintenance   - immunizations:   Influenza Vaccination - (2019), (9/16/20)  Pneumonia Vaccination  Zoster/Shingles Vaccine  Tetanus Vaccination  covid (3/4/2021) #1, (4/1/2021) #2 - moderna     - Screenings:   Bone Density Scan   Pelvic/Pap Exam  Mammogram - declines     Colonoscopy - (6/20) hemorrhoids, diverticular disease, multiple polyps, tubular and hyperplastic per path, follow up in 3 years     EGD (6/20) -normal    optho (3/21) - for cataract     ROS:  Review of Systems   Constitutional: Negative for appetite change, chills, fever and unexpected weight change. HENT: Negative for congestion, rhinorrhea and sore throat. Eyes: Negative for pain and visual disturbance. Respiratory: Positive for shortness of breath. Negative for cough and chest tightness. Cardiovascular: Negative for chest pain and palpitations. Gastrointestinal: Negative for abdominal pain, blood in stool, constipation, diarrhea, nausea and vomiting. Genitourinary: Negative for difficulty urinating, dysuria, frequency, pelvic pain, urgency and vaginal bleeding. Musculoskeletal: Negative for arthralgias and myalgias. Skin: Negative for rash. Neurological: Negative for dizziness, syncope, weakness, light-headedness, numbness and headaches. Hematological: Negative for adenopathy. Psychiatric/Behavioral: Negative for suicidal ideas. The patient is not nervous/anxious. Current Outpatient Medications:     zolpidem (AMBIEN) 10 MG tablet, Take 1 tablet by mouth nightly as needed for Sleep for up to 90 days. , Disp: 90 tablet, Rfl: 0    atorvastatin (LIPITOR) 40 MG tablet, Take 1 tablet by mouth daily, Disp: 90 tablet, Rfl: 1    aspirin 81 MG EC tablet, Take 81 mg by mouth daily, Disp: , Rfl:     metFORMIN (GLUCOPHAGE) 1000 MG tablet, Take 1 tablet by mouth 2 times daily (with meals), Disp: 180 tablet, Rfl: 1    lisinopril (PRINIVIL;ZESTRIL) 20 MG tablet, Take 1 tablet by mouth daily, Disp: 90 tablet, Rfl: 1    insulin lispro, 1 Unit Dial, (HUMALOG KWIKPEN) 100 UNIT/ML SOPN, Inject up to 12 units QAC TID, Disp: 15 pen, Rfl: 1    zinc 50 MG CAPS, Take 1 capsule by mouth three times a week , Disp: , Rfl:     insulin glargine (LANTUS SOLOSTAR) 100 UNIT/ML injection pen, Inject 65 Units into the skin every morning, Disp: 15 pen, Rfl: 1    magnesium (MAGNESIUM-OXIDE) 250 MG TABS tablet, Take 250 mg by mouth in the morning and at bedtime, Disp: , Rfl:     Insulin Pen Needle (B-D UF III MINI PEN NEEDLES) 31G X 5 MM MISC, 1 each by Does not apply route daily, Disp: , Rfl:     No Known Allergies    Past Medical History:   Diagnosis Date    Cervical spondylosis 2021    Class 2 obesity due to excess calories without serious comorbidity with body mass index (BMI) of 35.0 to 35.9 in adult 2019    Essential hypertension 2019    GERD (gastroesophageal reflux disease)     H pylori ulcer     Hypomagnesemia 2019    Mixed hyperlipidemia 2019    Other insomnia 2019    Peptic ulcer disease     Type 2 diabetes mellitus without complication ([facility] [address]     Vitamin D deficiency 2019       Past Surgical History:   Procedure Laterality Date    BUNIONECTOMY Left     CATARACT REMOVAL WITH IMPLANT      HYSTERECTOMY, VAGINAL      partial in the 80s then bilateral oopherectome 2016    RETINAL DETACHMENT SURGERY Right     vitreous surgery    TUBAL LIGATION         Family History   Problem Relation Age of Onset    Diabetes Mother     Stroke Mother     Kidney Disease Father         Renal Failure       Social History     Socioeconomic History    Marital status:      Spouse name: Carina Shirley Number of children: 2    Years of education: 15    Highest education level: High school graduate   Occupational History    None   Tobacco Use    Smoking status: Former Smoker     Packs/day: 1.00     Years: 12.00     Pack years: 12.00     Types: Cigarettes     Quit date: 1982     Years since quittin.2    Smokeless tobacco: Never Used   Substance and Sexual Activity    Alcohol use: Yes     Comment: rare    Drug use: None    Sexual activity: None   Other Topics Concern    None   Social History Narrative    None     Social Determinants of Health     Financial Resource Strain: Low Risk     Difficulty of Paying Living Expenses: Not hard at all   Food Insecurity: No Food Insecurity    Worried About Running Out of Food in the Last Year: Never true    Ran Out of Food in the Last Year: Never true   Transportation Needs:     Lack of Transportation (Medical): Not on file    Lack of Transportation (Non-Medical): Not on file   Physical Activity:     Days of Exercise per Week: Not on file    Minutes of Exercise per Session: Not on file   Stress:     Feeling of Stress : Not on file   Social Connections:     Frequency of Communication with Friends and Family: Not on file    Frequency of Social Gatherings with Friends and Family: Not on file    Attends Ten[employer] Services: Not on file    Active Member of [address] [place] [address] [employer] or Organizations: Not on file    Attends Club or Organization Meetings: Not on file    Marital Status: Not on file   Intimate Partner Violence:     Fear of Current or Ex-Partner: Not on file    Emotionally Abused: Not on file    Physically Abused: Not on file    Sexually Abused: Not on file   Housing Stability:     Unable to Pay for Housing in the Last Year: Not on file    Number of [place] in the Last Year: Not on file    Unstable Housing in the Last Year: Not on file        Vitals:    04/06/22 0912   BP: 132/60   Pulse: 84   Resp: 16   Temp: 97.1 °F (36.2 °C)   TempSrc: Temporal   SpO2: 97%   Weight: 195 lb (88.5 kg)   Height: 5' 3\" (1.6 m)       Exam:  Physical Exam  Constitutional:       Appearance: She is well-developed. HENT:      Head: Normocephalic and atraumatic. Right Ear: External ear normal.      Left Ear: External ear normal.   Eyes:      Pupils: Pupils are equal, round, and reactive to light. Neck:      Thyroid: No thyromegaly. Vascular: Carotid bruit present. Cardiovascular:      Rate and Rhythm: Normal rate and regular rhythm. Heart sounds: Murmur heard. Systolic murmur is present with a grade of 2/6. Pulmonary:      Effort: Pulmonary effort is normal.      Breath sounds: Normal breath sounds. No wheezing.    Abdominal:      General: Bowel sounds are normal. There is no distension. Palpations: Abdomen is soft. Tenderness: There is no abdominal tenderness. There is no guarding or rebound. Musculoskeletal:         General: Normal range of motion. Cervical back: Normal range of motion and neck supple. Lymphadenopathy:      Cervical: No cervical adenopathy. Skin:     General: Skin is warm and dry. Neurological:      Mental Status: She is alert and oriented to person, place, and time. Cranial Nerves: No cranial nerve deficit. Psychiatric:         Judgment: Judgment normal.         Assessment and Plan:    Diagnoses and all orders for this visit:    - Procedure: right carpal tunnel and cubital tunnel     ACC/AHA  - no active cardiac conditions. Moderate MET of 9 based on duke activity index  - EKG: normal sinus rhythm with non specific st and t wave changes   - Revised cardiac index -1 risk factors giving 1.0 to 1.3% risk   - lower to intermediate risk from cardiovascular standpoint     Pulmonary:  ARISCAT pulmonary risk 2.7% risk of complications - low risk   -  Possible sleep apnea risk - referred to sleep medicine, testing pending     American college of surgeons risk Calculator   - unable to match procedure to assess risk     Medications: To talk with endocrinology regarding insulin   Ok to continue other meds up to day before surgery  Hold aspirin and NSAIDs 1 week before procedure    Ok to proceed with procedure - lower to intermediate risk    Chronic conditions:   Pain in both upper extremities  - uncertain etiology   - EMG (12/21) - A chronic right ulnar neuropathy.   A chronic right median mononeuropathy at the wrist (I.e., carpal tunnel syndrome) versus a chronic right C8/T1 cervical radiculopathy cannot be excluded underlying the above   - for surgery      Anemia  - uncertain cause   - H74 and folic acid was normal   - following with GI   - s/p  EGD and colonoscopy (6/2020) - polyps   - has not tolerated iron - constipation - not helped with stool softener - stopped   - last lab (10/2021) - normal    Type 2 diabetes mellitus with hyperglycemia, with long-term current use of insulin (HCC)  - watch diet   - monitor blood sugar at home, goals fasting am <120, 2 hours post meal < 180  - following with endocrinology   - on lantus 65 units   - on metformin 1000mg bid  - endocrinology added humalog slide scale  - stopped glipizide   - stopped trulicity due to cost   - follow A1c - 8.7 (2/2022)     On ACE  On statin   Not on aspirin - h/o PUD   optho (1/22) -mild bilateral diabetic retinopathy without significant edema receptor damage due to macular edema in the left eye after cataract surgery no significant edema present    Essential hypertension  - watch diet   - monitor blood pressure at home   - on lisinopril   - stable 4/6/2022    Mixed hyperlipidemia  - Continue present treatment   - watch diet   - stop pravastatin for higher intensity statin   - on atorvastatin   - follow labs   - last lab (10/2021) - stable     Gastroesophageal reflux disease without esophagitis   - watch diet   - h/o PUD   - on omeprazole prn   - on tums as needed     Other insomnia  - states has had sleep study in the past - did nto tolerate   - On Ambien  - weaned off lorazepam   - discussed did not recommend these medications   - continue Ambien for now   - sleep habits handout provided   - referral to sleep medicine   - discussed sleep apnea testing  - referral placed     Class 1 obesity due to excess calories without serious comorbidity with body mass index (BMI) of 34.0 to 34.9 in adult  - watch diet   - discussed diet and exercises     Vitamin D deficiency  - on otc supplement   - follow labs     Hypomagnesemia  - on otc supplement   - follow labs   - last lab (10/2021) - low   - increased magnesium supplement to twice a day     Elevated LFTs  - uncertain etiology at present   - recheck labs - last lab (2/2022) - Stable and normal     Murmur  - has seen cardiology (5/2021)   - had echo (8/2021) - ef 60-65%, no wall motion abnormalities, trace MR     Carotid artery disease, unspecified laterality, unspecified type ([address]  -US carotid (5/2021) - 50-69% stenosis b/l    - repeat in 1 year     Return for check up and review, as scheduled. Orders Placed This Encounter   Procedures    EKG 12 lead     Standing Status:   Future     Number of Occurrences:   1     Standing Expiration Date:   6/5/2022     Order Specific Question:   Reason for Exam?     Answer:    Other     Comments:   pre-op     Requested Prescriptions      No prescriptions requested or ordered in this encounter        Wendy Fonseca MD  4/6/2022  9:59 AM

## 2022-04-07 LAB — ANTI-NUCLEAR ANTIBODY (ANA): NEGATIVE

## 2022-04-09 LAB — LYME, EIA: 0.16 LIV (ref 0–1.2)

## 2022-04-11 ENCOUNTER — TELEPHONE (OUTPATIENT)
Dept: FAMILY MEDICINE CLINIC | Age: 72
End: 2022-04-11

## 2022-04-11 LAB — CYCLIC CITRULLINATED PEPTIDE ANTIBODY IGG: 2.5 U/ML (ref 0–7)

## 2022-04-11 NOTE — TELEPHONE ENCOUNTER
Please let the patient know that blood work results showed    Bicarbonate level was borderline low of uncertain cause could be related to dehydration and/or medications    BUN level, a measure of kidney function was also elevated which could be related to dehydration    Other kidney function values were normal    Other electrolytes and liver functions were normal    ESR a measure of nonspecific elevation was slightly elevated    CRP another measure of nonspecific inflammation was normal    Uric acid was borderline increased    Platelet count was borderline increased of uncertain cause or significance    Other blood counts were normal    Rheumatoid factor and anti-CCP antibodies, markers of rheumatoid arthritis were negative    DANG, measure of nonspecific autoimmune illnesses was also negative    Lyme's panel was negative    Please let orthopedic office staff that I feel some of these findings are nonspecific in nature    Thanks

## 2022-04-28 ENCOUNTER — PREP FOR PROCEDURE (OUTPATIENT)
Dept: ORTHOPEDIC SURGERY | Age: 72
End: 2022-04-28

## 2022-04-28 RX ORDER — SODIUM CHLORIDE 9 MG/ML
INJECTION, SOLUTION INTRAVENOUS CONTINUOUS
Status: CANCELLED | OUTPATIENT
Start: 2022-04-28

## 2022-04-28 RX ORDER — SODIUM CHLORIDE 0.9 % (FLUSH) 0.9 %
5-40 SYRINGE (ML) INJECTION EVERY 12 HOURS SCHEDULED
Status: CANCELLED | OUTPATIENT
Start: 2022-04-28

## 2022-04-28 RX ORDER — SODIUM CHLORIDE 0.9 % (FLUSH) 0.9 %
5-40 SYRINGE (ML) INJECTION PRN
Status: CANCELLED | OUTPATIENT
Start: 2022-04-28

## 2022-04-28 RX ORDER — SODIUM CHLORIDE 9 MG/ML
INJECTION, SOLUTION INTRAVENOUS PRN
Status: CANCELLED | OUTPATIENT
Start: 2022-04-28

## 2022-04-28 NOTE — PROGRESS NOTES
Have you been tested for COVID  Yes           Have you been told you were positive for COVID No  Have you had any known exposure to someone that is positive for COVID No  Do you have a cough                   No              Do you have shortness of breath No                 Do you have a sore throat            No                Are you having chills                    No                Are you having muscle aches. No                    Please come to the hospital wearing a mask and have your significant other wear a mask as well. Both of you should check your temperature before leaving to come here,  if it is 100 or higher please call [phone] for instruction.

## 2022-04-28 NOTE — PROGRESS NOTES
[facility] PRE-ADMISSION TESTING INSTRUCTIONS    The Preadmission Testing patient is instructed accordingly using the following criteria (check applicable):    ARRIVAL INSTRUCTIONS:  [x] Parking the day of Surgery is located in the Main Entrance lot. Upon entering the door, make an immediate right to the surgery reception desk    [x] Bring photo ID and insurance card    [] Bring in a copy of Living will or Durable Power of  papers. [x] Please be sure to arrange for responsible adult to provide transportation to and from the hospital    [x] Please arrange for responsible adult to be with you for the 24 hour period post procedure due to having anesthesia      GENERAL INSTRUCTIONS:    [x] Nothing by mouth after midnight, including gum, candy, mints or water    [x] You may brush your teeth, but do not swallow any water    [x] Take medications as instructed with 1-2 oz of water    [x] Stop herbal supplements and vitamins 5 days prior to procedure    [x] Follow preop dosing of blood thinners per physician instructions    [] Take 1/2 dose of evening insulin, but no insulin after midnight    [x] No oral diabetic medications after midnight    [x] If diabetic and have low blood sugar or feel symptomatic, take 1-2oz apple juice only    [] Bring inhalers day of surgery    [] Bring C-PAP/ Bi-Pap day of surgery    [] Bring urine specimen day of surgery    [x] Shower or bath with soap, lather and rinse well, AM of Surgery, no lotion, powders or creams to surgical site    [] Follow bowel prep as instructed per surgeon    [x] No tobacco products within 24 hours of surgery     [x] No alcohol or illegal drug use within 24 hours of surgery.     [x] Jewelry, body piercing's, eyeglasses, contact lenses and dentures are not permitted into surgery (bring cases)      [x] Please do not wear any nail polish, make up or hair products on the day of surgery    [x] You can expect a call the business day prior to procedure to notify you if your arrival time changes    [x] If you receive a survey after surgery we would greatly appreciate your comments    [] Parent/guardian of a minor must accompany their child and remain on the premises  the entire time they are under our care     [] Pediatric patients may bring favorite toy, blanket or comfort item with them    [] A caregiver or family member must remain with the patient during their stay if they are mentally handicapped, have dementia, disoriented or unable to use a call light or would be a safety concern if left unattended    [x] Please notify surgeon if you develop any illness between now and time of surgery (cold, cough, sore throat, fever, nausea, vomiting) or any signs of infections  including skin, wounds, and dental.    [x]  The Outpatient Pharmacy is available to fill your prescription here on your day of surgery, ask your preop nurse for details    [] Other instructions    EDUCATIONAL MATERIALS PROVIDED:    [] PAT Preoperative Education Packet/Booklet     [] Medication List    [] Transfusion bracelet applied with instructions    [] Shower with soap, lather and rinse well, and use CHG wipes provided the evening before surgery as instructed    [] Incentive spirometer with instructions

## 2022-05-03 ENCOUNTER — ANESTHESIA EVENT (OUTPATIENT)
Dept: OPERATING ROOM | Age: 72
End: 2022-05-03
Payer: MEDICARE

## 2022-05-04 ENCOUNTER — HOSPITAL ENCOUNTER (OUTPATIENT)
Age: 72
Setting detail: OUTPATIENT SURGERY
Discharge: HOME | End: 2022-05-04
Attending: ORTHOPAEDIC SURGERY | Admitting: ORTHOPAEDIC SURGERY
Payer: MEDICARE

## 2022-05-04 ENCOUNTER — ANESTHESIA (OUTPATIENT)
Dept: OPERATING ROOM | Age: 72
End: 2022-05-04
Payer: MEDICARE

## 2022-05-04 ENCOUNTER — HOSPITAL ENCOUNTER (OUTPATIENT)
Dept: GENERAL RADIOLOGY | Age: 72
Setting detail: OUTPATIENT SURGERY
Discharge: HOME | End: 2022-05-06
Attending: ORTHOPAEDIC SURGERY
Payer: MEDICARE

## 2022-05-04 VITALS
HEIGHT: 63 IN | RESPIRATION RATE: 16 BRPM | DIASTOLIC BLOOD PRESSURE: 79 MMHG | HEART RATE: 82 BPM | WEIGHT: 195 LBS | TEMPERATURE: 97.2 F | BODY MASS INDEX: 34.55 KG/M2 | SYSTOLIC BLOOD PRESSURE: 148 MMHG | OXYGEN SATURATION: 94 %

## 2022-05-04 VITALS — OXYGEN SATURATION: 98 % | TEMPERATURE: 97 F | SYSTOLIC BLOOD PRESSURE: 114 MMHG | DIASTOLIC BLOOD PRESSURE: 56 MMHG

## 2022-05-04 DIAGNOSIS — R52 PAIN: ICD-10-CM

## 2022-05-04 DIAGNOSIS — G89.18 POST-OPERATIVE PAIN: Primary | ICD-10-CM

## 2022-05-04 LAB
ANION GAP SERPL CALCULATED.3IONS-SCNC: 12 MMOL/L (ref 7–16)
BUN BLDV-MCNC: 17 MG/DL (ref 6–23)
CALCIUM SERPL-MCNC: 9.5 MG/DL (ref 8.6–10.2)
CHLORIDE BLD-SCNC: 102 MMOL/L (ref 98–107)
CO2: 24 MMOL/L (ref 22–29)
CREAT SERPL-MCNC: 0.8 MG/DL (ref 0.5–1)
GFR AFRICAN AMERICAN: >60
GFR NON-AFRICAN AMERICAN: >60 ML/MIN/1.73
GLUCOSE BLD-MCNC: 226 MG/DL (ref 74–99)
HCT VFR BLD CALC: 35.4 % (ref 34–48)
HEMOGLOBIN: 11.8 G/DL (ref 11.5–15.5)
MCH RBC QN AUTO: 28.9 PG (ref 26–35)
MCHC RBC AUTO-ENTMCNC: 33.3 % (ref 32–34.5)
MCV RBC AUTO: 86.8 FL (ref 80–99.9)
PDW BLD-RTO: 12.4 FL (ref 11.5–15)
PLATELET # BLD: 395 E9/L (ref 130–450)
PMV BLD AUTO: 9 FL (ref 7–12)
POTASSIUM SERPL-SCNC: 4.6 MMOL/L (ref 3.5–5)
RBC # BLD: 4.08 E12/L (ref 3.5–5.5)
SODIUM BLD-SCNC: 138 MMOL/L (ref 132–146)
WBC # BLD: 10.9 E9/L (ref 4.5–11.5)

## 2022-05-04 PROCEDURE — 3600000012 HC SURGERY LEVEL 2 ADDTL 15MIN: Performed by: ORTHOPAEDIC SURGERY

## 2022-05-04 PROCEDURE — 80048 BASIC METABOLIC PNL TOTAL CA: CPT

## 2022-05-04 PROCEDURE — 7100000001 HC PACU RECOVERY - ADDTL 15 MIN: Performed by: ORTHOPAEDIC SURGERY

## 2022-05-04 PROCEDURE — 6360000002 HC RX W HCPCS: Performed by: NURSE ANESTHETIST, CERTIFIED REGISTERED

## 2022-05-04 PROCEDURE — 36415 COLL VENOUS BLD VENIPUNCTURE: CPT

## 2022-05-04 PROCEDURE — 7100000011 HC PHASE II RECOVERY - ADDTL 15 MIN: Performed by: ORTHOPAEDIC SURGERY

## 2022-05-04 PROCEDURE — 64718 REVISE ULNAR NERVE AT ELBOW: CPT | Performed by: ORTHOPAEDIC SURGERY

## 2022-05-04 PROCEDURE — 7100000010 HC PHASE II RECOVERY - FIRST 15 MIN: Performed by: ORTHOPAEDIC SURGERY

## 2022-05-04 PROCEDURE — 85027 COMPLETE CBC AUTOMATED: CPT

## 2022-05-04 PROCEDURE — 2500000003 HC RX 250 WO HCPCS: Performed by: ORTHOPAEDIC SURGERY

## 2022-05-04 PROCEDURE — 64721 CARPAL TUNNEL SURGERY: CPT | Performed by: ORTHOPAEDIC SURGERY

## 2022-05-04 PROCEDURE — 6360000002 HC RX W HCPCS: Performed by: PHYSICIAN ASSISTANT

## 2022-05-04 PROCEDURE — 20600 DRAIN/INJ JOINT/BURSA W/O US: CPT | Performed by: ORTHOPAEDIC SURGERY

## 2022-05-04 PROCEDURE — 3700000001 HC ADD 15 MINUTES (ANESTHESIA): Performed by: ORTHOPAEDIC SURGERY

## 2022-05-04 PROCEDURE — 3700000000 HC ANESTHESIA ATTENDED CARE: Performed by: ORTHOPAEDIC SURGERY

## 2022-05-04 PROCEDURE — 2709999900 HC NON-CHARGEABLE SUPPLY: Performed by: ORTHOPAEDIC SURGERY

## 2022-05-04 PROCEDURE — 20605 DRAIN/INJ JOINT/BURSA W/O US: CPT | Performed by: ORTHOPAEDIC SURGERY

## 2022-05-04 PROCEDURE — 6360000002 HC RX W HCPCS: Performed by: ORTHOPAEDIC SURGERY

## 2022-05-04 PROCEDURE — 7100000000 HC PACU RECOVERY - FIRST 15 MIN: Performed by: ORTHOPAEDIC SURGERY

## 2022-05-04 PROCEDURE — 2500000003 HC RX 250 WO HCPCS: Performed by: NURSE ANESTHETIST, CERTIFIED REGISTERED

## 2022-05-04 PROCEDURE — 2580000003 HC RX 258: Performed by: PHYSICIAN ASSISTANT

## 2022-05-04 PROCEDURE — 99999 PR OFFICE/OUTPT VISIT,PROCEDURE ONLY: CPT | Performed by: PHYSICIAN ASSISTANT

## 2022-05-04 PROCEDURE — 3600000002 HC SURGERY LEVEL 2 BASE: Performed by: ORTHOPAEDIC SURGERY

## 2022-05-04 RX ORDER — FENTANYL CITRATE 50 UG/ML
25 INJECTION, SOLUTION INTRAMUSCULAR; INTRAVENOUS EVERY 5 MIN PRN
Status: CANCELLED | OUTPATIENT
Start: 2022-05-04

## 2022-05-04 RX ORDER — PROPOFOL 10 MG/ML
INJECTION, EMULSION INTRAVENOUS PRN
Status: DISCONTINUED | OUTPATIENT
Start: 2022-05-04 | End: 2022-05-04 | Stop reason: SDUPTHER

## 2022-05-04 RX ORDER — FENTANYL CITRATE 50 UG/ML
INJECTION, SOLUTION INTRAMUSCULAR; INTRAVENOUS PRN
Status: DISCONTINUED | OUTPATIENT
Start: 2022-05-04 | End: 2022-05-04 | Stop reason: SDUPTHER

## 2022-05-04 RX ORDER — OXYCODONE HYDROCHLORIDE AND ACETAMINOPHEN 5; 325 MG/1; MG/1
1 TABLET ORAL EVERY 6 HOURS PRN
Qty: 28 TABLET | Refills: 0 | Status: SHIPPED | OUTPATIENT
Start: 2022-05-04 | End: 2022-05-11

## 2022-05-04 RX ORDER — SODIUM CHLORIDE 0.9 % (FLUSH) 0.9 %
5-40 SYRINGE (ML) INJECTION PRN
Status: CANCELLED | OUTPATIENT
Start: 2022-05-04

## 2022-05-04 RX ORDER — SODIUM CHLORIDE 9 MG/ML
INJECTION, SOLUTION INTRAVENOUS PRN
Status: DISCONTINUED | OUTPATIENT
Start: 2022-05-04 | End: 2022-05-04 | Stop reason: HOSPADM

## 2022-05-04 RX ORDER — OXYCODONE HYDROCHLORIDE 5 MG/1
5 TABLET ORAL PRN
Status: CANCELLED | OUTPATIENT
Start: 2022-05-04 | End: 2022-05-04

## 2022-05-04 RX ORDER — SODIUM CHLORIDE 9 MG/ML
INJECTION, SOLUTION INTRAVENOUS PRN
Status: CANCELLED | OUTPATIENT
Start: 2022-05-04

## 2022-05-04 RX ORDER — SODIUM CHLORIDE 0.9 % (FLUSH) 0.9 %
5-40 SYRINGE (ML) INJECTION EVERY 12 HOURS SCHEDULED
Status: DISCONTINUED | OUTPATIENT
Start: 2022-05-04 | End: 2022-05-04 | Stop reason: HOSPADM

## 2022-05-04 RX ORDER — ONDANSETRON 2 MG/ML
INJECTION INTRAMUSCULAR; INTRAVENOUS PRN
Status: DISCONTINUED | OUTPATIENT
Start: 2022-05-04 | End: 2022-05-04 | Stop reason: SDUPTHER

## 2022-05-04 RX ORDER — OXYCODONE HYDROCHLORIDE 5 MG/1
10 TABLET ORAL PRN
Status: CANCELLED | OUTPATIENT
Start: 2022-05-04 | End: 2022-05-04

## 2022-05-04 RX ORDER — SODIUM CHLORIDE 0.9 % (FLUSH) 0.9 %
5-40 SYRINGE (ML) INJECTION EVERY 12 HOURS SCHEDULED
Status: CANCELLED | OUTPATIENT
Start: 2022-05-04

## 2022-05-04 RX ORDER — GLYCOPYRROLATE 1 MG/5 ML
SYRINGE (ML) INTRAVENOUS PRN
Status: DISCONTINUED | OUTPATIENT
Start: 2022-05-04 | End: 2022-05-04 | Stop reason: SDUPTHER

## 2022-05-04 RX ORDER — SODIUM CHLORIDE 9 MG/ML
INJECTION, SOLUTION INTRAVENOUS CONTINUOUS
Status: DISCONTINUED | OUTPATIENT
Start: 2022-05-04 | End: 2022-05-04 | Stop reason: HOSPADM

## 2022-05-04 RX ORDER — DEXAMETHASONE SODIUM PHOSPHATE 4 MG/ML
INJECTION, SOLUTION INTRA-ARTICULAR; INTRALESIONAL; INTRAMUSCULAR; INTRAVENOUS; SOFT TISSUE PRN
Status: DISCONTINUED | OUTPATIENT
Start: 2022-05-04 | End: 2022-05-04 | Stop reason: SDUPTHER

## 2022-05-04 RX ORDER — LIDOCAINE HYDROCHLORIDE 20 MG/ML
INJECTION, SOLUTION EPIDURAL; INFILTRATION; INTRACAUDAL; PERINEURAL PRN
Status: DISCONTINUED | OUTPATIENT
Start: 2022-05-04 | End: 2022-05-04 | Stop reason: SDUPTHER

## 2022-05-04 RX ORDER — SODIUM CHLORIDE 0.9 % (FLUSH) 0.9 %
5-40 SYRINGE (ML) INJECTION PRN
Status: DISCONTINUED | OUTPATIENT
Start: 2022-05-04 | End: 2022-05-04 | Stop reason: HOSPADM

## 2022-05-04 RX ORDER — BUPIVACAINE HYDROCHLORIDE AND EPINEPHRINE 5; 5 MG/ML; UG/ML
INJECTION, SOLUTION EPIDURAL; INTRACAUDAL; PERINEURAL PRN
Status: DISCONTINUED | OUTPATIENT
Start: 2022-05-04 | End: 2022-05-04 | Stop reason: ALTCHOICE

## 2022-05-04 RX ORDER — MIDAZOLAM HYDROCHLORIDE 1 MG/ML
INJECTION INTRAMUSCULAR; INTRAVENOUS PRN
Status: DISCONTINUED | OUTPATIENT
Start: 2022-05-04 | End: 2022-05-04 | Stop reason: SDUPTHER

## 2022-05-04 RX ADMIN — DEXAMETHASONE SODIUM PHOSPHATE 10 MG: 4 INJECTION INTRA-ARTICULAR; INTRALESIONAL; INTRAMUSCULAR; INTRAVENOUS; SOFT TISSUE at 09:39

## 2022-05-04 RX ADMIN — SODIUM CHLORIDE: 9 INJECTION, SOLUTION INTRAVENOUS at 09:27

## 2022-05-04 RX ADMIN — FENTANYL CITRATE 50 MCG: 50 INJECTION, SOLUTION INTRAMUSCULAR; INTRAVENOUS at 09:35

## 2022-05-04 RX ADMIN — FENTANYL CITRATE 25 MCG: 50 INJECTION, SOLUTION INTRAMUSCULAR; INTRAVENOUS at 09:44

## 2022-05-04 RX ADMIN — Medication 0.2 MG: at 09:34

## 2022-05-04 RX ADMIN — MIDAZOLAM 2 MG: 1 INJECTION INTRAMUSCULAR; INTRAVENOUS at 09:27

## 2022-05-04 RX ADMIN — ONDANSETRON 4 MG: 2 INJECTION INTRAMUSCULAR; INTRAVENOUS at 09:44

## 2022-05-04 RX ADMIN — FENTANYL CITRATE 25 MCG: 50 INJECTION, SOLUTION INTRAMUSCULAR; INTRAVENOUS at 10:02

## 2022-05-04 RX ADMIN — PROPOFOL 150 MG: 10 INJECTION, EMULSION INTRAVENOUS at 09:35

## 2022-05-04 RX ADMIN — Medication 2000 MG: at 09:40

## 2022-05-04 RX ADMIN — LIDOCAINE HYDROCHLORIDE 50 MG: 20 INJECTION, SOLUTION EPIDURAL; INFILTRATION; INTRACAUDAL; PERINEURAL at 09:35

## 2022-05-04 ASSESSMENT — PAIN DESCRIPTION - DESCRIPTORS
DESCRIPTORS: DISCOMFORT
DESCRIPTORS: ACHING

## 2022-05-04 ASSESSMENT — PAIN DESCRIPTION - FREQUENCY
FREQUENCY: CONTINUOUS

## 2022-05-04 ASSESSMENT — PULMONARY FUNCTION TESTS
PIF_VALUE: 13
PIF_VALUE: 17
PIF_VALUE: 15
PIF_VALUE: 17
PIF_VALUE: 18
PIF_VALUE: 15
PIF_VALUE: 1
PIF_VALUE: 9
PIF_VALUE: 12
PIF_VALUE: 18
PIF_VALUE: 17
PIF_VALUE: 1
PIF_VALUE: 14
PIF_VALUE: 12
PIF_VALUE: 17
PIF_VALUE: 1
PIF_VALUE: 17
PIF_VALUE: 12
PIF_VALUE: 12
PIF_VALUE: 22
PIF_VALUE: 1
PIF_VALUE: 16
PIF_VALUE: 17
PIF_VALUE: 17
PIF_VALUE: 5
PIF_VALUE: 17
PIF_VALUE: 2
PIF_VALUE: 0
PIF_VALUE: 23
PIF_VALUE: 17
PIF_VALUE: 17
PIF_VALUE: 1
PIF_VALUE: 17
PIF_VALUE: 17
PIF_VALUE: 12
PIF_VALUE: 17
PIF_VALUE: 12
PIF_VALUE: 14
PIF_VALUE: 17
PIF_VALUE: 13
PIF_VALUE: 2
PIF_VALUE: 1
PIF_VALUE: 14
PIF_VALUE: 2
PIF_VALUE: 16
PIF_VALUE: 17

## 2022-05-04 ASSESSMENT — PAIN DESCRIPTION - ORIENTATION
ORIENTATION: RIGHT

## 2022-05-04 ASSESSMENT — PAIN SCALES - GENERAL
PAINLEVEL_OUTOF10: 3

## 2022-05-04 ASSESSMENT — PAIN DESCRIPTION - ONSET
ONSET: ON-GOING

## 2022-05-04 ASSESSMENT — PAIN DESCRIPTION - LOCATION
LOCATION: HAND;ARM
LOCATION: ARM;HAND

## 2022-05-04 ASSESSMENT — PAIN - FUNCTIONAL ASSESSMENT
PAIN_FUNCTIONAL_ASSESSMENT: 0-10
PAIN_FUNCTIONAL_ASSESSMENT: ACTIVITIES ARE NOT PREVENTED

## 2022-05-04 ASSESSMENT — PAIN DESCRIPTION - PAIN TYPE
TYPE: SURGICAL PAIN
TYPE: SURGICAL PAIN

## 2022-05-04 NOTE — OP NOTE
Operative Note      Patient: Melvin Perez  YOB: 1950  MRN: [id number]    Date of Procedure: 5/4/2022    Pre-Op Diagnosis: RIGHT CARPAL TUNNEL SYNDROME, RIGHT ULNAR NEUROPATHY    Post-Op Diagnosis: Same       Procedure(s):  RIGHT CARPAL TUNNEL RELEASE  RIGHT ULNAR NERVE IN SITU DECOMPRESSION AT ELBOW,  RIGHT WRIST TRIANGULAR FIBROCARTILAGE COMPLEX INJECTION, RIGHT INDEX PROXIMAL INTERPHALANGEAL JOINT INJECTION    Surgeon(s):  Vivienne George MD    Assistant:   Physician Assistant: GLENDY Romo  Resident: Joshua Moeller DO    Anesthesia: General    Estimated Blood Loss (mL): Minimal    Complications: None    Specimens:   * No specimens in log *    Implants:  * No implants in log *      Drains: * No LDAs found *    Findings: see details    Detailed Description of Procedure:   OPERATIVE REPORT       DATE OF PROCEDURE:   5/4/2022       PREOPERATIVE DIAGNOSES:  1.  right carpal tunnel syndrome. 2.  right ulnar neuropathy at the elbow. 3. Right TFCC tear  4. Right index finger arthritis     POSTOPERATIVE DIAGNOSES:same      PROCEDURES PERFORMED:  1. Right carpal tunnel release. 2.  right ulnar nerve in situ decompression. 3. Right wrist cortisone injection  4. Right index finger PIP joint cortisone injection       SURGEON:  Javed Velazquez M.D.     ANESTHESIA:  1. General.  2.  Local anesthetic by surgeon consisting of approximately 20 mL of 0.25%  Marcaine with epinephrine.     ASSISTANT:  1. Dr Wade Halsted, orthopedic surgery resident. 2.  Roverto Christian, physician assistant certified. She was present  throughout the procedure. Her assistance was used for preoperative  positioning, intraoperative retraction, closure, and dressing application.    Her assistance expedited the case and decreased the surgical time.     TOTAL TOURNIQUET TIME:  Approximately 12 minutes at 250 mmHg of brachial  tourniquet.     FINDINGS:  1.  Evidence of median nerve compression beneath the transverse carpal  ligament that was adequately decompressed with release of the transverse  carpal ligament. Status post decompression, the nerve was visualized  throughout its visualized course and it was decompressed including the  distal forearm fascia through the carpal tunnel to its stratification  distally. 2.  Evidence of ulnar nerve entrapment at the cubital tunnel. Status post  cubital tunnel decompression, the nerve was fully decompressed without any  significant instability.     COMPLICATIONS:  None.     DISPOSITION:  The patient remained stable throughout the procedure.     OPERATIVE INDICATIONS:  The patient presents with persistent  recalcitrant right upper extremity complaints. After failing conservative  management, she wished to proceed with surgical intervention. The risks,  benefits, alternatives, and complications were fully outlined and explained  to her including, but not limited to the risks of infection; damage to  nerve, vessels, or tendons; failure to improve her symptoms; worsening  symptoms; pillar pain; thenar pain; hypothenar pain, scar sensitivity; and  possible need for further surgery and/or therapy. She understood and  wished to proceed.     SURGERY IN DETAIL:  The patient was identified in the holding area. Her  right upper extremity was identified as the surgical site. She was then  seen by anesthesia, taken to the operating room, placed supine on the  operating table, and underwent general anesthesia per anesthesia  department. All bony prominences were well padded. A well-padded arm  tourniquet was placed. The extremity was prepped and draped in  the standard sterile fashion. The arm was exsanguinated. The tourniquet  was inflated to 250 mmHg. Total tourniquet time was approximately one-half  hour. Procedure Note Wrist Cortisone Injection    The right wrist was identified as the injection site.  Under sterile conditions, the wrist TFCC was injected with a mixture of 1 mL of 1% Lidocaine and 1 mL of 6 mg/mL Betamethasone without complication. Procedure Note Finger Joint Injection    The right Index finger PIP joint dorsal aspect was identified as the injection site. The joint was injected with a mixture of 0.5 mL of 1% Lidocaine and 0.5 mL of 6 mg/mL Betamethasone without complication. An incision in line with the radial border of the ring finger extending  from Wood's cardinal line to within 1 cm of the volar wrist flexor crease  was then created followed by blunt dissection and identification of the  superficial palmar fascia site longitudinally followed by blunt dissection  and identification of the distal margin of the transverse carpal ligament  as well as superficial palmar arch and underlying median nerve. Proximal  dissection was performed to identify the contents of Guyon's canal as well  as the distal forearm fascia. The distal fascia was then incised and  extended distally. Decompressing the median nerve from proximally to  distally. Blunt dissection was performed distally to confirm complete  division of the transverse carpal ligament distally as well as preservation  of the superficial palmar arch as well as visualizing the stratification of  the median nerve. Proximally, the remaining portions of the transverse  carpal ligament and distal forearm fascia were then released using  blunt-tipped Metzenbaum scissors in a distal to proximal slide technique  fully decompressing the nerve. The nerve was then fully visualized and had  evidence of median nerve compression that was adequately decompressed with  release of the transverse carpal ligament. The nerve was intact throughout  its visualized course. The wound was copiously irrigated out. Local  anesthetic was infiltrated into the soft tissues. Skin closed with nylon  suture.     Attention was directed towards the ulnar nerve at the elbow.   A curvilinear  incision just posterior to the medial epicondyle was then created, followed  by blunt dissection, identification, and preservation of medial  antebrachial cutaneous nerve branch, which was reflected anteriorly. Proximal dissection was used to identify the ulnar nerve just posterior to  the medial intermuscular septum. The nerve was decompressed approximately  12 cm above the medial epicondyle. The nerve was then traced through the  cubital tunnel. There was a significant entrapment of the nerve through  this level, which was meticulously dissected out. The decompression was  continued distally to include the superficial and deep fascia of the FCU  tendon. The nerve was fully mobilized and decompressed. The elbow was  then flexed and extended. No significant instability was encountered. The  wound was copiously irrigated out. The tourniquet was deflated. Hemostasis achieved with bipolar cautery. Local anesthetic was infiltrated  into the soft tissues. Skin closed with 2-0 Vicryl and 3-0 Monocryl.    Mastisol, Steri-Strips, a bulky sterile dressing, and sling was applied.           Gualberto Grady MD         Electronically signed by Leonidas Spaulding MD on 5/4/2022 at 5:08 PM

## 2022-05-04 NOTE — H&P
Department of Orthopedic Surgery  History and Physical        CHIEF COMPLAINT:  Right hand pain, tingling      HISTORY OF PRESENT ILLNESS:                 The patient is a 66 y.o. female, right hand dominant, who presents with several months of right hand and wrist pain which extends up to the elbow. She did have a fall in her bathroom where she landed on outstretched hands approximately one year prior but does not feel it that this specifically led to her symptoms. Her main complaint today is tingling in the digits of the hand and pain which radiates to the ulnar side of the elbow. She has had weakness of her right hand causing her to frequently drop things. She is occasionally awoken at night due to symptoms in the hand. She has had no intervention for her symptoms, but did complete a nerve conduction study on 12/7/2021 suggestive of carpal tunnel syndrome and questionable ulnar nerve compressive neuropathy. She was unable to complete the EMG portion at that time due to discomfort. Medical history is significant for depression and diabetes with a recent hemoglobin A1c of 8.7.   No personal or family history of inflammatory arthritis.     Patient had an EMG/NCS dated 12/7/21     Right median nerve motor latency: 5.52  Left median nerve motor latency: Not completed  Right ulnar nerve velocity: 50 below elbow, 51 above elbow  Left ulnar nerve velocity: Not completed  Right median nerve sensory latency: 2.40  Left median nerve sensory latency: Not completed    EMG portion of the exam was not completed as patient did not tolerate     Interpretation of the NCS included chronic right ulnar neuropathy, chronic right median mononeuropathy versus/and chronic right C8/T1 cervical radiculopathy        Past Medical History:    Past Medical History        Diagnosis Date    Cervical spondylosis 12/7/2021    Class 2 obesity due to excess calories without serious comorbidity with body mass index (BMI) of 35.0 to 35.9 in adult 12/18/2019    Essential hypertension 12/18/2019    GERD (gastroesophageal reflux disease)      H pylori ulcer      Hypomagnesemia 12/18/2019    Mixed hyperlipidemia 12/18/2019    Other insomnia 12/18/2019    Peptic ulcer disease      Type 2 diabetes mellitus without complication ([address]      Vitamin D deficiency 12/18/2019         Past Surgical History:    Past Surgical History             Procedure Laterality Date    BUNIONECTOMY Left      CATARACT REMOVAL WITH IMPLANT        HYSTERECTOMY, VAGINAL         partial in the 80s then bilateral oopherectome 2016    RETINAL DETACHMENT SURGERY Right       vitreous surgery    TUBAL LIGATION             Current Medications:   Current Hospital Medications   No current facility-administered medications for this visit. Allergies:  Patient has no known allergies.     Social History:   TOBACCO:   reports that she quit smoking about 40 years ago. Her smoking use included cigarettes. She has a 12.00 pack-year smoking history. She has never used smokeless tobacco.  ETOH:   reports current alcohol use. DRUGS:   has no history on file for drug use.   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[employer]      Family History:   Family History             Problem Relation Age of Onset    Diabetes Mother      Stroke Mother      Kidney Disease Father           Renal Failure            REVIEW OF SYSTEMS:  CONSTITUTIONAL:  negative  RESPIRATORY:  negative  CARDIOVASCULAR:  negative  MUSCULOSKELETAL:  positive for tingling and weakness in the hand, pain radiating proximally to the medial elbow      PHYSICAL EXAM:    VITALS:  Resp 20   Ht 5' 3\" (1.6 m)   Wt 197 lb (89.4 kg)   BMI 34.90 kg/m²   CONSTITUTIONAL:  awake, alert, cooperative, no apparent distress, and appears stated age  EYES:  Lids and lashes normal, pupils equal, round and reactive to light, extra ocular muscles intact, sclera clear, conjunctiva normal  ENT: Normocephalic, without obvious abnormality, atraumatic, sinuses nontender on palpation, external ears without lesions, oral pharynx with moist mucus membranes, tonsils without erythema or exudates, gums normal and good dentition. NECK:  Supple, symmetrical, trachea midline, no adenopathy, thyroid symmetric, not enlarged and no tenderness, skin normal  LUNGS:  CTA  CARDIOVASCULAR:  2+ radial pulses, extremities warm and well perfused  ABDOMEN:  Non-distended, NTTP  CHEST:  Atraumatic   GENITAL/URINARY:  deferred  NEUROLOGIC:  Awake, alert, oriented to name, place and time. Cranial nerves II-XII are grossly intact. Motor is 5 out of 5 bilaterally. Sensory is intact.  gait is normal.  MUSCULOSKELETAL:     Tenderness to light touch and positive Tinel's over the ulnar nerve in the cubital tunnel. Non-tender about the shoulder with good ROM. + Tinel's over the carpal tunnel, + durkans, - finkelsteins, - CMC grind, - tenderness over the A1 pulleys with no active triggering. Swelling, erythema, and warmth over the index finger PIPJ with 10 degree flexion contracture and limited flexion secondary to stiffness. Ulnar-sided tenderness of the wrist at the TFCC. No significant tenderness to palpation at the radial aspect of the radiocarpal joint. No snuffbox tenderness. + Wartenberg's, APB strength 4/5 with no atrophy. Median, ulnar, radial nerves intact to light touch. Brisk capillary refill.          DATA:    CBC:         Lab Results   Component Value Date     WBC 12.0 02/24/2022     RBC 3.89 02/24/2022     HGB 11.4 02/24/2022     HCT 37.3 02/24/2022     MCV 95.9 02/24/2022     MCH 29.3 02/24/2022     MCHC 30.6 02/24/2022     RDW 13.0 02/24/2022      02/24/2022     MPV 9.7 02/24/2022      PT/INR:  No results found for: PROTIME, INR     Radiology Review:  Xray: X-rays of the right hand from 11/17 were reviewed with the patient.  3 views: PA, lateral and oblique : images demonstrate no acute fractures or dislocations. There are calcifications involving the second PIPJ as well as the TFCC. There are calcifications present at the MCPJ of the long and ring fingers.     Impression: Inflammatory arthritis involving small joints, calcifications in the TFCC     Xray: x-rays of the right elbow from 11/17 were reviewed with the patient. 3 views: AP, lateral and oblique : demonstrate no acute fractures or dislocations. Calcifications about the radiocapitellar joint with calcific changes at the lateral epicondyle. Chronic appearing changes also present at the biceps tuberosity of the proximal radius     Impression: Calcifications of the radiocapitellar joint and lateral epicondyle      IMPRESSION:  · Right carpal tunnel syndrome  · Right cubital tunnel syndrome  · Right small digit inflammatory arthritis, with acute involvement of index finger PIPJ  · Right TFCC chondrocalcinosis  · Diabetes  · Depression     PLAN:  Physical examination and imaging findings were discussed with the patient. We discussed both surgical and non-surgical treatments with her. She was offered a corticosteroid injection for the right index PIPJ and right TFCC today which she declined. We discussed surgical treatment of her right carpal tunnel and cubital tunnel syndrome for which she was agreeable. If her wrist and index finger are symptomatic at the time of surgery, we would proceed with injections of corticosteroid at the time of surgery.       I explained the risks, benefits, alternatives and complications of surgery with the patient including but not limited to the risks of infection, possible damage to nerves, vessels, or tendons, stiffness, loss of range of motion, scar sensitivity, wound healing complications, worsening symptoms, possible need for therapy, as well as the possible need further surgery and unanticipated complications. The patient voiced understanding and all questions were answered.  The patient elected to proceed with surgical intervention.      Macario ParekhtrinidadDO  3/3/2022        I have seen and evaluated the patient and agree with the above assessment and plan on today's visit. I have performed the key components of the history and physical examination with significant findings of severe end-stage right carpal tunnel syndrome, right ulnar neuropathy, right ulnar-sided wrist pain consistent with TFCC pathology, and right index finger chronic proximal phalangeal joint inflammation. Treatment option explained discussed per Dr. Ingris Hernandez electrocerebral surgical invention from a right carpal tunnel release, right in situ ulnar nerve decompression at elbow with possible transposition, TFCC cortisone injection and index finger PIP joint cortisone injections. . I concur with the findings and plan as documented.     The patient was counseled at length about the risks of graham Covid-19 during their perioperative period and any recovery window from their procedure. The patient was made aware that graham Covid-19  may worsen their prognosis for recovering from their procedure  and lend to a higher morbidity and/or mortality risk. All material risks, benefits, and reasonable alternatives including postponing the procedure were discussed. The patient does wish to proceed with the procedure at this time. History and Physical Update     Patient was seen and examined. Patient's history and physical was reviewed with the patient. There has been no significant interval changes.

## 2022-05-04 NOTE — ANESTHESIA PRE PROCEDURE
Department of Anesthesiology  Preprocedure Note       Name:  Melvin Perez   Age:  66 y.o.  :  1950                                          MRN:  [id number]         Date:  2022      Surgeon: Lauren Ruiz):  Vivienne George MD    Procedure: Procedure(s):  RIGHT CARPAL TUNNEL RELEASE  RIGHT ULNAR NERVE IN SITU DECOMPRESSION AT ELBOW POSSIBLE TRANSPOSITION, RIGHT WRIST TRIANGULAR FIBROCARTILAGE COMPLEX INJECTION, RIGHT INDEX PROXIMAL INTERPHALANGEAL JOINT INJECTION    Medications prior to admission:   Prior to Admission medications    Medication Sig Start Date End Date Taking? Authorizing Provider   oxyCODONE-acetaminophen (PERCOCET) 5-325 MG per tablet Take 1 tablet by mouth every 6 hours as needed for Pain for up to 7 days. 22 Yes GLENDY Guerrero   zolpidem (AMBIEN) 10 MG tablet Take 1 tablet by mouth nightly as needed for Sleep for up to 90 days.  22  Sara Ahumada, MD   atorvastatin (LIPITOR) 40 MG tablet Take 1 tablet by mouth daily 22   Sara Ahumada, MD   aspirin 81 MG EC tablet Take 81 mg by mouth daily    Historical Provider, MD   metFORMIN (GLUCOPHAGE) 1000 MG tablet Take 1 tablet by mouth 2 times daily (with meals) 21   Sara Ahumada, MD   lisinopril (PRINIVIL;ZESTRIL) 20 MG tablet Take 1 tablet by mouth daily 21   Sara Ahumada, MD   insulin lispro, 1 Unit Dial, ([facility]) 100 UNIT/ML SOPN Inject up to 12 units QAC TID 21   Sara Ahumada, MD   zinc 50 MG CAPS Take 1 capsule by mouth three times a week     Historical Provider, MD   insulin glargine (LANTUS SOLOSTAR) 100 UNIT/ML injection pen Inject 65 Units into the skin every morning 21   Sara Ahumada, MD   magnesium (MAGNESIUM-OXIDE) 250 MG TABS tablet Take 250 mg by mouth in the morning and at bedtime    Historical Provider, MD   Insulin Pen Needle (B-D UF III MINI PEN NEEDLES) 31G X 5 MM MISC 1 each by Does not apply route daily    Historical Provider, MD       Current medications:    Current Facility-Administered Medications   Medication Dose Route Frequency Provider Last Rate Last Admin    0.9 % sodium chloride infusion   IntraVENous Continuous GLENDY Aguilar        0.9 % sodium chloride infusion   IntraVENous PRN GLENDY Aguilar        ceFAZolin (ANCEF) 2000 mg in sterile water 20 mL IV syringe  2,000 mg IntraVENous On Call to [address] GLEND[address]        sodium chloride flush 0.9 % injection 5-40 mL  5-40 mL IntraVENous 2 times per day GLENDY Aguilar        sodium chloride flush 0.9 % injection 5-40 mL  5-40 mL IntraVENous PRN GLENDY Aguilar           Allergies:  No Known Allergies    Problem List:    Patient Active Problem List   Diagnosis Code    Type 2 diabetes mellitus with hyperglycemia, with long-term current use of insulin ([facility]) E11.65, Z79.4    Essential hypertension I10    Mixed hyperlipidemia E78.2    Gastroesophageal reflux disease without esophagitis K21.9    Other insomnia G47.09    Class 2 obesity due to excess calories without serious comorbidity with body mass index (BMI) of 35.0 to 35.9 in adult E66.09, Z68.35    Vitamin D deficiency E55.9    Hypomagnesemia E83.42    Elevated LFTs R79.89    Murmur R01.1    Bilateral carotid bruits R09.89    Anemia D64.9    Suspected COVID-19 virus infection Z20.822    Cervical spondylosis M47.812       Past Medical History:        Diagnosis Date    Cervical spondylosis 12/7/2021    Class 2 obesity due to excess calories without serious comorbidity with body mass index (BMI) of 35.0 to 35.9 in adult 12/18/2019    Essential hypertension 12/18/2019    Hypomagnesemia 12/18/2019    Mixed hyperlipidemia 12/18/2019    Other insomnia 12/18/2019    Right carpal tunnel syndrome     Type 2 diabetes mellitus without complication ([facility] [address]     Vitamin D deficiency 12/18/2019       Past Surgical History:        Procedure Laterality Date    BUNIONECTOMY Left     CATARACT REMOVAL WITH IMPLANT      CHOLECYSTECTOMY      COLONOSCOPY      HYSTERECTOMY      HYSTERECTOMY, VAGINAL      partial in the 80s then bilateral oopherectome 2016    RETINAL DETACHMENT SURGERY Right     vitreous surgery    TUBAL LIGATION         Social History:    Social History     Tobacco Use    Smoking status: Former Smoker     Packs/day: 1.00     Years: 12.00     Pack years: 12.00     Types: Cigarettes     Quit date: 1982     Years since quittin.2    Smokeless tobacco: Never Used   Substance Use Topics    Alcohol use: Yes     Comment: rare                                Counseling given: Not Answered      Vital Signs (Current):   Vitals:    22 1618 22 0745   BP:  (!) 145/65   Pulse:  84   Resp:  18   Temp:  97.6 °F (36.4 °C)   TempSrc:  Temporal   SpO2:  97%   Weight: 195 lb (88.5 kg) 195 lb (88.5 kg)   Height: 5' 3\" (1.6 m) 5' 3\" (1.6 m)                                              BP Readings from Last 3 Encounters:   22 (!) 145/65   22 132/60   22 134/66       NPO Status: Time of last liquid consumption:                         Time of last solid consumption:                         Date of last liquid consumption: 22                        Date of last solid food consumption: 22    BMI:   Wt Readings from Last 3 Encounters:   22 195 lb (88.5 kg)   22 195 lb (88.5 kg)   22 197 lb (89.4 kg)     Body mass index is 34.54 kg/m².     CBC:   Lab Results   Component Value Date    WBC 10.9 2022    RBC 4.08 2022    HGB 11.8 2022    HCT 35.4 2022    MCV 86.8 2022    RDW 12.4 2022     2022       CMP:   Lab Results   Component Value Date     2022    K 4.9 2022     2022    CO2 20 2022    BUN 24 2022    CREATININE 0.8 2022    GFRAA >60 2022    LABGLOM >60 2022    GLUCOSE 153 2022    PROT 7.2 2022    CALCIUM 10.2 2022    BILITOT <0.2 2022 ALKPHOS 70 04/06/2022    AST 18 04/06/2022    ALT 30 04/06/2022       POC Tests: No results for input(s): POCGLU, POCNA, POCK, POCCL, POCBUN, POCHEMO, POCHCT in the last 72 hours. Coags: No results found for: PROTIME, INR, APTT    HCG (If Applicable): No results found for: PREGTESTUR, PREGSERUM, HCG, HCGQUANT     ABGs: No results found for: PHART, PO2ART, UCC0JYV, HAM9IKB, BEART, Q6HZHXQL     Type & Screen (If Applicable):  No results found for: LABABO, LABRH    Drug/Infectious Status (If Applicable):  No results found for: HIV, HEPCAB    COVID-19 Screening (If Applicable):   Lab Results   Component Value Date    COVID19 Not-Detected 12/01/2021    COVID19 Not Detected 12/01/2021           Anesthesia Evaluation  Patient summary reviewed no history of anesthetic complications:   Airway: Mallampati: III  TM distance: >3 FB   Neck ROM: full  Mouth opening: > = 3 FB Dental: normal exam   (+) implants      Pulmonary:Negative Pulmonary ROS breath sounds clear to auscultation                            ROS comment: Former smoker    Cardiovascular:    (+) hypertension:, hyperlipidemia        Rhythm: regular             Beta Blocker:  Not on Beta Blocker         Neuro/Psych:   (+) neuromuscular disease:,             GI/Hepatic/Renal:   (+) GERD:,           Endo/Other:    (+) DiabetesType II DM, using insulin, . Abdominal:             Vascular: negative vascular ROS. Other Findings:           Anesthesia Plan      general     ASA 3       Induction: intravenous. MIPS: Postoperative opioids intended and Prophylactic antiemetics administered. Anesthetic plan and risks discussed with patient and spouse. Plan discussed with CRNA.           [address],    5/4/2022

## 2022-05-04 NOTE — BRIEF OP NOTE
Brief Postoperative Note      Patient: Leidy Mcginnis  YOB: 1950  MRN: [id number]    Date of Procedure: 5/4/2022    Pre-Op Diagnosis: RIGHT CARPAL TUNNEL SYNDROME, RIGHT ULNAR NEUROPATHY    Post-Op Diagnosis: Same       Procedure(s):  RIGHT CARPAL TUNNEL RELEASE  RIGHT ULNAR NERVE IN SITU DECOMPRESSION AT ELBOW,  RIGHT WRIST TRIANGULAR FIBROCARTILAGE COMPLEX INJECTION, RIGHT INDEX PROXIMAL INTERPHALANGEAL JOINT INJECTION    Surgeon(s):  Marquis Oleary MD    Assistant:  Physician Assistant: GLENDY Zuniga  Resident: Karol Cortes DO    Anesthesia: General    Estimated Blood Loss (mL): Minimal    Complications: None    Specimens:   * No specimens in log *    Implants:  * No implants in log *      Drains: * No LDAs found *    Findings: see op note    Electronically signed by GLENDY Zuniga on 5/4/2022 at 10:17 AM

## 2022-05-04 NOTE — ANESTHESIA POSTPROCEDURE EVALUATION
Department of Anesthesiology  Postprocedure Note    Patient: Lilo Stephen  MRN: [id number]  YOB: 1950  Date of evaluation: 5/4/2022  Time:  12:38 PM     Procedure Summary     Date: 05/04/22 Room / Location: [facility] 03 / 106 [facility]    Anesthesia Start: 3334 Anesthesia Stop: 7736    Procedures:       RIGHT CARPAL TUNNEL RELEASE (Right Wrist)      RIGHT ULNAR NERVE IN SITU DECOMPRESSION AT ELBOW,  RIGHT WRIST TRIANGULAR FIBROCARTILAGE COMPLEX INJECTION, RIGHT INDEX PROXIMAL INTERPHALANGEAL JOINT INJECTION (Right Elbow) Diagnosis: (RIGHT CARPAL TUNNEL SYNDROME, RIGHT ULNAR NEUROPATHY)    Surgeons: Dorenda Bumpers, MD Responsible Provider: Blayne Valles DO    Anesthesia Type: general ASA Status: 3          Anesthesia Type: general    Kellen Phase I: Kellen Score: 10    Kellen Phase II: Kellen Score: 10    Last vitals: Reviewed and per EMR flowsheets.        Anesthesia Post Evaluation    Patient location during evaluation: PACU  Patient participation: complete - patient participated  Level of consciousness: awake and alert  Airway patency: patent  Nausea & Vomiting: no nausea and no vomiting  Complications: no  Cardiovascular status: hemodynamically stable  Respiratory status: acceptable  Hydration status: euvolemic

## 2022-05-16 ENCOUNTER — OFFICE VISIT (OUTPATIENT)
Dept: ORTHOPEDIC SURGERY | Age: 72
End: 2022-05-16

## 2022-05-16 VITALS — BODY MASS INDEX: 34.55 KG/M2 | HEIGHT: 63 IN | WEIGHT: 195 LBS | RESPIRATION RATE: 20 BRPM

## 2022-05-16 DIAGNOSIS — G56.01 RIGHT CARPAL TUNNEL SYNDROME: Primary | ICD-10-CM

## 2022-05-16 PROCEDURE — 99024 POSTOP FOLLOW-UP VISIT: CPT | Performed by: ORTHOPAEDIC SURGERY

## 2022-05-23 ENCOUNTER — OFFICE VISIT (OUTPATIENT)
Dept: FAMILY MEDICINE CLINIC | Age: 72
End: 2022-05-23
Payer: MEDICARE

## 2022-05-23 VITALS
HEIGHT: 63 IN | WEIGHT: 195 LBS | TEMPERATURE: 98.1 F | DIASTOLIC BLOOD PRESSURE: 54 MMHG | HEART RATE: 73 BPM | RESPIRATION RATE: 18 BRPM | SYSTOLIC BLOOD PRESSURE: 110 MMHG | OXYGEN SATURATION: 97 % | BODY MASS INDEX: 34.55 KG/M2

## 2022-05-23 DIAGNOSIS — I77.9 CAROTID ARTERY DISEASE, UNSPECIFIED LATERALITY, UNSPECIFIED TYPE (HCC): ICD-10-CM

## 2022-05-23 DIAGNOSIS — E78.2 MIXED HYPERLIPIDEMIA: ICD-10-CM

## 2022-05-23 DIAGNOSIS — R53.83 OTHER FATIGUE: ICD-10-CM

## 2022-05-23 DIAGNOSIS — K21.9 GASTROESOPHAGEAL REFLUX DISEASE WITHOUT ESOPHAGITIS: ICD-10-CM

## 2022-05-23 DIAGNOSIS — E66.09 CLASS 1 OBESITY DUE TO EXCESS CALORIES WITHOUT SERIOUS COMORBIDITY WITH BODY MASS INDEX (BMI) OF 34.0 TO 34.9 IN ADULT: ICD-10-CM

## 2022-05-23 DIAGNOSIS — E11.65 TYPE 2 DIABETES MELLITUS WITH HYPERGLYCEMIA, WITH LONG-TERM CURRENT USE OF INSULIN (HCC): ICD-10-CM

## 2022-05-23 DIAGNOSIS — G56.01 CARPAL TUNNEL SYNDROME OF RIGHT WRIST: Primary | ICD-10-CM

## 2022-05-23 DIAGNOSIS — D64.9 ANEMIA, UNSPECIFIED TYPE: ICD-10-CM

## 2022-05-23 DIAGNOSIS — E55.9 VITAMIN D DEFICIENCY: ICD-10-CM

## 2022-05-23 DIAGNOSIS — Z79.4 TYPE 2 DIABETES MELLITUS WITH HYPERGLYCEMIA, WITH LONG-TERM CURRENT USE OF INSULIN (HCC): ICD-10-CM

## 2022-05-23 DIAGNOSIS — G47.33 OSA (OBSTRUCTIVE SLEEP APNEA): ICD-10-CM

## 2022-05-23 DIAGNOSIS — G47.09 OTHER INSOMNIA: ICD-10-CM

## 2022-05-23 DIAGNOSIS — E83.42 HYPOMAGNESEMIA: ICD-10-CM

## 2022-05-23 DIAGNOSIS — R01.1 MURMUR: ICD-10-CM

## 2022-05-23 DIAGNOSIS — I10 ESSENTIAL HYPERTENSION: ICD-10-CM

## 2022-05-23 DIAGNOSIS — F41.9 ANXIETY: ICD-10-CM

## 2022-05-23 PROCEDURE — 1123F ACP DISCUSS/DSCN MKR DOCD: CPT | Performed by: INTERNAL MEDICINE

## 2022-05-23 PROCEDURE — 2022F DILAT RTA XM EVC RTNOPTHY: CPT | Performed by: INTERNAL MEDICINE

## 2022-05-23 PROCEDURE — G8400 PT W/DXA NO RESULTS DOC: HCPCS | Performed by: INTERNAL MEDICINE

## 2022-05-23 PROCEDURE — G8427 DOCREV CUR MEDS BY ELIG CLIN: HCPCS | Performed by: INTERNAL MEDICINE

## 2022-05-23 PROCEDURE — 1090F PRES/ABSN URINE INCON ASSESS: CPT | Performed by: INTERNAL MEDICINE

## 2022-05-23 PROCEDURE — G8417 CALC BMI ABV UP PARAM F/U: HCPCS | Performed by: INTERNAL MEDICINE

## 2022-05-23 PROCEDURE — 1036F TOBACCO NON-USER: CPT | Performed by: INTERNAL MEDICINE

## 2022-05-23 PROCEDURE — 3017F COLORECTAL CA SCREEN DOC REV: CPT | Performed by: INTERNAL MEDICINE

## 2022-05-23 PROCEDURE — 99214 OFFICE O/P EST MOD 30 MIN: CPT | Performed by: INTERNAL MEDICINE

## 2022-05-23 PROCEDURE — 3052F HG A1C>EQUAL 8.0%<EQUAL 9.0%: CPT | Performed by: INTERNAL MEDICINE

## 2022-05-23 RX ORDER — ALPRAZOLAM 0.25 MG/1
0.25 TABLET ORAL DAILY PRN
Qty: 4 TABLET | Refills: 0 | Status: SHIPPED | OUTPATIENT
Start: 2022-05-23 | End: 2022-08-25 | Stop reason: SDUPTHER

## 2022-05-23 RX ORDER — ZOLPIDEM TARTRATE 10 MG/1
10 TABLET ORAL NIGHTLY PRN
Qty: 90 TABLET | Refills: 0 | Status: SHIPPED
Start: 2022-05-23 | End: 2022-10-24 | Stop reason: SDUPTHER

## 2022-05-23 ASSESSMENT — ENCOUNTER SYMPTOMS
DIARRHEA: 0
CHEST TIGHTNESS: 0
COUGH: 0
CONSTIPATION: 0
ABDOMINAL PAIN: 0
EYE PAIN: 0
RHINORRHEA: 0
BLOOD IN STOOL: 0
NAUSEA: 0
SORE THROAT: 0
VOMITING: 0
SHORTNESS OF BREATH: 1

## 2022-05-23 ASSESSMENT — PATIENT HEALTH QUESTIONNAIRE - PHQ9
DEPRESSION UNABLE TO ASSESS: YES
2. FEELING DOWN, DEPRESSED OR HOPELESS: NOT AT ALL
1. LITTLE INTEREST OR PLEASURE IN DOING THINGS: NOT AT ALL
SUM OF ALL RESPONSES TO PHQ9 QUESTIONS 1 & 2: 0

## 2022-05-23 NOTE — PROGRESS NOTES
[facility]) Physicians   Internal Medicine     2022  Calvin Kelly : 1950 Sex: female  Age:74 y.o. Chief Complaint   Patient presents with    Cholesterol Problem     f/u    Diabetes     f/u    Hypertension     f/u        HPI:   Patient presents to office for evaluation of the following medical concerns. - asking for anxiety medication for stress of driving on long trip to [place]. - States has had pain in b/l UE. States pain from elbow to fingers. States describes has ache type pain. States also weakness. States varied in intensity. States had had difficulty gripping things. EMG () - A chronic right ulnar neuropathy. A chronic right median mononeuropathy at the wrist (I.e., carpal tunnel syndrome) versus a chronic right C8/T1 cervical radiculopathy cannot be excluded underlying the above. States s/p op () -right carpal tunnel release right ulnar nerve decompression at elbow right wrist triangular fibrocartilage complex injection right index proximal interphalangeal joint injection. States still having pain. Has had follow up, no complications. Only taking meds at night for pain. - States has had some depression. Last PHQ score was 0 (2021). Discussed treatment - psychology and or medication - declines (2022)      - States still having fatigue. States having body aches. No fever or chills     - States has murmur. Following with cardiology. Last visit was (2021). Last Echo (2021)  EF 60-65%. - Has carotid bruit. Had US carotid (2021) - b/l stenosis 50-69%. - Labs showed anemia. States was placed on iron - self stopped due to constipation issues. Has seen GI - s/p EGD and colonoscopy. Last lab (2022) was stable . - States has been having issues with insomnia. Uncontrolled. States stopped taking lorazepam. States has been taking Ambien - decreased ambien. Still with issues sleeping. Discussed medications and interactions. Has sleep apnea.  Not wearing cpap.     - States has diabetes. States trying to watch diet. States checking blood sugars at home , did have a few > 200. States lantus 65 units daily, metformin 1000mg twice a day, added humalog slide scale. Stopped trulicity (cost). States occasional reported hypoglycemia. On lisinopril. On pravastatin. States follows with optho. Last visit with endo per reviewed note (2/2022) - A1c 8.7, continue Lantus and insulin sliding scale    - States has high blood pressure. States occasioanlly checking blood pressure at home, has wrist cuff - 120's/60's. On lisinopril.     - States has high cholesterol. States trying to watch diet. On pravastatin. No reported side effects. Last labs (2/2022)     - States has had gastric ulcers and GERD. On omeprazole as needed. Stable. - States has vitamin D def. On otc supplement     -  has had low magnesium levels on supplement. Last lab low  (2/2022) - increased to twice (95/5265) - uncertain if taking     -  stopped vit b12 supplement.     -  was told had elevated LFT and enlarged liver. Last lab (2/2022) was normal.     optho (1/22) -mild bilateral diabetic retinopathy without significant edema damage due to macular edema in the left eye after cataract surgery no significant edema present    -  had neck pain. [facility] had decreased ROM. [facility] was seen by chiropractor. [facility] s/p physical therapy and has helped.      Health Maintenance   - immunizations:   Influenza Vaccination - (2019), (9/16/20)  Pneumonia Vaccination  Zoster/Shingles Vaccine  Tetanus Vaccination  covid (3/4/2021) #1, (4/1/2021) #2, (12/27/2021) #3 - moderna     - Screenings:   Bone Density Scan   Pelvic/Pap Exam  Mammogram - declines     Colonoscopy - (6/20) hemorrhoids, diverticular disease, multiple polyps, tubular and hyperplastic per path, follow up in 3 years     EGD (6/20) -normal    optho (3/21) - for cataract     ROS:  Review of Systems   Constitutional: Negative for appetite change, chills, fever and unexpected weight change. HENT: Negative for congestion, rhinorrhea and sore throat. Eyes: Negative for pain and visual disturbance. Respiratory: Positive for shortness of breath. Negative for cough and chest tightness. Cardiovascular: Negative for chest pain and palpitations. Gastrointestinal: Negative for abdominal pain, blood in stool, constipation, diarrhea, nausea and vomiting. Genitourinary: Negative for difficulty urinating, dysuria, frequency, pelvic pain, urgency and vaginal bleeding. Musculoskeletal: Negative for arthralgias and myalgias. Skin: Negative for rash. Neurological: Negative for dizziness, syncope, weakness, light-headedness, numbness and headaches. Hematological: Negative for adenopathy. Psychiatric/Behavioral: Negative for suicidal ideas. The patient is not nervous/anxious. Current Outpatient Medications:     zolpidem (AMBIEN) 10 MG tablet, Take 1 tablet by mouth nightly as needed for Sleep for up to 90 days. , Disp: 90 tablet, Rfl: 0    ALPRAZolam (XANAX) 0.25 MG tablet, Take 1 tablet by mouth daily as needed for Anxiety for up to 30 days. , Disp: 4 tablet, Rfl: 0    atorvastatin (LIPITOR) 40 MG tablet, Take 1 tablet by mouth daily, Disp: 90 tablet, Rfl: 1    aspirin 81 MG EC tablet, Take 81 mg by mouth daily, Disp: , Rfl:     metFORMIN (GLUCOPHAGE) 1000 MG tablet, Take 1 tablet by mouth 2 times daily (with meals), Disp: 180 tablet, Rfl: 1    lisinopril (PRINIVIL;ZESTRIL) 20 MG tablet, Take 1 tablet by mouth daily, Disp: 90 tablet, Rfl: 1    insulin lispro, 1 Unit Dial, (HUMALOG KWIKPEN) 100 UNIT/ML SOPN, Inject up to 12 units QAC TID, Disp: 15 pen, Rfl: 1    insulin glargine (LANTUS SOLOSTAR) 100 UNIT/ML injection pen, Inject 65 Units into the skin every morning, Disp: 15 pen, Rfl: 1    magnesium (MAGNESIUM-OXIDE) 250 MG TABS tablet, Take 250 mg by mouth in the morning and at bedtime, Disp: , Rfl:     Insulin Pen Needle (B-D UF III MINI PEN NEEDLES) 31G X 5 MM MISC, 1 each by Does not apply route daily, Disp: , Rfl:     No Known Allergies    Past Medical History:   Diagnosis Date    Cervical spondylosis 2021    Class 2 obesity due to excess calories without serious comorbidity with body mass index (BMI) of 35.0 to 35.9 in adult 2019    Essential hypertension 2019    Hypomagnesemia 2019    Mixed hyperlipidemia 2019    Other insomnia 2019    Right carpal tunnel syndrome     Type 2 diabetes mellitus without complication ([facility] [address]     Vitamin D deficiency 2019       Past Surgical History:   Procedure Laterality Date    ARM SURGERY Right 2022    RIGHT ULNAR NERVE IN SITU DECOMPRESSION AT ELBOW,  RIGHT WRIST TRIANGULAR FIBROCARTILAGE COMPLEX INJECTION, RIGHT INDEX PROXIMAL INTERPHALANGEAL JOINT INJECTION performed by Vania Macedo MD at [facility] 80 Left     CARPAL TUNNEL RELEASE Right 2022    RIGHT CARPAL TUNNEL RELEASE performed by Vania Macedo MD at [facility] 22 CATARACT REMOVAL WITH IMPLANT      CHOLECYSTECTOMY      COLONOSCOPY      HYSTERECTOMY      HYSTERECTOMY, VAGINAL      partial in the s then bilateral oopherectome 2016    RETINAL DETACHMENT SURGERY Right     vitreous surgery    TUBAL LIGATION         Family History   Problem Relation Age of Onset    Diabetes Mother     Stroke Mother     Kidney Disease Father         Renal Failure       Social History     Socioeconomic History    Marital status:      Spouse name: Venus Living Number of children: 2    Years of education: 15    Highest education level: High school graduate   Occupational History    None   Tobacco Use    Smoking status: Former Smoker     Packs/day: 1.00     Years: 12.00     Pack years: 12.00     Types: Cigarettes     Quit date: 1982     Years since quittin.3    Smokeless tobacco: Never Used   Vaping Use    Vaping Use: Never used   Substance and Sexual Activity    Alcohol use: Yes     Comment: rare    Drug use: None    Sexual activity: None   Other Topics Concern    None   Social History Narrative    None     Social Determinants of Health     Financial Resource Strain: Low Risk     Difficulty of Paying Living Expenses: Not hard at all   Food Insecurity: No Food Insecurity    Worried About Running Out of Food in the Last Year: Never true    Shaniqua of Food in the Last Year: Never true   Transportation Needs:     Lack of Transportation (Medical): Not on file    Lack of Transportation (Non-Medical): Not on file   Physical Activity:     Days of Exercise per Week: Not on file    Minutes of Exercise per Session: Not on file   Stress:     Feeling of Stress : Not on file   Social Connections:     Frequency of Communication with Friends and Family: Not on file    Frequency of Social Gatherings with Friends and Family: Not on file    Attends Ep[employer] Services: Not on file    Active Member of [address] [place] [address] [employer] or Organizations: Not on file    Attends Club or Organization Meetings: Not on file    Marital Status: Not on file   Intimate Partner Violence:     Fear of Current or Ex-Partner: Not on file    Emotionally Abused: Not on file    Physically Abused: Not on file    Sexually Abused: Not on file   Housing Stability:     Unable to Pay for Housing in the Last Year: Not on file    Number of [place] in the Last Year: Not on file    Unstable Housing in the Last Year: Not on file        Vitals:    05/23/22 1021   BP: (!) 110/54   Pulse: 73   Resp: 18   Temp: 98.1 °F (36.7 °C)   TempSrc: Temporal   SpO2: 97%   Weight: 195 lb (88.5 kg)   Height: 5' 3\" (1.6 m)       Exam:  Physical Exam  Constitutional:       Appearance: She is well-developed. HENT:      Head: Normocephalic and atraumatic. Right Ear: External ear normal.      Left Ear: External ear normal.   Eyes:      Pupils: Pupils are equal, round, and reactive to light. Neck:      Thyroid: No thyromegaly. Vascular: Carotid bruit present. Cardiovascular:      Rate and Rhythm: Normal rate and regular rhythm. Heart sounds: Murmur heard. Systolic murmur is present with a grade of 2/6. Pulmonary:      Effort: Pulmonary effort is normal.      Breath sounds: Normal breath sounds. No wheezing. Abdominal:      General: Bowel sounds are normal. There is no distension. Palpations: Abdomen is soft. Tenderness: There is no abdominal tenderness. There is no guarding or rebound. Musculoskeletal:         General: Normal range of motion. Cervical back: Normal range of motion and neck supple. Lymphadenopathy:      Cervical: No cervical adenopathy. Skin:     General: Skin is warm and dry. Neurological:      Mental Status: She is alert and oriented to person, place, and time. Cranial Nerves: No cranial nerve deficit. Psychiatric:         Judgment: Judgment normal.         Assessment and Plan:    Diagnoses and all orders for this visit:      Anxiety   - situational   - xanax prn travel     Controlled Substance Monitoring:    Acute and Chronic Pain Monitoring:   RX Monitoring 5/23/2022   Periodic Controlled Substance Monitoring Possible medication side effects, risk of tolerance/dependence & alternative treatments discussed. ;No signs of potential drug abuse or diversion identified. Carpal tunnel syndrome of right wrist  - uncertain etiology   - EMG (12/21) - A chronic right ulnar neuropathy.   A chronic right median mononeuropathy at the wrist (I.e., carpal tunnel syndrome) versus a chronic right C8/T1 cervical radiculopathy cannot be excluded underlying the above   - op (5/22) -right carpal tunnel release right ulnar nerve decompression at elbow right wrist triangular fibrocartilage complex injection right index proximal interphalangeal joint injection     Anemia  - uncertain cause   - P75 and folic acid was normal   - following with GI   - s/p  EGD and colonoscopy (6/2020) - polyps   - has not tolerated iron - constipation - not helped with stool softener - stopped   - last lab (10/2021) - normal    Type 2 diabetes mellitus with hyperglycemia, with long-term current use of insulin (HCC)  - watch diet   - monitor blood sugar at home, goals fasting am <120, 2 hours post meal < 180  - following with endocrinology   - on lantus 65 units   - on metformin 1000mg bid  - endocrinology added humalog slide scale  - stopped glipizide   - stopped trulicity due to cost   - follow A1c - 8.7 (2/2022)     On ACE  On statin   Not on aspirin - h/o PUD   optho (1/22) -mild bilateral diabetic retinopathy without significant edema receptor damage due to macular edema in the left eye after cataract surgery no significant edema present    Essential hypertension  - watch diet   - monitor blood pressure at home   - on lisinopril   - stable 5/23/2022    Mixed hyperlipidemia  - Continue present treatment   - watch diet   - stop pravastatin for higher intensity statin   - on atorvastatin   - follow labs   - last lab (10/2021) - stable     Gastroesophageal reflux disease without esophagitis   - watch diet   - h/o PUD   - on omeprazole prn   - on tums as needed     DEYSI (obstructive sleep apnea)  - referred to sleep medicine     Other insomnia  - states has had sleep study in the past - did nto tolerate   - On Ambien  - weaned off lorazepam   - discussed did not recommend these medications   - continue Ambien for now   - sleep habits handout provided   - referral to sleep medicine   - discussed sleep apnea testing  - referral placed     Controlled Substance Monitoring:    Acute and Chronic Pain Monitoring:   RX Monitoring 5/23/2022   Periodic Controlled Substance Monitoring Possible medication side effects, risk of tolerance/dependence & alternative treatments discussed. ;No signs of potential drug abuse or diversion identified.        Class 1 obesity due to excess calories without serious comorbidity with body mass index (BMI) of 34.0 to 34.9 in adult  - watch diet   - discussed diet and exercises     Vitamin D deficiency  - on otc supplement   - follow labs     Hypomagnesemia  - on otc supplement   - follow labs   - last lab (10/2021) - low   - increased magnesium supplement to twice a day     Elevated LFTs  - uncertain etiology at present   - recheck labs - last lab (2/2022) - Stable and normal     Murmur  - has seen cardiology (5/2021)   - had echo (8/2021) - ef 60-65%, no wall motion abnormalities, trace MR     Carotid artery disease, unspecified laterality, unspecified type ([address]  -US carotid (5/2021) - 50-69% stenosis b/l    - repeat in 1 year   - declines recheck 5/23/2022 - would like to discuss at next visit     Return in about 3 months (around 8/23/2022) for check up and review and labs. Orders Placed This Encounter   Procedures    Comprehensive Metabolic Panel     Standing Status:   Future     Standing Expiration Date:   5/23/2023    Magnesium     Standing Status:   Future     Standing Expiration Date:   5/23/2023    Lipid, Fasting     Standing Status:   Future     Standing Expiration Date:   5/23/2023    Vitamin D 25 Hydroxy     Standing Status:   Future     Standing Expiration Date:   5/23/2023    TSH     Standing Status:   Future     Standing Expiration Date:   8/23/2022    Microalbumin, Ur     Standing Status:   Future     Standing Expiration Date:   5/23/2023    Urinalysis     Standing Status:   Future     Standing Expiration Date:   5/23/2023    CBC with Auto Differential     Standing Status:   Future     Standing Expiration Date:   5/23/2023    Vitamin B12 & Folate     Standing Status:   Future     Standing Expiration Date:   5/23/2023    Ferritin     Standing Status:   Future     Standing Expiration Date:   5/23/2023    Iron and TIBC     Standing Status:   Future     Standing Expiration Date:   5/23/2023     Order Specific Question:   Is Patient Fasting?      Answer:   yes     Order Specific Question:   No of Hours? Answer:   8     Requested Prescriptions     Signed Prescriptions Disp Refills    zolpidem (AMBIEN) 10 MG tablet 90 tablet 0     Sig: Take 1 tablet by mouth nightly as needed for Sleep for up to 90 days.  ALPRAZolam (XANAX) 0.25 MG tablet 4 tablet 0     Sig: Take 1 tablet by mouth daily as needed for Anxiety for up to 30 days.         Elida Mckinney MD  5/23/2022  11:00 AM

## 2022-06-05 NOTE — PROGRESS NOTES
[facility]    Patient Name: Dai Dodson  Age: 66 y.o.   : 1950    Date of Visit: 22      HPI   Dai Dodson is a 66 y.o. female with  has a past medical history of Cervical spondylosis (2021), Class 2 obesity due to excess calories without serious comorbidity with body mass index (BMI) of 35.0 to 35.9 in adult (2019), Essential hypertension (2019), Hypomagnesemia (2019), Mixed hyperlipidemia (2019), Other insomnia (2019), Right carpal tunnel syndrome, Type 2 diabetes mellitus without complication ([facility] [address] and Vitamin D deficiency (2019). She presents as a new patient to Sleep Clinic, referred by Dr. Adina Braden, for Sleep Apnea and Insomnia   . Patient presents to establish with sleep medicine regarding several ongoing and chronic sleep disturbances including suspected sleep apnea and insomnia. She first had a sleep study over 10 years ago, unknown aware of location as well as unknown severity. She never followed up with CPAP therapy. Per patient, she has had difficulty with snoring and sleep cycles most of her adult life and also into childhood as well. She displays symptoms of sleep apnea such as very loud snoring, and witnessed apneas in her breathing at night. She also has difficulty maintaining sleep, waking up for unknown reason. She admits to having daytime sleepiness and mild difficulty with memory. She feels her sleep is very fragmented and \"tosses and turns \"in bed all night. There are times during the night where she will get up and go to the recliner chair because she does not want to disturb her . Patient rarely feels rested upon awakening and estimates that she receives 4 to 5 hours of sleep on a good night. She does not nap intentionally, but will fall asleep in the evenings or if she is not performing activities involving the need for an active mind. She denies drowsy driving.     Patient typically sleeps on her right side and sometimes on her back, this is rare as she is not comfortable on her back mainly due to breathing disruptions. Patient also has taken Ambien nightly for many years at 10 mg. She takes this every night and feels that it helps her to calm down. Patient denies side effects of Ambien. She does not necessarily have difficulty falling asleep, her main issue is staying asleep. She does note though that when she goes into her bed not sleepy, she will be up for hours. She typically watches TV in her recliner until she becomes very sleepy. Her actually sleep environment is dark, quiet. Usually, patient can sleep for several hours and then will wake up and have difficulty returning to sleep. Patient states that she does worry about her sleep and especially if she has to be up at a certain time the next day. She notes that her sleep is \"much worse\" when she has something to do the next day, versus if her next day is free. She constantly counts how much time/sleep she has left when she wakes in the night. Patient is retired now, but worked 2nd shift the majority of her life. Stable weight this past year. Sleep Study History: No record of her previous sleep study. Bed time: 12-1 am  Wake time: 8 am    Sleep Latency (min): within 30      Sleep Medications: Ambien 10 mg for years  Awakenings: multiple- takes a while to fall back asleep, can't usually fall back asleep. Estimated Sleep time (hours): 4-5 on a good night   Daytime Naps: Not intentially-but she will fall asleep in the evning. Not usually when activites, mind active wot go to sleep. Sleep disturbances: None  Sleep Location: Bed/recliner  Sleep environment: Sleeps on right side, sometimes on back. Rare. Not comfortable on back.  -- mostly breathing  Occupation: Retired    SLEEP HYGIENE     Activities before bed: TV  Caffeine:  2 cup in am   Coffee    0   Soda    0   Tea  Alcohol: rare  Tobacco: N, former quit 25 years ago Sleep ROS:     Snoring: Y, loud   Witnessed apneas: Y, pauses in breathing  AM Headache: N  Dry Mouth: Sometimes   Daytime Sleepiness: Y  Difficulty remembering things: Mild   MVA  or near miss accident due to sleepiness in the past? N  Tonsillectomy? N  Have you lost or gained weight recently?  Stable       PARASOMNIAS/ NARCOLEPSY:  Hypnogogic/Hynopompic Hallucinations: N   Sleep paralysis: N   REM behavior symptoms: N  Nightmare: N   Sleep Walking: N  Sleep Talking: N  RLS Symptoms: Rare    STOP-BANG score of 6/8 for  (+)snoring, (+)daytime fatigue, (+)observed apneas, (+)high blood pressure, (-)BMI>35, (+)age >50, (+)neck circumference >15in, (+)female gender      Sleep Medicine 6/6/2022   Sitting and reading 1   Watching TV 1   Sitting, inactive in a public place (e.g. a theatre or a meeting) 1   As a passenger in a car for an hour without a break 0   Lying down to rest in the afternoon when circumstances permit 2   Sitting and talking to someone 0   Sitting quietly after a lunch without alcohol 1   In a car, while stopped for a few minutes in traffic 0   Total score 6   Neck circumference (Inches) 17.5       PMH:  Past Medical History:   Diagnosis Date    Cervical spondylosis 12/7/2021    Class 2 obesity due to excess calories without serious comorbidity with body mass index (BMI) of 35.0 to 35.9 in adult 12/18/2019    Essential hypertension 12/18/2019    Hypomagnesemia 12/18/2019    Mixed hyperlipidemia 12/18/2019    Other insomnia 12/18/2019    Right carpal tunnel syndrome     Type 2 diabetes mellitus without complication ([facility] [address]     Vitamin D deficiency 12/18/2019        PSH:  Past Surgical History:   Procedure Laterality Date    ARM SURGERY Right 5/4/2022    RIGHT ULNAR NERVE IN SITU DECOMPRESSION AT ELBOW,  RIGHT WRIST TRIANGULAR FIBROCARTILAGE COMPLEX INJECTION, RIGHT INDEX PROXIMAL INTERPHALANGEAL JOINT INJECTION performed by Pauline Garcia MD at [place] [address] Left     CARPAL TUNNEL RELEASE Right 2022    RIGHT CARPAL TUNNEL RELEASE performed by Saadia Blackwood MD at [address] IMPLANT      CHOLECYSTECTOMY      COLONOSCOPY      HYSTERECTOMY (CERVIX STATUS UNKNOWN)      HYSTERECTOMY, VAGINAL      partial in the 80s then bilateral oopherectome 2016    RETINAL DETACHMENT SURGERY Right     vitreous surgery    TUBAL LIGATION          Soc Hx:  Social History     Tobacco Use    Smoking status: Former Smoker     Packs/day: 1.00     Years: 12.00     Pack years: 12.00     Types: Cigarettes     Quit date: 1982     Years since quittin.3    Smokeless tobacco: Never Used   Vaping Use    Vaping Use: Never used   Substance Use Topics    Alcohol use: Yes     Comment: rare    Drug use: Not on file        Fam Hx:  Family History   Problem Relation Age of Onset    Diabetes Mother     Stroke Mother     Kidney Disease Father         Renal Failure        Current Outpatient Medications   Medication Sig Dispense Refill    zolpidem (AMBIEN) 10 MG tablet Take 1 tablet by mouth nightly as needed for Sleep for up to 90 days. 90 tablet 0    ALPRAZolam (XANAX) 0.25 MG tablet Take 1 tablet by mouth daily as needed for Anxiety for up to 30 days.  4 tablet 0    atorvastatin (LIPITOR) 40 MG tablet Take 1 tablet by mouth daily 90 tablet 1    aspirin 81 MG EC tablet Take 81 mg by mouth daily      metFORMIN (GLUCOPHAGE) 1000 MG tablet Take 1 tablet by mouth 2 times daily (with meals) 180 tablet 1    lisinopril (PRINIVIL;ZESTRIL) 20 MG tablet Take 1 tablet by mouth daily 90 tablet 1    insulin lispro, 1 Unit Dial, (HUMALOG KWIKPEN) 100 UNIT/ML SOPN Inject up to 12 units QAC TID 15 pen 1    insulin glargine (LANTUS SOLOSTAR) 100 UNIT/ML injection pen Inject 65 Units into the skin every morning 15 pen 1    magnesium (MAGNESIUM-OXIDE) 250 MG TABS tablet Take 250 mg by mouth in the morning and at bedtime      Insulin Pen Needle (B-D UF III MINI PEN NEEDLES) 31G X 5 MM MISC 1 each by Does not apply route daily       No current facility-administered medications for this visit. Review of Systems  (Sleep ROS above)     Constitutional: no chills, no fever   Eyes: no blurred vision   Cardiovascular: no chest pain  Respiratory: no cough, no shortness of breath   Gastrointestinal:  no nausea,  no vomiting, no diarrhea. Neurological:  no dizziness,  no headache, no memory changes. Endocrine: No feelings of weakness    Objective:   Physical Exam  /66 (Site: Left Upper Arm, Position: Sitting, Cuff Size: Large Adult)   Pulse 75   Resp 14   Ht 5' 3\" (1.6 m)   Wt 192 lb 4.8 oz (87.2 kg)   SpO2 98%   BMI 34.06 kg/m²      Additional Measurements    06/06/22 1102   Neck circumference (Inches): 17.5       General: No acute distress. BMI of Body mass index is 34.06 kg/m². HEENT: Normocephalic, atraumatic. No oropharyngeal lesions. Neck circumference (Inches): 17.5  Mallampati class- 3/4  Tonsils- 1  Neck: Trachea midline  Lungs: Clear to auscultation bilaterally. No wheezes or crackles  Cardiac: Regular rate and rhythm. No murmurs appreciated. Neurologic: Normal gait. Balance intact. Psychiatric: Alert and oriented. Appropriate affect. PERTINENT LAB RESULTS  TSH   Date Value Ref Range Status   02/24/2022 3.770 0.270 - 4.200 uIU/mL Final      Ferritin   Date Value Ref Range Status   02/12/2021 19 ng/mL Final     Comment:     FERRITIN Reference Ranges:  Adult Males   20 - 60 years:    27 - 400 ng/mL  Adult females 16 - 61 years:    15 - 150 ng/mL  Adults greater than 60 years:   no established reference range  Pediatrics:                     no established reference range        Assessment:      Kanika Chapman was seen today for sleep apnea and insomnia. Diagnoses and all orders for this visit:    Obstructive sleep apnea  -     Home Sleep Study;  Future    Obesity, unspecified classification, unspecified obesity type, unspecified whether serious comorbidity present    Insomnia, unspecified type    ·    Plan:      Lazarus Aranda is a 66 y.o. female with  has a past medical history of Cervical spondylosis (12/7/2021), Class 2 obesity due to excess calories without serious comorbidity with body mass index (BMI) of 35.0 to 35.9 in adult (12/18/2019), Essential hypertension (12/18/2019), Hypomagnesemia (12/18/2019), Mixed hyperlipidemia (12/18/2019), Other insomnia (12/18/2019), Right carpal tunnel syndrome, Type 2 diabetes mellitus without complication ([facility] [address] and Vitamin D deficiency (12/18/2019). She presents as a new patient to Sleep Clinic with chronic untreated sleep apnea and insomnia. A home sleep study will be performed. 1. Obstructive Sleep Apnea     -Multiple risk factors with STOP-BANG 6/8. Discussed in lab versus home sleep studies. Will proceed withsplit-night home sleep study, as patient is more comfortable sleeping at home. Order placed today for [facility].   -Discussed pathophysiology of DEYSI and its impact on daily well-being, as well as cardiometabolic and neurocognitive health (particularly in moderate-severe cases). -Discussed CPAP as first-line and gold-standard therapy for DEYSI should diagnosis be confirmed. Patient understands that CPAP should be worn every night for the duration of the night (in order to not miss therapy during early-morning REM period) for maximum benefit.   -Patient would like to follow up in office after sleep study is completed to further discuss options and to review sleep study.  -Informed patient to call office if she does not hear from sleep lab about scheduling within 1 to 2 weeks. 2. Chronic Sleep Initiation/Maintenance Insomnia     -Patient reports her main difficulty is staying asleep, but she has had use of Ambien nightly over the last several years.  May be multifactorial: poor sleep hygiene + untreated DEYSI + underlying anxiety.  -Introduced Cognitive Behavioral Therapy for Insomnia (first line therapy for psychophysiologic insomnia) as ideal way to manage insomnia symptoms. Briefly reviewed its core concepts, including cognitive restructuring, sleep scheduling, stimulus control, relaxation techniques, and sleep hygiene. Discussed that this is a better, healthier way to manage psychophysiologic insomnia. -Provided self-guided resources for CBT-I, including Say Leena to Insomnia by Dr. Juaquin Hendrickson. Offered referral to virtual sleep psychologist to initiate CBT-I. Explained this is the ideal approach to proceed with CBT-I, patient is not interested at this time.  -Sleep hygiene also discussed, she may see improvement with maintenance insomnia once her underlying sleep apnea is treated. -Will assess for improvement with PAP therapy and consider Cognitive Behavioral Therapy for Insomnia if persistent.  -Also discussed risks associated with Ambien, including complex sleep parasomnias such as sleep-walking, sleep driving, sleep- eating, these can occur anytime with use. 3. Obesity (Body mass index is 34.06 kg/m².)     -Discussed impact of weight gain on DEYSI severity. Patient understands that DEYSI severity may improve with weight loss but no guarantee of cure can be made. Encouraged weight loss efforts.  -Healthy weight loss advised. Patient will follow up Return in about 2 months (around 8/6/2022) for Sleep Study Results, Follow up for insomnia, Follow up for sleep apnea.     Kim Ortiz, APRN-CNP  [address] [place] [address] -[phone] option 2   [phone]

## 2022-06-06 ENCOUNTER — OFFICE VISIT (OUTPATIENT)
Dept: SLEEP MEDICINE | Age: 72
End: 2022-06-06
Payer: MEDICARE

## 2022-06-06 VITALS
WEIGHT: 192.3 LBS | SYSTOLIC BLOOD PRESSURE: 139 MMHG | RESPIRATION RATE: 14 BRPM | DIASTOLIC BLOOD PRESSURE: 66 MMHG | HEIGHT: 63 IN | BODY MASS INDEX: 34.07 KG/M2 | HEART RATE: 75 BPM | OXYGEN SATURATION: 98 %

## 2022-06-06 DIAGNOSIS — G47.33 OBSTRUCTIVE SLEEP APNEA: Primary | ICD-10-CM

## 2022-06-06 DIAGNOSIS — E66.9 OBESITY, UNSPECIFIED CLASSIFICATION, UNSPECIFIED OBESITY TYPE, UNSPECIFIED WHETHER SERIOUS COMORBIDITY PRESENT: ICD-10-CM

## 2022-06-06 DIAGNOSIS — G47.00 INSOMNIA, UNSPECIFIED TYPE: ICD-10-CM

## 2022-06-06 PROCEDURE — G8427 DOCREV CUR MEDS BY ELIG CLIN: HCPCS | Performed by: NURSE PRACTITIONER

## 2022-06-06 PROCEDURE — 99203 OFFICE O/P NEW LOW 30 MIN: CPT | Performed by: NURSE PRACTITIONER

## 2022-06-06 PROCEDURE — 1090F PRES/ABSN URINE INCON ASSESS: CPT | Performed by: NURSE PRACTITIONER

## 2022-06-06 PROCEDURE — G8417 CALC BMI ABV UP PARAM F/U: HCPCS | Performed by: NURSE PRACTITIONER

## 2022-06-06 ASSESSMENT — SLEEP AND FATIGUE QUESTIONNAIRES
HOW LIKELY ARE YOU TO NOD OFF OR FALL ASLEEP WHILE SITTING INACTIVE IN A PUBLIC PLACE: 1
HOW LIKELY ARE YOU TO NOD OFF OR FALL ASLEEP WHILE LYING DOWN TO REST IN THE AFTERNOON WHEN CIRCUMSTANCES PERMIT: 2
HOW LIKELY ARE YOU TO NOD OFF OR FALL ASLEEP WHILE WATCHING TV: 1
NECK CIRCUMFERENCE (INCHES): 17.5
HOW LIKELY ARE YOU TO NOD OFF OR FALL ASLEEP WHEN YOU ARE A PASSENGER IN A CAR FOR AN HOUR WITHOUT A BREAK: 0
ESS TOTAL SCORE: 6
HOW LIKELY ARE YOU TO NOD OFF OR FALL ASLEEP WHILE SITTING AND READING: 1
HOW LIKELY ARE YOU TO NOD OFF OR FALL ASLEEP IN A CAR, WHILE STOPPED FOR A FEW MINUTES IN TRAFFIC: 0
HOW LIKELY ARE YOU TO NOD OFF OR FALL ASLEEP WHILE SITTING QUIETLY AFTER LUNCH WITHOUT ALCOHOL: 1
HOW LIKELY ARE YOU TO NOD OFF OR FALL ASLEEP WHILE SITTING AND TALKING TO SOMEONE: 0

## 2022-06-06 NOTE — PATIENT INSTRUCTIONS
It was a pleasure seeing you today [facility]! Summary of Today's Visit            - You have been ordered a Home Sleep Study. The Sleep lab should call you about arranging a time for you to  the Home Sleep Study device and how to use it. A copy of the Home sleep study device instruction sheet is included (towards the back of this packet) and the sleep lab number is listed directly below. [facility] Number  [phone]      -Please do not drive when you are sleepy   -Please sign a request of records consent form, so that we may receive all of your previous sleep study reports and clinical notes, if they were not available today. - Please schedule a 2 month follow up today, to go over results        It is always advised that you check with your insurance company to determine how your study will be covered. For general questions or scheduling issues, call our nurse        Nati Leiva [phone] option 2  F- [phone]           The general categories for the treatment of Sleep Apnea include:     1. Supportive Care  -Elevate the head of the bed   -Avoid sleeping on your back      2. Weight loss  -Start a healthy weight loss program  -Consider a referral to Weight Loss Medicine Clinic     3. Oral Appliance \"Mouth Piece\"  (Mandibular Advancement Device)     4. Positive pressure therapy with a machine and a mask (CPAP or BiPAP)     5. Different kinds of surgeries, including nasal surgery, surgery of the throat/windpipe, and nerve stimulation therapy (INSPIRE). Helpful Web Sites: For patients with ALL SLEEP DISORDERS: American Academy of Sleep Medicine http://sleepeducation. org; or [employer]: https://sleepfoundation. org  For patients with DEYSI: American Sleep Apnea Association: http://marroquin.org/. org  For patients with RLS: RLS Foundation: Markie.clint  For patients with INSOMNIA: https://www.duval.org/. org/articles/sleep/insomnia-causes-and-cures. htm  For patients with DEPRESSION: [place] [employer].com.[employer]ack. com/depression         Sleep Medicine Terms     CPAP - Continuous Positive Airway Pressure - 1 set pressure (i.e. 7 cwp)  APAP - Automatic Positive Airway Pressure - PAP device determines in real time what pressure will work best confined to certain settings. (I.e. 4-15 cwp)  BiPAP - Bilevel Positive Airway Pressure - Higher pressure on taking a breath and lower pressure upon breathing out (i.e. 10/6 cwp)  CWP - Centimeters of water pressure   BRIAN - [facility] who sends you your CPAP/APAP/BiPAP and supplies. PSG - Polysomnogram - Overnight Sleep study  Titration Study - Overnight sleep study with the PAP device tired at different settings  Split Night study - PSG and titration study       Please note that complications of DYESI ,if present, and not treated can increase risk for: systemic hypertension , pulmonary hypertension (high blood pressure in the lungs), cardiovascular morbidities (heart attack and stroke), car accidents and all cause mortality (increased risk of death). Treatment for sleep apnea, if present, can reduce these risks. Sleep Studies: About This Test  What is it? Sleep studies are tests that evaluate your breathing during sleep. Sleep studies you do at home can be done with portable equipment. Why is this test done? Sleep studies are done for people who say that sleep isn't restful or that they are tired all day. These studies can help find sleep problems, such as:  · Sleep apnea. This means that an adult regularly stops breathing during sleep for 10 seconds or longer. · Excessive snoring. How is the test done? · Soft elastic belts will be placed around your chest measure your breathing. · Your blood oxygen levels will be checked by a small clip (oximeter) placed on the tip of your index finger.     How long does the test take?  · Overnight at home    What happens after the test?  · You may not sleep well during the test and may be tired the next day. · After your sleep problem has been identified, you may need a second study if your doctor orders treatment such as CPAP. ·   Follow-up care is a key part of your treatment and safety. Be sure to make and go to all appointments, and call your doctor if you are having problems. It's also a good idea to keep a list of the medicines you take. Ask your doctor when you can expect to have your test results. © 2511-7606 Healthwise, Incorporated. Care instructions adapted under license by [facility] ([facility]). If you have questions about a medical condition or this instruction, always ask your healthcare professional. [place] [address] any warranty or liability for your use of this information. Cognitive Behavioral Therapy for Insomnia Resources    Book: Say Leena to Insomnia by Dr. Donald Albert ($12 on [employer])    [address] [place] [address],[address] Floor! To Sleep Module ($40): https://www.[employer]/. com/Pages/GoToSleep.htm      Please note the following concerns and risks that can be associated with the use of zolpidem (Ambien):    -  Medications in this class can cause complex sleep parasomnias such as sleep-walking, sleep driving, sleep- eating, and even violent behaviors that can result in serious injury. If this occurs, please contact your prescribing physician immediately. - Residual effects from medication can impair function the morning after use and cause daytime sleepiness. - Increased risk of falls in the elderly. - Possible anterograde amnesia ( inability to remember information after drug administration) can occur with this class of medication.     - Tolerance, or the loss of effectiveness of medication with repeated use, may occur after regular and long term use.     - Be aware that rebound insomnia may occur and sleep may be worsened for 1-2 nights after discontinuation of medication. --Long term use of sedatives/hypnotics should be avoided and as discussed in your visit, the first line, gold standard treatment of chronic initiation and maintenance insomnia is called Cognitive Behavioral Therapy for Insomnia which is a 4-6 week program consisting of core concepts such as cognitive restructuring, sleep scheduling, stimulus control, relaxation techniques, and sleep hygiene.

## 2022-06-30 ENCOUNTER — OFFICE VISIT (OUTPATIENT)
Dept: FAMILY MEDICINE CLINIC | Age: 72
End: 2022-06-30
Payer: MEDICARE

## 2022-06-30 ENCOUNTER — OFFICE VISIT (OUTPATIENT)
Dept: ORTHOPEDIC SURGERY | Age: 72
End: 2022-06-30
Payer: MEDICARE

## 2022-06-30 VITALS
OXYGEN SATURATION: 98 % | BODY MASS INDEX: 34.06 KG/M2 | SYSTOLIC BLOOD PRESSURE: 146 MMHG | TEMPERATURE: 98.1 F | HEIGHT: 63 IN | DIASTOLIC BLOOD PRESSURE: 68 MMHG | HEART RATE: 86 BPM | RESPIRATION RATE: 22 BRPM

## 2022-06-30 VITALS — BODY MASS INDEX: 34.55 KG/M2 | WEIGHT: 195 LBS | HEIGHT: 63 IN

## 2022-06-30 DIAGNOSIS — M16.11 PRIMARY OSTEOARTHRITIS OF RIGHT HIP: ICD-10-CM

## 2022-06-30 DIAGNOSIS — S76.011A STRAIN OF GLUTEUS MEDIUS OF RIGHT LOWER EXTREMITY, INITIAL ENCOUNTER: ICD-10-CM

## 2022-06-30 DIAGNOSIS — E78.2 MIXED HYPERLIPIDEMIA: ICD-10-CM

## 2022-06-30 DIAGNOSIS — M17.11 LOCALIZED OSTEOARTHRITIS OF RIGHT KNEE: Primary | ICD-10-CM

## 2022-06-30 DIAGNOSIS — M25.562 ACUTE PAIN OF LEFT KNEE: Primary | ICD-10-CM

## 2022-06-30 PROCEDURE — 99213 OFFICE O/P EST LOW 20 MIN: CPT | Performed by: PHYSICIAN ASSISTANT

## 2022-06-30 PROCEDURE — 99214 OFFICE O/P EST MOD 30 MIN: CPT | Performed by: INTERNAL MEDICINE

## 2022-06-30 PROCEDURE — G8417 CALC BMI ABV UP PARAM F/U: HCPCS | Performed by: INTERNAL MEDICINE

## 2022-06-30 PROCEDURE — 1123F ACP DISCUSS/DSCN MKR DOCD: CPT | Performed by: INTERNAL MEDICINE

## 2022-06-30 PROCEDURE — G8427 DOCREV CUR MEDS BY ELIG CLIN: HCPCS | Performed by: INTERNAL MEDICINE

## 2022-06-30 PROCEDURE — 1123F ACP DISCUSS/DSCN MKR DOCD: CPT | Performed by: PHYSICIAN ASSISTANT

## 2022-06-30 PROCEDURE — 3017F COLORECTAL CA SCREEN DOC REV: CPT | Performed by: INTERNAL MEDICINE

## 2022-06-30 PROCEDURE — 96372 THER/PROPH/DIAG INJ SC/IM: CPT | Performed by: INTERNAL MEDICINE

## 2022-06-30 PROCEDURE — G8400 PT W/DXA NO RESULTS DOC: HCPCS | Performed by: INTERNAL MEDICINE

## 2022-06-30 PROCEDURE — 1090F PRES/ABSN URINE INCON ASSESS: CPT | Performed by: INTERNAL MEDICINE

## 2022-06-30 PROCEDURE — 1036F TOBACCO NON-USER: CPT | Performed by: INTERNAL MEDICINE

## 2022-06-30 RX ORDER — IBUPROFEN 800 MG/1
800 TABLET ORAL 2 TIMES DAILY
Qty: 14 TABLET | Refills: 0 | Status: SHIPPED | OUTPATIENT
Start: 2022-06-30 | End: 2022-07-25

## 2022-06-30 RX ORDER — KETOROLAC TROMETHAMINE 30 MG/ML
30 INJECTION, SOLUTION INTRAMUSCULAR; INTRAVENOUS ONCE
Qty: 1 ML | Refills: 0
Start: 2022-06-30 | End: 2022-06-30

## 2022-06-30 RX ORDER — CYCLOBENZAPRINE HCL 10 MG
TABLET ORAL
COMMUNITY
Start: 2022-06-12 | End: 2022-06-30 | Stop reason: ALTCHOICE

## 2022-06-30 RX ORDER — ATORVASTATIN CALCIUM 40 MG/1
40 TABLET, FILM COATED ORAL DAILY
Qty: 90 TABLET | Refills: 1 | Status: SHIPPED | OUTPATIENT
Start: 2022-06-30

## 2022-06-30 RX ORDER — KETOROLAC TROMETHAMINE 30 MG/ML
30 INJECTION, SOLUTION INTRAMUSCULAR; INTRAVENOUS ONCE
Status: COMPLETED | OUTPATIENT
Start: 2022-06-30 | End: 2022-06-30

## 2022-06-30 RX ADMIN — KETOROLAC TROMETHAMINE 30 MG: 30 INJECTION, SOLUTION INTRAMUSCULAR; INTRAVENOUS at 15:47

## 2022-06-30 ASSESSMENT — PATIENT HEALTH QUESTIONNAIRE - PHQ9
SUM OF ALL RESPONSES TO PHQ9 QUESTIONS 1 & 2: 1
SUM OF ALL RESPONSES TO PHQ QUESTIONS 1-9: 1
2. FEELING DOWN, DEPRESSED OR HOPELESS: 1
SUM OF ALL RESPONSES TO PHQ QUESTIONS 1-9: 1
1. LITTLE INTEREST OR PLEASURE IN DOING THINGS: 0

## 2022-06-30 NOTE — PROGRESS NOTES
22  Arjun Mallory : 1950 Sex: female  Age: 66 y.o. Chief Complaint   Patient presents with    Knee Pain     right hurt it 3 weeks and again yesterday       HPI:  Pt reports pain to the posterior right knee  The patient states the pain has been present for the last 3 weeks. States was going up and down steps, stepped hard on the last step and felt rocio. Next morning could not walk. Pain is currently a 10/10 on the pain scale. Patient is having difficulty ambulating. Pain is worse with weight bearing. Pain is worse with movement. Knee is not locking or buckling. They deny numbness or tingling to the distal extremity. No redness and warmth to the affected area. No fever or chills. States was seen in urgent care shortly after first episode, was given muscle relaxants, helped slightly. No imaging. States took it easy and wore a brace. States felt like was getting better. Then stepped off step hard again yesterday and pain has returned. Now pain in front and back and radiating into hip. States took ibuprofen today. Patient comes to the urgent care for evaluation. ROS:  Const: Positives and pertinent negatives as per HPI. All others reviewed and are negative. Current Outpatient Medications:     atorvastatin (LIPITOR) 40 MG tablet, Take 1 tablet by mouth daily, Disp: 90 tablet, Rfl: 1    cyclobenzaprine (FLEXERIL) 10 MG tablet, take 1 tablet by mouth three times a day for 5 days, Disp: , Rfl:     ketorolac (TORADOL) 30 MG/ML injection, Inject 1 mL into the muscle once for 1 dose, Disp: 1 mL, Rfl: 0    ibuprofen (ADVIL;MOTRIN) 800 MG tablet, Take 1 tablet by mouth 2 times daily for 7 days, Disp: 14 tablet, Rfl: 0    zolpidem (AMBIEN) 10 MG tablet, Take 1 tablet by mouth nightly as needed for Sleep for up to 90 days. , Disp: 90 tablet, Rfl: 0    aspirin 81 MG EC tablet, Take 81 mg by mouth daily, Disp: , Rfl:     metFORMIN (GLUCOPHAGE) 1000 MG tablet, Take 1 tablet by mouth 2 times daily (with meals), Disp: 180 tablet, Rfl: 1    lisinopril (PRINIVIL;ZESTRIL) 20 MG tablet, Take 1 tablet by mouth daily, Disp: 90 tablet, Rfl: 1    insulin lispro, 1 Unit Dial, (HUMALOG KWIKPEN) 100 UNIT/ML SOPN, Inject up to 12 units QAC TID, Disp: 15 pen, Rfl: 1    insulin glargine (LANTUS SOLOSTAR) 100 UNIT/ML injection pen, Inject 65 Units into the skin every morning, Disp: 15 pen, Rfl: 1    magnesium (MAGNESIUM-OXIDE) 250 MG TABS tablet, Take 250 mg by mouth in the morning and at bedtime, Disp: , Rfl:     Insulin Pen Needle (B-D UF III MINI PEN NEEDLES) 31G X 5 MM MISC, 1 each by Does not apply route daily, Disp: , Rfl:   No Known Allergies    Past Medical History:   Diagnosis Date    Cervical spondylosis 12/7/2021    Class 2 obesity due to excess calories without serious comorbidity with body mass index (BMI) of 35.0 to 35.9 in adult 12/18/2019    Essential hypertension 12/18/2019    Hypomagnesemia 12/18/2019    Mixed hyperlipidemia 12/18/2019    Other insomnia 12/18/2019    Right carpal tunnel syndrome     Type 2 diabetes mellitus without complication ([facility] [address]     Vitamin D deficiency 12/18/2019     Past Surgical History:   Procedure Laterality Date    ARM SURGERY Right 5/4/2022    RIGHT ULNAR NERVE IN SITU DECOMPRESSION AT ELBOW,  RIGHT WRIST TRIANGULAR FIBROCARTILAGE COMPLEX INJECTION, RIGHT INDEX PROXIMAL INTERPHALANGEAL JOINT INJECTION performed by Hao Borrero MD at [place] [address] Left     CARPAL TUNNEL RELEASE Right 5/4/2022    RIGHT CARPAL TUNNEL RELEASE performed by [facility] at [facility] OR    CATARACT REMOVAL WITH IMPLANT      CHOLECYSTECTOMY      COLONOSCOPY      HYSTERECTOMY (CERVIX STATUS UNKNOWN)      HYSTERECTOMY, VAGINAL      partial in the 80s then bilateral oopherectome 2016    RETINAL DETACHMENT SURGERY Right     vitreous surgery    TUBAL LIGATION       Family History   Problem Relation Age of Onset    Diabetes Mother     Stroke Mother    Jaymie Martinez Kidney Disease Father         Renal Failure     Social History     Socioeconomic History    Marital status:      Spouse name: Antwon Corbett Number of children: 2    Years of education: 15    Highest education level: High school graduate   Occupational History    Not on file   Tobacco Use    Smoking status: Former Smoker     Packs/day: 1.00     Years: 12.00     Pack years: 12.00     Types: Cigarettes     Quit date: 1982     Years since quittin.4    Smokeless tobacco: Never Used   Vaping Use    Vaping Use: Never used   Substance and Sexual Activity    Alcohol use: Yes     Comment: rare    Drug use: Not on file    Sexual activity: Not on file   Other Topics Concern    Not on file   Social History Narrative    Not on file     Social Determinants of Health     Financial Resource Strain: Low Risk     Difficulty of Paying Living Expenses: Not hard at all   Food Insecurity: No Food Insecurity    Worried About [address] [employer] in the Last Year: Never true    [facility] in the Last Year: Never true   Transportation Needs:     Lack of Transportation (Medical): Not on file    Lack of Transportation (Non-Medical):  Not on file   Physical Activity:     Days of Exercise per Week: Not on file    Minutes of Exercise per Session: Not on file   Stress:     Feeling of Stress : Not on file   Social Connections:     Frequency of Communication with Friends and Family: Not on file    Frequency of Social Gatherings with Friends and Family: Not on file    Attends Shinto Services: Not on file    Active Member of Clubs or Organizations: Not on file    Attends Club or Organization Meetings: Not on file    Marital Status: Not on file   Intimate Partner Violence:     Fear of Current or Ex-Partner: Not on file    Emotionally Abused: Not on file    Physically Abused: Not on file    Sexually Abused: Not on file   Housing Stability:     Unable to Pay for Housing in the Last Year: Not on file    Number of Places Lived in the Last Year: Not on file    Unstable Housing in the Last Year: Not on file       Vitals:    06/30/22 1453   BP: (!) 146/68   Pulse: 86   Resp: 22   Temp: 98.1 °F (36.7 °C)   TempSrc: Temporal   SpO2: 98%   Weight: Comment: refused   Height: 5' 3\" (1.6 m)       Exam:  Physical Exam  Vitals reviewed. Constitutional:       Appearance: She is well-developed. HENT:      Head: Normocephalic and atraumatic. Right Ear: External ear normal.      Left Ear: External ear normal.   Eyes:      Pupils: Pupils are equal, round, and reactive to light. Neck:      Thyroid: No thyromegaly. Cardiovascular:      Rate and Rhythm: Normal rate and regular rhythm. Heart sounds: Normal heart sounds. No murmur heard. Pulmonary:      Effort: Pulmonary effort is normal.      Breath sounds: Normal breath sounds. No wheezing. Abdominal:      General: Bowel sounds are normal. There is no distension. Palpations: Abdomen is soft. Tenderness: There is no abdominal tenderness. There is no guarding or rebound. Musculoskeletal:         General: Normal range of motion. Cervical back: Normal range of motion and neck supple. Right hip: Tenderness present. Right knee: Tenderness present. Comments: Tenderness to palpation over greater trochanter of the hip, pain with palpation of knee, some swelling, no erythema    Lymphadenopathy:      Cervical: No cervical adenopathy. Skin:     General: Skin is warm and dry. Neurological:      Mental Status: She is alert and oriented to person, place, and time. Cranial Nerves: No cranial nerve deficit. Psychiatric:         Judgment: Judgment normal.         Assessment and Plan   Cele León was seen today for knee pain. Diagnoses and all orders for this visit:    Acute pain of left knee  Comments:  uncertain as to cause   possible strain and sprain   compression.  elevation, rest ice/heat  home exercises  xray hip,knee, tib/fib  ortho walk in   toradol x 1   Orders:  -     XR HIP RIGHT (2-3 VIEWS); Future  -     XR KNEE RIGHT (MIN 4 VIEWS); Future  -     XR TIBIA FIBULA RIGHT (2 VIEWS); Future  -     ketorolac (TORADOL) 30 MG/ML injection; Inject 1 mL into the muscle once for 1 dose  -     ibuprofen (ADVIL;MOTRIN) 800 MG tablet; Take 1 tablet by mouth 2 times daily for 7 days  -     [facility] Walk In  -     ketorolac (TORADOL) injection 30 mg    Follow-up with your primary care provider in 7-10 days for re-evaluation and further management. Return  sooner or go to the Emergency Department immediately if your condition changes or worsens. Return for check up and review, as scheduled.       Seen By:   Soha Montes MD

## 2022-06-30 NOTE — PATIENT INSTRUCTIONS
Patient Education        Knee Arthritis: Care Instructions  Your Care Instructions     Knee arthritis is a breakdown of the cartilage that cushions your knee joint. When the cartilage wears down, your bones rub against each other. This causespain and stiffness. Knee arthritis tends to get worse with time. Treatment for knee arthritis involves reducing pain, making the leg muscles stronger, and staying at a healthy body weight. The treatment usually does not improve the health of the cartilage, but it can reduce pain and improve howwell your knee works. You can take simple measures to protect your knee joints, ease your pain, andhelp you stay active. Follow-up care is a key part of your treatment and safety. Be sure to make and go to all appointments, and call your doctor if you are having problems. It's also a good idea to know your test results and keep alist of the medicines you take. How can you care for yourself at home?  Know that knee arthritis will cause more pain on some days than on others.  Stay at a healthy weight. Lose weight if you are overweight. When you stand up, the pressure on your knees from every pound of body weight is multiplied four times. So if you lose 10 pounds, you will reduce the pressure on your knees by 40 pounds.  Talk to your doctor or physical therapist about exercises that will help ease joint pain. ? Stretch to help prevent stiffness and to prevent injury before you exercise. You may enjoy gentle forms of yoga to help keep your knee joints and muscles flexible. ? Walk instead of jog.  ? Ride a bike. This makes your thigh muscles stronger and takes pressure off your knee. ? Wear well-fitting and comfortable shoes. ? Exercise in chest-deep water. This can help you exercise longer with less pain. ? Avoid exercises that include squatting or kneeling. They can put a lot of strain on your knees.   ? Talk to your doctor to make sure that the exercise you do is not making the arthritis worse.  Do not sit for long periods of time. Try to walk once in a while to keep your knee from getting stiff.  Ask your doctor or physical therapist whether shoe inserts may reduce your arthritis pain.  If you can afford it, get new athletic shoes at least every year. This can help reduce the strain on your knees.  Use a device to help you do everyday activities. ? A cane or walking stick can help you keep your balance when you walk. Hold the cane or walking stick in the hand opposite the painful knee. ? If you feel like you may fall when you walk, try using crutches or a front-wheeled walker. These can prevent falls that could cause more damage to your knee. ? A knee brace may help keep your knee stable and prevent pain. ? You also can use other things to make life easier, such as a higher toilet seat and handrails in the bathtub or shower.  Take pain medicines exactly as directed. ? Do not wait until you are in severe pain. You will get better results if you take it sooner. ? If you are not taking a prescription pain medicine, take an over-the-counter medicine such as acetaminophen (Tylenol), ibuprofen (Advil, Motrin), or naproxen (Aleve). Read and follow all instructions on the label. ? Do not take two or more pain medicines at the same time unless the doctor told you to. Many pain medicines have acetaminophen, which is Tylenol. Too much acetaminophen (Tylenol) can be harmful. ? Tell your doctor if you take a blood thinner, have diabetes, or have allergies to shellfish.  Ask your doctor if you might benefit from a shot of steroid medicine into your knee. This may provide pain relief for several months.  Many people take the supplements glucosamine and chondroitin for osteoarthritis. Some people feel they help, but the medical research does not show that they work. Talk to your doctor before you take these supplements. When should you call for help?    Call your doctor now or seek immediate medical care if:     You have sudden swelling, warmth, or pain in your knee.      You have knee pain and a fever or rash.      You have such bad pain that you cannot use your knee. Watch closely for changes in your health, and be sure to contact your doctor ifyou have any problems. Where can you learn more? Go to https://chpepicewshannan.[employer] [facility]. org and sign in to your [employer] account. Enter J983 in the [employer] box to learn more about \"Knee Arthritis: Care Instructions. \"     If you do not have an account, please click on the \"Sign Up Now\" link. Current as of: December 20, 2021               Content Version: 13.3  © 2006-2022 [employer]. Care instructions adapted under license by [facility]"[employer] [facility] ([facility]). If you have questions about a medical condition or this instruction, always ask your healthcare professional. [place] [address] any warranty or liability for your use of this information. Patient Education        Knee Arthritis: Exercises  Introduction  Here are some examples of exercises for you to try. The exercises may be suggested for a condition or for rehabilitation. Start each exercise slowly. Ease off the exercises if you start to have pain. You will be told when to start these exercises and which ones will work bestfor you. How to do the exercises  Knee flexion with heel slide    1. Lie on your back with your knees bent. 2. Slide your heel back by bending your affected knee as far as you can. Then hook your other foot around your ankle to help pull your heel even farther back. 3. Hold for about 6 seconds, then rest for up to 10 seconds. 4. Repeat 8 to 12 times. 5. Switch legs and repeat steps 1 through 4, even if only one knee is sore. Quad sets    1. Sit with your affected leg straight and supported on the floor or a firm bed. Place a small, rolled-up towel under your knee.  Your other leg should be bent, with that foot flat on the floor. 2. Tighten the thigh muscles of your affected leg by pressing the back of your knee down into the towel. 3. Hold for about 6 seconds, then rest for up to 10 seconds. 4. Repeat 8 to 12 times. 5. Switch legs and repeat steps 1 through 4, even if only one knee is sore. Straight-leg raises to the front    1. Lie on your back with your good knee bent so that your foot rests flat on the floor. Your affected leg should be straight. Make sure that your low back has a normal curve. You should be able to slip your hand in between the floor and the small of your back, with your palm touching the floor and your back touching the back of your hand. 2. Tighten the thigh muscles in your affected leg by pressing the back of your knee flat down to the floor. Hold your knee straight. 3. Keeping the thigh muscles tight and your leg straight, lift your affected leg up so that your heel is about 12 inches off the floor. Hold for about 6 seconds, then lower slowly. 4. Relax for up to 10 seconds between repetitions. 5. Repeat 8 to 12 times. 6. Switch legs and repeat steps 1 through 5, even if only one knee is sore. Active knee flexion    1. Lie on your stomach with your knees straight. If your kneecap is uncomfortable, roll up a washcloth and put it under your leg just above your kneecap. 2. Lift the foot of your affected leg by bending the knee so that you bring the foot up toward your buttock. If this motion hurts, try it without bending your knee quite as far. This may help you avoid any painful motion. 3. Slowly move your leg up and down. 4. Repeat 8 to 12 times. 5. Switch legs and repeat steps 1 through 4, even if only one knee is sore. Quadriceps stretch (facedown)    1. Lie flat on your stomach, and rest your face on the floor. 2. Wrap a towel or belt strap around the lower part of your affected leg.  Then use the towel or belt strap to slowly pull your heel toward your buttock until you feel a stretch. 3. Hold for about 15 to 30 seconds, then relax your leg against the towel or belt strap. 4. Repeat 2 to 4 times. 5. Switch legs and repeat steps 1 through 4, even if only one knee is sore. Stationary exercise bike    1. If you do not have a stationary exercise bike at home, you can find one to ride at your local health club or community center. 2. Adjust the height of the bike seat so that your knee is slightly bent when your leg is extended downward. If your knee hurts when the pedal reaches the top, you can raise the seat so that your knee does not bend as much. 3. Start slowly. At first, try to do 5 to 10 minutes of cycling with little to no resistance. Then increase your time and the resistance bit by bit until you can do 20 to 30 minutes without pain. 4. If you start to have pain, rest your knee until your pain gets back to the level that is normal for you. Or cycle for less time or with less effort. Follow-up care is a key part of your treatment and safety. Be sure to make and go to all appointments, and call your doctor if you are having problems. It's also a good idea to know your test results and keep alist of the medicines you take. Where can you learn more? Go to https://Playerize.[employer]. org and sign in to your [employer] account. Enter C159 in the [facility] box to learn more about \"Knee Arthritis: Exercises. \"     If you do not have an account, please click on the \"Sign Up Now\" link. Current as of: March 9, 2022               Content Version: 13.3  © 6743-9302 [employer]. Care instructions adapted under license by [facility] ([facility]). If you have questions about a medical condition or this instruction, always ask your healthcare professional. [address] any warranty or liability for your use of this information.

## 2022-06-30 NOTE — PATIENT INSTRUCTIONS
Patient Education        Knee: Exercises  Introduction  Here are some examples of exercises for you to try. The exercises may be suggested for a condition or for rehabilitation. Start each exercise slowly. Ease off the exercises if you start to have pain. You will be told when to start these exercises and which ones will work bestfor you. How to do the exercises  Quad sets    1. Sit with your leg straight and supported on the floor or a firm bed. (If you feel discomfort in the front or back of your knee, place a small towel roll under your knee.)  2. Tighten the muscles on top of your thigh by pressing the back of your knee flat down to the floor. (If you feel discomfort under your kneecap, place a small towel roll under your knee.)  3. Hold for about 6 seconds, then rest for up to 10 seconds. 4. Do 8 to 12 repetitions several times a day. Straight-leg raises to the front    1. Lie on your back with your good knee bent so that your foot rests flat on the floor. Your injured leg should be straight. Make sure that your low back has a normal curve. You should be able to slip your flat hand in between the floor and the small of your back, with your palm touching the floor and your back touching the back of your hand. 2. Tighten the thigh muscles in the injured leg by pressing the back of your knee flat down to the floor. Hold your knee straight. 3. Keeping the thigh muscles tight, lift your injured leg up so that your heel is about 12 inches off the floor. Hold for about 6 seconds and then lower slowly. 4. Do 8 to 12 repetitions, 3 times a day. Straight-leg raises to the outside    1. Lie on your side, with your injured leg on top. 2. Tighten the front thigh muscles of your injured leg to keep your knee straight. 3. Keep your hip and your leg straight in line with the rest of your body, and keep your knee pointing forward. Do not drop your hip back.   4. Lift your injured leg straight up toward the ceiling, about 12 inches off the floor. Hold for about 6 seconds, then slowly lower your leg. 5. Do 8 to 12 repetitions. Straight-leg raises to the back    1. Lie on your stomach, and lift your leg straight up behind you (toward the ceiling). 2. Lift your toes about 6 inches off the floor, hold for about 6 seconds, then lower slowly. 3. Do 8 to 12 repetitions. Straight-leg raises to the inside    1. Lie on the side of your body with the injured leg. 2. You can either prop your other (good) leg up on a chair, or you can bend your good knee and put that foot in front of your injured knee. Do not drop your hip back. 3. Tighten the muscles on the front of your thigh to straighten your injured knee. 4. Keep your kneecap pointing forward, and lift your whole leg up toward the ceiling about 6 inches. Hold for about 6 seconds, then lower slowly. 5. Do 8 to 12 repetitions. Heel dig bridging    1. Lie on your back with both knees bent and your ankles bent so that only your heels are digging into the floor. Your knees should be bent about 90 degrees. 2. Then push your heels into the floor, squeeze your buttocks, and lift your hips off the floor until your shoulders, hips, and knees are all in a straight line. 3. Hold for about 6 seconds as you continue to breathe normally, and then slowly lower your hips back down to the floor and rest for up to 10 seconds. 4. Do 8 to 12 repetitions. Hamstring curls    1. Lie on your stomach with your knees straight. If your kneecap is uncomfortable, roll up a washcloth and put it under your leg just above your kneecap. 2. Lift the foot of your injured leg by bending the knee so that you bring the foot up toward your buttock. If this motion hurts, try it without bending your knee quite as far. This may help you avoid any painful motion. 3. Slowly lower your leg back to the floor. 4. Do 8 to 12 repetitions.   5. With permission from your doctor or physical therapist, you may also want to add a cuff weight to your ankle (not more than 5 pounds). With weight, you do not have to lift your leg more than 12 inches to get a hamstring workout. Shallow standing knee bends    Do this exercise only if you have very little pain; if you have no clicking, locking, or giving way if you have an injured knee; and if it does not hurtwhile you are doing 8 to 12 repetitions. 1. Stand with your hands lightly resting on a counter or chair in front of you. Put your feet shoulder-width apart. 2. Slowly bend your knees so that you squat down like you are going to sit in a chair. Make sure your knees do not go in front of your toes. 3. Lower yourself about 6 inches. Your heels should remain on the floor at all times. 4. Rise slowly to a standing position. Heel raises    1. Stand with your feet a few inches apart, with your hands lightly resting on a counter or chair in front of you. 2. Slowly raise your heels off the floor while keeping your knees straight. 3. Hold for about 6 seconds, then slowly lower your heels to the floor. 4. Do 8 to 12 repetitions several times during the day. Follow-up care is a key part of your treatment and safety. Be sure to make and go to all appointments, and call your doctor if you are having problems. It's also a good idea to know your test results and keep alist of the medicines you take. Where can you learn more? Go to https://PayDragonpeFactual.[employer]. org and sign in to your [employer] account. Enter Y430 in the [facility] box to learn more about \"Knee: Exercises. \"     If you do not have an account, please click on the \"Sign Up Now\" link. Current as of: March 9, 2022               Content Version: 13.3  © 0631-3983 Healthwise, Incorporated. Care instructions adapted under license by [facility] ([facility]).  If you have questions about a medical condition or this instruction, always ask your healthcare professional. [facility] disclaims any warranty or liability for your use of this information. Patient Education        Hip Arthritis: Exercises  Introduction  Here are some examples of exercises for you to try. The exercises may be suggested for a condition or for rehabilitation. Start each exercise slowly. Ease off the exercises if you start to have pain. You will be told when to start these exercises and which ones will work bestfor you. How to do the exercises  Straight-leg raises to the outside    1. Lie on your side, with your affected hip on top. 2. Tighten the front thigh muscles of your top leg to keep your knee straight. 3. Keep your hip and your leg straight in line with the rest of your body, and keep your knee pointing forward. Do not drop your hip back. 4. Lift your top leg straight up toward the ceiling, about 12 inches off the floor. Hold for about 6 seconds, then slowly lower your leg. 5. Repeat 8 to 12 times. 6. Switch legs and repeat steps 1 through 5, even if only one hip is sore. Straight-leg raises to the inside    1. Lie on your side with your affected hip on the floor. 2. You can either prop up your other leg on a chair, or you can bend that knee and put that foot in front of your other knee. Do not drop your hip back. 3. Tighten the muscles on the front thigh of your bottom leg to straighten that knee. 4. Keep your kneecap pointing forward and your leg straight, and lift your bottom leg up toward the ceiling about 6 inches. Hold for about 6 seconds, then lower slowly. 5. Repeat 8 to 12 times. 6. Switch legs and repeat steps 1 through 5, even if only one hip is sore. Hip hike    1. Stand sideways on the bottom step of a staircase, and hold on to the banister or wall. 2. Keeping both knees straight, lift your good leg off the step and let it hang down. Then hike your good hip up to the same level as your affected hip or a little higher. 3. Repeat 8 to 12 times.   4. Switch legs and repeat steps 1 through 3, even if only one you feel a gentle stretch around your hip. 4. Hold the stretch for 15 to 30 seconds. 5. Repeat 2 to 4 times. 6. Repeat steps 1 through 5, but this time use your hand to gently pull your knee toward your opposite shoulder. 7. Switch legs and repeat steps 1 through 6, even if only one hip is sore. Knee-to-chest    1. Lie on your back with your knees bent and your feet flat on the floor. 2. Bring your affected leg to your chest, keeping the other foot flat on the floor (or keeping the other leg straight, whichever feels better on your lower back). 3. Keep your lower back pressed to the floor. Hold for at least 15 to 30 seconds. 4. Relax, and lower the knee to the starting position. 5. Repeat 2 to 4 times. 6. Switch legs and repeat steps 1 through 5, even if only one hip is sore. 7. To get more stretch, put your other leg flat on the floor while pulling your knee to your chest.  Clamshell    1. Lie on your side, with your affected hip on top. Keep your feet and knees together and your knees bent. 2. Raise your top knee, but keep your feet together. Do not let your hips roll back. Your legs should open up like a clamshell. 3. Hold for 6 seconds. 4. Slowly lower your knee back down. Rest for 10 seconds. 5. Repeat 8 to 12 times. 6. Switch legs and repeat steps 1 through 5, even if only one hip is sore. Follow-up care is a key part of your treatment and safety. Be sure to make and go to all appointments, and call your doctor if you are having problems. It's also a good idea to know your test results and keep alist of the medicines you take. Where can you learn more? Go to https://Lecorpiopepiceweb.[employer]. org and sign in to your [employer] account. Enter C405 in the [employer] box to learn more about \"Hip Arthritis: Exercises. \"     If you do not have an account, please click on the \"Sign Up Now\" link.   Current as of: March 9, 2022               Content Version: 13.3  © 9136-8082 Healthwise, Incorporated. Care instructions adapted under license by [facility] ([facility]). If you have questions about a medical condition or this instruction, always ask your healthcare professional. [address] any warranty or liability for your use of this information.

## 2022-07-01 ENCOUNTER — TELEPHONE (OUTPATIENT)
Dept: FAMILY MEDICINE CLINIC | Age: 72
End: 2022-07-01

## 2022-07-01 ENCOUNTER — HOSPITAL ENCOUNTER (OUTPATIENT)
Dept: SLEEP CENTER | Age: 72
Discharge: HOME | End: 2022-07-01
Payer: MEDICARE

## 2022-07-01 DIAGNOSIS — G47.33 OBSTRUCTIVE SLEEP APNEA: ICD-10-CM

## 2022-07-01 PROCEDURE — 95800 SLP STDY UNATTENDED: CPT

## 2022-07-01 NOTE — TELEPHONE ENCOUNTER
Reviewed results w/ Fang Bravo. Said that she is following the ortho's recommendations. She is noticing some improvement and feels that she is headed in the right direction.

## 2022-07-01 NOTE — TELEPHONE ENCOUNTER
Please let the patient know that x-rays of the knee hip and tibia and fibula per radiology report suggested    No fractures or evidence for dislocation of the tibia-fibula or other hip pelvic bones    Right hip showed moderate osteoarthritis    Right knee showed mild osteoarthritis    Lower leg and ankle showed some heel spurs    Would recommend continued plan of care notify if symptoms worsen or not improving    Thanks

## 2022-07-07 NOTE — PROGRESS NOTES
[address] [address]                               SLEEP STUDY REPORT    PATIENT NAME: Aggie Wang                     :        1950  MED REC NO:   [id number]                            ROOM:  ACCOUNT NO:   [de-identified]                           ADMIT DATE: 2022  PROVIDER:     Christelle Betts MD    DATE OF STUDY:  2022    REFERRING PROVIDER:  ELIEZER Wang CNP    STUDY PERFORMED:  Sleep study. INDICATION FOR STUDY:  Witnessed apnea, loud snoring, and restless  sleep. CURRENT MEDICATIONS:  Atorvastatin, insulin, Lantus, metformin,  magnesium, and zolpidem. INTERPRETATION:  This sleep study was performed as a home sleep apnea  test.  A WatchPAT monitoring device was utilized for the study. Total  recording time was 7 hours and 22 minutes and total sleep time was 6  hours and 10 minutes. REM sleep comprised 25% of the total sleep time. Review of the raw data indicates sufficient information to adequately   interpret the study. RESPIRATION SUMMARY:  The respiratory event index was four. The  respiratory disturbance index was 23.5 and the REM respiratory  disturbance index was 26.2. The patient slept in the supine position  for 39% of the study. OXYGEN SATURATION:  Average oxygen saturation was 95%. Lowest  saturation was 84%. The patient spent less than 1% of the time with  saturation was less than 88%. HEART RATE SUMMARY:  Average heart rate was 86 beats per minute. SNORING:  The patient snored for 207 minutes which was 56% of the valid  sleep time. MISCELLANEOUS:  [place] Sleepiness Scale score is 6/24. BMI is 34.8  pounds per inch squared. Neck circumference is 17 inches. IMPRESSION:  1. Borderline sleep apnea. 2.  Very good oxygen saturation. 3.  Moderate snoring.     DISCUSSION:  Although this patient has a respiratory event index of less  than five,

## 2022-07-08 ENCOUNTER — TELEPHONE (OUTPATIENT)
Dept: SLEEP CENTER | Age: 72
End: 2022-07-08

## 2022-07-08 NOTE — TELEPHONE ENCOUNTER
Patients home sleep study consistent with moderate DEYSI.  She will be notified of results, has follow up apt 7/25 to review results/treatment options

## 2022-07-11 ENCOUNTER — OFFICE VISIT (OUTPATIENT)
Dept: CHIROPRACTIC MEDICINE | Age: 72
End: 2022-07-11
Payer: MEDICARE

## 2022-07-11 VITALS — HEART RATE: 101 BPM | TEMPERATURE: 98.7 F | OXYGEN SATURATION: 95 %

## 2022-07-11 DIAGNOSIS — M47.22 CERVICAL SPONDYLOSIS WITH RADICULOPATHY: ICD-10-CM

## 2022-07-11 DIAGNOSIS — M54.04 PANNICULITIS AFFECTING REGIONS OF NECK AND BACK, THORACIC REGION: ICD-10-CM

## 2022-07-11 DIAGNOSIS — M99.01 SEGMENTAL AND SOMATIC DYSFUNCTION OF CERVICAL REGION: ICD-10-CM

## 2022-07-11 DIAGNOSIS — I10 ESSENTIAL HYPERTENSION: ICD-10-CM

## 2022-07-11 DIAGNOSIS — M99.05 SEGMENTAL AND SOMATIC DYSFUNCTION OF PELVIC REGION: ICD-10-CM

## 2022-07-11 DIAGNOSIS — M99.02 SEGMENTAL AND SOMATIC DYSFUNCTION OF THORACIC REGION: ICD-10-CM

## 2022-07-11 DIAGNOSIS — M53.3 SACROCOCCYGEAL DISORDERS, NOT ELSEWHERE CLASSIFIED: ICD-10-CM

## 2022-07-11 DIAGNOSIS — G89.29 CHRONIC RIGHT-SIDED LOW BACK PAIN WITH RIGHT-SIDED SCIATICA: ICD-10-CM

## 2022-07-11 DIAGNOSIS — M54.41 CHRONIC RIGHT-SIDED LOW BACK PAIN WITH RIGHT-SIDED SCIATICA: ICD-10-CM

## 2022-07-11 DIAGNOSIS — M99.03 SEGMENTAL AND SOMATIC DYSFUNCTION OF LUMBAR REGION: Primary | ICD-10-CM

## 2022-07-11 PROCEDURE — 99999 PR OFFICE/OUTPT VISIT,PROCEDURE ONLY: CPT | Performed by: CHIROPRACTOR

## 2022-07-11 PROCEDURE — 98941 CHIROPRACT MANJ 3-4 REGIONS: CPT | Performed by: CHIROPRACTOR

## 2022-07-11 RX ORDER — LISINOPRIL 20 MG/1
20 TABLET ORAL DAILY
Qty: 90 TABLET | Refills: 1 | Status: SHIPPED | OUTPATIENT
Start: 2022-07-11

## 2022-07-11 NOTE — PROGRESS NOTES
22  Linwood Dougherty : 1950 Sex: female  Age: 66 y.o. Chief Complaint   Patient presents with    Back Pain    Neck Pain       HPI:   Stepped off step, hurt knee. Had to go to [facility], felt better  On way home, stepped down again and had major pain  Saw Emily at [address] and Dr. Priti Mcmillan. Still bothering her. But its caused everything else to be out of whack. Low back particularly, up back to neck. Current Outpatient Medications:     atorvastatin (LIPITOR) 40 MG tablet, Take 1 tablet by mouth daily, Disp: 90 tablet, Rfl: 1    zolpidem (AMBIEN) 10 MG tablet, Take 1 tablet by mouth nightly as needed for Sleep for up to 90 days. , Disp: 90 tablet, Rfl: 0    aspirin 81 MG EC tablet, Take 81 mg by mouth daily, Disp: , Rfl:     metFORMIN (GLUCOPHAGE) 1000 MG tablet, Take 1 tablet by mouth 2 times daily (with meals), Disp: 180 tablet, Rfl: 1    lisinopril (PRINIVIL;ZESTRIL) 20 MG tablet, Take 1 tablet by mouth daily, Disp: 90 tablet, Rfl: 1    insulin lispro, 1 Unit Dial, (HUMALOG KWIKPEN) 100 UNIT/ML SOPN, Inject up to 12 units QAC TID, Disp: 15 pen, Rfl: 1    insulin glargine (LANTUS SOLOSTAR) 100 UNIT/ML injection pen, Inject 65 Units into the skin every morning, Disp: 15 pen, Rfl: 1    magnesium (MAGNESIUM-OXIDE) 250 MG TABS tablet, Take 250 mg by mouth in the morning and at bedtime, Disp: , Rfl:     Insulin Pen Needle (B-D UF III MINI PEN NEEDLES) 31G X 5 MM MISC, 1 each by Does not apply route daily, Disp: , Rfl:     ketorolac (TORADOL) 30 MG/ML injection, Inject 1 mL into the muscle once for 1 dose, Disp: 1 mL, Rfl: 0    ibuprofen (ADVIL;MOTRIN) 800 MG tablet, Take 1 tablet by mouth 2 times daily for 7 days, Disp: 14 tablet, Rfl: 0    Exam:   Vitals:    22 1027   Pulse: (!) 101   Temp: 98.7 °F (37.1 °C)   SpO2: 95%     Ambulates favoring right lower extremity. Mild limp. Knee motion is well-preserved with, each at 0-110 degrees without pain.   She does have some medial joint line pain at the right knee. There are hypertonic and tender fibers noted with palpation in the paraspinal muscles of the cervical, thoracic, lumbar region. Joint fixation is noted with motion screening at C4-6, T3-5, L3-4 and bilateral SI joints. Taylor Appiah was seen today for back pain and neck pain. Diagnoses and all orders for this visit:    Segmental and somatic dysfunction of lumbar region    Chronic right-sided low back pain with right-sided sciatica    Segmental and somatic dysfunction of pelvic region    Sacrococcygeal disorders, not elsewhere classified    Segmental and somatic dysfunction of thoracic region    Panniculitis affecting regions of neck and back, thoracic region    Segmental and somatic dysfunction of cervical region    Cervical spondylosis with radiculopathy        Treatment Plan: Continue treatment to the back today. Berry flexion distraction manipulation at L3, protocol 2. Mechanically assisted manipulation of the SI joints, thoracic segments and cervical segments as well. Tolerated well. I will see her back in approximately 2 weeks for continued care.       Seen By:  Kobi Navas, DC

## 2022-07-11 NOTE — PROGRESS NOTES
Patient is here for an adjustment. Patient states shes feeling okay today.  Alicia Linder MD  Electronically signed by Jim Ragland LPN on 4/53/6602 at 48:44 AM

## 2022-07-11 NOTE — TELEPHONE ENCOUNTER
Last Appointment:  6/30/2022  Future Appointments   Date Time Provider Dionna Janessa   7/25/2022 10:15 AM KERRY Mas [facility]   7/25/2022 12:00 PM ELIEZER Capps CNP [facility]   8/29/2022 10:00 AM Martha Marcus  [address] [place] [address]

## 2022-07-20 LAB
AVERAGE GLUCOSE: NORMAL
HBA1C MFR BLD HPLC: 7.8 %
HBA1C MFR BLD: 7.8 %

## 2022-07-24 NOTE — PROGRESS NOTES
[facility] Sleep Medicine    Patient Name: Kristi Deluca  Age: 66 y.o.   : 1950    Date of Visit: 22        Review of Last Visit Summary:    The patient was last seen on 2022 for  Obstructive Sleep Apnea. Interim History:     Kristi Deluca is a 66 y.o. female that  has a past medical history of Cervical spondylosis (2021), Class 2 obesity due to excess calories without serious comorbidity with body mass index (BMI) of 35.0 to 35.9 in adult (2019), Essential hypertension (2019), Hypomagnesemia (2019), Mixed hyperlipidemia (2019), Other insomnia (2019), Right carpal tunnel syndrome, Type 2 diabetes mellitus without complication ([facility] [address] and Vitamin D deficiency (2019). She presents as follow up to Sleep Clinic to review sleep study results. Interval Events:    - Completed HST, showing equivocal results. - Continues on Ambien, 10 mg nightly for difficulty with nocturnal awakenings.  -Here to review HST results. Sleep Study History: 2022- [facility] [place] [facility] Study- wt 195 lbs, ANTHONY 4, respiratory disturbance index 23.5, SpO2 dexter 84%, T<88% was less than 1 % of total oxygen eval period. Sleep History:     She first had a sleep study over 10 years ago, unknown aware of location as well as unknown severity. She never followed up with CPAP therapy. Per patient, she has had difficulty with snoring and sleep cycles most of her adult life and also into childhood as well. She displays symptoms of sleep apnea such as very loud snoring, and witnessed apneas in her breathing at night. She also has difficulty maintaining sleep, waking up for unknown reason. She admits to having daytime sleepiness and mild difficulty with memory. She feels her sleep is very fragmented and \"tosses and turns \"in bed all night. There are times during the night where she will get up and go to the recliner chair because she does not want to disturb her . LIGATION         Allergies:  has No Known Allergies. Social History:    Social History     Tobacco Use    Smoking status: Former     Packs/day: 1.00     Years: 12.00     Pack years: 12.00     Types: Cigarettes     Quit date: 1982     Years since quittin.5    Smokeless tobacco: Never   Vaping Use    Vaping Use: Never used   Substance Use Topics    Alcohol use: Yes     Comment: rare        Family History:       Problem Relation Age of Onset    Diabetes Mother     Stroke Mother     Kidney Disease Father         Renal Failure       Current Medications:    Current Outpatient Medications:     lisinopril (PRINIVIL;ZESTRIL) 20 MG tablet, Take 1 tablet by mouth daily, Disp: 90 tablet, Rfl: 1    atorvastatin (LIPITOR) 40 MG tablet, Take 1 tablet by mouth daily, Disp: 90 tablet, Rfl: 1    ibuprofen (ADVIL;MOTRIN) 800 MG tablet, Take 1 tablet by mouth 2 times daily for 7 days, Disp: 14 tablet, Rfl: 0    zolpidem (AMBIEN) 10 MG tablet, Take 1 tablet by mouth nightly as needed for Sleep for up to 90 days. , Disp: 90 tablet, Rfl: 0    aspirin 81 MG EC tablet, Take 81 mg by mouth daily, Disp: , Rfl:     metFORMIN (GLUCOPHAGE) 1000 MG tablet, Take 1 tablet by mouth 2 times daily (with meals), Disp: 180 tablet, Rfl: 1    insulin lispro, 1 Unit Dial, (HUMALOG KWIKPEN) 100 UNIT/ML SOPN, Inject up to 12 units QAC TID, Disp: 15 pen, Rfl: 1    insulin glargine (LANTUS SOLOSTAR) 100 UNIT/ML injection pen, Inject 65 Units into the skin every morning, Disp: 15 pen, Rfl: 1    magnesium (MAGNESIUM-OXIDE) 250 MG TABS tablet, Take 250 mg by mouth in the morning and at bedtime, Disp: , Rfl:     Insulin Pen Needle 31G X 5 MM MISC, 1 each by Does not apply route daily, Disp: , Rfl:     ketorolac (TORADOL) 30 MG/ML injection, Inject 1 mL into the muscle once for 1 dose, Disp: 1 mL, Rfl: 0    Sleep Medicine 2022   Sitting and reading 3 1   Watching TV 1 1   Sitting, inactive in a public place (e.g. a theatre or a meeting) 0 1 As a passenger in a car for an hour without a break 1 0   Lying down to rest in the afternoon when circumstances permit 1 2   Sitting and talking to someone 0 0   Sitting quietly after a lunch without alcohol 0 1   In a car, while stopped for a few minutes in traffic 0 0   Total score 6 6   Neck circumference (Inches) - 17.5       Review of Systems:    Constitutional: no chills, no fever   Eyes: no blurred vision   Cardiovascular: no chest pain,   Respiratory: no cough, no shortness of breath   Gastrointestinal:  no nausea  Neurological:no dizziness,  no headache, no memory changes. Endocrine: No chills    Objective:   PHYSICAL EXAM:    /65 (Site: Left Upper Arm, Position: Sitting, Cuff Size: Large Adult)   Pulse 85   Resp 14   Ht 5' 3\" (1.6 m)   Wt 194 lb (88 kg)   SpO2 97%   BMI 34.37 kg/m²     Physical exam:  Gen: No acute distress. BMI of Body mass index is 34.37 kg/m². Neck: Trachea midline. No obvious mass. Neck circumference     Resp: No accessory muscle use. No crackles. No wheezes. No rhonchi. CV: Regular rate. Regular rhythm. No murmur or rub. Skin: Warm and dry. M/S: No cyanosis. No obvious joint deformity. Neuro: Awake. Alert. Moves all four extremities. Psych: Alert and oriented. No anxiety. Assessment:      Liliana was seen today for sleep apnea. Diagnoses and all orders for this visit:    Obesity, unspecified classification, unspecified obesity type, unspecified whether serious comorbidity present  -     Ambulatory Referral to Bariatric Surgery    Obstructive sleep apnea  -     DME Order for CPAP as OP    Insomnia, unspecified type     Plan:       1. Obstructive Sleep Apnea     -HST results reviewed, equivocal. Moderate flow limitation noted, without desaturation.  Interpreting provider recommends in- lab study for more accurate results, but at this time patient is not willing because she has such difficulty sleeping elsewhere.  -Will order AUTO CPAP 5-20 cmH2O based on HST, given patient's symptoms. She is aware that insurance may not cover PAP therapy. Discussed purchasing CPAP out of pocket, not interested at this time.   -Additionally, discussed positional therapy (avoiding supine sleep, sleeping elevated). Weight loss encouraged to decrease risk of DEYSI. 2. Chronic Sleep Initiation/Maintenance Insomnia     -Continues Ambien 10 mg nightly, discussed side effects and possible adverse reactions. Not interested in weaning at this time.  -Reintroduced Cognitive Behavioral Therapy for Insomnia (first line therapy for psychophysiologic insomnia) as ideal way to manage insomnia symptoms. Briefly reviewed its core concepts, including cognitive restructuring, sleep scheduling, stimulus control, relaxation techniques, and sleep hygiene.  -Previously provided self-guided resources for CBT-I, including Say Leena to Insomnia by Dr. Colvin Members. Offered referral to virtual psychologist, not interested at this time.  -Symptoms of maintenance insomnia may improve with CPAP therapy. 3. Obesity (Body mass index is 34.37 kg/m².)     -Discussed impact of weight gain on DEYSI risk/severity. Patient understands that DEYSI severity may improve with weight loss but no guarantee of cure can be made. -Referral made to [facility] Weight loss clinic. Return in about 4 months (around 11/25/2022) for Follow up for sleep apnea, CPAP compliance.     Basil Valera, APRN-CNP  [address]  p -[phone] option 2  f- [phone]

## 2022-07-25 ENCOUNTER — OFFICE VISIT (OUTPATIENT)
Dept: SLEEP MEDICINE | Age: 72
End: 2022-07-25
Payer: MEDICARE

## 2022-07-25 ENCOUNTER — OFFICE VISIT (OUTPATIENT)
Dept: CHIROPRACTIC MEDICINE | Age: 72
End: 2022-07-25
Payer: MEDICARE

## 2022-07-25 VITALS — HEART RATE: 94 BPM | OXYGEN SATURATION: 95 % | TEMPERATURE: 97.7 F

## 2022-07-25 VITALS
HEIGHT: 63 IN | HEART RATE: 85 BPM | BODY MASS INDEX: 34.38 KG/M2 | OXYGEN SATURATION: 97 % | SYSTOLIC BLOOD PRESSURE: 133 MMHG | RESPIRATION RATE: 14 BRPM | DIASTOLIC BLOOD PRESSURE: 65 MMHG | WEIGHT: 194 LBS

## 2022-07-25 DIAGNOSIS — M54.41 CHRONIC RIGHT-SIDED LOW BACK PAIN WITH RIGHT-SIDED SCIATICA: ICD-10-CM

## 2022-07-25 DIAGNOSIS — M99.05 SEGMENTAL AND SOMATIC DYSFUNCTION OF PELVIC REGION: ICD-10-CM

## 2022-07-25 DIAGNOSIS — G89.29 CHRONIC RIGHT-SIDED LOW BACK PAIN WITH RIGHT-SIDED SCIATICA: ICD-10-CM

## 2022-07-25 DIAGNOSIS — E66.9 OBESITY, UNSPECIFIED CLASSIFICATION, UNSPECIFIED OBESITY TYPE, UNSPECIFIED WHETHER SERIOUS COMORBIDITY PRESENT: Primary | ICD-10-CM

## 2022-07-25 DIAGNOSIS — G47.00 INSOMNIA, UNSPECIFIED TYPE: ICD-10-CM

## 2022-07-25 DIAGNOSIS — M99.03 SEGMENTAL AND SOMATIC DYSFUNCTION OF LUMBAR REGION: Primary | ICD-10-CM

## 2022-07-25 DIAGNOSIS — G47.33 OBSTRUCTIVE SLEEP APNEA: ICD-10-CM

## 2022-07-25 DIAGNOSIS — M53.3 SACROCOCCYGEAL DISORDERS, NOT ELSEWHERE CLASSIFIED: ICD-10-CM

## 2022-07-25 PROCEDURE — 1123F ACP DISCUSS/DSCN MKR DOCD: CPT | Performed by: NURSE PRACTITIONER

## 2022-07-25 PROCEDURE — 99213 OFFICE O/P EST LOW 20 MIN: CPT | Performed by: NURSE PRACTITIONER

## 2022-07-25 PROCEDURE — 98940 CHIROPRACT MANJ 1-2 REGIONS: CPT | Performed by: CHIROPRACTOR

## 2022-07-25 PROCEDURE — 99999 PR OFFICE/OUTPT VISIT,PROCEDURE ONLY: CPT | Performed by: CHIROPRACTOR

## 2022-07-25 ASSESSMENT — SLEEP AND FATIGUE QUESTIONNAIRES
HOW LIKELY ARE YOU TO NOD OFF OR FALL ASLEEP WHILE SITTING AND TALKING TO SOMEONE: 0
HOW LIKELY ARE YOU TO NOD OFF OR FALL ASLEEP IN A CAR, WHILE STOPPED FOR A FEW MINUTES IN TRAFFIC: 0
HOW LIKELY ARE YOU TO NOD OFF OR FALL ASLEEP WHILE SITTING AND READING: 3
HOW LIKELY ARE YOU TO NOD OFF OR FALL ASLEEP WHILE WATCHING TV: 1
HOW LIKELY ARE YOU TO NOD OFF OR FALL ASLEEP WHEN YOU ARE A PASSENGER IN A CAR FOR AN HOUR WITHOUT A BREAK: 1
HOW LIKELY ARE YOU TO NOD OFF OR FALL ASLEEP WHILE SITTING INACTIVE IN A PUBLIC PLACE: 0
HOW LIKELY ARE YOU TO NOD OFF OR FALL ASLEEP WHILE SITTING QUIETLY AFTER LUNCH WITHOUT ALCOHOL: 0
ESS TOTAL SCORE: 6
HOW LIKELY ARE YOU TO NOD OFF OR FALL ASLEEP WHILE LYING DOWN TO REST IN THE AFTERNOON WHEN CIRCUMSTANCES PERMIT: 1

## 2022-07-25 NOTE — PROGRESS NOTES
22  Endy Zhang : 1950 Sex: female  Age: 66 y.o. Chief Complaint   Patient presents with    Back Pain       HPI:   Pain has improved. On average, pain is perceived as mild (1-3  pain scale). Patient denies new numbness, new weakness, new tingling  Knee doing better overall. Current Outpatient Medications:     lisinopril (PRINIVIL;ZESTRIL) 20 MG tablet, Take 1 tablet by mouth daily, Disp: 90 tablet, Rfl: 1    atorvastatin (LIPITOR) 40 MG tablet, Take 1 tablet by mouth daily, Disp: 90 tablet, Rfl: 1    zolpidem (AMBIEN) 10 MG tablet, Take 1 tablet by mouth nightly as needed for Sleep for up to 90 days. , Disp: 90 tablet, Rfl: 0    aspirin 81 MG EC tablet, Take 81 mg by mouth daily, Disp: , Rfl:     metFORMIN (GLUCOPHAGE) 1000 MG tablet, Take 1 tablet by mouth 2 times daily (with meals), Disp: 180 tablet, Rfl: 1    insulin lispro, 1 Unit Dial, (HUMALOG KWIKPEN) 100 UNIT/ML SOPN, Inject up to 12 units QAC TID, Disp: 15 pen, Rfl: 1    insulin glargine (LANTUS SOLOSTAR) 100 UNIT/ML injection pen, Inject 65 Units into the skin every morning, Disp: 15 pen, Rfl: 1    magnesium (MAGNESIUM-OXIDE) 250 MG TABS tablet, Take 250 mg by mouth in the morning and at bedtime, Disp: , Rfl:     Insulin Pen Needle 31G X 5 MM MISC, 1 each by Does not apply route daily, Disp: , Rfl:     ketorolac (TORADOL) 30 MG/ML injection, Inject 1 mL into the muscle once for 1 dose, Disp: 1 mL, Rfl: 0    ibuprofen (ADVIL;MOTRIN) 800 MG tablet, Take 1 tablet by mouth 2 times daily for 7 days, Disp: 14 tablet, Rfl: 0    Exam:   Vitals:    22 1016   Pulse: 94   Temp: 97.7 °F (36.5 °C)   SpO2: 95%       There are hypertonic and tender fibers noted with palpation in the paraspinal muscles of the lumbar, sacral region. Joint fixation is noted with motion screening at 3-4, bilateral SI joints. Rea Pryor was seen today for back pain.     Diagnoses and all orders for this visit:    Segmental and somatic dysfunction of lumbar region    Chronic right-sided low back pain with right-sided sciatica    Segmental and somatic dysfunction of pelvic region    Sacrococcygeal disorders, not elsewhere classified      Treatment Plan:    Berry flexion distraction manipulation L3, protocol 2. Mechanically assisted manipulation of the SI joints. Tolerated well.   I will see her back in 1 month or as needed for care      Seen By:  Gauri Hernadez

## 2022-08-02 ENCOUNTER — TELEPHONE (OUTPATIENT)
Dept: BARIATRICS/WEIGHT MGMT | Age: 72
End: 2022-08-02

## 2022-08-25 ENCOUNTER — OFFICE VISIT (OUTPATIENT)
Dept: CHIROPRACTIC MEDICINE | Age: 72
End: 2022-08-25
Payer: MEDICARE

## 2022-08-25 VITALS — TEMPERATURE: 97.3 F | HEART RATE: 75 BPM | OXYGEN SATURATION: 97 %

## 2022-08-25 DIAGNOSIS — M54.41 CHRONIC RIGHT-SIDED LOW BACK PAIN WITH RIGHT-SIDED SCIATICA: ICD-10-CM

## 2022-08-25 DIAGNOSIS — F41.9 ANXIETY: ICD-10-CM

## 2022-08-25 DIAGNOSIS — M99.03 SEGMENTAL AND SOMATIC DYSFUNCTION OF LUMBAR REGION: Primary | ICD-10-CM

## 2022-08-25 DIAGNOSIS — M53.3 SACROCOCCYGEAL DISORDERS, NOT ELSEWHERE CLASSIFIED: ICD-10-CM

## 2022-08-25 DIAGNOSIS — G89.29 CHRONIC RIGHT-SIDED LOW BACK PAIN WITH RIGHT-SIDED SCIATICA: ICD-10-CM

## 2022-08-25 DIAGNOSIS — M99.05 SEGMENTAL AND SOMATIC DYSFUNCTION OF PELVIC REGION: ICD-10-CM

## 2022-08-25 PROCEDURE — 98940 CHIROPRACT MANJ 1-2 REGIONS: CPT | Performed by: CHIROPRACTOR

## 2022-08-25 PROCEDURE — 99999 PR OFFICE/OUTPT VISIT,PROCEDURE ONLY: CPT | Performed by: CHIROPRACTOR

## 2022-08-25 RX ORDER — ALPRAZOLAM 0.25 MG/1
0.25 TABLET ORAL DAILY PRN
Qty: 30 TABLET | Refills: 0 | Status: SHIPPED | OUTPATIENT
Start: 2022-08-25 | End: 2022-09-24

## 2022-08-25 NOTE — TELEPHONE ENCOUNTER
Jimmy Izabel is going to be traveling for 3 weeks soon and asking for a Xanax script that you giver her for this. She said you usually give her 4 tablets, but she would like 30 since they will be on the road much longer.

## 2022-08-25 NOTE — TELEPHONE ENCOUNTER
Controlled Substance Monitoring:    Acute and Chronic Pain Monitoring:   RX Monitoring 8/25/2022   Periodic Controlled Substance Monitoring No signs of potential drug abuse or diversion identified. Requested Prescriptions     Signed Prescriptions Disp Refills    ALPRAZolam (XANAX) 0.25 MG tablet 30 tablet 0     Sig: Take 1 tablet by mouth daily as needed for Anxiety for up to 30 days.      Authorizing Provider: Sade Myles to local pharmacy

## 2022-08-25 NOTE — PROGRESS NOTES
22  Amy Marino : 1950 Sex: female  Age: 66 y.o. Chief Complaint   Patient presents with    Back Pain       HPI:   Pain has worsened. On average, pain is perceived as moderate (4-6 pain scale). Patient denies new numbness, new weakness, new tingling      Current Outpatient Medications:     lisinopril (PRINIVIL;ZESTRIL) 20 MG tablet, Take 1 tablet by mouth daily, Disp: 90 tablet, Rfl: 1    atorvastatin (LIPITOR) 40 MG tablet, Take 1 tablet by mouth daily, Disp: 90 tablet, Rfl: 1    aspirin 81 MG EC tablet, Take 81 mg by mouth daily, Disp: , Rfl:     metFORMIN (GLUCOPHAGE) 1000 MG tablet, Take 1 tablet by mouth 2 times daily (with meals), Disp: 180 tablet, Rfl: 1    insulin lispro, 1 Unit Dial, (HUMALOG KWIKPEN) 100 UNIT/ML SOPN, Inject up to 12 units QAC TID, Disp: 15 pen, Rfl: 1    insulin glargine (LANTUS SOLOSTAR) 100 UNIT/ML injection pen, Inject 65 Units into the skin every morning, Disp: 15 pen, Rfl: 1    magnesium (MAGNESIUM-OXIDE) 250 MG TABS tablet, Take 250 mg by mouth in the morning and at bedtime, Disp: , Rfl:     Insulin Pen Needle 31G X 5 MM MISC, 1 each by Does not apply route daily, Disp: , Rfl:     ketorolac (TORADOL) 30 MG/ML injection, Inject 1 mL into the muscle once for 1 dose, Disp: 1 mL, Rfl: 0    ibuprofen (ADVIL;MOTRIN) 800 MG tablet, Take 1 tablet by mouth 2 times daily for 7 days, Disp: 14 tablet, Rfl: 0    zolpidem (AMBIEN) 10 MG tablet, Take 1 tablet by mouth nightly as needed for Sleep for up to 90 days. , Disp: 90 tablet, Rfl: 0    Exam:   Vitals:    22 1011   Pulse: 75   Temp: 97.3 °F (36.3 °C)   SpO2: 97%       There are hypertonic and tender fibers noted with palpation in the paraspinal muscles of the lumbar region. Joint fixation is noted with motion screening at L3-4, right SI joint, left SI joint. Caratunk Cousins was seen today for back pain.     Diagnoses and all orders for this visit:    Segmental and somatic dysfunction of lumbar region    Chronic right-sided low back pain with right-sided sciatica    Segmental and somatic dysfunction of pelvic region    Sacrococcygeal disorders, not elsewhere classified      Treatment Plan:    Berry flexion distraction manipulation L3, protocol 2. Mechanically assisted manipulation of the SI joints. Tolerated well. I will see her back in 1 month or as needed for care.       Seen By:  Chantelle Parham DC

## 2022-08-26 ENCOUNTER — TELEPHONE (OUTPATIENT)
Dept: BARIATRICS/WEIGHT MGMT | Age: 72
End: 2022-08-26

## 2022-09-08 ENCOUNTER — OFFICE VISIT (OUTPATIENT)
Dept: CHIROPRACTIC MEDICINE | Age: 72
End: 2022-09-08
Payer: MEDICARE

## 2022-09-08 VITALS — TEMPERATURE: 97.3 F | HEIGHT: 63 IN | OXYGEN SATURATION: 98 % | HEART RATE: 67 BPM | BODY MASS INDEX: 34.37 KG/M2

## 2022-09-08 DIAGNOSIS — M99.05 SEGMENTAL AND SOMATIC DYSFUNCTION OF PELVIC REGION: ICD-10-CM

## 2022-09-08 DIAGNOSIS — M99.03 SEGMENTAL AND SOMATIC DYSFUNCTION OF LUMBAR REGION: Primary | ICD-10-CM

## 2022-09-08 DIAGNOSIS — M54.41 CHRONIC RIGHT-SIDED LOW BACK PAIN WITH RIGHT-SIDED SCIATICA: ICD-10-CM

## 2022-09-08 DIAGNOSIS — G89.29 CHRONIC RIGHT-SIDED LOW BACK PAIN WITH RIGHT-SIDED SCIATICA: ICD-10-CM

## 2022-09-08 DIAGNOSIS — M53.3 SACROCOCCYGEAL DISORDERS, NOT ELSEWHERE CLASSIFIED: ICD-10-CM

## 2022-09-08 PROCEDURE — 98940 CHIROPRACT MANJ 1-2 REGIONS: CPT | Performed by: CHIROPRACTOR

## 2022-09-08 PROCEDURE — 99999 PR OFFICE/OUTPT VISIT,PROCEDURE ONLY: CPT | Performed by: CHIROPRACTOR

## 2022-09-08 NOTE — PROGRESS NOTES
22  Bradley Holstein : 1950 Sex: female  Age: 66 y.o. Chief Complaint   Patient presents with    Back Pain     Pt states pain is a little better       HPI:   Pain has worsened. On average, pain is perceived as moderate (4-6 pain scale). Patient denies new numbness, new weakness, new tingling  Sitting for 7 hours playing cards, went to get up and had a lot of sciatica pain. Pain traveling down right leg to her foot  Taking some ibuprofen which is helping. Walking some too which helps  Leave for [place]     Current Outpatient Medications:     ALPRAZolam (XANAX) 0.25 MG tablet, Take 1 tablet by mouth daily as needed for Anxiety for up to 30 days. , Disp: 30 tablet, Rfl: 0    lisinopril (PRINIVIL;ZESTRIL) 20 MG tablet, Take 1 tablet by mouth daily, Disp: 90 tablet, Rfl: 1    atorvastatin (LIPITOR) 40 MG tablet, Take 1 tablet by mouth daily, Disp: 90 tablet, Rfl: 1    aspirin 81 MG EC tablet, Take 81 mg by mouth daily, Disp: , Rfl:     metFORMIN (GLUCOPHAGE) 1000 MG tablet, Take 1 tablet by mouth 2 times daily (with meals), Disp: 180 tablet, Rfl: 1    insulin lispro, 1 Unit Dial, (HUMALOG KWIKPEN) 100 UNIT/ML SOPN, Inject up to 12 units QAC TID, Disp: 15 pen, Rfl: 1    insulin glargine (LANTUS SOLOSTAR) 100 UNIT/ML injection pen, Inject 65 Units into the skin every morning, Disp: 15 pen, Rfl: 1    magnesium (MAGNESIUM-OXIDE) 250 MG TABS tablet, Take 250 mg by mouth in the morning and at bedtime, Disp: , Rfl:     Insulin Pen Needle 31G X 5 MM MISC, 1 each by Does not apply route daily, Disp: , Rfl:     ketorolac (TORADOL) 30 MG/ML injection, Inject 1 mL into the muscle once for 1 dose, Disp: 1 mL, Rfl: 0    ibuprofen (ADVIL;MOTRIN) 800 MG tablet, Take 1 tablet by mouth 2 times daily for 7 days, Disp: 14 tablet, Rfl: 0    zolpidem (AMBIEN) 10 MG tablet, Take 1 tablet by mouth nightly as needed for Sleep for up to 90 days. , Disp: 90 tablet, Rfl: 0    Exam:   Vitals:    22 0959   Pulse: 67   Temp: 97.3 °F

## 2022-10-13 ENCOUNTER — OFFICE VISIT (OUTPATIENT)
Dept: CHIROPRACTIC MEDICINE | Age: 72
End: 2022-10-13
Payer: MEDICARE

## 2022-10-13 VITALS
OXYGEN SATURATION: 97 % | BODY MASS INDEX: 34.38 KG/M2 | WEIGHT: 194 LBS | HEART RATE: 87 BPM | TEMPERATURE: 97.5 F | HEIGHT: 63 IN

## 2022-10-13 DIAGNOSIS — M99.05 SEGMENTAL AND SOMATIC DYSFUNCTION OF PELVIC REGION: ICD-10-CM

## 2022-10-13 DIAGNOSIS — M54.41 CHRONIC RIGHT-SIDED LOW BACK PAIN WITH RIGHT-SIDED SCIATICA: ICD-10-CM

## 2022-10-13 DIAGNOSIS — G89.29 CHRONIC RIGHT-SIDED LOW BACK PAIN WITH RIGHT-SIDED SCIATICA: ICD-10-CM

## 2022-10-13 DIAGNOSIS — M99.03 SEGMENTAL AND SOMATIC DYSFUNCTION OF LUMBAR REGION: Primary | ICD-10-CM

## 2022-10-13 DIAGNOSIS — M53.3 SACROCOCCYGEAL DISORDERS, NOT ELSEWHERE CLASSIFIED: ICD-10-CM

## 2022-10-13 PROCEDURE — 99999 PR OFFICE/OUTPT VISIT,PROCEDURE ONLY: CPT | Performed by: CHIROPRACTOR

## 2022-10-13 PROCEDURE — 98940 CHIROPRACT MANJ 1-2 REGIONS: CPT | Performed by: CHIROPRACTOR

## 2022-10-13 NOTE — PROGRESS NOTES
10/13/22  Rory Herrera : 1950 Sex: female  Age: 66 y.o. Chief Complaint   Patient presents with    Back Pain       HPI:   Pain has worsened. She reports for the past few weeks they have been on vacation, traveling to [facility] and [place]. She states riding in the car and being an active seems to have caused her right lower back to worsen. No specific injury. It is traveling down the right leg today, not past the knee. On average, pain is perceived as moderate (4-6 pain scale). Patient denies new numbness, new weakness, new tingling      Current Outpatient Medications:     lisinopril (PRINIVIL;ZESTRIL) 20 MG tablet, Take 1 tablet by mouth daily, Disp: 90 tablet, Rfl: 1    atorvastatin (LIPITOR) 40 MG tablet, Take 1 tablet by mouth daily, Disp: 90 tablet, Rfl: 1    aspirin 81 MG EC tablet, Take 81 mg by mouth daily, Disp: , Rfl:     metFORMIN (GLUCOPHAGE) 1000 MG tablet, Take 1 tablet by mouth 2 times daily (with meals), Disp: 180 tablet, Rfl: 1    insulin lispro, 1 Unit Dial, (HUMALOG KWIKPEN) 100 UNIT/ML SOPN, Inject up to 12 units QAC TID, Disp: 15 pen, Rfl: 1    insulin glargine (LANTUS SOLOSTAR) 100 UNIT/ML injection pen, Inject 65 Units into the skin every morning, Disp: 15 pen, Rfl: 1    magnesium (MAGNESIUM-OXIDE) 250 MG TABS tablet, Take 250 mg by mouth in the morning and at bedtime, Disp: , Rfl:     Insulin Pen Needle 31G X 5 MM MISC, 1 each by Does not apply route daily, Disp: , Rfl:     ketorolac (TORADOL) 30 MG/ML injection, Inject 1 mL into the muscle once for 1 dose, Disp: 1 mL, Rfl: 0    ibuprofen (ADVIL;MOTRIN) 800 MG tablet, Take 1 tablet by mouth 2 times daily for 7 days, Disp: 14 tablet, Rfl: 0    zolpidem (AMBIEN) 10 MG tablet, Take 1 tablet by mouth nightly as needed for Sleep for up to 90 days. , Disp: 90 tablet, Rfl: 0    Exam:   Vitals:    10/13/22 0852   Pulse: 87   Temp: 97.5 °F (36.4 °C)   SpO2: 97%     She appears well per no acute distress. Alert and oriented x3. Ambulating without assistance. Tender right SI joint. Hibb's testing is positive right. She is nontender over the greater trochanter right. Mild midline pain L3-4 interspace  There are hypertonic and tender fibers noted with palpation in the paraspinal muscles of the lumbar region. Joint fixation is noted with motion screening at L3-4, bilateral SI joints. Pura De La Torre was seen today for back pain. Diagnoses and all orders for this visit:    Segmental and somatic dysfunction of lumbar region    Chronic right-sided low back pain with right-sided sciatica    Segmental and somatic dysfunction of pelvic region    Sacrococcygeal disorders, not elsewhere classified        Treatment Plan: Continued with active treatment today for her worsening symptoms. This consisted of Berry flexion distraction manipulation at L3, protocol 1. Mechanically assisted manipulation of the SI joints. Tolerated well. She noted relief following care. I will see her back in 2-4 weeks per her preference for further care.       Seen By:  Tiffany Jacinto DC

## 2022-10-13 NOTE — PROGRESS NOTES
Patient is here for adjustment and concerns for back.  Juan Wood MD  Electronically signed by Stevo Enrique LPN on [id number] at 8:53 AM

## 2022-10-19 DIAGNOSIS — Z79.4 TYPE 2 DIABETES MELLITUS WITH HYPERGLYCEMIA, WITH LONG-TERM CURRENT USE OF INSULIN (HCC): ICD-10-CM

## 2022-10-19 DIAGNOSIS — E78.2 MIXED HYPERLIPIDEMIA: ICD-10-CM

## 2022-10-19 DIAGNOSIS — E11.65 TYPE 2 DIABETES MELLITUS WITH HYPERGLYCEMIA, WITH LONG-TERM CURRENT USE OF INSULIN (HCC): ICD-10-CM

## 2022-10-19 DIAGNOSIS — D64.9 ANEMIA, UNSPECIFIED TYPE: ICD-10-CM

## 2022-10-19 DIAGNOSIS — E55.9 VITAMIN D DEFICIENCY: ICD-10-CM

## 2022-10-19 LAB
ALBUMIN SERPL-MCNC: 3.8 G/DL (ref 3.5–5.2)
ALBUMIN SERPL-MCNC: 4 G/DL (ref 3.5–5.2)
ALP BLD-CCNC: 59 U/L (ref 35–104)
ALP BLD-CCNC: 60 U/L (ref 35–104)
ALT SERPL-CCNC: 37 U/L (ref 0–32)
ALT SERPL-CCNC: 37 U/L (ref 0–32)
ANION GAP SERPL CALCULATED.3IONS-SCNC: 9 MMOL/L (ref 7–16)
ANION GAP SERPL CALCULATED.3IONS-SCNC: 9 MMOL/L (ref 7–16)
AST SERPL-CCNC: 25 U/L (ref 0–31)
AST SERPL-CCNC: 25 U/L (ref 0–31)
BACTERIA: ABNORMAL /HPF
BASOPHILS ABSOLUTE: 0.08 E9/L (ref 0–0.2)
BASOPHILS RELATIVE PERCENT: 0.8 % (ref 0–2)
BILIRUB SERPL-MCNC: <0.2 MG/DL (ref 0–1.2)
BILIRUB SERPL-MCNC: <0.2 MG/DL (ref 0–1.2)
BILIRUBIN URINE: NEGATIVE
BLOOD, URINE: NEGATIVE
BUN BLDV-MCNC: 16 MG/DL (ref 6–23)
BUN BLDV-MCNC: 16 MG/DL (ref 6–23)
CALCIUM SERPL-MCNC: 10.1 MG/DL (ref 8.6–10.2)
CALCIUM SERPL-MCNC: 10.2 MG/DL (ref 8.6–10.2)
CHLORIDE BLD-SCNC: 105 MMOL/L (ref 98–107)
CHLORIDE BLD-SCNC: 107 MMOL/L (ref 98–107)
CHOLESTEROL, FASTING: 143 MG/DL (ref 0–199)
CLARITY: CLEAR
CO2: 26 MMOL/L (ref 22–29)
CO2: 28 MMOL/L (ref 22–29)
COLOR: YELLOW
CREAT SERPL-MCNC: 0.8 MG/DL (ref 0.5–1)
CREAT SERPL-MCNC: 0.8 MG/DL (ref 0.5–1)
EOSINOPHILS ABSOLUTE: 0.23 E9/L (ref 0.05–0.5)
EOSINOPHILS RELATIVE PERCENT: 2.4 % (ref 0–6)
FERRITIN: 24 NG/ML
FOLATE: 18.9 NG/ML (ref 4.8–24.2)
GFR SERPL CREATININE-BSD FRML MDRD: >60 ML/MIN/1.73
GFR SERPL CREATININE-BSD FRML MDRD: >60 ML/MIN/1.73
GLUCOSE BLD-MCNC: 81 MG/DL (ref 74–99)
GLUCOSE BLD-MCNC: 82 MG/DL (ref 74–99)
GLUCOSE URINE: NEGATIVE MG/DL
HCT VFR BLD CALC: 35.9 % (ref 34–48)
HDLC SERPL-MCNC: 59 MG/DL
HEMOGLOBIN: 11.6 G/DL (ref 11.5–15.5)
IMMATURE GRANULOCYTES #: 0.03 E9/L
IMMATURE GRANULOCYTES %: 0.3 % (ref 0–5)
IRON SATURATION: 22 % (ref 15–50)
IRON: 67 MCG/DL (ref 37–145)
KETONES, URINE: NEGATIVE MG/DL
LDL CHOLESTEROL CALCULATED: 66 MG/DL (ref 0–99)
LEUKOCYTE ESTERASE, URINE: ABNORMAL
LYMPHOCYTES ABSOLUTE: 3.19 E9/L (ref 1.5–4)
LYMPHOCYTES RELATIVE PERCENT: 33 % (ref 20–42)
MAGNESIUM: 1.4 MG/DL (ref 1.6–2.6)
MCH RBC QN AUTO: 30.1 PG (ref 26–35)
MCHC RBC AUTO-ENTMCNC: 32.3 % (ref 32–34.5)
MCV RBC AUTO: 93.2 FL (ref 80–99.9)
MICROALBUMIN UR-MCNC: <12 MG/L
MONOCYTES ABSOLUTE: 0.81 E9/L (ref 0.1–0.95)
MONOCYTES RELATIVE PERCENT: 8.4 % (ref 2–12)
NEUTROPHILS ABSOLUTE: 5.32 E9/L (ref 1.8–7.3)
NEUTROPHILS RELATIVE PERCENT: 55.1 % (ref 43–80)
NITRITE, URINE: NEGATIVE
PDW BLD-RTO: 12.5 FL (ref 11.5–15)
PH UA: 6 (ref 5–9)
PLATELET # BLD: 417 E9/L (ref 130–450)
PMV BLD AUTO: 9.8 FL (ref 7–12)
POTASSIUM SERPL-SCNC: 4.5 MMOL/L (ref 3.5–5)
POTASSIUM SERPL-SCNC: 4.6 MMOL/L (ref 3.5–5)
PROTEIN UA: NEGATIVE MG/DL
RBC # BLD: 3.85 E12/L (ref 3.5–5.5)
RBC UA: ABNORMAL /HPF (ref 0–2)
SODIUM BLD-SCNC: 142 MMOL/L (ref 132–146)
SODIUM BLD-SCNC: 142 MMOL/L (ref 132–146)
SPECIFIC GRAVITY UA: 1.01 (ref 1–1.03)
TOTAL IRON BINDING CAPACITY: 305 MCG/DL (ref 250–450)
TOTAL PROTEIN: 6.7 G/DL (ref 6.4–8.3)
TOTAL PROTEIN: 6.9 G/DL (ref 6.4–8.3)
TRIGLYCERIDE, FASTING: 89 MG/DL (ref 0–149)
UROBILINOGEN, URINE: 0.2 E.U./DL
VITAMIN B-12: 419 PG/ML (ref 211–946)
VLDLC SERPL CALC-MCNC: 18 MG/DL
WBC # BLD: 9.7 E9/L (ref 4.5–11.5)
WBC UA: ABNORMAL /HPF (ref 0–5)

## 2022-10-20 LAB — VITAMIN D 25-HYDROXY: 62 NG/ML (ref 30–100)

## 2022-10-24 ENCOUNTER — OFFICE VISIT (OUTPATIENT)
Dept: FAMILY MEDICINE CLINIC | Age: 72
End: 2022-10-24
Payer: MEDICARE

## 2022-10-24 VITALS
WEIGHT: 197 LBS | HEIGHT: 63 IN | RESPIRATION RATE: 20 BRPM | SYSTOLIC BLOOD PRESSURE: 136 MMHG | OXYGEN SATURATION: 96 % | DIASTOLIC BLOOD PRESSURE: 68 MMHG | HEART RATE: 83 BPM | TEMPERATURE: 98.2 F | BODY MASS INDEX: 34.91 KG/M2

## 2022-10-24 DIAGNOSIS — F41.9 ANXIETY: ICD-10-CM

## 2022-10-24 DIAGNOSIS — E78.2 MIXED HYPERLIPIDEMIA: ICD-10-CM

## 2022-10-24 DIAGNOSIS — I77.9 CAROTID ARTERY DISEASE, UNSPECIFIED LATERALITY, UNSPECIFIED TYPE (HCC): ICD-10-CM

## 2022-10-24 DIAGNOSIS — G47.09 OTHER INSOMNIA: ICD-10-CM

## 2022-10-24 DIAGNOSIS — D64.9 ANEMIA, UNSPECIFIED TYPE: ICD-10-CM

## 2022-10-24 DIAGNOSIS — E11.65 TYPE 2 DIABETES MELLITUS WITH HYPERGLYCEMIA, WITH LONG-TERM CURRENT USE OF INSULIN (HCC): ICD-10-CM

## 2022-10-24 DIAGNOSIS — R01.1 MURMUR: ICD-10-CM

## 2022-10-24 DIAGNOSIS — M25.559 HIP PAIN: ICD-10-CM

## 2022-10-24 DIAGNOSIS — E83.42 HYPOMAGNESEMIA: ICD-10-CM

## 2022-10-24 DIAGNOSIS — I10 ESSENTIAL HYPERTENSION: ICD-10-CM

## 2022-10-24 DIAGNOSIS — E55.9 VITAMIN D DEFICIENCY: ICD-10-CM

## 2022-10-24 DIAGNOSIS — K21.9 GASTROESOPHAGEAL REFLUX DISEASE WITHOUT ESOPHAGITIS: ICD-10-CM

## 2022-10-24 DIAGNOSIS — Z79.4 TYPE 2 DIABETES MELLITUS WITH HYPERGLYCEMIA, WITH LONG-TERM CURRENT USE OF INSULIN (HCC): ICD-10-CM

## 2022-10-24 DIAGNOSIS — G47.33 OSA (OBSTRUCTIVE SLEEP APNEA): ICD-10-CM

## 2022-10-24 DIAGNOSIS — G56.01 CARPAL TUNNEL SYNDROME OF RIGHT WRIST: ICD-10-CM

## 2022-10-24 DIAGNOSIS — Z23 NEED FOR INFLUENZA VACCINATION: Primary | ICD-10-CM

## 2022-10-24 DIAGNOSIS — E66.09 CLASS 1 OBESITY DUE TO EXCESS CALORIES WITHOUT SERIOUS COMORBIDITY WITH BODY MASS INDEX (BMI) OF 34.0 TO 34.9 IN ADULT: ICD-10-CM

## 2022-10-24 PROCEDURE — G8427 DOCREV CUR MEDS BY ELIG CLIN: HCPCS | Performed by: INTERNAL MEDICINE

## 2022-10-24 PROCEDURE — G8417 CALC BMI ABV UP PARAM F/U: HCPCS | Performed by: INTERNAL MEDICINE

## 2022-10-24 PROCEDURE — 1090F PRES/ABSN URINE INCON ASSESS: CPT | Performed by: INTERNAL MEDICINE

## 2022-10-24 PROCEDURE — 3051F HG A1C>EQUAL 7.0%<8.0%: CPT | Performed by: INTERNAL MEDICINE

## 2022-10-24 PROCEDURE — G0008 ADMIN INFLUENZA VIRUS VAC: HCPCS | Performed by: INTERNAL MEDICINE

## 2022-10-24 PROCEDURE — 90694 VACC AIIV4 NO PRSRV 0.5ML IM: CPT | Performed by: INTERNAL MEDICINE

## 2022-10-24 PROCEDURE — 1036F TOBACCO NON-USER: CPT | Performed by: INTERNAL MEDICINE

## 2022-10-24 PROCEDURE — 2022F DILAT RTA XM EVC RTNOPTHY: CPT | Performed by: INTERNAL MEDICINE

## 2022-10-24 PROCEDURE — G8400 PT W/DXA NO RESULTS DOC: HCPCS | Performed by: INTERNAL MEDICINE

## 2022-10-24 PROCEDURE — 99214 OFFICE O/P EST MOD 30 MIN: CPT | Performed by: INTERNAL MEDICINE

## 2022-10-24 PROCEDURE — G8484 FLU IMMUNIZE NO ADMIN: HCPCS | Performed by: INTERNAL MEDICINE

## 2022-10-24 PROCEDURE — 1123F ACP DISCUSS/DSCN MKR DOCD: CPT | Performed by: INTERNAL MEDICINE

## 2022-10-24 PROCEDURE — 3017F COLORECTAL CA SCREEN DOC REV: CPT | Performed by: INTERNAL MEDICINE

## 2022-10-24 RX ORDER — MAGNESIUM OXIDE 400 MG/1
400 TABLET ORAL 2 TIMES DAILY
Qty: 180 TABLET | Refills: 1 | Status: SHIPPED | OUTPATIENT
Start: 2022-10-24

## 2022-10-24 RX ORDER — ZOLPIDEM TARTRATE 10 MG/1
10 TABLET ORAL NIGHTLY PRN
Qty: 90 TABLET | Refills: 0 | Status: SHIPPED | OUTPATIENT
Start: 2022-10-24 | End: 2023-01-22

## 2022-10-24 SDOH — ECONOMIC STABILITY: FOOD INSECURITY: WITHIN THE PAST 12 MONTHS, THE FOOD YOU BOUGHT JUST DIDN'T LAST AND YOU DIDN'T HAVE MONEY TO GET MORE.: PATIENT DECLINED

## 2022-10-24 SDOH — ECONOMIC STABILITY: FOOD INSECURITY: WITHIN THE PAST 12 MONTHS, YOU WORRIED THAT YOUR FOOD WOULD RUN OUT BEFORE YOU GOT MONEY TO BUY MORE.: PATIENT DECLINED

## 2022-10-24 ASSESSMENT — ENCOUNTER SYMPTOMS
BLOOD IN STOOL: 0
ABDOMINAL PAIN: 0
COUGH: 0
CHEST TIGHTNESS: 0
RHINORRHEA: 0
CONSTIPATION: 0
EYE PAIN: 0
SHORTNESS OF BREATH: 1
VOMITING: 0
NAUSEA: 0
DIARRHEA: 0
SORE THROAT: 0

## 2022-10-24 ASSESSMENT — SOCIAL DETERMINANTS OF HEALTH (SDOH): HOW HARD IS IT FOR YOU TO PAY FOR THE VERY BASICS LIKE FOOD, HOUSING, MEDICAL CARE, AND HEATING?: PATIENT DECLINED

## 2022-10-24 NOTE — PROGRESS NOTES
[facility]) Physicians   Internal Medicine     10/24/2022  Nelson Ovalles : 1950 Sex: female  Age:74 y.o. Chief Complaint   Patient presents with    Hip Injury     Right - fell out of bed    Shoulder Injury     Right - fell out of bed     Hypertension     3 month f/u    Cholesterol Problem     3 month f/u    Diabetes     3 month f/u        HPI:   Patient presents to office for evaluation of the following medical concerns. - States was getting out of bed and feet slipped out on carpet and landed on backside. States injury 3-4 days ago. States right hip and shoulder pain. No head injury. Has not sought care. States to see chiropractor. States has been taking ibuprofen. - asking for anxiety medication for stress of driving on long trip to [place]. - States has had some depression. Last PHQ score was 0 (2021). Discussed treatment - psychology and or medication - declines (2022)      - States still having fatigue. States having body aches. No fever or chills     - States has murmur. Following with cardiology. Last visit was (2021). Last Echo (2021)  EF 60-65%. - Has carotid bruit. Had US carotid (2021) - b/l stenosis 50-69%. - Labs showed anemia. States was placed on iron - self stopped due to constipation issues. Has seen GI - s/p EGD and colonoscopy. Last lab (10/2022) was stable . - States has been having issues with insomnia. Uncontrolled. States stopped taking lorazepam. States has been taking Ambien - decreased ambien. Still with issues sleeping. Discussed medications and interactions. Has sleep apnea. Not wearing cpap. Has seen sleep med.  Follow up sleep study () - Borderline sleep apnea, respiratory event index of less than five, (which is normal), respiratory disturbance index is 26, rec treatment if insurance will cover, if not recommend in lab test last visit with sleep med per reviewed report () -home sleep study equivocal suggesting in lab study patient declines, order AUTO CPAP 5-20 cmH2O based on HST, recommended weaning off of Ambien. Follow-up 4 months    - States has diabetes. States trying to watch diet. States checking blood sugars at home , did have a few > 200. States lantus 65 units daily, metformin 1000mg twice a day, added humalog slide scale. Stopped trulicity (cost). States occasional reported hypoglycemia. On lisinopril. On pravastatin. States follows with optho. Last visit with endo per reviewed note  (7/22) -hemoglobin A1c 7.8 improved continue Lantus 65 units daily Humalog sliding scale metformin thousand twice a day continue atorvastatin    - States has high blood pressure. States occasioanlly checking blood pressure at home, has wrist cuff - 120's/60's. On lisinopril.     - States has high cholesterol. States trying to watch diet. On pravastatin. No reported side effects. Last labs (10/2022)     - States has had gastric ulcers and GERD. On omeprazole as needed. Stable. - States has vitamin D def. On otc supplement     - States has had low magnesium levels on supplement. Last lab low  (2/2022) - increased to twice (64/1511) - uncertain if taking     - States stopped vit b12 supplement.     - States was told had elevated LFT and enlarged liver. Last lab (10/2022) was borderline elevated . optho (1/22) -mild bilateral diabetic retinopathy without significant edema damage due to macular edema in the left eye after cataract surgery no significant edema present    - States has had pain in b/l UE. States pain from elbow to fingers. States describes has ache type pain. States also weakness. States varied in intensity. States had had difficulty gripping things. EMG (12/21) - A chronic right ulnar neuropathy. A chronic right median mononeuropathy at the wrist (I.e., carpal tunnel syndrome) versus a chronic right C8/T1 cervical radiculopathy cannot be excluded underlying the above.  States s/p op (5/22) -right carpal tunnel release right ulnar nerve decompression at elbow right wrist triangular fibrocartilage complex injection right index proximal interphalangeal joint injection. States still having pain. Has had follow up, no complications. Only taking meds at night for pain. - labs from 10/19/2022 reviewed with patient 10/24/2022    Health Maintenance   - immunizations:   Influenza Vaccination - (2019), (2020), (2022)   Pneumonia Vaccination  Zoster/Shingles Vaccine  Tetanus Vaccination  covid (3/4/2021) #1, (4/1/2021) #2, (12/27/2021) #3 - moderna     - Screenings:   Bone Density Scan   Pelvic/Pap Exam  Mammogram - declines     Colonoscopy - (6/20) hemorrhoids, diverticular disease, multiple polyps, tubular and hyperplastic per path, follow up in 3 years     EGD (6/20) -normal    optho (3/21) - for cataract     ROS:  Review of Systems   Constitutional:  Negative for appetite change, chills, fever and unexpected weight change. HENT:  Negative for congestion, rhinorrhea and sore throat. Eyes:  Negative for pain and visual disturbance. Respiratory:  Positive for shortness of breath. Negative for cough and chest tightness. Cardiovascular:  Negative for chest pain and palpitations. Gastrointestinal:  Negative for abdominal pain, blood in stool, constipation, diarrhea, nausea and vomiting. Genitourinary:  Negative for difficulty urinating, dysuria, frequency, pelvic pain, urgency and vaginal bleeding. Musculoskeletal:  Negative for arthralgias and myalgias. Skin:  Negative for rash. Neurological:  Negative for dizziness, syncope, weakness, light-headedness, numbness and headaches. Hematological:  Negative for adenopathy. Psychiatric/Behavioral:  Negative for suicidal ideas. The patient is not nervous/anxious. Current Outpatient Medications:     zolpidem (AMBIEN) 10 MG tablet, Take 1 tablet by mouth nightly as needed for Sleep for up to 90 days. , Disp: 90 tablet, Rfl: 0    magnesium oxide (MAG-OX) 400 MG tablet, Take 1 tablet by mouth 2 times daily, Disp: 180 tablet, Rfl: 1    lisinopril (PRINIVIL;ZESTRIL) 20 MG tablet, Take 1 tablet by mouth daily, Disp: 90 tablet, Rfl: 1    atorvastatin (LIPITOR) 40 MG tablet, Take 1 tablet by mouth daily, Disp: 90 tablet, Rfl: 1    aspirin 81 MG EC tablet, Take 81 mg by mouth daily, Disp: , Rfl:     metFORMIN (GLUCOPHAGE) 1000 MG tablet, Take 1 tablet by mouth 2 times daily (with meals), Disp: 180 tablet, Rfl: 1    insulin lispro, 1 Unit Dial, (HUMALOG KWIKPEN) 100 UNIT/ML SOPN, Inject up to 12 units QAC TID, Disp: 15 pen, Rfl: 1    insulin glargine (LANTUS SOLOSTAR) 100 UNIT/ML injection pen, Inject 65 Units into the skin every morning, Disp: 15 pen, Rfl: 1    magnesium (MAGNESIUM-OXIDE) 250 MG TABS tablet, Take 250 mg by mouth in the morning and at bedtime, Disp: , Rfl:     Insulin Pen Needle 31G X 5 MM MISC, 1 each by Does not apply route daily, Disp: , Rfl:     ketorolac (TORADOL) 30 MG/ML injection, Inject 1 mL into the muscle once for 1 dose, Disp: 1 mL, Rfl: 0    ibuprofen (ADVIL;MOTRIN) 800 MG tablet, Take 1 tablet by mouth 2 times daily for 7 days, Disp: 14 tablet, Rfl: 0    No Known Allergies    Past Medical History:   Diagnosis Date    Cervical spondylosis 12/7/2021    Class 2 obesity due to excess calories without serious comorbidity with body mass index (BMI) of 35.0 to 35.9 in adult 12/18/2019    Essential hypertension 12/18/2019    Hypomagnesemia 12/18/2019    Mixed hyperlipidemia 12/18/2019    Other insomnia 12/18/2019    Right carpal tunnel syndrome     Type 2 diabetes mellitus without complication (HCC)     Vitamin D deficiency 12/18/2019       Past Surgical History:   Procedure Laterality Date    ARM SURGERY Right 5/4/2022    RIGHT ULNAR NERVE IN SITU DECOMPRESSION AT ELBOW,  RIGHT WRIST TRIANGULAR FIBROCARTILAGE COMPLEX INJECTION, RIGHT INDEX PROXIMAL INTERPHALANGEAL JOINT INJECTION performed by William Ford MD at [address] Left     CARPAL TUNNEL RELEASE Right 2022    RIGHT CARPAL TUNNEL RELEASE performed by Laly Schwartz MD at [facility] (CERVIX STATUS UNKNOWN)      HYSTERECTOMY, VAGINAL      partial in the 80s then bilateral oopherectome 2016    RETINAL DETACHMENT SURGERY Right     vitreous surgery    TUBAL LIGATION         Family History   Problem Relation Age of Onset    Diabetes Mother     Stroke Mother     Kidney Disease Father         Renal Failure       Social History     Socioeconomic History    Marital status:      Spouse name: Jesus Sanchez    Number of children: 2    Years of education: 12    Highest education level: High school graduate   Tobacco Use    Smoking status: Former     Packs/day: 1.00     Years: 12.00     Pack years: 12.00     Types: Cigarettes     Quit date: 1982     Years since quittin.7    Smokeless tobacco: Never   Vaping Use    Vaping Use: Never used   Substance and Sexual Activity    Alcohol use: Yes     Comment: rare     Social Determinants of Health     Financial Resource Strain: Unknown    Difficulty of Paying Living Expenses: Patient refused   Food Insecurity: Unknown    Worried About Running Out of Food in the Last Year: Patient refused    [address] in the Last Year: Patient refused        Vitals:    10/24/22 1504   BP: 136/68   Site: Left Upper Arm   Position: Sitting   Cuff Size: Large Adult   Pulse: 83   Resp: 20   Temp: 98.2 °F (36.8 °C)   TempSrc: Temporal   SpO2: 96%   Weight: 197 lb (89.4 kg)   Height: 5' 3\" (1.6 m)       Exam:  Physical Exam  Constitutional:       Appearance: She is well-developed. HENT:      Head: Normocephalic and atraumatic. Right Ear: External ear normal.      Left Ear: External ear normal.   Eyes:      Pupils: Pupils are equal, round, and reactive to light. Neck:      Thyroid: No thyromegaly. Vascular: Carotid bruit present.    Cardiovascular:      Rate and Rhythm: Normal rate and regular rhythm. Heart sounds: Murmur heard. Systolic murmur is present with a grade of 2/6. Pulmonary:      Effort: Pulmonary effort is normal.      Breath sounds: Normal breath sounds. No wheezing. Abdominal:      General: Bowel sounds are normal. There is no distension. Palpations: Abdomen is soft. Tenderness: There is no abdominal tenderness. There is no guarding or rebound. Musculoskeletal:         General: Normal range of motion. Cervical back: Normal range of motion and neck supple. Lymphadenopathy:      Cervical: No cervical adenopathy. Skin:     General: Skin is warm and dry. Neurological:      Mental Status: She is alert and oriented to person, place, and time. Cranial Nerves: No cranial nerve deficit. Psychiatric:         Judgment: Judgment normal.       Assessment and Plan:    Diagnoses and all orders for this visit:    Hip pain  - declines toradol    - declines xray   - to see chiropractor     Anxiety   - situational   - xanax prn travel     Controlled Substance Monitoring:    Acute and Chronic Pain Monitoring:   RX Monitoring 10/24/2022   Periodic Controlled Substance Monitoring Possible medication side effects, risk of tolerance/dependence & alternative treatments discussed. ;No signs of potential drug abuse or diversion identified.         Anemia  - uncertain cause   - C70 and folic acid was normal   - following with GI   - s/p  EGD and colonoscopy (6/2020) - polyps   - has not tolerated iron - constipation - not helped with stool softener - stopped   - last lab (10/2022) - normal    Type 2 diabetes mellitus with hyperglycemia, with long-term current use of insulin ([facility])  - watch diet   - monitor blood sugar at home, goals fasting am <120, 2 hours post meal < 180  - following with endocrinology   - on lantus 65 units   - on metformin 1000mg bid  - endocrinology added humalog slide scale  - stopped glipizide   - stopped trulicity due to cost   - follow A1c - 7.8 (7/2022)     On ACE  On statin   Not on aspirin - h/o PUD   optho (1/22) -mild bilateral diabetic retinopathy without significant edema receptor damage due to macular edema in the left eye after cataract surgery no significant edema present    Essential hypertension  - watch diet   - monitor blood pressure at home   - on lisinopril   - stable 10/24/2022    Mixed hyperlipidemia  - Continue present treatment   - watch diet   - stop pravastatin for higher intensity statin   - on atorvastatin   - follow labs   - last lab (10/2021) - stable     Gastroesophageal reflux disease without esophagitis   - watch diet   - h/o PUD   - on omeprazole prn   - on tums as needed     DEYSI (obstructive sleep apnea)  - following with sleep medicine   - sleep study (7/22) - Borderline sleep apnea, respiratory event index of less than five, (which is normal), respiratory disturbance index is 26, rec treatment if insurance will cover, if not recommend in lab test last visit with sleep    Other insomnia  - states has had sleep study in the past - did nto tolerate   - On Ambien  - weaned off lorazepam   - discussed did not recommend these medications   - continue Ambien for now   - sleep habits handout provided   - referral to sleep medicine   - discussed sleep apnea testing  - referral placed     Controlled Substance Monitoring:    Acute and Chronic Pain Monitoring:   RX Monitoring 10/24/2022   Periodic Controlled Substance Monitoring Possible medication side effects, risk of tolerance/dependence & alternative treatments discussed. ;No signs of potential drug abuse or diversion identified.        Class 1 obesity due to excess calories without serious comorbidity with body mass index (BMI) of 34.0 to 34.9 in adult  - watch diet   - discussed diet and exercises     Vitamin D deficiency  - on otc supplement   - follow labs     Hypomagnesemia  - on otc supplement   - follow labs   - last lab (10/2021) - low   - increased magnesium supplement to twice a day   - will order mag oxide 400mg twice a day     Elevated LFTs  - uncertain etiology at present   - recheck labs - last lab (2/2022) - Stable and normal     Murmur  - has seen cardiology (5/2021)   - had echo (8/2021) - ef 60-65%, no wall motion abnormalities, trace MR     Carotid artery disease, unspecified laterality, unspecified type ([address]  -US carotid (5/2021) - 50-69% stenosis b/l    - repeat in 1 year   - declines recheck 10/24/2022 - would like to discuss at next visit     Carpal tunnel syndrome of right wrist  - uncertain etiology   - EMG (12/21) - A chronic right ulnar neuropathy. A chronic right median mononeuropathy at the wrist (I.e., carpal tunnel syndrome) versus a chronic right C8/T1 cervical radiculopathy cannot be excluded underlying the above   - op (5/22) -right carpal tunnel release right ulnar nerve decompression at elbow right wrist triangular fibrocartilage complex injection right index proximal interphalangeal joint injection    Return in about 3 months (around 1/24/2023) for check up and review. Orders Placed This Encounter   Procedures    Influenza, FLUAD, (age 72 y+), IM, Preservative Free, 0.5 mL     Requested Prescriptions     Signed Prescriptions Disp Refills    zolpidem (AMBIEN) 10 MG tablet 90 tablet 0     Sig: Take 1 tablet by mouth nightly as needed for Sleep for up to 90 days.     magnesium oxide (MAG-OX) 400 MG tablet 180 tablet 1     Sig: Take 1 tablet by mouth 2 times daily        Adama Finley MD  10/24/2022  3:43 PM

## 2022-10-25 ENCOUNTER — OFFICE VISIT (OUTPATIENT)
Dept: CHIROPRACTIC MEDICINE | Age: 72
End: 2022-10-25
Payer: MEDICARE

## 2022-10-25 VITALS — OXYGEN SATURATION: 96 % | TEMPERATURE: 97.4 F | HEART RATE: 87 BPM

## 2022-10-25 DIAGNOSIS — M54.41 CHRONIC RIGHT-SIDED LOW BACK PAIN WITH RIGHT-SIDED SCIATICA: ICD-10-CM

## 2022-10-25 DIAGNOSIS — M99.03 SEGMENTAL AND SOMATIC DYSFUNCTION OF LUMBAR REGION: Primary | ICD-10-CM

## 2022-10-25 DIAGNOSIS — M99.05 SEGMENTAL AND SOMATIC DYSFUNCTION OF PELVIC REGION: ICD-10-CM

## 2022-10-25 DIAGNOSIS — M99.02 SEGMENTAL AND SOMATIC DYSFUNCTION OF THORACIC REGION: ICD-10-CM

## 2022-10-25 DIAGNOSIS — G89.29 CHRONIC RIGHT-SIDED LOW BACK PAIN WITH RIGHT-SIDED SCIATICA: ICD-10-CM

## 2022-10-25 DIAGNOSIS — M53.3 SACROCOCCYGEAL DISORDERS, NOT ELSEWHERE CLASSIFIED: ICD-10-CM

## 2022-10-25 DIAGNOSIS — M54.04 PANNICULITIS AFFECTING REGIONS OF NECK AND BACK, THORACIC REGION: ICD-10-CM

## 2022-10-25 PROCEDURE — 98941 CHIROPRACT MANJ 3-4 REGIONS: CPT | Performed by: CHIROPRACTOR

## 2022-10-25 PROCEDURE — 99999 PR OFFICE/OUTPT VISIT,PROCEDURE ONLY: CPT | Performed by: CHIROPRACTOR

## 2022-10-25 NOTE — PROGRESS NOTES
10/25/22  [facility] 1950 Sex: female  Age: 66 y.o. Chief Complaint   Patient presents with    Hip Pain    Shoulder Pain     right       HPI:   Pain has worsened. She was getting out of bed last week, feet went out and she landed down on her right buttock. Thinks that her feet slipped on the rug. Presents today with complaints of dull right sided buttock pain with some occasional sharp twinges with walking. Travels down R post thigh to ankle at times. Pain mainly in R hip joint, identifying the right gluteal and sacroiliac region as the area of complaint. Right shoulder pain too at R suprascapular area--this again is identified in the right suprascapular area, not glenohumeral joint. Osmany or 'jammed' it. Pain at 7-8/10  Ibuprofen helps. Wonder if she should use heat or not. Current Outpatient Medications:     zolpidem (AMBIEN) 10 MG tablet, Take 1 tablet by mouth nightly as needed for Sleep for up to 90 days. , Disp: 90 tablet, Rfl: 0    magnesium oxide (MAG-OX) 400 MG tablet, Take 1 tablet by mouth 2 times daily, Disp: 180 tablet, Rfl: 1    lisinopril (PRINIVIL;ZESTRIL) 20 MG tablet, Take 1 tablet by mouth daily, Disp: 90 tablet, Rfl: 1    atorvastatin (LIPITOR) 40 MG tablet, Take 1 tablet by mouth daily, Disp: 90 tablet, Rfl: 1    ketorolac (TORADOL) 30 MG/ML injection, Inject 1 mL into the muscle once for 1 dose, Disp: 1 mL, Rfl: 0    ibuprofen (ADVIL;MOTRIN) 800 MG tablet, Take 1 tablet by mouth 2 times daily for 7 days, Disp: 14 tablet, Rfl: 0    aspirin 81 MG EC tablet, Take 81 mg by mouth daily, Disp: , Rfl:     metFORMIN (GLUCOPHAGE) 1000 MG tablet, Take 1 tablet by mouth 2 times daily (with meals), Disp: 180 tablet, Rfl: 1    insulin lispro, 1 Unit Dial, (HUMALOG KWIKPEN) 100 UNIT/ML SOPN, Inject up to 12 units QAC TID, Disp: 15 pen, Rfl: 1    insulin glargine (LANTUS SOLOSTAR) 100 UNIT/ML injection pen, Inject 65 Units into the skin every morning, Disp: 15 pen, Rfl: 1 magnesium (MAGNESIUM-OXIDE) 250 MG TABS tablet, Take 250 mg by mouth in the morning and at bedtime, Disp: , Rfl:     Insulin Pen Needle 31G X 5 MM MISC, 1 each by Does not apply route daily, Disp: , Rfl:     Exam:   Vitals:    10/25/22 1411   Pulse: 87   Temp: 97.4 °F (36.3 °C)   SpO2: 96%     She appears well. No acute distress. She is alert and oriented x3. She is ambulating without assistance. She has limited lumbar active ranges of motion today, previous with consistent visits. She is nontender over the greater trochanter. No sciatic notch tenderness. There are some mild tenderness with palpation in the midline L3-4 today. Active trigger point right trapezius again today. There are hypertonic and tender fibers noted with palpation in the paraspinal muscles of the thoracic, lumbar region. Joint fixation is noted with motion screening at bilateral SI joints, L3-4 and T3-6. Liliana was seen today for hip pain and shoulder pain. Diagnoses and all orders for this visit:    Segmental and somatic dysfunction of lumbar region    Segmental and somatic dysfunction of pelvic region    Segmental and somatic dysfunction of thoracic region    Chronic right-sided low back pain with right-sided sciatica    Sacrococcygeal disorders, not elsewhere classified    Panniculitis affecting regions of neck and back, thoracic region      Treatment Plan: Despite her fall, no new issues. Apparent exacerbation of previously noted issues. Continue with conservative treatment today. Specifically performing Berry flexion distraction manipulation at L3, protocol 2. Mechanically assisted manipulation to the thoracic segments, SI joints listed. Tolerated well. She may use heat to the affected area for 10-20 minutes. If no better by next week she should certainly not hesitate to get back and otherwise I will see her as needed.       Seen By:  Kymberly Thomas DC

## 2022-10-25 NOTE — PROGRESS NOTES
Patient is here for a follow up with hip pain. Patient fell out of bed and her shoulder hurts as well . Tommie Somers MD  Electronically signed by Nori Guillen LPN on [id number]/9582 at 2:12 PM

## 2022-11-10 ENCOUNTER — OFFICE VISIT (OUTPATIENT)
Dept: CHIROPRACTIC MEDICINE | Age: 72
End: 2022-11-10
Payer: MEDICARE

## 2022-11-10 VITALS — TEMPERATURE: 98 F | HEART RATE: 94 BPM | OXYGEN SATURATION: 96 %

## 2022-11-10 DIAGNOSIS — M99.03 SEGMENTAL AND SOMATIC DYSFUNCTION OF LUMBAR REGION: Primary | ICD-10-CM

## 2022-11-10 DIAGNOSIS — G89.29 CHRONIC RIGHT-SIDED LOW BACK PAIN WITH RIGHT-SIDED SCIATICA: ICD-10-CM

## 2022-11-10 DIAGNOSIS — M53.3 SACROCOCCYGEAL DISORDERS, NOT ELSEWHERE CLASSIFIED: ICD-10-CM

## 2022-11-10 DIAGNOSIS — M99.05 SEGMENTAL AND SOMATIC DYSFUNCTION OF PELVIC REGION: ICD-10-CM

## 2022-11-10 DIAGNOSIS — M54.41 CHRONIC RIGHT-SIDED LOW BACK PAIN WITH RIGHT-SIDED SCIATICA: ICD-10-CM

## 2022-11-10 PROCEDURE — 99999 PR OFFICE/OUTPT VISIT,PROCEDURE ONLY: CPT | Performed by: CHIROPRACTOR

## 2022-11-10 PROCEDURE — 98940 CHIROPRACT MANJ 1-2 REGIONS: CPT | Performed by: CHIROPRACTOR

## 2022-11-10 NOTE — PROGRESS NOTES
Patient is here for follow up back pain.  Camille Bucio MD  Electronically signed by Macario Greenfield LPN on 96/88/4871 at 10:58 AM
hypertonic and tender fibers noted with palpation in the paraspinal muscles of the lumbar region. Joint fixation is noted with motion screening at L3-4, bilateral SI joints. Dale Antunez was seen today for back pain. Diagnoses and all orders for this visit:    Segmental and somatic dysfunction of lumbar region    Segmental and somatic dysfunction of pelvic region    Chronic right-sided low back pain with right-sided sciatica    Sacrococcygeal disorders, not elsewhere classified        Treatment Plan: Vibratory massage to low back for 2 minutes. Berry flexion distraction manipulation at L3, protocol 2. Mechanically assisted manipulation of the SI joints. Tolerated well noting relief following care.   I will see her back as needed for further care      Seen By:  Lance Banks

## 2022-12-01 ENCOUNTER — OFFICE VISIT (OUTPATIENT)
Dept: CHIROPRACTIC MEDICINE | Age: 72
End: 2022-12-01

## 2022-12-01 VITALS — TEMPERATURE: 97.5 F | HEART RATE: 100 BPM | OXYGEN SATURATION: 96 %

## 2022-12-01 DIAGNOSIS — M53.3 SACROCOCCYGEAL DISORDERS, NOT ELSEWHERE CLASSIFIED: ICD-10-CM

## 2022-12-01 DIAGNOSIS — M54.41 CHRONIC RIGHT-SIDED LOW BACK PAIN WITH RIGHT-SIDED SCIATICA: ICD-10-CM

## 2022-12-01 DIAGNOSIS — G89.29 CHRONIC RIGHT-SIDED LOW BACK PAIN WITH RIGHT-SIDED SCIATICA: ICD-10-CM

## 2022-12-01 DIAGNOSIS — M99.05 SEGMENTAL AND SOMATIC DYSFUNCTION OF PELVIC REGION: ICD-10-CM

## 2022-12-01 DIAGNOSIS — M99.03 SEGMENTAL AND SOMATIC DYSFUNCTION OF LUMBAR REGION: Primary | ICD-10-CM

## 2022-12-01 NOTE — PROGRESS NOTES
22  [employer] Player : 1950 Sex: female  Age: 74 y.o. Chief Complaint   Patient presents with    Lower Back Pain       HPI:   Pain has improved. On average, pain is perceived as mild (1-3  pain scale). Patient denies new numbness, new weakness, new tingling      Current Outpatient Medications:     zolpidem (AMBIEN) 10 MG tablet, Take 1 tablet by mouth nightly as needed for Sleep for up to 90 days. , Disp: 90 tablet, Rfl: 0    magnesium oxide (MAG-OX) 400 MG tablet, Take 1 tablet by mouth 2 times daily, Disp: 180 tablet, Rfl: 1    lisinopril (PRINIVIL;ZESTRIL) 20 MG tablet, Take 1 tablet by mouth daily, Disp: 90 tablet, Rfl: 1    atorvastatin (LIPITOR) 40 MG tablet, Take 1 tablet by mouth daily, Disp: 90 tablet, Rfl: 1    ketorolac (TORADOL) 30 MG/ML injection, Inject 1 mL into the muscle once for 1 dose, Disp: 1 mL, Rfl: 0    ibuprofen (ADVIL;MOTRIN) 800 MG tablet, Take 1 tablet by mouth 2 times daily for 7 days, Disp: 14 tablet, Rfl: 0    aspirin 81 MG EC tablet, Take 81 mg by mouth daily, Disp: , Rfl:     metFORMIN (GLUCOPHAGE) 1000 MG tablet, Take 1 tablet by mouth 2 times daily (with meals), Disp: 180 tablet, Rfl: 1    insulin lispro, 1 Unit Dial, (HUMALOG KWIKPEN) 100 UNIT/ML SOPN, Inject up to 12 units QAC TID, Disp: 15 pen, Rfl: 1    insulin glargine (LANTUS SOLOSTAR) 100 UNIT/ML injection pen, Inject 65 Units into the skin every morning, Disp: 15 pen, Rfl: 1    magnesium (MAGNESIUM-OXIDE) 250 MG TABS tablet, Take 250 mg by mouth in the morning and at bedtime, Disp: , Rfl:     Insulin Pen Needle 31G X 5 MM MISC, 1 each by Does not apply route daily, Disp: , Rfl:     Exam:   Vitals:    22 1104   Pulse: 100   Temp: 97.5 °F (36.4 °C)   SpO2: 96%       There are hypertonic and tender fibers noted with palpation in the paraspinal muscles of the lumbar region. Joint fixation is noted with motion screening at L3-4, bilateral SI joints. Clifm Leyden was seen today for lower back pain.     Diagnoses and all orders for this visit:    Segmental and somatic dysfunction of lumbar region    Segmental and somatic dysfunction of pelvic region    Chronic right-sided low back pain with right-sided sciatica    Sacrococcygeal disorders, not elsewhere classified      Treatment Plan: Continued with Berry flexion distraction manipulation at L3 today, protocol 2. Mechanically assisted manipulation of the SI joints. Tolerated well.   I will see her back in 1 month or as needed for further care      Seen By:  Ke Olivarez

## 2023-01-03 ENCOUNTER — OFFICE VISIT (OUTPATIENT)
Dept: CHIROPRACTIC MEDICINE | Age: 73
End: 2023-01-03
Payer: MEDICARE

## 2023-01-03 VITALS — OXYGEN SATURATION: 96 % | TEMPERATURE: 97.8 F | HEART RATE: 102 BPM

## 2023-01-03 DIAGNOSIS — M53.3 SACROCOCCYGEAL DISORDERS, NOT ELSEWHERE CLASSIFIED: ICD-10-CM

## 2023-01-03 DIAGNOSIS — G89.29 CHRONIC RIGHT-SIDED LOW BACK PAIN WITH RIGHT-SIDED SCIATICA: ICD-10-CM

## 2023-01-03 DIAGNOSIS — M99.05 SEGMENTAL AND SOMATIC DYSFUNCTION OF PELVIC REGION: ICD-10-CM

## 2023-01-03 DIAGNOSIS — M54.41 CHRONIC RIGHT-SIDED LOW BACK PAIN WITH RIGHT-SIDED SCIATICA: ICD-10-CM

## 2023-01-03 DIAGNOSIS — M99.03 SEGMENTAL AND SOMATIC DYSFUNCTION OF LUMBAR REGION: Primary | ICD-10-CM

## 2023-01-03 PROCEDURE — 99999 PR OFFICE/OUTPT VISIT,PROCEDURE ONLY: CPT | Performed by: CHIROPRACTOR

## 2023-01-03 PROCEDURE — 98940 CHIROPRACT MANJ 1-2 REGIONS: CPT | Performed by: CHIROPRACTOR

## 2023-01-03 NOTE — PROGRESS NOTES
1/3/23  Regulo Rivero : 1950 Sex: female  Age: 74 y.o. Chief Complaint   Patient presents with    Lower Back Pain       HPI:   Pain has worsened. On average, pain is perceived as moderate (4-6 pain scale). Patient denies new numbness, new weakness, new tingling      Current Outpatient Medications:     zolpidem (AMBIEN) 10 MG tablet, Take 1 tablet by mouth nightly as needed for Sleep for up to 90 days. , Disp: 90 tablet, Rfl: 0    magnesium oxide (MAG-OX) 400 MG tablet, Take 1 tablet by mouth 2 times daily, Disp: 180 tablet, Rfl: 1    lisinopril (PRINIVIL;ZESTRIL) 20 MG tablet, Take 1 tablet by mouth daily, Disp: 90 tablet, Rfl: 1    atorvastatin (LIPITOR) 40 MG tablet, Take 1 tablet by mouth daily, Disp: 90 tablet, Rfl: 1    ketorolac (TORADOL) 30 MG/ML injection, Inject 1 mL into the muscle once for 1 dose, Disp: 1 mL, Rfl: 0    ibuprofen (ADVIL;MOTRIN) 800 MG tablet, Take 1 tablet by mouth 2 times daily for 7 days, Disp: 14 tablet, Rfl: 0    aspirin 81 MG EC tablet, Take 81 mg by mouth daily, Disp: , Rfl:     metFORMIN (GLUCOPHAGE) 1000 MG tablet, Take 1 tablet by mouth 2 times daily (with meals), Disp: 180 tablet, Rfl: 1    insulin lispro, 1 Unit Dial, (HUMALOG KWIKPEN) 100 UNIT/ML SOPN, Inject up to 12 units QAC TID, Disp: 15 pen, Rfl: 1    insulin glargine (LANTUS SOLOSTAR) 100 UNIT/ML injection pen, Inject 65 Units into the skin every morning, Disp: 15 pen, Rfl: 1    magnesium (MAGNESIUM-OXIDE) 250 MG TABS tablet, Take 250 mg by mouth in the morning and at bedtime, Disp: , Rfl:     Insulin Pen Needle 31G X 5 MM MISC, 1 each by Does not apply route daily, Disp: , Rfl:     Exam:   Vitals:    23 1127   Pulse: (!) 102   Temp: 97.8 °F (36.6 °C)   SpO2: 96%       There are hypertonic and tender fibers noted with palpation in the paraspinal muscles of the lumbar region. Joint fixation is noted with motion screening at L3-4, B/L SI jts. Ronald Milling was seen today for lower back pain.     Diagnoses and all orders for this visit:    Segmental and somatic dysfunction of lumbar region    Segmental and somatic dysfunction of pelvic region    Chronic right-sided low back pain with right-sided sciatica    Sacrococcygeal disorders, not elsewhere classified        Treatment Plan:    Continued with Berry flexion distraction manipulation at L3 today, protocol 2.   Mechanically assisted manipulation of the SI joints      Seen By:  Charles Bowers DC

## 2023-01-23 LAB
AVERAGE GLUCOSE: NORMAL
HBA1C MFR BLD: 9 %

## 2023-01-25 ENCOUNTER — OFFICE VISIT (OUTPATIENT)
Dept: FAMILY MEDICINE CLINIC | Age: 73
End: 2023-01-25

## 2023-01-25 VITALS
OXYGEN SATURATION: 96 % | HEART RATE: 85 BPM | RESPIRATION RATE: 18 BRPM | TEMPERATURE: 98.3 F | BODY MASS INDEX: 35.08 KG/M2 | SYSTOLIC BLOOD PRESSURE: 116 MMHG | WEIGHT: 198 LBS | HEIGHT: 63 IN | DIASTOLIC BLOOD PRESSURE: 60 MMHG

## 2023-01-25 VITALS
TEMPERATURE: 98.3 F | DIASTOLIC BLOOD PRESSURE: 60 MMHG | RESPIRATION RATE: 18 BRPM | SYSTOLIC BLOOD PRESSURE: 116 MMHG | HEIGHT: 63 IN | WEIGHT: 198 LBS | BODY MASS INDEX: 35.08 KG/M2 | OXYGEN SATURATION: 96 % | HEART RATE: 85 BPM

## 2023-01-25 DIAGNOSIS — D64.9 ANEMIA, UNSPECIFIED TYPE: ICD-10-CM

## 2023-01-25 DIAGNOSIS — Z23 NEED FOR PROPHYLACTIC VACCINATION AGAINST STREPTOCOCCUS PNEUMONIAE (PNEUMOCOCCUS): ICD-10-CM

## 2023-01-25 DIAGNOSIS — E83.42 HYPOMAGNESEMIA: ICD-10-CM

## 2023-01-25 DIAGNOSIS — I10 ESSENTIAL HYPERTENSION: ICD-10-CM

## 2023-01-25 DIAGNOSIS — R01.1 MURMUR: ICD-10-CM

## 2023-01-25 DIAGNOSIS — Z79.4 TYPE 2 DIABETES MELLITUS WITH HYPERGLYCEMIA, WITH LONG-TERM CURRENT USE OF INSULIN (HCC): Primary | ICD-10-CM

## 2023-01-25 DIAGNOSIS — G47.33 OSA (OBSTRUCTIVE SLEEP APNEA): ICD-10-CM

## 2023-01-25 DIAGNOSIS — Z00.00 MEDICARE ANNUAL WELLNESS VISIT, SUBSEQUENT: Primary | ICD-10-CM

## 2023-01-25 DIAGNOSIS — E11.65 TYPE 2 DIABETES MELLITUS WITH HYPERGLYCEMIA, WITH LONG-TERM CURRENT USE OF INSULIN (HCC): Primary | ICD-10-CM

## 2023-01-25 DIAGNOSIS — E78.2 MIXED HYPERLIPIDEMIA: ICD-10-CM

## 2023-01-25 DIAGNOSIS — E55.9 VITAMIN D DEFICIENCY: ICD-10-CM

## 2023-01-25 DIAGNOSIS — I65.23 BILATERAL CAROTID ARTERY STENOSIS: ICD-10-CM

## 2023-01-25 DIAGNOSIS — E66.01 SEVERE OBESITY (BMI 35.0-39.9) WITH COMORBIDITY (HCC): ICD-10-CM

## 2023-01-25 DIAGNOSIS — K21.9 GASTROESOPHAGEAL REFLUX DISEASE WITHOUT ESOPHAGITIS: ICD-10-CM

## 2023-01-25 DIAGNOSIS — G47.09 OTHER INSOMNIA: ICD-10-CM

## 2023-01-25 PROBLEM — R09.89 BILATERAL CAROTID BRUITS: Status: RESOLVED | Noted: 2019-12-18 | Resolved: 2023-01-25

## 2023-01-25 PROBLEM — Z20.822 SUSPECTED COVID-19 VIRUS INFECTION: Status: RESOLVED | Noted: 2021-12-01 | Resolved: 2023-01-25

## 2023-01-25 RX ORDER — ZOLPIDEM TARTRATE 10 MG/1
10 TABLET ORAL NIGHTLY PRN
Qty: 90 TABLET | Refills: 0 | Status: SHIPPED | OUTPATIENT
Start: 2023-01-25 | End: 2023-04-25

## 2023-01-25 RX ORDER — LISINOPRIL 20 MG/1
20 TABLET ORAL DAILY
Qty: 90 TABLET | Refills: 1 | Status: SHIPPED | OUTPATIENT
Start: 2023-01-25

## 2023-01-25 RX ORDER — ATORVASTATIN CALCIUM 40 MG/1
40 TABLET, FILM COATED ORAL DAILY
Qty: 90 TABLET | Refills: 1 | Status: SHIPPED | OUTPATIENT
Start: 2023-01-25

## 2023-01-25 ASSESSMENT — PATIENT HEALTH QUESTIONNAIRE - PHQ9
SUM OF ALL RESPONSES TO PHQ QUESTIONS 1-9: 0
SUM OF ALL RESPONSES TO PHQ QUESTIONS 1-9: 0
2. FEELING DOWN, DEPRESSED OR HOPELESS: 0
1. LITTLE INTEREST OR PLEASURE IN DOING THINGS: 0
SUM OF ALL RESPONSES TO PHQ9 QUESTIONS 1 & 2: 0
SUM OF ALL RESPONSES TO PHQ QUESTIONS 1-9: 0
SUM OF ALL RESPONSES TO PHQ QUESTIONS 1-9: 0

## 2023-01-25 ASSESSMENT — ENCOUNTER SYMPTOMS
BLOOD IN STOOL: 0
COUGH: 0
SHORTNESS OF BREATH: 1
SORE THROAT: 0
NAUSEA: 0
EYE PAIN: 0
VOMITING: 0
ABDOMINAL PAIN: 0
CONSTIPATION: 0
DIARRHEA: 0
CHEST TIGHTNESS: 0
RHINORRHEA: 0

## 2023-01-25 ASSESSMENT — LIFESTYLE VARIABLES
HOW OFTEN DO YOU HAVE A DRINK CONTAINING ALCOHOL: NEVER
HOW MANY STANDARD DRINKS CONTAINING ALCOHOL DO YOU HAVE ON A TYPICAL DAY: PATIENT DOES NOT DRINK

## 2023-01-25 NOTE — PATIENT INSTRUCTIONS
Advance Directives: Care Instructions  Overview  An advance directive is a legal way to state your wishes at the end of your life. It tells your family and your doctor what to do if you can't say what you want. There are two main types of advance directives. You can change them any time your wishes change. Living will. This form tells your family and your doctor your wishes about life support and other treatment. The form is also called a declaration. Medical power of . This form lets you name a person to make treatment decisions for you when you can't speak for yourself. This person is called a health care agent (health care proxy, health care surrogate). The form is also called a durable power of  for health care. If you do not have an advance directive, decisions about your medical care may be made by a family member, or by a doctor or a  who doesn't know you. It may help to think of an advance directive as a gift to the people who care for you. If you have one, they won't have to make tough decisions by themselves. For more information, including forms for your state, see the [address] website (www.caringinfo.org/planning/advance-directives/). Follow-up care is a key part of your treatment and safety. Be sure to make and go to all appointments, and call your doctor if you are having problems. It's also a good idea to know your test results and keep a list of the medicines you take. What should you include in an advance directive? Many states have a unique advance directive form. (It may ask you to address specific issues.) Or you might use a universal form that's approved by many states. If your form doesn't tell you what to address, it may be hard to know what to include in your advance directive. Use the questions below to help you get started. Who do you want to make decisions about your medical care if you are not able to?   What life-support measures do you want if you have a serious illness that gets worse over time or can't be cured? What are you most afraid of that might happen? (Maybe you're afraid of having pain, losing your independence, or being kept alive by machines.)  Where would you prefer to die? (Your home? A hospital? A nursing home?)  Do you want to donate your organs when you die? Do you want certain Orthodox practices performed before you die? When should you call for help? Be sure to contact your doctor if you have any questions. Where can you learn more? Go to http://www.berry.com/ and enter R264 to learn more about \"Advance Directives: Care Instructions. \"  Current as of: June 16, 2022               Content Version: 13.5  © 5494-7246 Healthwise, [employer]orporated. Care instructions adapted under license by [facility] ([facility]). If you have questions about a medical condition or this instruction, always ask your healthcare professional. [place] [address] any warranty or liability for your use of this information. Personalized Preventive Plan for Jeana Suazo - 1/25/2023  Medicare offers a range of preventive health benefits. Some of the tests and screenings are paid in full while other may be subject to a deductible, co-insurance, and/or copay. Some of these benefits include a comprehensive review of your medical history including lifestyle, illnesses that may run in your family, and various assessments and screenings as appropriate. After reviewing your medical record and screening and assessments performed today your provider may have ordered immunizations, labs, imaging, and/or referrals for you. A list of these orders (if applicable) as well as your Preventive Care list are included within your After Visit Summary for your review.     Other Preventive Recommendations:    A preventive eye exam performed by an eye specialist is recommended every 1-2 years to screen for glaucoma; cataracts, macular degeneration, and other eye disorders. A preventive dental visit is recommended every 6 months. Try to get at least 150 minutes of exercise per week or 10,000 steps per day on a pedometer . Order or download the FREE \"Exercise & Physical Activity: Your Everyday Guide\" from [employer]. Call 8-627.878.4485 or search [employer] online. You need 4920-7862 mg of calcium and 1530-9072 IU of vitamin D per day. It is possible to meet your calcium requirement with diet alone, but a vitamin D supplement is usually necessary to meet this goal.  When exposed to the sun, use a sunscreen that protects against both UVA and UVB radiation with an SPF of 30 or greater. Reapply every 2 to 3 hours or after sweating, drying off with a towel, or swimming. Always wear a seat belt when traveling in a car. Always wear a helmet when riding a bicycle or motorcycle. Heart-Healthy Diet   Sodium, Fat, and Cholesterol Controlled Diet       What Is a Heart Healthy Diet? A heart-healthy diet is one that limits sodium , certain types of fat , and cholesterol . This type of diet is recommended for:   People with any form of cardiovascular disease (eg, coronary heart disease , peripheral vascular disease , previous heart attack , previous stroke )   People with risk factors for cardiovascular disease, such as high blood pressure , high cholesterol , or diabetes   Anyone who wants to lower their risk of developing cardiovascular disease   Sodium    Sodium is a mineral found in many foods. In general, most people consume much more sodium than they need. Diets high in sodium can increase blood pressure and lead to edema (water retention). On a heart-healthy diet, you should consume no more than 2,300 mg (milligrams) of sodium per dayabout the amount in one teaspoon of table salt. The foods highest in sodium include table salt (about 50% sodium), processed foods, convenience foods, and preserved foods.    Cholesterol Cholesterol is a fat-like, waxy substance in your blood. Our bodies make some cholesterol. It is also found in animal products, with the highest amounts in fatty meat, egg yolks, whole milk, cheese, shellfish, and organ meats. On a heart-healthy diet, you should limit your cholesterol intake to less than 200 mg per day. It is normal and important to have some cholesterol in your bloodstream. But too much cholesterol can cause plaque to build up within your arteries, which can eventually lead to a heart attack or stroke. The two types of cholesterol that are most commonly referred to are:   Low-density lipoprotein (LDL) cholesterol  Also known as bad cholesterol, this is the cholesterol that tends to build up along your arteries. Bad cholesterol levels are increased by eating fats that are saturated or hydrogenated. Optimal level of this cholesterol is less than 100. Over 130 starts to get risky for heart disease. High-density lipoprotein (HDL) cholesterol  Also known as good cholesterol, this type of cholesterol actually carries cholesterol away from your arteries and may, therefore, help lower your risk of having a heart attack. You want this level to be high (ideally greater than 60). It is a risk to have a level less than 40. You can raise this good cholesterol by eating olive oil, canola oil, avocados, or nuts. Exercise raises this level, too. Fat    Fat is calorie dense and packs a lot of calories into a small amount of food. Even though fats should be limited due to their high calorie content, not all fats are bad. In fact, some fats are quite healthful. Fat can be broken down into four main types.    The good-for-you fats are:   Monounsaturated fat  found in oils such as olive and canola, avocados, and nuts and natural nut butters; can decrease cholesterol levels, while keeping levels of HDL cholesterol high   Polyunsaturated fat  found in oils such as safflower, sunflower, soybean, corn, and sesame; can decrease total cholesterol and LDL cholesterol   Omega-3 fatty acids  particularly those found in fatty fish (such as salmon, trout, tuna, mackerel, herring, and sardines); can decrease risk of arrhythmias, decrease triglyceride levels, and slightly lower blood pressure   The fats that you want to limit are:   Saturated fat  found in animal products, many fast foods, and a few vegetables; increases total blood cholesterol, including LDL levels   Animal fats that are saturated include: butter, lard, whole-milk dairy products, meat fat, and poultry skin   Vegetable fats that are saturated include: hydrogenated shortening, palm oil, coconut oil, cocoa butter   Hydrogenated or trans fat  found in margarine and vegetable shortening, most shelf stable snack foods, and fried foods; increases LDL and decreases HDL     It is generally recommended that you limit your total fat for the day to less than 30% of your total calories. If you follow an 1800-calorie heart healthy diet, for example, this would mean 60 grams of fat or less per day.   Saturated fat and trans fat in your diet raises your blood cholesterol the most, much more than dietary cholesterol does. For this reason, on a heart-healthy diet, less than 7% of your calories should come from saturated fat and ideally 0% from trans fat. On an 1800-calorie diet, this translates into less than 14 grams of saturated fat per day, leaving 46 grams of fat to come from mono- and polyunsaturated fats.   Food Choices on a Heart Healthy Diet   Food Category   Foods Recommended   Foods to Avoid   Grains   Breads and rolls without salted tops Most dry and cooked cereals Unsalted crackers and breadsticks Low-sodium or homemade breadcrumbs or stuffing All rice and pastas   Breads, rolls, and crackers with salted tops High-fat baked goods (eg, muffins, donuts, pastries) Quick breads, self-rising flour, and biscuit mixes Regular bread crumbs Instant hot cereals Commercially prepared  rice, pasta, or stuffing mixes   Vegetables   Most fresh, frozen, and low-sodium canned vegetables Low-sodium and salt-free vegetable juices Canned vegetables if unsalted or rinsed   Regular canned vegetables and juices, including sauerkraut and pickled vegetables Frozen vegetables with sauces Commercially prepared potato and vegetable mixes   Fruits   Most fresh, frozen, and canned fruits All fruit juices   Fruits processed with salt or sodium   Milk   Nonfat or low-fat (1%) milk Nonfat or low-fat yogurt Cottage cheese, low-fat ricotta, cheeses labeled as low-fat and low-sodium   Whole milk Reduced-fat (2%) milk Malted and chocolate milk Full fat yogurt Most cheeses (unless low-fat and low salt) Buttermilk (no more than 1 cup per week)   Meats and Beans   Lean cuts of fresh or frozen beef, veal, lamb, or pork (look for the word loin) Fresh or frozen poultry without the skin Fresh or frozen fish and some shellfish Egg whites and egg substitutes (Limit whole eggs to three per week) Tofu Nuts or seeds (unsalted, dry-roasted), low-sodium peanut butter Dried peas, beans, and lentils   Any smoked, cured, salted, or canned meat, fish, or poultry (including doyle, chipped beef, cold cuts, hot dogs, sausages, sardines, and anchovies) Poultry skins Breaded and/or fried fish or meats Canned peas, beans, and lentils Salted nuts   Fats and Oils   Olive oil and canola oil Low-sodium, low-fat salad dressings and mayonnaise   Butter, margarine, coconut and palm oils, doyle fat   Snacks, Sweets, and Condiments   Low-sodium or unsalted versions of broths, soups, soy sauce, and condiments Pepper, herbs, and spices; vinegar, lemon, or lime juice Low-fat frozen desserts (yogurt, sherbet, fruit bars) Sugar, cocoa powder, honey, syrup, jam, and preserves Low-fat, trans-fat free cookies, cakes, and pies Jan and animal crackers, fig bars, jillian snaps   High-fat desserts Broth, soups, gravies, and sauces, made from instant mixes or other high-sodium ingredients Salted snack foods Canned olives Meat tenderizers, seasoning salt, and most flavored vinegars   Beverages   Low-sodium carbonated beverages Tea and coffee in moderation Soy milk   Commercially softened water   Suggestions   Make whole grains, fruits, and vegetables the base of your diet. Choose heart-healthy fats such as canola, olive, and flaxseed oil, and foods high in heart-healthy fats, such as nuts, seeds, soybeans, tofu, and fish. Eat fish at least twice per week; the fish highest in omega-3 fatty acids and lowest in mercury include salmon, herring, mackerel, sardines, and canned chunk light tuna. If you eat fish less than twice per week or have high triglycerides, talk to your doctor about taking fish oil supplements. Read food labels. For products low in fat and cholesterol, look for fat free, low-fat, cholesterol free, saturated fat free, and trans fat freeAlso scan the Nutrition Facts Label, which lists saturated fat, trans fat, and cholesterol amounts. For products low in sodium, look for sodium free, very low sodium, low sodium, no added salt, and unsalted   Skip the salt when cooking or at the table; if food needs more flavor, get creative and try out different herbs and spices. Garlic and onion also add substantial flavor to foods. Trim any visible fat off meat and poultry before cooking, and drain the fat off after etienne. Use cooking methods that require little or no added fat, such as grilling, boiling, baking, poaching, broiling, roasting, steaming, stir-frying, and sauting. Avoid fast food and convenience food. They tend to be high in saturated and trans fat and have a lot of added salt. Talk to a registered dietitian for individualized diet advice.       Last Reviewed: March 2011 Jairo Taylor MS, MPH, RD   Updated: 3/29/2011     Patient information: Weight loss treatments    INTRODUCTION -- Obesity is a major international problem, and Americans are among the heaviest people in the world. The percentage of obese people in the [facility] has risen steadily. Many people find that although they initially lose weight by dieting, they quickly regain the weight after the diet ends. Because it so hard to keep weight off over time, it is important to have as much information and support as possible before starting a diet. You are most likely to be successful in losing weight and keeping it off when you believe that your body weight can be controlled. STARTING A WEIGHT LOSS PROGRAM -- Some people like to talk to their doctor or nurse to get help choosing the best plan, monitoring progress, and getting advice and support along the way. To know what treatment (or combination of treatments) will work best, determine your body mass index (BMI) and waist circumference (measurement). The BMI is calculated from your height and weight. A person with a BMI between 25 and 29.9 is considered overweight   A person with a BMI of 30 or greater is considered to be obese  A waist circumference greater than 35 inches (88 cm) in women and 40 inches (102 cm) in men increases the risk of obesity-related complications, such as heart disease and diabetes. People who are obese and who have a larger waist size may need more aggressive weight loss treatment than others. Talk to your doctor or nurse for advice. Types of treatment -- Based on your measurements and your medical history, your doctor or nurse can determine what combination of weight loss treatments would work best for you. Treatments may include changes in lifestyle, exercise, dieting, and, in some cases, weight loss medicines or weight loss surgery. Weight loss surgery, also called bariatric surgery, is reserved for people with severe obesity who have not responded to other weight loss treatments.    SETTING A WEIGHT LOSS GOAL -- It is important to set a realistic weight loss goal. Your first goal should be to avoid gaining more weight and staying at your current weight (or within 5 percent). Many people have a \"dream\" weight that is difficult or impossible to achieve. People at high risk of developing diabetes who are able to lose 5 percent of their body weight and maintain this weight will reduce their risk of developing diabetes by about 50 percent and reduce their blood pressure. This is a success. Losing more than 15 percent of your body weight and staying at this weight is an extremely good result, even if you never reach your \"dream\" or \"ideal\" weight. LIFESTYLE CHANGES -- Programs that help you to change your lifestyle are usually run by psychologists or other professionals. The goals of lifestyle changes are to help you change your eating habits, become more active, and be more aware of how much you eat and exercise, helping you to make healthier choices. This type of treatment can be broken down into three steps: The triggers that make you want to eat   Eating   What happens after you eat  Triggers to eat -- Determining what triggers you to eat involves figuring out what foods you eat and where and when you eat. To figure out what triggers you to eat, keep a record for a few days of everything you eat, the places where you eat, how often you eat, and the emotions you were feeling when you ate. For some people, the trigger is related to a certain time of day or night. For others, the trigger is related to a certain place, like sitting at a desk working. Eating -- You can change your eating habits by breaking the chain of events between the trigger for eating and eating itself. There are many ways to do this.  For instance, you can:  Limit where you eat to a few places (eg, dining room)   Restrict the number of utensils (eg, only a fork) used for eating   Drink a sip of water between each bite   Chew your food a certain number of times   Get up and stop eating every few minutes  What happens after you eat -- Rewarding yourself for good eating behaviors can help you to develop better habits. This is not a reward for weight loss; instead, it is a reward for changing unhealthy behaviors. Do not use food as a reward. Some people find money, clothing, or personal care (eg, a hair cut, manicure, or massage) to be effective rewards. Treat yourself immediately after making better eating choices to reinforce the value of the good behavior. You need to have clear behavior goals, and you must have a time frame for reaching your goals. Reward small changes along the way to your final goal.  Other factors that contribute to successful weight loss -- Changing your behavior involves more than just changing unhealthy eating habits; it also involves finding people around you to support your weight loss, reducing stress, and learning to be strong when tempted by food. Establish a \"clifford\" system -- Having a friend or family member available to provide support and reinforce good behavior is very helpful. The support person needs to understand your goals. Learn to be strong -- Learning to be strong when tempted by food is an important part of losing weight. As an example, you will need to learn how to say \"no\" and continue to say no when urged to eat at parties and social gatherings. Develop strategies for events before you go, such as eating before you go or taking low-calorie snacks and drinks with you. Develop a support system -- Having a support system is helpful when losing weight. This is why many commercial groups are successful. Family support is also essential; if your family does not support your efforts to lose weight, this can slow your progress or even keep you from losing weight. Positive thinking -- People often have conversations with themselves in their head; these conversations can be positive or negative.  If you eat a piece of cake that was not planned, you may respond by thinking, \"Oh, you stupid idiot, you've blown your diet! \" and as a result, you may eat more cake. A positive thought for the same event could be, \"Well, I ate cake when it was not on my plan. Now I should do something to get back on track. \" A positive approach is much more likely to be successful than a negative one. Reduce stress -- Although stress is a part of everyday life, it can trigger uncontrolled eating in some people. It is important to find a way to get through these difficult times without eating or by eating low-calorie food, like raw vegetables. It may be helpful to imagine a relaxing place that allows you to temporarily escape from stress. With deep breaths and closed eyes, you can imagine this relaxing place for a few minutes. Self-help programs -- Self-help programs like [place] Watchers®, Overeaters Anonymous®, and Take Off Kennedy (TOPS)© work for some people. As with all weight loss programs, you are most likely to be successful with these plans if you make long-term changes in how you eat. CHOOSING A DIET -- A calorie is a unit of energy found in food. Your body needs calories to function. The goal of any diet is to burn up more calories than you eat. How quickly you lose weight depends upon several factors, such as your age, gender, and starting weight. Older people have a slower metabolism than young people, so they lose weight more slowly. Men lose more weight than women of similar height and weight when dieting because they use more energy. People who are extremely overweight lose weight more quickly than those who are only mildly overweight. Try not to drink alcohol or drinks with added sugar, and most sweets (candy, cakes, cookies), since they rarely contain important nutrients. Portion-controlled diets -- One simple way to diet is to buy packaged foods, like frozen low-calorie meals or meal-replacement canned drinks.  A typical meal plan for one day may include:  A meal-replacement drink or breakfast bar for breakfast   A meal-replacement drink or a frozen low-calorie (250 to 350 calories) meal for lunch   A frozen low-calorie meal or other prepackaged, calorie-controlled meal, along with extra vegetables for dinner  This would give you 1000 to 1500 calories per day. Low-fat diet -- To reduce the amount of fat in your diet, you can:  Eat low-fat foods. Low-fat foods are those that contain less than 30 percent of calories from fat. Fat is listed on the food facts label (figure 1). Count fat grams. For a 1500 calorie diet, this would mean about 45 g or fewer of fat per day. Low-carbohydrate diet -- Low- and very-low-carbohydrate diets (eg, Atkins diet, [employer]) have become popular ways to lose weight quickly. With a very-low-carbohydrate diet, you eat between 0 and 60 grams of carbohydrates per day (a standard diet contains 200 to 300 grams of carbohydrates)   With a low-carbohydrate diet, you eat between 60 and 130 grams of carbohydrates per day  Carbohydrates are found in fruits, vegetables, and grains (including breads, rice, pasta, and cereal), alcoholic beverages, and in dairy products. Meat and fish do not contain carbohydrates. Side effects of very-low-carbohydrate diets can include constipation, headache, bad breath, muscle cramps, diarrhea, and weakness. Mediterranean diet -- The term \"Mediterranean diet\" refers to a way of eating that is common in [place] regions around the [facility]. Although there is some variation in Mediterranean diets, there are some similarities. Most Mediterranean diets include:  A high level of monounsaturated fats (from olive or canola oil, walnuts, pecans, almonds) and a low level of saturated fats (from butter)   A high amount of vegetables, fruits, legumes, and grains (7 to 10 servings of fruits and vegetables per day)   A moderate amount of milk and dairy products, mostly in the form of cheese.  Use low-fat dairy products (skim milk, fat-free yogurt, low-fat cheese). A relatively low amount of red meat and meat products. Substitute fish or poultry for red meat. For those who drink alcohol, a modest amount (mainly as red wine) may help to protect against cardiovascular disease. A modest amount is up to one (4 ounce) glass per day for women and up to two glasses per day for men. Which diet is best? -- Studies have compared different diets, including:  Very-low-carbohydrate (Atkins)   Macronutrient balance controlling glycemic load (Zone®)   Reduced-calorie (Weight Watchers®)   Very-low-fat (Ornish)  No one diet is \"best\" for weight loss. Any diet will help you to lose weight if you stick with the diet. Therefore, it is important to choose a diet that includes foods you like. Fad diets -- Fad diets often promise quick weight loss (more than 1 to 2 pounds per week) and may claim that you do not need to exercise or give up favorite foods. Some fad diets cost a lot of money, because you have to pay for seminars or pills. Fad diets generally lack any scientific evidence that they are safe and effective, but instead rely on \"before\" and \"after\" photos or testimonials. Diets that sound too good to be true usually are. These plans are a waste of time and money and are not recommended. A doctor, nurse, or nutritionist can help you find a safe and effective way to lose weight and keep it off. WEIGHT LOSS MEDICINES -- Taking a weight loss medicine may be helpful when used in combination with diet, exercise, and lifestyle changes. However, it is important to understand the risks and benefits of these medicines. It is also important to be realistic about your goal weight using a weight loss medicine; you may not reach your \"dream\" weight, but you may be able to reduce your risk of diabetes or heart disease.    Weight loss medicines may be recommended for people who have not been able to lose weight with diet and exercise who have a:  BMI of 30 or more    BMI between 27 and 29.9 and have other medical problems, such as diabetes, high cholesterol, or high blood pressure  Two weight loss medicines are approved in the [place] for long-term use. These are sibutramine and orlistat. Other weight loss medicines (phentermine, diethylpropion) are available but are only approved for short-term use (up to 12 weeks). Sibutramine -- Sibutramine (Meridia®, Reductil®) is a medicine that reduces your appetite. In people who take the medicine for one year, the average weight loss is 10 percent of the initial body weight (5 percent more than those who took a placebo treatment). Side effects of sibutramine include insomnia, dry mouth, and constipation. Increases in blood pressure can occur. Therefore, blood pressure is usually monitored during treatment. There is no evidence that sibutramine causes heart or lung problems (like dexfenfluramine and fenfluramine (Phen/Fen)). However, experts agree that sibutramine should not used by people with coronary heart disease, heart failure, uncontrolled hypertension, stroke, irregular heart rhythms, or peripheral vascular disease (poor circulation in the legs). Orlistat -- Orlistat (Xenical® 120 mg capsules) is a medicine that reduces the amount of fat your body absorbs from the foods you eat. A lower-dose version is now available without a prescription (Nishant® 60 mg capsules) in many countries, including the [place]. The medicine is recommended three times per day, taken with a meal; you can skip a dose if you skip a meal or if the meal contains no fat. After one year of treatment with orlistat, the average weight loss is approximately 8 to 10 percent of initial body weight (4 percent more than in those who took a placebo). Cholesterol levels often improve, and blood pressure sometimes falls. In people with diabetes, orlistat may help control blood sugar levels.   Side effects occur in 15 to 10 percent of people and may include stomach cramps, gas, diarrhea, leakage of stool, or oily stools. These problems are more likely when you take orlistat with a high-fat meal (if more than 30 percent of calories in the meal are from fat). Side effects usually improve as you learn to avoid high-fat foods. Severe liver injury has been reported rarely in patients taking orlistat, but it is not known if orlistat caused the liver problems. Diet supplements -- Diet supplements are widely used by people who are trying to lose weight, although the safety and efficacy of these supplements are often unproven. A few of the more common diet supplements are discussed below; none of these are recommended because they have not been studied carefully, and there is no proof they are safe or effective. Chitosan and wheat dextrin are ineffective for weight loss, and their use is not recommended. Ephedra, a compound related to ephedrine, is no longer available in the [place] due to safety concerns. Many nonprescription diet pills previously contained ephedra. Although some studies have shown that ephedra helps with weight loss, there can be serious side effects (psychiatric symptoms, palpitations, and stomach upset), including death. There are not enough data about the safety and efficacy of chromium, ginseng, glucomannan, green tea, hydroxycitric acid, L carnitine, psyllium, pyruvate supplements, Hardy wort, and conjugated linoleic acid. Two supplements from [facility], [address] [place] (also known as the Jerrell Hurley 15 pill) and Herbathin dietary supplement, have been shown to contain prescription drugs. Hoodia gordonii is a dietary supplement derived from a plant in [place]. It is not recommended because there is no proof that it is safe or effective. Bitter orange (Citrus aurantium) can increase your heart rate and blood pressure and is not recommended.   SHOULD I HAVE SURGERY TO LOSE WEIGHT? -- Weight loss surgery is recommended ONLY for people with one of the following:  Severe obesity (body mass index above 40) (calculator 1 and calculator 2) who have not responded to diet, exercise, or weight loss medicines   Body mass index between 35 and 40, along with a serious medical problem (including diabetes, severe joint pain, or sleep apnea) that would improve with weight loss  You should be sure that you understand the potential risks and benefits of weight loss surgery. You must be motivated and willing to make lifelong changes in how you eat to reach and maintain a healthier weight after surgery. You must also be realistic about weight loss after surgery (see 'Effectiveness of weight loss surgery' below). PREPARING FOR WEIGHT LOSS SURGERY -- Most people who have weight loss surgery will meet with several specialists before surgery is scheduled. This often includes a dietitian, mental health counselor, a doctor who specializes in care of obese people, and a surgeon who performs weight loss surgery (bariatric surgeon). You may need to work with these providers for several weeks or months before surgery. The nutritionist will explain what and how much you will be able to eat after surgery. You may also need to lose a small amount of weight before surgery. The mental health specialist will help you to cope with stress and other factors that can make it harder to lose weight or trigger you to eat   The medical doctor will determine whether you need other tests, counseling, or treatment before surgery. He or she might also help you begin a medical weight loss program so that you can lose some weight before surgery. The bariatric surgeon will meet with you to discuss the surgeries available to treat obesity. He or she will also make sure you are a good candidate for surgery. TYPES OF WEIGHT LOSS SURGERY -- There are several types of weight loss surgeries, the most common being lap banding, gastric bypass, and gastric sleeve.   Lap banding -- Laparoscopic adjustable gastric banding (LAGB), or lap banding, is a surgery that uses an adjustable band around the opening to the stomach (figure 1). This reduces the amount of food that you can eat at one time. Lap banding is done through small incisions, with a laparoscope. The band can be adjusted after surgery, allowing you to eat more or less food. Adjustments to the size and tightness of the band are made by using a needle to add or remove fluid from a port (a small container under the skin that is connected to the band). Adding fluid to the band makes it tighter which restricts the amount of food you can eat and may help you to lose more weight. Lap banding is a popular choice because it is relatively simple to perform, can be adjusted or removed, and has a low risk of serious complications immediately after surgery. However, weight loss with the lap band depends on your ability to follow the program closely. You will need to prepare nutritious meals that [facility] with\" the band, not against it. For example, the lap band will not work well if you eat or drink a large amount of liquid calories (like ice cream). The band will not help you to feel full when you eat/drink liquid calories. Weight loss ranges from 45 to 75 percent after two years. As an example, a person who is 120 pounds overweight could expect to lose approximately 54 to 90 pounds in the two years after lap banding. Gastric bypass -- Fritz-en-Y gastric bypass, also called gastric bypass, helps you to lose weight by reducing the amount of food you can eat and reducing the number of calories and nutrients you absorb from the food you eat. To perform gastric bypass, a surgeon creates a small stomach pouch by dividing the stomach and attaching it to the small intestine. This helps you to lose weight in two ways: The smaller stomach can hold less food than before surgery. This causes you to feel full after eating a very small amount of food or liquid.  Over time, the pouch might stretch, allowing you to eat more food. The body absorbs fewer calories, since food bypasses most of the stomach as well as the upper small intestine. This new arrangement seems to decrease your appetite and change how you break down foods by changing the release of various hormones. Gastric bypass can be performed as open surgery (through an incision on the abdomen) or laparoscopically, which uses smaller incisions and smaller instruments. Both the laparoscopic and open techniques have risks and benefits. You and your surgeon should work together to decide which surgery, if any, is right for you. Gastric bypass has a high success rate, and people lose an average of 62 to 68 percent of their excess body weight in the first year. Weight loss typically levels off after one to two years, with an overall excess weight loss between 50 and 75 percent. For a person who is 120 pounds overweight, an average of 60 to 90 pounds of weight loss would be expected. Gastric sleeve -- Gastric sleeve, also known as sleeve gastrectomy, is a surgery that reduces the size of the stomach and makes it into a narrow tube (figure 3). The new stomach is much smaller and produces less of the hormone (ghrelin) that causes hunger, helping you feel satisfied with less food. Sleeve gastrectomy is safer than gastric bypass because the intestines are not rearranged, and there is less chance of malnutrition. It also appears to control hunger better than lap banding. It might be safer than the lap banding because no foreign materials are used. The gastric sleeve has a good success rate, and people lose an average of 33 percent of their excess body weight in the first year. For a person who is 120 pounds overweight, this would mean losing about 40 pounds in the first year. WEIGHT LOSS SURGERY COMPLICATIONS -- A variety of complications can occur with weight loss surgery.  The risks of surgery depend upon which surgery you have and any medical problems you had before surgery. Some of the more common early surgical complications (one to six weeks after surgery) include:  Bleeding   Infection   Blockage or tear in the bowels   Need for further surgery  Important medical complications after surgery can include blood clots in the legs or lungs, heart attack, pneumonia, and urinary tract infection. Complications are less likely when surgery is performed in centers that are experienced in weight loss surgery. In general, centers with experience in weight loss surgery have:  Board-certified doctors and surgeons   A team of support staff (dietitians, counselors, nurses)   Long-term follow-up after surgery   Hospital staff experienced with the care of weight loss patients. This includes nurses who are trained in the care of patients immediately after surgery and anesthesiologists who are experienced in caring for the morbidly obese. EFFECTIVENESS OF WEIGHT LOSS SURGERY -- The goal of weight loss surgery is to reduce the risk of illness or death associated with obesity. Weight loss surgery can also help you to feel and look better, reduce the amount of money you spend on medicines, and cut down on sick days. As an example, weight loss surgery can improve health problems related to obesity (diabetes, high blood pressure, high cholesterol, sleep apnea) to the point that you need less or no medicine. Finally, weight loss surgery might reduce your risk of developing heart disease, cancer, and certain infections. AFTER WEIGHT LOSS SURGERY -- You will need to stay in the hospital until your team feels that it is safe for you to leave (on average, one to three days). Do not drive if you are taking prescription pain medicine. Begin exercising as soon as possible once you have healed; most weight loss centers will design an exercise program for you.   Once you are home, it is important to eat and drink exactly what your doctor and dietitian recommend. You will see your doctor, nurse, and dietitian on a regular basis after surgery to monitor your health, diet, and weight loss. You will be able to slowly increase how much you eat over time, although it will always be important to:  Eat small, frequent meals and not skip meals   Chew your food slowly and completely   Avoid eating while \"distracted\" (such as eating while watching TV)   Stop eating when you feel full   Drink liquids at least 30 minutes before or after eating   Avoid foods high in fat or sugar   Take vitamin supplements, as recommended  It can take several months to learn to listen to your body so that you know when you are hungry and when you are full. You may dislike foods you previously loved, and you may begin to prefer new foods. This can be a frustrating process for some people, so talk to your dietitian if you are having trouble. It usually takes between one and two years to lose weight after surgery. After reaching their goal weight, some people have plastic surgery (called \"body contouring\") to remove excess skin from the body, particularly in the abdominal area. Before you decide to have weight loss surgery, you must commit to staying healthy for life. This includes following up with your healthcare team, exercising most days of the week, and eating a sensible diet every day. It can be difficult to develop new eating and exercise habits after weight loss surgery, and you will have to work hard to stick to your goals. Recovering from surgery and losing weight can be stressful and emotional, and it is important to have the support of family and friends. Working with a , therapist, or support group can help you through the ups and downs. WHERE TO GET MORE INFORMATION -- Your healthcare provider is the best source of information for questions and concerns related to your medical problem.   This article will be updated as needed every four months on our Web site ([email or web address])    Keeping Home a [facility]       As we get older, changes in balance, gait, strength, vision, hearing, and cognition make even the most youthful senior more prone to accidents. Falls are one of the leading health risks for older people. This increased risk of falling is related to:   Aging process (eg, decreased muscle strength, slowed reflexes)   Higher incidence of chronic health problems (eg, arthritis, diabetes) that may limit mobility, agility or sensory awareness   Side effects of medicine (eg, dizziness, blurred vision)especially medicines like prescription pain medicines and drugs used to treat mental health conditions   Depending on the brittleness of your bones, the consequences of a fall can be serious and long lasting. Home Life   Research by the Association of Aging [facility]) shows that some home accidents among older adults can be prevented by making simple lifestyle changes and basic modifications and repairs to the home environment. Here are some lifestyle changes that experts recommend:   Have your hearing and vision checked regularly. Be sure to wear prescription glasses that are right for you. Speak to your doctor or pharmacist about the possible side effects of your medicines. A number of medicines can cause dizziness. If you have problems with sleep, talk to your doctor. Limit your intake of alcohol. If necessary, use a cane or walker to help maintain your balance. Wear supportive, rubber-soled shoes, even at home. If you live in a region that gets wintry weather, you may want to put special cleats on your shoes to prevent you from slipping on the snow and ice. Exercise regularly to help maintain muscle tone, agility, and balance. Always hold the banister when going up or down stairs. Also, use  bars when getting in or out of the bath or shower, or using the toilet. To avoid dizziness, get up slowly from a lying down position.  Sit up first, dangling your legs for a minute or two before rising to a standing position. Overall Home Safety Check   According to the Consumer Product Safety Commision's \"Older Consumer Home Safety Checklist,\" it is important to check for potential hazards in each room. And remember, proper lighting is an essential factor in home safety. If you cannot see clearly, you are more likely to fall. Important questions to ask yourself include:   Are lamp, electric, extension, and telephone cords placed out of the flow of traffic and maintained in good condition? Have frayed cords been replaced? Are all small rugs and runners slip resistant? If not, you can secure them to the floor with a special double-sided carpet tape. Are smoke detectors properly locatedone on every floor of your home and one outside of every sleeping area? Are they in good working order? Are batteries replaced at least once a year? Do you have a well-maintained carbon monoxide detector outside every sleeping are in your home? Does your furniture layout leave plenty of space to maneuver between and around chairs, tables, beds, and sofas? Are hallways, stairs and passages between rooms well lit? Can you reach a lamp without getting out of bed? Are floor surfaces well maintained? Shag rugs, high-pile carpeting, tile floors, and polished wood floors can be particularly slippery. Stairs should always have handrails and be carpeted or fitted with a non-skid tread. Is your telephone easily reachable. Is the cord safely tucked away? Room by Room   According to the Association of Aging, bathrooms and joyce are the two most potentially hazardous rooms in your home. In the Kitchen    Be sure your stove is in proper working order and always make sure burners and the oven are off before you go out or go to sleep. Keep pots on the back burners, turn handles away from the front of the stove, and keep stove clean and free of grease build-up.     Kitchen ventilation systems and range exhausts should be working properly. Keep flammable objects such as towels and pot holders away from the cooking area except when in use. Make sure kitchen curtains are tied back. Move cords and appliances away from the sink and hot surfaces. If extension cords are needed, install wiring guides so they do not hang over the sink, range, or working areas. Look for coffee pots, kettles and toaster ovens with automatic shut-offs. Keep a mop handy in the kitchen so you can wipe up spills instantly. You should also have a small fire extinguisher. Arrange your kitchen with frequently used items on lower shelves to avoid the need to stand on a stepstool to reach them. Make sure countertops are well-lit to avoid injuries while cutting and preparing food. In the Bathroom    Use a non-slip mat or decals in the tub and shower, since wet, soapy tile or porcelain surfaces are extremely slippery. Make sure bathroom rugs are non-skid or tape them firmly to the floor. Bathtubs should have at least one, preferably two, grab bars, firmly attached to structural supports in the wall. (Do not use built-in soap holders or glass shower doors as grab bars.)    Tub seats fitted with non-slip material on the legs allow you to wash sitting down. For people with limited mobility, bathtub transfer benches allow you to slide safely into the tub. Raised toilet seats and toilet safety rails are helpful for those with knee or hip problems. In the [facility]    Make sure you use a nightlight and that the area around your bed is clear of potential obstacles. Be careful with electric blankets and never go to sleep with a heating pad, which can cause serious burns even if on a low setting. Use fire-resistant mattress covers and pillows, and NEVER smoke in bed. Keep a phone next to the bed that is programmed to dial 911 at the push of a button.       If you have a chronic condition, you may want to sign on with an automatic call-in service. Typically the system includes a small pendant that connects directly to an emergency medical voice-response system. You should also make arrangements to stay in contact with someonefriend, neighbor, family memberon a regular schedule. Fire Prevention   According to the [employer]. (Smoke Alarms for Every) [address] [place] [address], senior citizens are one of the two highest risk groups for death and serious injuries due to residential fires. When cooking, wear short-sleeved items, never a bulky long-sleeved robe. The [facility]'s Safety Checklist for Older Consumers emphasizes the importance of checking basements, garages, workshops and storage areas for fire hazards, such as volatile liquids, piles of old rags or clothing and overloaded circuits. Never smoke in bed or when lying down on a couch or recliner chair. Small portable electric or kerosene heaters are responsible for many home fires and should be used cautiously if at all. If you do use one, be sure to keep them away from flammable materials. In case of fire, make sure you have a pre-established emergency exit plan. Have a professional check your fireplace and other fuel-burning appliances yearly. [employer]   Baby boomers entering the christopher years will continue to see the development of new products to help older adults live safely and independently in spite of age-related changes. Making Life More Livable  , by Darcy [employer], lists over 1,000 products for \"living well in the mature years,\" such as bathing and mobility aids, household security devices, ergonomically designed knives and peelers, and faucet valves and knobs for temperature control. Medical supply stores and organizations are good sources of information about products that improve your quality of life and insure your safety.      Last Reviewed: November 2009 Jory Chapman MD   Updated: 3/7/2011            Advance Care Planning: Care Instructions  Your Care Instructions     It can be hard to live with an illness that cannot be cured. But if your health is getting worse, you may want to make decisions about end-of-life care. Planning for the end of your life does not mean that you are giving up. It is a way to make sure that your wishes are met. Clearly stating your wishes can make it easier for your loved ones. Making plans while you are still able may also ease your mind and make your final days less stressful and more meaningful. Follow-up care is a key part of your treatment and safety. Be sure to make and go to all appointments, and call your doctor if you are having problems. It's also a good idea to know your test results and keep a list of the medicines you take. What can you do to plan for the end of life? You can bring these issues up with your doctor. You do not need to wait until your doctor starts the conversation. You might start with, \"What makes life worth living for me is. Vearl Pippins .\" And then follow it with, \"I would not be willing to live with . Vearl Pippins Vearl Pippins \" When you complete this sentence it helps your doctor understand your wishes. Talk openly and honestly with your doctor. This is the best way to understand the decisions you will need to make as your health changes. Know that you can always change your mind. Ask your doctor about commonly used life-support measures. These include tube feedings, breathing machines, and fluids given through a vein (IV). Understanding these treatments will help you decide whether you want them. You may choose to have these life-supporting treatments for a limited time. This allows a trial period to see whether they will help you. You may also decide that you want your doctor to take only certain measures to keep you alive. It may help to think about the big picture, like what makes life worth living for you or what your values and goals are.   Talk to your doctor about how long you are likely to live. Your doctor may be able to give you an idea of what usually happens with your specific illness. Think about preparing papers that state your wishes. These papers are called advance directives. If you do this early and review them often, there will not be any confusion about what you want. You can change your instructions at any time. Which papers should you prepare? Advance directives are legal papers that tell doctors how you want to be cared for at the end of your life. You do not need a  to write these papers. Ask your doctor or your state [facility] department for information on how to write your advance directives. They may have the forms for each of these types of papers. Make sure your doctor has a copy of these on file, and give a copy to a family member or close friend. Consider a do-not-resuscitate order (DNR). This order asks that no extra treatments be done if your heart stops or you stop breathing. Extra treatments may include cardiopulmonary resuscitation (CPR), electrical shock to restart your heart, or a machine to breathe for you. If you decide to have a DNR order, ask your doctor to explain and write it. Place the order in your home where everyone can easily see it. Consider a living will. A living will explains your wishes about life support and other treatments at the end of your life if you become unable to speak for yourself. Living mahoney tell doctors to use or not use treatments that would keep you alive. You must have one or two witnesses or a notary present when you sign this form. A living will may be called something else in your state. Consider a medical power of . This form allows you to name a person to make decisions about your care if you are not able to. Most people ask a close friend or family member. Talk to this person about the kinds of treatments you want and those that you do not want. Make sure this person understands your wishes.  A medical power of  may be called something else in your state. These legal papers are simple to change. Tell your doctor what you want to change, and ask him or her to make a note in your medical file. Give your family updated copies of the papers. Where can you learn more? Go to http://www.berry.com/ and enter P184 to learn more about \"Advance Care Planning: Care Instructions. \"  Current as of: October 6, 2021               Content Version: 13.5  © 2064-4887 Healthwise, [employer]orporated. Care instructions adapted under license by [facility] ([facility]). If you have questions about a medical condition or this instruction, always ask your healthcare professional. [address] any warranty or liability for your use of this information. Learning About Living [place]  What is a living will? A living will, also called a declaration, is a legal form. It tells your family and your doctor your wishes when you can't speak for yourself. It's used by the health professionals who will treat you as you near the end of your life or if you get seriously hurt or ill. If you put your wishes in writing, your loved ones and others will know what kind of care you want. They won't need to guess. This can ease your mind and be helpful to others. And you can change or cancel your living will at any time. A living will is not the same as an estate or property will. An estate will explains what you want to happen with your money and property after you die. How do you use it? Keep these facts in mind about how a living will is used. Your living will is used only if you can't speak or make decisions for yourself. Most often, one or more doctors must certify that you can't speak or decide for yourself before your living will takes effect. If you get better and can speak for yourself again, you can accept or refuse any treatment. It doesn't matter what you said in your living will.   Some states may limit your right to refuse treatment in certain cases. For example, you may need to clearly state in your living will that you don't want artificial hydration and nutrition, such as being fed through a tube. Is a living will a legal document? A living will is a legal document. Each state has its own laws about living mahoney. And a living will may be called something else in your state. Here are some things to know about living mahoney. You don't need an  to complete a living will. But legal advice can be helpful if your state's laws are unclear. It can also help if your health history is complicated or your family can't agree on what should be in your living will. You can change your living will at any time. Some people find that their wishes about end-of-life care change as their health changes. If you make big changes to your living will, complete a new form. If you move to another state, make sure that your living will is legal in the state where you now live. In most cases, doctors will respect your wishes even if you have a form from a different state. You might use a universal form that has been approved by many states. This kind of form can sometimes be filled out and stored online. Your digital copy will then be available wherever you have a connection to the internet. The doctors and nurses who need to treat you can find it right away. Your state may offer an online registry. This is another place where you can store your living will online. It's a good idea to get your living will notarized. This means using a person called a  to watch two people sign, or witness, your living will. What should you know when you create a living will? Here are some questions to ask yourself as you make your living will. Do you know enough about life support methods that might be used?  If not, talk to your doctor so you know what might be done if you can't breathe on your own, your heart stops, or you can't swallow. What things would you still want to be able to do after you receive life-support methods? Would you want to be able to walk? To speak? To eat on your own? To live without the help of machines? Do you want certain Protestant practices performed if you become very ill? If you have a choice, where do you want to be cared for? In your home? At a hospital or nursing home? If you have a choice at the end of your life, where would you prefer to die? At home? In a hospital or nursing home? Somewhere else? Would you prefer to be buried or cremated? Do you want your organs to be donated after you die? What should you do with your living will? Make sure that your family members and your health care agent have copies of your living will (also called a declaration). Give your doctor a copy of your living will. Ask to have it kept as part of your medical record. If you have more than one doctor, make sure that each one has a copy. Put a copy of your living will where it can be easily found. For example, some people may put a copy on their refrigerator door. If you are using a digital copy, be sure your doctor, family members, and health care agent know how to find and access it. Where can you learn more? Go to http://www.berry.com/ and enter K356 to learn more about \"Learning About Living [place]. \"  Current as of: June 16, 2022               Content Version: 13.5  © 1540-4413 Healthwise, Incorporated. Care instructions adapted under license by [facility] ([facility]). If you have questions about a medical condition or this instruction, always ask your healthcare professional. Leodan Loredo any warranty or liability for your use of this information. Learning About Medical Power of   What is a medical power of ?      A medical power of , also called a durable power of  for health care, is one type of the legal forms called advance directives. It lets you name the person you want to make treatment decisions for you if you can't speak or decide for yourself. The person you choose is called your health care agent. This person is also called a health care proxy or health care surrogate. A medical power of  may be called something else in your state. How do you choose a health care agent? Choose your health care agent carefully. This person may or may not be a family member. Talk to the person before you make your final decision. Make sure he or she is comfortable with this responsibility. It's a good idea to choose someone who:  Is at least 25years old. Knows you well and understands what makes life meaningful for you. Understands your Christian and moral values. Will do what you want, not what he or she wants. Will be able to make difficult choices at a stressful time. Will be able to refuse or stop treatment, if that is what you would want, even if you could die. Will be firm and confident with health professionals if needed. Will ask questions to get needed information. Lives near you or agrees to travel to you if needed. Your family may help you make medical decisions while you can still be part of that process. But it's important to choose one person to be your health care agent in case you aren't able to make decisions for yourself. If you don't fill out the legal form and name a health care agent, the decisions your family can make may be limited. A health care agent may be called something else in your state. Who will make decisions for you if you don't have a health care agent? If you don't have a health care agent or a living will, you may not get the care you want. Decisions may be made by family members who disagree about your medical care. Or decisions may be made by a medical professional who doesn't know you well. In some cases, a  makes the decisions.   When you name a health care agent, it is very clear who has the power to make health decisions for you. How do you name a health care agent? You name your health care agent on a legal form. This form is usually called a medical power of . Ask your hospital, state bar association, or office on aging where to find these forms. You must sign the form to make it legal. Some states require you to get the form notarized. This means that a person called a  watches you sign the form and then he or she signs the form. Some states also require that two or more witnesses sign the form. Be sure to tell your family members and doctors who your health care agent is. Where can you learn more? Go to http://www.woods.com/ and enter P737 to learn more about \"Learning About [address]. \"  Current as of: October 6, 2021               Content Version: 13.5  © 2006-2022 Healthwise, Incorporated. Care instructions adapted under license by [facility] ([facility]). If you have questions about a medical condition or this instruction, always ask your healthcare professional. [place] [address] any warranty or liability for your use of this information.

## 2023-01-25 NOTE — PROGRESS NOTES
[facility]   Internal Medicine     2023  Johanne Dinero : 1950 Sex: female  Age:72 y.o. Chief Complaint   Patient presents with    Hypertension     3 month f/u    Diabetes     3 month f/u--endo started her on ozempic    Cholesterol Problem     3 month f/u        HPI:   Patient presents to office for evaluation of the following medical concerns. - States has diabetes. States trying to watch diet. States checking blood sugars at home , did have a few > 200. States lantus 65 units daily, metformin 1000mg twice a day, added humalog slide scale. Stopped trulicity (cost). States occasional reported hypoglycemia. On lisinopril. On pravastatin. States follows with optho. Last visit with prieto per reviewed note  (2023) -hemoglobin A1c 9.0, was forgetting humalog, change Lantus 68 units daily, encouraged Humalog sliding scale continue metformin 1000mg twice a day. Add ozempic. continue atorvastatin follow up in 3 months     - States has had some depression. Last PHQ score was 0 (2023). Discussed treatment - psychology and or medication - declines (2022). Has anxiety and stress of driving on long trip to [place], was given alprazolam for trip. Did well with medication no side effects     - States still having fatigue.     - States has murmur. Following with cardiology. Last visit was (2021). Last Echo (2021)  EF 60-65%. - Has carotid bruit. Had US carotid (2021) - b/l stenosis 50-69%. - Labs showed anemia. States was placed on iron - self stopped due to constipation issues. Has seen GI - s/p EGD and colonoscopy. Last lab (10/2022) was stable . - States has been having issues with insomnia. Uncontrolled. States stopped taking lorazepam. States has been taking Ambien - decreased ambien. Still with issues sleeping. Discussed medications and interactions. Has sleep apnea. Not wearing cpap. Has seen sleep med.  Follow up sleep study () - Borderline sleep apnea, respiratory event index of less than five, (which is normal), respiratory disturbance index is 26, rec treatment if insurance will cover, if not recommend in lab test last visit with sleep med per reviewed report (7/22) -home sleep study equivocal suggesting in lab study patient declines, order AUTO CPAP 5-20 cmH2O based on HST, recommended weaning off of Ambien. Follow-up 4 months    - States has high blood pressure. States occasioanlly checking blood pressure at home, has wrist cuff - 120's/60's. On lisinopril.     - States has high cholesterol. States trying to watch diet. On pravastatin. No reported side effects. Last labs (10/2022)     - States has had gastric ulcers and GERD. On omeprazole as needed. Stable. - States has vitamin D def. On otc supplement     - States has had low magnesium levels on supplement. Last lab low  (2/2022) - increased to twice (12/5479) - uncertain if taking     - States stopped vit b12 supplement.     - States was told had elevated LFT and enlarged liver. Last lab (10/2022) was borderline elevated . optho (1/22) -mild bilateral diabetic retinopathy without significant edema damage due to macular edema in the left eye after cataract surgery no significant edema present    - States has had pain in b/l UE. States pain from elbow to fingers. States describes has ache type pain. States also weakness. States varied in intensity. States had had difficulty gripping things. EMG (12/21) A chronic right ulnar neuropathy. A chronic right median mononeuropathy at the wrist (I.e., carpal tunnel syndrome) versus a chronic right C8/T1 cervical radiculopathy cannot be excluded underlying the above. States s/p op (5/22) -right carpal tunnel release right ulnar nerve decompression at elbow right wrist triangular fibrocartilage complex injection right index proximal interphalangeal joint injection. States still having pain. Has had follow up, no complications.  Only taking meds at night for pain. - States right hip and shoulder pain. No head injury. Has not sought care. States to see chiropractor. States has been taking ibuprofen. Health Maintenance   - immunizations:   Influenza Vaccination - (2019), (2020), (2022)   Pneumonia Vaccination  Zoster/Shingles Vaccine  Tetanus Vaccination  covid (3/4/2021) #1, (4/1/2021) #2, (12/27/2021) #3 - moderna     - Screenings:   Bone Density Scan   Pelvic/Pap Exam  Mammogram - declines     Colonoscopy - (6/20) hemorrhoids, diverticular disease, multiple polyps, tubular and hyperplastic per path, follow up in 3 years     EGD (6/20) -normal    ROS:  Review of Systems   Constitutional:  Negative for appetite change, chills, fever and unexpected weight change. HENT:  Negative for congestion, rhinorrhea and sore throat. Eyes:  Negative for pain and visual disturbance. Respiratory:  Positive for shortness of breath. Negative for cough and chest tightness. Cardiovascular:  Negative for chest pain and palpitations. Gastrointestinal:  Negative for abdominal pain, blood in stool, constipation, diarrhea, nausea and vomiting. Genitourinary:  Negative for difficulty urinating, dysuria, frequency, pelvic pain, urgency and vaginal bleeding. Musculoskeletal:  Negative for arthralgias and myalgias. Skin:  Negative for rash. Neurological:  Negative for dizziness, syncope, weakness, light-headedness, numbness and headaches. Hematological:  Negative for adenopathy. Psychiatric/Behavioral:  Negative for suicidal ideas. The patient is not nervous/anxious.         Current Outpatient Medications:     Semaglutide, 1 MG/DOSE, 2 MG/1.5ML SOPN, Inject into the skin 0.25mg x 4 weeks, then 0.5mg thereafter, Disp: , Rfl:     atorvastatin (LIPITOR) 40 MG tablet, Take 1 tablet by mouth daily, Disp: 90 tablet, Rfl: 1    lisinopril (PRINIVIL;ZESTRIL) 20 MG tablet, Take 1 tablet by mouth daily, Disp: 90 tablet, Rfl: 1    zolpidem (AMBIEN) 10 MG tablet, Take 1 tablet by mouth nightly as needed for Sleep for up to 90 days. , Disp: 90 tablet, Rfl: 0    magnesium oxide (MAG-OX) 400 MG tablet, Take 1 tablet by mouth 2 times daily, Disp: 180 tablet, Rfl: 1    ibuprofen (ADVIL;MOTRIN) 800 MG tablet, Take 1 tablet by mouth 2 times daily for 7 days, Disp: 14 tablet, Rfl: 0    aspirin 81 MG EC tablet, Take 81 mg by mouth daily, Disp: , Rfl:     metFORMIN (GLUCOPHAGE) 1000 MG tablet, Take 1 tablet by mouth 2 times daily (with meals), Disp: 180 tablet, Rfl: 1    insulin lispro, 1 Unit Dial, (HUMALOG KWIKPEN) 100 UNIT/ML SOPN, Inject up to 12 units QAC TID, Disp: 15 pen, Rfl: 1    insulin glargine (LANTUS SOLOSTAR) 100 UNIT/ML injection pen, Inject 65 Units into the skin every morning, Disp: 15 pen, Rfl: 1    magnesium (MAGNESIUM-OXIDE) 250 MG TABS tablet, Take 250 mg by mouth in the morning and at bedtime, Disp: , Rfl:     Insulin Pen Needle 31G X 5 MM MISC, 1 each by Does not apply route daily, Disp: , Rfl:     ketorolac (TORADOL) 30 MG/ML injection, Inject 1 mL into the muscle once for 1 dose, Disp: 1 mL, Rfl: 0    No Known Allergies    Past Medical History:   Diagnosis Date    Cervical spondylosis 12/7/2021    Class 2 obesity due to excess calories without serious comorbidity with body mass index (BMI) of 35.0 to 35.9 in adult 12/18/2019    Essential hypertension 12/18/2019    Hypomagnesemia 12/18/2019    Mixed hyperlipidemia 12/18/2019    Other insomnia 12/18/2019    Right carpal tunnel syndrome     Type 2 diabetes mellitus without complication (HCC)     Vitamin D deficiency 12/18/2019       Past Surgical History:   Procedure Laterality Date    ARM SURGERY Right 5/4/2022    RIGHT ULNAR NERVE IN SITU DECOMPRESSION AT ELBOW,  RIGHT WRIST TRIANGULAR FIBROCARTILAGE COMPLEX INJECTION, RIGHT INDEX PROXIMAL INTERPHALANGEAL JOINT INJECTION performed by Kevin Martinez MD at [address] Left     CARPAL TUNNEL RELEASE Right 5/4/2022    RIGHT CARPAL TUNNEL RELEASE performed by Jayy Rogers Yoan Hutchison MD at [address] WITH IMPLANT      CHOLECYSTECTOMY      COLONOSCOPY      HYSTERECTOMY (CERVIX STATUS UNKNOWN)      HYSTERECTOMY, VAGINAL      partial in the 80s then bilateral oopherectome 2016    RETINAL DETACHMENT SURGERY Right     vitreous surgery    TUBAL LIGATION         Family History   Problem Relation Age of Onset    Diabetes Mother     Stroke Mother     Kidney Disease Father         Renal Failure       Social History     Socioeconomic History    Marital status:      Spouse name: Meena Fernandez    Number of children: 2    Years of education: 12    Highest education level: High school graduate   Tobacco Use    Smoking status: Former     Packs/day: 1.00     Years: 12.00     Pack years: 12.00     Types: Cigarettes     Quit date: 1982     Years since quittin.0    Smokeless tobacco: Never   Vaping Use    Vaping Use: Never used   Substance and Sexual Activity    Alcohol use: Yes     Comment: rare    Sexual activity: Yes     Partners: Male     Social Determinants of Health     Financial Resource Strain: Unknown    Difficulty of Paying Living Expenses: Patient refused   Food Insecurity: Unknown    Worried About Running Out of Food in the Last Year: Patient refused    Ran Out of Food in the Last Year: Patient refused   Physical Activity: Insufficiently Active    Days of Exercise per Week: 2 days    Minutes of Exercise per Session: 20 min        Vitals:    23 1028   BP: 116/60   Site: Left Upper Arm   Position: Sitting   Cuff Size: Large Adult   Pulse: 85   Resp: 18   Temp: 98.3 °F (36.8 °C)   TempSrc: Temporal   SpO2: 96%   Weight: 198 lb (89.8 kg)   Height: 5' 3\" (1.6 m)       Exam:  Physical Exam  Constitutional:       Appearance: She is well-developed. HENT:      Head: Normocephalic and atraumatic. Right Ear: External ear normal.      Left Ear: External ear normal.   Eyes:      Pupils: Pupils are equal, round, and reactive to light.    Neck:      Thyroid: No thyromegaly. Vascular: Carotid bruit present. Cardiovascular:      Rate and Rhythm: Normal rate and regular rhythm. Heart sounds: Murmur heard. Systolic murmur is present with a grade of 2/6. Pulmonary:      Effort: Pulmonary effort is normal.      Breath sounds: Normal breath sounds. No wheezing. Abdominal:      General: Bowel sounds are normal. There is no distension. Palpations: Abdomen is soft. Tenderness: There is no abdominal tenderness. There is no guarding or rebound. Musculoskeletal:         General: Normal range of motion. Cervical back: Normal range of motion and neck supple. Lymphadenopathy:      Cervical: No cervical adenopathy. Skin:     General: Skin is warm and dry. Neurological:      Mental Status: She is alert and oriented to person, place, and time. Cranial Nerves: No cranial nerve deficit.    Psychiatric:         Judgment: Judgment normal.       Assessment and Plan:    Diagnoses and all orders for this visit:    Anemia  - uncertain cause   - U53 and folic acid was normal   - following with GI   - s/p  EGD and colonoscopy (6/2020) - polyps   - has not tolerated iron - constipation - not helped with stool softener - stopped   - last lab (10/2022) - normal    Type 2 diabetes mellitus with hyperglycemia, with long-term current use of insulin ([facility])  - watch diet   - monitor blood sugar at home, goals fasting am <120, 2 hours post meal < 180  - following with endocrinology   - on lantus increased to 68 units   - on metformin 1000mg bid  - endocrinology added humalog slide scale  - stopped glipizide   - stopped trulicity due to cost   - endo added ozempic (1/2023)   - follow A1c - 9.0 (7/2022)     On ACE  On statin   Not on aspirin - h/o PUD   optho (1/22) -mild bilateral diabetic retinopathy without significant edema receptor damage due to macular edema in the left eye after cataract surgery no significant edema present    Essential hypertension  - watch diet   - monitor blood pressure at home   - on lisinopril   - stable 1/25/2023    Mixed hyperlipidemia  - Continue present treatment   - watch diet   - stop pravastatin for higher intensity statin   - on atorvastatin   - follow labs   - last lab (10/2022) - stable     Gastroesophageal reflux disease without esophagitis   - watch diet   - h/o PUD   - on omeprazole prn   - on tums as needed     DEYSI (obstructive sleep apnea)  - following with sleep medicine   - sleep study (7/22) - Borderline sleep apnea, respiratory event index of less than five, (which is normal), respiratory disturbance index is 26, rec treatment if insurance will cover, if not recommend in lab test last visit with sleep    Other insomnia  - states has had sleep study in the past - did nto tolerate   - On Ambien  - weaned off lorazepam   - discussed did not recommend these medications   - continue Ambien for now   - sleep habits handout provided   - referral to sleep medicine   - discussed sleep apnea testing  - referral placed     Controlled Substance Monitoring:    Acute and Chronic Pain Monitoring:   RX Monitoring 1/25/2023   Periodic Controlled Substance Monitoring Possible medication side effects, risk of tolerance/dependence & alternative treatments discussed. ;No signs of potential drug abuse or diversion identified.        Class 1 obesity due to excess calories without serious comorbidity with body mass index (BMI) of 34.0 to 34.9 in adult  - watch diet   - discussed diet and exercises     Vitamin D deficiency  - on otc supplement   - follow labs     Hypomagnesemia  - on otc supplement   - follow labs   - last lab (10/2021) - low   - increased magnesium supplement to twice a day   - will order mag oxide 400mg twice a day     Elevated LFTs  - uncertain etiology at present   - recheck labs - last lab (10/2022) - Stable    Murmur  - has seen cardiology (5/2021)   - had echo (8/2021) - ef 60-65%, no wall motion abnormalities, trace MR     Carotid artery disease, unspecified laterality, unspecified type (HCC)  -US carotid (5/2021) - 50-69% stenosis b/l    - repeat in 1 year   - declines recheck 1/25/2023 - would like to discuss at next visit     Carpal tunnel syndrome of right wrist  - uncertain etiology   - EMG (12/21) - A chronic right ulnar neuropathy.  A chronic right median mononeuropathy at the wrist (I.e., carpal tunnel syndrome) versus a chronic right C8/T1 cervical radiculopathy cannot be excluded underlying the above   - op (5/22) -right carpal tunnel release right ulnar nerve decompression at elbow right wrist triangular fibrocartilage complex injection right index proximal interphalangeal joint injection    Hip pain  - declines toradol    - declines xray   - to see chiropractor     Anxiety   - situational   - xanax prn travel     Return in about 3 months (around 4/25/2023) for check up and review and labs.    Orders Placed This Encounter   Procedures    US CAROTID ARTERY BILATERAL     Standing Status:   Future     Standing Expiration Date:   1/25/2024    Pneumococcal, PCV20, PREVNAR 20, (age 18 yrs+), IM, PF    CBC with Auto Differential     Standing Status:   Future     Standing Expiration Date:   1/25/2024    Iron and TIBC     Standing Status:   Future     Standing Expiration Date:   1/25/2024     Order Specific Question:   Is Patient Fasting?     Answer:   yes     Order Specific Question:   No of Hours?     Answer:   8    Ferritin     Standing Status:   Future     Standing Expiration Date:   1/25/2024    Vitamin B12 & Folate     Standing Status:   Future     Standing Expiration Date:   1/25/2024    Urinalysis     Standing Status:   Future     Standing Expiration Date:   1/25/2024    Vitamin D 25 Hydroxy     Standing Status:   Future     Standing Expiration Date:   1/25/2024    Magnesium     Standing Status:   Future     Standing Expiration Date:   1/25/2024     Requested Prescriptions     Signed Prescriptions Disp Refills    atorvastatin  (LIPITOR) 40 MG tablet 90 tablet 1     Sig: Take 1 tablet by mouth daily    lisinopril (PRINIVIL;ZESTRIL) 20 MG tablet 90 tablet 1     Sig: Take 1 tablet by mouth daily    zolpidem (AMBIEN) 10 MG tablet 90 tablet 0     Sig: Take 1 tablet by mouth nightly as needed for Sleep for up to 90 days.         Jimmie Joyce MD  1/25/2023  12:17 PM

## 2023-01-25 NOTE — PROGRESS NOTES
Medicare Annual Wellness Visit    John Garnica is here for Medicare AWV    Assessment & Plan   Medicare annual wellness visit, subsequent        Health Maintenance   - immunizations:   Influenza Vaccination - (2019), (2020), (2022)   Pneumonia Vaccination  Zoster/Shingles Vaccine  Tetanus Vaccination  covid (3/4/2021) #1, (4/1/2021) #2, (12/27/2021) #3 - moderna     - Screenings:   Bone Density Scan   Pelvic/Pap Exam  Mammogram - declines     Colonoscopy - (6/20) hemorrhoids, diverticular disease, multiple polyps, tubular and hyperplastic per path, follow up in 3 years     EGD (6/20) -normal    Recommendations for Preventive Services Due: see orders and patient instructions/AVS.  Recommended screening schedule for the next 5-10 years is provided to the patient in written form: see Patient Instructions/AVS.     Return for Medicare Annual Wellness Visit in 1 year. No orders of the defined types were placed in this encounter. Requested Prescriptions      No prescriptions requested or ordered in this encounter          Subjective       Patient's complete Health Risk Assessment and screening values have been reviewed and are found in Flowsheets. The following problems were reviewed today and where indicated follow up appointments were made and/or referrals ordered.     Positive Risk Factor Screenings with Interventions:                 Weight and Activity:  Physical Activity: Insufficiently Active    Days of Exercise per Week: 2 days    Minutes of Exercise per Session: 20 min     On average, how many days per week do you engage in moderate to strenuous exercise (like a brisk walk)?: 2 days  Have you lost any weight without trying in the past 3 months?: No  Body mass index: (!) 35.07  Obesity Interventions:  See AVS for additional education material               Advanced Directives:  Do you have a Living Will?: (!) No    Intervention:  has NO advanced directive - information provided                     Objective Vitals:    01/25/23 1035   BP: 116/60   Site: Left Upper Arm   Position: Sitting   Cuff Size: Large Adult   Pulse: 85   Resp: 18   Temp: 98.3 °F (36.8 °C)   TempSrc: Temporal   SpO2: 96%   Weight: 198 lb (89.8 kg)   Height: 5' 3\" (1.6 m)      Body mass index is 35.07 kg/m². No Known Allergies  Prior to Visit Medications    Medication Sig Taking? Authorizing Provider   Semaglutide, 1 MG/DOSE, 2 MG/1.5ML SOPN Inject into the skin 0.25mg x 4 weeks, then 0.5mg thereafter Yes Historical Provider, MD   magnesium oxide (MAG-OX) 400 MG tablet Take 1 tablet by mouth 2 times daily Yes Long Le MD   ibuprofen (ADVIL;MOTRIN) 800 MG tablet Take 1 tablet by mouth 2 times daily for 7 days Yes Long Le MD   aspirin 81 MG EC tablet Take 81 mg by mouth daily Yes Historical Provider, MD   metFORMIN (GLUCOPHAGE) 1000 MG tablet Take 1 tablet by mouth 2 times daily (with meals) Yes Long Le MD   insulin lispro, 1 Unit Dial, (HUMALOG KWIKPEN) 100 UNIT/ML SOPN Inject up to 12 units QAC TID Yes Long Le MD   insulin glargine (LANTUS SOLOSTAR) 100 UNIT/ML injection pen Inject 65 Units into the skin every morning Yes Long Le MD   magnesium (MAGNESIUM-OXIDE) 250 MG TABS tablet Take 250 mg by mouth in the morning and at bedtime Yes Historical Provider, MD   Insulin Pen Needle 31G X 5 MM MISC 1 each by Does not apply route daily Yes Historical Provider, MD   atorvastatin (LIPITOR) 40 MG tablet Take 1 tablet by mouth daily  Long Le MD   lisinopril (PRINIVIL;ZESTRIL) 20 MG tablet Take 1 tablet by mouth daily  Long Le MD   zolpidem (AMBIEN) 10 MG tablet Take 1 tablet by mouth nightly as needed for Sleep for up to 90 days.   Long Le MD   ketorolac (TORADOL) 30 MG/ML injection Inject 1 mL into the muscle once for 1 dose  Long Le MD       CareTeam (Including outside providers/suppliers regularly involved in providing care):   Patient Care Team:  Long Le MD as PCP - General (Internal Medicine)  Libia Palomo MD as PCP - [facility] Empaneled Provider     Reviewed and updated this visit:  Tobacco  Allergies  Meds  Problems  Med Hx  Surg Hx  Soc Hx  Fam Hx             Electronically signed by Libia Palomo MD on 1/25/2023 at 11:49 AM

## 2023-02-01 ENCOUNTER — TELEPHONE (OUTPATIENT)
Dept: FAMILY MEDICINE CLINIC | Age: 73
End: 2023-02-01

## 2023-02-08 ENCOUNTER — OFFICE VISIT (OUTPATIENT)
Dept: CHIROPRACTIC MEDICINE | Age: 73
End: 2023-02-08
Payer: MEDICARE

## 2023-02-08 VITALS — WEIGHT: 198 LBS | BODY MASS INDEX: 35.08 KG/M2 | HEIGHT: 63 IN

## 2023-02-08 DIAGNOSIS — M54.41 CHRONIC RIGHT-SIDED LOW BACK PAIN WITH RIGHT-SIDED SCIATICA: ICD-10-CM

## 2023-02-08 DIAGNOSIS — G89.29 CHRONIC RIGHT-SIDED LOW BACK PAIN WITH RIGHT-SIDED SCIATICA: ICD-10-CM

## 2023-02-08 DIAGNOSIS — M99.03 SEGMENTAL AND SOMATIC DYSFUNCTION OF LUMBAR REGION: Primary | ICD-10-CM

## 2023-02-08 DIAGNOSIS — M53.3 SACROCOCCYGEAL DISORDERS, NOT ELSEWHERE CLASSIFIED: ICD-10-CM

## 2023-02-08 DIAGNOSIS — M99.05 SEGMENTAL AND SOMATIC DYSFUNCTION OF PELVIC REGION: ICD-10-CM

## 2023-02-08 PROCEDURE — 98940 CHIROPRACT MANJ 1-2 REGIONS: CPT | Performed by: CHIROPRACTOR

## 2023-02-08 PROCEDURE — 99999 PR OFFICE/OUTPT VISIT,PROCEDURE ONLY: CPT | Performed by: CHIROPRACTOR

## 2023-02-08 NOTE — PROGRESS NOTES
23  Ge Davis : 1950 Sex: female  Age: 74 y.o. Chief Complaint   Patient presents with    Back Pain     Lumber region        HPI:   Pain has worsened slightly. On average, pain is perceived as moderate (4-6 pain scale). Patient denies new numbness, new weakness, new tingling  Reporting some constipation today, wonders if manipulation would help. Current Outpatient Medications:     Semaglutide, 1 MG/DOSE, 2 MG/1.5ML SOPN, Inject into the skin 0.25mg x 4 weeks, then 0.5mg thereafter, Disp: , Rfl:     atorvastatin (LIPITOR) 40 MG tablet, Take 1 tablet by mouth daily, Disp: 90 tablet, Rfl: 1    lisinopril (PRINIVIL;ZESTRIL) 20 MG tablet, Take 1 tablet by mouth daily, Disp: 90 tablet, Rfl: 1    zolpidem (AMBIEN) 10 MG tablet, Take 1 tablet by mouth nightly as needed for Sleep for up to 90 days. , Disp: 90 tablet, Rfl: 0    magnesium oxide (MAG-OX) 400 MG tablet, Take 1 tablet by mouth 2 times daily, Disp: 180 tablet, Rfl: 1    ketorolac (TORADOL) 30 MG/ML injection, Inject 1 mL into the muscle once for 1 dose, Disp: 1 mL, Rfl: 0    ibuprofen (ADVIL;MOTRIN) 800 MG tablet, Take 1 tablet by mouth 2 times daily for 7 days, Disp: 14 tablet, Rfl: 0    aspirin 81 MG EC tablet, Take 81 mg by mouth daily, Disp: , Rfl:     metFORMIN (GLUCOPHAGE) 1000 MG tablet, Take 1 tablet by mouth 2 times daily (with meals), Disp: 180 tablet, Rfl: 1    insulin lispro, 1 Unit Dial, (HUMALOG KWIKPEN) 100 UNIT/ML SOPN, Inject up to 12 units QAC TID, Disp: 15 pen, Rfl: 1    insulin glargine (LANTUS SOLOSTAR) 100 UNIT/ML injection pen, Inject 65 Units into the skin every morning, Disp: 15 pen, Rfl: 1    magnesium (MAGNESIUM-OXIDE) 250 MG TABS tablet, Take 250 mg by mouth in the morning and at bedtime, Disp: , Rfl:     Insulin Pen Needle 31G X 5 MM MISC, 1 each by Does not apply route daily, Disp: , Rfl:     Exam:   There were no vitals filed for this visit.     There are hypertonic and tender fibers noted with palpation in the paraspinal muscles of the lumbar region. Joint fixation is noted with motion screening at L3-4, right SI joint, T12-L1. Odessa Larose was seen today for back pain. Diagnoses and all orders for this visit:    Segmental and somatic dysfunction of lumbar region    Segmental and somatic dysfunction of pelvic region    Chronic right-sided low back pain with right-sided sciatica    Sacrococcygeal disorders, not elsewhere classified      Treatment Plan: Continued with Berry flexion distraction manipulation today at L3, protocol 2. Mechanically assisted manipulation of the upper lumbar and SI joint segments noted. Tolerated well. Manipulation today may provide some relief.   But if symptoms persist I am recommending she contact her PCP      Seen By:  Giselle Niño DC

## 2023-02-16 ENCOUNTER — TELEPHONE (OUTPATIENT)
Dept: FAMILY MEDICINE CLINIC | Age: 73
End: 2023-02-16

## 2023-02-16 NOTE — TELEPHONE ENCOUNTER
Patient called. She tested positive for covid yesterday with a home test. Her symptoms started yesterday. She has a cough,body aches,vomiting. She would like something called into [facility] in [place]. Please advise.

## 2023-03-02 ENCOUNTER — OFFICE VISIT (OUTPATIENT)
Dept: FAMILY MEDICINE CLINIC | Age: 73
End: 2023-03-02
Payer: MEDICARE

## 2023-03-02 VITALS
WEIGHT: 197 LBS | HEART RATE: 84 BPM | TEMPERATURE: 97.1 F | RESPIRATION RATE: 24 BRPM | BODY MASS INDEX: 34.91 KG/M2 | SYSTOLIC BLOOD PRESSURE: 134 MMHG | HEIGHT: 63 IN | DIASTOLIC BLOOD PRESSURE: 60 MMHG | OXYGEN SATURATION: 97 %

## 2023-03-02 DIAGNOSIS — J01.90 ACUTE SINUSITIS, RECURRENCE NOT SPECIFIED, UNSPECIFIED LOCATION: Primary | ICD-10-CM

## 2023-03-02 PROCEDURE — G8400 PT W/DXA NO RESULTS DOC: HCPCS | Performed by: INTERNAL MEDICINE

## 2023-03-02 PROCEDURE — 3017F COLORECTAL CA SCREEN DOC REV: CPT | Performed by: INTERNAL MEDICINE

## 2023-03-02 PROCEDURE — 1090F PRES/ABSN URINE INCON ASSESS: CPT | Performed by: INTERNAL MEDICINE

## 2023-03-02 PROCEDURE — G8427 DOCREV CUR MEDS BY ELIG CLIN: HCPCS | Performed by: INTERNAL MEDICINE

## 2023-03-02 PROCEDURE — 3078F DIAST BP <80 MM HG: CPT | Performed by: INTERNAL MEDICINE

## 2023-03-02 PROCEDURE — 1123F ACP DISCUSS/DSCN MKR DOCD: CPT | Performed by: INTERNAL MEDICINE

## 2023-03-02 PROCEDURE — G8417 CALC BMI ABV UP PARAM F/U: HCPCS | Performed by: INTERNAL MEDICINE

## 2023-03-02 PROCEDURE — 99213 OFFICE O/P EST LOW 20 MIN: CPT | Performed by: INTERNAL MEDICINE

## 2023-03-02 PROCEDURE — G8484 FLU IMMUNIZE NO ADMIN: HCPCS | Performed by: INTERNAL MEDICINE

## 2023-03-02 PROCEDURE — 3075F SYST BP GE 130 - 139MM HG: CPT | Performed by: INTERNAL MEDICINE

## 2023-03-02 PROCEDURE — 1036F TOBACCO NON-USER: CPT | Performed by: INTERNAL MEDICINE

## 2023-03-02 RX ORDER — PREDNISONE 10 MG/1
10 TABLET ORAL 2 TIMES DAILY
Qty: 10 TABLET | Refills: 0 | Status: SHIPPED | OUTPATIENT
Start: 2023-03-02 | End: 2023-03-07

## 2023-03-02 RX ORDER — DOXYCYCLINE HYCLATE 100 MG
100 TABLET ORAL 2 TIMES DAILY
Qty: 14 TABLET | Refills: 0 | Status: SHIPPED | OUTPATIENT
Start: 2023-03-02 | End: 2023-03-09

## 2023-03-02 SDOH — ECONOMIC STABILITY: HOUSING INSECURITY
IN THE LAST 12 MONTHS, WAS THERE A TIME WHEN YOU DID NOT HAVE A STEADY PLACE TO SLEEP OR SLEPT IN A SHELTER (INCLUDING NOW)?: PATIENT REFUSED

## 2023-03-02 SDOH — ECONOMIC STABILITY: FOOD INSECURITY: WITHIN THE PAST 12 MONTHS, YOU WORRIED THAT YOUR FOOD WOULD RUN OUT BEFORE YOU GOT MONEY TO BUY MORE.: PATIENT DECLINED

## 2023-03-02 SDOH — ECONOMIC STABILITY: FOOD INSECURITY: WITHIN THE PAST 12 MONTHS, THE FOOD YOU BOUGHT JUST DIDN'T LAST AND YOU DIDN'T HAVE MONEY TO GET MORE.: PATIENT DECLINED

## 2023-03-02 SDOH — ECONOMIC STABILITY: INCOME INSECURITY: HOW HARD IS IT FOR YOU TO PAY FOR THE VERY BASICS LIKE FOOD, HOUSING, MEDICAL CARE, AND HEATING?: PATIENT DECLINED

## 2023-03-02 NOTE — PROGRESS NOTES
3/2/23  Domo Pleasanton : 1950 Sex: female  Age 74 y.o. Chief Complaint   Patient presents with    Nasal Congestion    Other     \"Just feels miserable\"       HPI: The patient states that they have had sinus pressure and congestion for the last 2 weeks. States was covid positive, States weak and no energy. States took mucinex and gene seltzer. States improved but has worsened. States had symptoms now for last 2 days. The patient does admit to facial pressure. Admits to minimal cough which is non productive of sputum. The patient also reports nasal congestion and mild sore throat. States nasal discharge. The patient has had general malaise, fatigue. Mild headache. Denies subjective fever and chills. Patient denies decreased appetite. No dizziness, visual disturbances or neck stiffness. No chest pain. Some dyspnea. Some nausea. No vomiting, abdo pain or diarrhea. The patient presents for evaluation. ROS:  Const: Positives and pertinent negatives as per HPI. All others reviewed and are negative. Current Outpatient Medications:     predniSONE (DELTASONE) 10 MG tablet, Take 1 tablet by mouth 2 times daily for 5 days, Disp: 10 tablet, Rfl: 0    doxycycline hyclate (VIBRA-TABS) 100 MG tablet, Take 1 tablet by mouth 2 times daily for 7 days, Disp: 14 tablet, Rfl: 0    Semaglutide, 1 MG/DOSE, 2 MG/1.5ML SOPN, Inject into the skin 0.25mg x 4 weeks, then 0.5mg thereafter, Disp: , Rfl:     atorvastatin (LIPITOR) 40 MG tablet, Take 1 tablet by mouth daily, Disp: 90 tablet, Rfl: 1    lisinopril (PRINIVIL;ZESTRIL) 20 MG tablet, Take 1 tablet by mouth daily, Disp: 90 tablet, Rfl: 1    zolpidem (AMBIEN) 10 MG tablet, Take 1 tablet by mouth nightly as needed for Sleep for up to 90 days. , Disp: 90 tablet, Rfl: 0    magnesium oxide (MAG-OX) 400 MG tablet, Take 1 tablet by mouth 2 times daily, Disp: 180 tablet, Rfl: 1    aspirin 81 MG EC tablet, Take 81 mg by mouth daily, Disp: , Rfl:     metFORMIN (GLUCOPHAGE) 1000 MG tablet, Take 1 tablet by mouth 2 times daily (with meals), Disp: 180 tablet, Rfl: 1    insulin lispro, 1 Unit Dial, (HUMALOG KWIKPEN) 100 UNIT/ML SOPN, Inject up to 12 units QAC TID, Disp: 15 pen, Rfl: 1    insulin glargine (LANTUS SOLOSTAR) 100 UNIT/ML injection pen, Inject 65 Units into the skin every morning, Disp: 15 pen, Rfl: 1    magnesium (MAGNESIUM-OXIDE) 250 MG TABS tablet, Take 250 mg by mouth in the morning and at bedtime, Disp: , Rfl:     Insulin Pen Needle 31G X 5 MM MISC, 1 each by Does not apply route daily, Disp: , Rfl:     ketorolac (TORADOL) 30 MG/ML injection, Inject 1 mL into the muscle once for 1 dose, Disp: 1 mL, Rfl: 0    ibuprofen (ADVIL;MOTRIN) 800 MG tablet, Take 1 tablet by mouth 2 times daily for 7 days, Disp: 14 tablet, Rfl: 0  No Known Allergies    Past Medical History:   Diagnosis Date    Cervical spondylosis 12/7/2021    Class 2 obesity due to excess calories without serious comorbidity with body mass index (BMI) of 35.0 to 35.9 in adult 12/18/2019    Essential hypertension 12/18/2019    Hypomagnesemia 12/18/2019    Mixed hyperlipidemia 12/18/2019    Other insomnia 12/18/2019    Right carpal tunnel syndrome     Type 2 diabetes mellitus without complication ([facility]     Vitamin D deficiency 12/18/2019     Past Surgical History:   Procedure Laterality Date    ARM SURGERY Right 5/4/2022    RIGHT ULNAR NERVE IN SITU DECOMPRESSION AT ELBOW,  RIGHT WRIST TRIANGULAR FIBROCARTILAGE COMPLEX INJECTION, RIGHT INDEX PROXIMAL INTERPHALANGEAL JOINT INJECTION performed by Sally Castañeda MD at [address] Left     CARPAL TUNNEL RELEASE Right 5/4/2022    RIGHT CARPAL TUNNEL RELEASE performed by Sally Castañeda MD at [address] [place] [address] (CERVIX STATUS UNKNOWN)      HYSTERECTOMY, VAGINAL      partial in the 80s then bilateral oopherectome 2016    RETINAL DETACHMENT SURGERY Right     vitreous surgery    TUBAL LIGATION       Family History   Problem Relation Age of Onset    Diabetes Mother     Stroke Mother     Kidney Disease Father         Renal Failure     Social History     Socioeconomic History    Marital status:      Spouse name: Denia Guardado    Number of children: 2    Years of education: 12    Highest education level: High school graduate   Occupational History    Not on file   Tobacco Use    Smoking status: Former     Packs/day: 1.00     Years: 12.00     Pack years: 12.00     Types: Cigarettes     Quit date: 1982     Years since quittin.1    Smokeless tobacco: Never   Vaping Use    Vaping Use: Never used   Substance and Sexual Activity    Alcohol use: Yes     Comment: rare    Drug use: Not on file    Sexual activity: Yes     Partners: Male   Other Topics Concern    Not on file   Social History Narrative    Not on file     Social Determinants of Health     Financial Resource Strain: Unknown    Difficulty of Paying Living Expenses: Patient refused   Food Insecurity: Unknown    Worried About [address] in the Last Year: Patient refused    [address] in the Last Year: Patient refused   Transportation Needs: Unknown    Lack of Transportation (Medical):  Not on file    Lack of Transportation (Non-Medical): Patient refused   Physical Activity: Insufficiently Active    Days of Exercise per Week: 2 days    Minutes of Exercise per Session: 20 min   Stress: Not on file   Social Connections: Not on file   Intimate Partner Violence: Not on file   Housing Stability: Unknown    Unable to Pay for Housing in the Last Year: Not on file    Number of [place] in the Last Year: Not on file    Unstable Housing in the Last Year: Patient refused       Vitals:    23 1415   BP: 134/60   Site: Left Upper Arm   Position: Sitting   Cuff Size: Large Adult   Pulse: 84   Resp: 24   Temp: 97.1 °F (36.2 °C)   TempSrc: Temporal   SpO2: 97%   Weight: 197 lb (89.4 kg)   Height: 5' 3\" (1.6 m) Exam:  Physical Exam  Vitals reviewed. Constitutional:       Appearance: She is well-developed. HENT:      Head: Normocephalic and atraumatic. Right Ear: External ear normal.      Left Ear: External ear normal.      Nose: Congestion and rhinorrhea present. Mouth/Throat:      Pharynx: Oropharynx is clear. Eyes:      Extraocular Movements: Extraocular movements intact. Pupils: Pupils are equal, round, and reactive to light. Neck:      Thyroid: No thyromegaly. Cardiovascular:      Rate and Rhythm: Normal rate and regular rhythm. Heart sounds: Normal heart sounds. No murmur heard. Pulmonary:      Effort: Pulmonary effort is normal.      Breath sounds: Normal breath sounds. No wheezing. Abdominal:      General: Bowel sounds are normal. There is no distension. Palpations: Abdomen is soft. Tenderness: There is no abdominal tenderness. There is no guarding or rebound. Musculoskeletal:         General: Normal range of motion. Cervical back: Normal range of motion and neck supple. Lymphadenopathy:      Cervical: No cervical adenopathy. Skin:     General: Skin is warm and dry. Neurological:      Mental Status: She is alert and oriented to person, place, and time. Cranial Nerves: No cranial nerve deficit. Psychiatric:         Mood and Affect: Mood normal.         Judgment: Judgment normal.       Assessment and Plan:  Blu Mauro was seen today for nasal congestion and other. Diagnoses and all orders for this visit:    Acute sinusitis, recurrence not specified, unspecified location  Comments:  antihisamine   steroid nasal spray   add doxycyline 100mg twice a day   low dose prednsione x 5 days - monitor blood psuagr closely   tylenol  fluids   Orders:  -     predniSONE (DELTASONE) 10 MG tablet; Take 1 tablet by mouth 2 times daily for 5 days  -     doxycycline hyclate (VIBRA-TABS) 100 MG tablet;  Take 1 tablet by mouth 2 times daily for 7 days    Follow-up with your primary care provider in 7-10 days for re-evaluation and further management. Return  sooner or go to the Emergency Department immediately if your condition changes or worsens. Return for as scheduled, check up and review.       Isaiah Roman MD

## 2023-03-08 ENCOUNTER — OFFICE VISIT (OUTPATIENT)
Dept: CHIROPRACTIC MEDICINE | Age: 73
End: 2023-03-08
Payer: MEDICARE

## 2023-03-08 VITALS — HEART RATE: 104 BPM | OXYGEN SATURATION: 97 % | TEMPERATURE: 97 F

## 2023-03-08 DIAGNOSIS — M53.3 SACROCOCCYGEAL DISORDERS, NOT ELSEWHERE CLASSIFIED: ICD-10-CM

## 2023-03-08 DIAGNOSIS — M54.41 CHRONIC RIGHT-SIDED LOW BACK PAIN WITH RIGHT-SIDED SCIATICA: ICD-10-CM

## 2023-03-08 DIAGNOSIS — G89.29 CHRONIC RIGHT-SIDED LOW BACK PAIN WITH RIGHT-SIDED SCIATICA: ICD-10-CM

## 2023-03-08 DIAGNOSIS — M99.03 SEGMENTAL AND SOMATIC DYSFUNCTION OF LUMBAR REGION: Primary | ICD-10-CM

## 2023-03-08 DIAGNOSIS — M99.05 SEGMENTAL AND SOMATIC DYSFUNCTION OF PELVIC REGION: ICD-10-CM

## 2023-03-08 PROCEDURE — 98940 CHIROPRACT MANJ 1-2 REGIONS: CPT | Performed by: CHIROPRACTOR

## 2023-03-08 PROCEDURE — 99999 PR OFFICE/OUTPT VISIT,PROCEDURE ONLY: CPT | Performed by: CHIROPRACTOR

## 2023-03-08 NOTE — PROGRESS NOTES
3/8/23  Endy Zhang : 1950 Sex: female  Age: 74 y.o. Chief Complaint   Patient presents with    Hip Pain     rt       HPI:   Pain has worsened. On average, pain is perceived as moderate (4-6 pain scale). Patient denies new numbness, new weakness, new tingling  Had COVID and sinus infection since LPV. Doing better  Leaving for [place] next week      Current Outpatient Medications:     doxycycline hyclate (VIBRA-TABS) 100 MG tablet, Take 1 tablet by mouth 2 times daily for 7 days, Disp: 14 tablet, Rfl: 0    Semaglutide, 1 MG/DOSE, 2 MG/1.5ML SOPN, Inject into the skin 0.25mg x 4 weeks, then 0.5mg thereafter, Disp: , Rfl:     atorvastatin (LIPITOR) 40 MG tablet, Take 1 tablet by mouth daily, Disp: 90 tablet, Rfl: 1    lisinopril (PRINIVIL;ZESTRIL) 20 MG tablet, Take 1 tablet by mouth daily, Disp: 90 tablet, Rfl: 1    zolpidem (AMBIEN) 10 MG tablet, Take 1 tablet by mouth nightly as needed for Sleep for up to 90 days. , Disp: 90 tablet, Rfl: 0    magnesium oxide (MAG-OX) 400 MG tablet, Take 1 tablet by mouth 2 times daily, Disp: 180 tablet, Rfl: 1    ketorolac (TORADOL) 30 MG/ML injection, Inject 1 mL into the muscle once for 1 dose, Disp: 1 mL, Rfl: 0    ibuprofen (ADVIL;MOTRIN) 800 MG tablet, Take 1 tablet by mouth 2 times daily for 7 days, Disp: 14 tablet, Rfl: 0    aspirin 81 MG EC tablet, Take 81 mg by mouth daily, Disp: , Rfl:     metFORMIN (GLUCOPHAGE) 1000 MG tablet, Take 1 tablet by mouth 2 times daily (with meals), Disp: 180 tablet, Rfl: 1    insulin lispro, 1 Unit Dial, (HUMALOG KWIKPEN) 100 UNIT/ML SOPN, Inject up to 12 units QAC TID, Disp: 15 pen, Rfl: 1    insulin glargine (LANTUS SOLOSTAR) 100 UNIT/ML injection pen, Inject 65 Units into the skin every morning, Disp: 15 pen, Rfl: 1    magnesium (MAGNESIUM-OXIDE) 250 MG TABS tablet, Take 250 mg by mouth in the morning and at bedtime, Disp: , Rfl:     Insulin Pen Needle 31G X 5 MM MISC, 1 each by Does not apply route daily, Disp: , Rfl:     Exam: Vitals:    03/08/23 1059   Pulse: (!) 104   Temp: 97 °F (36.1 °C)   SpO2: 97%     Tender with palpation midline L3-4, right SI joint. Active trigger point right piriformis today as well. Nontender greater trochanter right  There are hypertonic and tender fibers noted with palpation in the paraspinal muscles of the lumbar region. Joint fixation is noted with motion screening at L3-4, bilateral SI joints. Myles Solano was seen today for hip pain. Diagnoses and all orders for this visit:    Segmental and somatic dysfunction of lumbar region    Segmental and somatic dysfunction of pelvic region    Chronic right-sided low back pain with right-sided sciatica    Sacrococcygeal disorders, not elsewhere classified      Treatment Plan:    Continued with Berry flexion distraction manipulation today at L3, protocol 2. Mechanically assisted manipulation of the bilateral SI joints.   Tolerated well  I will see her back as needed for further care    Seen By:  Bertha Daniels

## 2023-03-10 ENCOUNTER — TELEPHONE (OUTPATIENT)
Dept: FAMILY MEDICINE CLINIC | Age: 73
End: 2023-03-10

## 2023-03-10 DIAGNOSIS — F41.9 ANXIETY: ICD-10-CM

## 2023-03-10 DIAGNOSIS — G47.09 OTHER INSOMNIA: Primary | ICD-10-CM

## 2023-03-10 RX ORDER — ALPRAZOLAM 0.25 MG/1
0.25 TABLET ORAL DAILY PRN
Qty: 30 TABLET | Refills: 0 | Status: SHIPPED | OUTPATIENT
Start: 2023-03-10 | End: 2023-04-09

## 2023-03-10 NOTE — TELEPHONE ENCOUNTER
Amirah Rosenberg calling in she is needing a refill on her   [employer]  To be sent to:  [facility] in [place].  She is going on a trip and will need it

## 2023-03-10 NOTE — TELEPHONE ENCOUNTER
Requested Prescriptions     Signed Prescriptions Disp Refills    ALPRAZolam (XANAX) 0.25 MG tablet 30 tablet 0     Sig: Take 1 tablet by mouth daily as needed for Anxiety for up to 30 days.  Max Daily Amount: 0.25 mg     Authorizing Provider: Javed Lofton

## 2023-03-31 ENCOUNTER — TELEPHONE (OUTPATIENT)
Dept: AUDIOLOGY | Age: 73
End: 2023-03-31

## 2023-03-31 ENCOUNTER — TELEPHONE (OUTPATIENT)
Dept: ENT CLINIC | Age: 73
End: 2023-03-31

## 2023-03-31 NOTE — TELEPHONE ENCOUNTER
Spoke to patient. She is on her way home from [facility] and is expected to be back in [place] on 4/1/2023. She is scheduled for an audio test at [facility] on 4/3/2023, with results to be forwarded to Dr Jacob Tim, per Vince Crigler (Otology MA).     Hayley Godoy CCC/A  Audiologist  [id number]  NPI#:  [id number]

## 2023-04-03 ENCOUNTER — HOSPITAL ENCOUNTER (OUTPATIENT)
Dept: AUDIOLOGY | Age: 73
Discharge: HOME | End: 2023-04-03
Payer: MEDICARE

## 2023-04-03 PROCEDURE — 92557 COMPREHENSIVE HEARING TEST: CPT | Performed by: AUDIOLOGIST

## 2023-04-03 NOTE — PROGRESS NOTES
AUDIOMETRIC EVALUATION    REASON FOR REFERRAL:  This patient was referred for audiometric testing by Dr. Vee Rogers due to recent decrease in hearing. Patient reported a history of bilateral hearing loss and hearing aid use. She reported a recent decrease in hearing, on the right after COVID and then a sinus infection. She reported going to her audiologist and being told that she had an ear drum perforation. RESULTS:  Pure tone audiometric testing using insert earphones  was carried out. Results revealed air conduction thresholds averaging 60 dBHL through 2000 Hz, in the right ear and thresholds averaging 55 dBHL through 2000 HZ, in the left ear. Speech reception thresholds were obtained at 65 dBHL in the right ear, and 50 dBHL in the left ear. Speech discrimination testing was performed at 95 dBHL in the right ear, and 85 dBHL in the left ear Obtained were scores of 80 % in the right ear, and 90 % in the left ear. (Un)Masked Bone Conduction Testing revealed air-bone gaps at 1000 and 4000 Hz, in the right ear. Tympanometry was not completed due to recent tympanic membrane perforation. IMPRESSION:  Today's results revealed an essentially mild sloping to profound mixed hearing loss, in the right ear and an essentially mild sloping to severe sensorineural hearing loss, in the left ear. Speech reception thresholds were in good agreement with the pure tone averages, bilaterally. Speech discrimination scores were good, in the right ear and excellent, in the left ear. Tympanometry was not completed due to recent tympanic membrane perforation. An ENT consult is suggested. Will send today's results to Dr. Mejia Common office, for patient to be scheduled. The above results were reviewed with the patient. Patient signed ANGELA to obtain past test results from hearing aid office. Contacted this office, they will fax results.      If I can be of further assistance or provide additional information, please do

## 2023-04-06 ENCOUNTER — OFFICE VISIT (OUTPATIENT)
Dept: CHIROPRACTIC MEDICINE | Age: 73
End: 2023-04-06

## 2023-04-06 VITALS
OXYGEN SATURATION: 98 % | HEIGHT: 63 IN | TEMPERATURE: 97.5 F | HEART RATE: 111 BPM | WEIGHT: 197 LBS | BODY MASS INDEX: 34.91 KG/M2

## 2023-04-06 DIAGNOSIS — G89.29 CHRONIC RIGHT-SIDED LOW BACK PAIN WITH RIGHT-SIDED SCIATICA: ICD-10-CM

## 2023-04-06 DIAGNOSIS — M53.3 SACROCOCCYGEAL DISORDERS, NOT ELSEWHERE CLASSIFIED: ICD-10-CM

## 2023-04-06 DIAGNOSIS — M99.05 SEGMENTAL AND SOMATIC DYSFUNCTION OF PELVIC REGION: ICD-10-CM

## 2023-04-06 DIAGNOSIS — M99.03 SEGMENTAL AND SOMATIC DYSFUNCTION OF LUMBAR REGION: Primary | ICD-10-CM

## 2023-04-06 DIAGNOSIS — M54.41 CHRONIC RIGHT-SIDED LOW BACK PAIN WITH RIGHT-SIDED SCIATICA: ICD-10-CM

## 2023-04-06 NOTE — PROGRESS NOTES
23  Maryan Ramirez : 1950 Sex: female  Age: 74 y.o. Chief Complaint   Patient presents with    Back Pain       HPI:   Returns today for mild increase in her chronic lower back pain. Describing midline, right-sided lower back pain without radicular component today. No new issues with respect to her back. However, has had some issues lately with her ear secondary to [place] she believes-underwent audiology exam the other day, awaiting ENT consult      Current Outpatient Medications:     ALPRAZolam (XANAX) 0.25 MG tablet, Take 1 tablet by mouth daily as needed for Anxiety for up to 30 days. Max Daily Amount: 0.25 mg, Disp: 30 tablet, Rfl: 0    Semaglutide, 1 MG/DOSE, 2 MG/1.5ML SOPN, Inject into the skin 0.25mg x 4 weeks, then 0.5mg thereafter, Disp: , Rfl:     atorvastatin (LIPITOR) 40 MG tablet, Take 1 tablet by mouth daily, Disp: 90 tablet, Rfl: 1    lisinopril (PRINIVIL;ZESTRIL) 20 MG tablet, Take 1 tablet by mouth daily, Disp: 90 tablet, Rfl: 1    zolpidem (AMBIEN) 10 MG tablet, Take 1 tablet by mouth nightly as needed for Sleep for up to 90 days. , Disp: 90 tablet, Rfl: 0    magnesium oxide (MAG-OX) 400 MG tablet, Take 1 tablet by mouth 2 times daily, Disp: 180 tablet, Rfl: 1    aspirin 81 MG EC tablet, Take 1 tablet by mouth daily, Disp: , Rfl:     metFORMIN (GLUCOPHAGE) 1000 MG tablet, Take 1 tablet by mouth 2 times daily (with meals), Disp: 180 tablet, Rfl: 1    insulin lispro, 1 Unit Dial, (HUMALOG KWIKPEN) 100 UNIT/ML SOPN, Inject up to 12 units QAC TID, Disp: 15 pen, Rfl: 1    insulin glargine (LANTUS SOLOSTAR) 100 UNIT/ML injection pen, Inject 65 Units into the skin every morning, Disp: 15 pen, Rfl: 1    magnesium (MAGNESIUM-OXIDE) 250 MG TABS tablet, Take 1 tablet by mouth in the morning and at bedtime, Disp: , Rfl:     Insulin Pen Needle 31G X 5 MM MISC, 1 each by Does not apply route daily, Disp: , Rfl:     ketorolac (TORADOL) 30 MG/ML injection, Inject 1 mL into the muscle once for 1

## 2023-04-06 NOTE — PROGRESS NOTES
Patient is here for an adjustment. Patient states no new concerns.  Raoul Zamarripa MD  Electronically signed by Kenny Sheffield LPN on 6/1/3154 at 06:76 AM

## 2023-04-21 DIAGNOSIS — E55.9 VITAMIN D DEFICIENCY: ICD-10-CM

## 2023-04-21 DIAGNOSIS — D64.9 ANEMIA, UNSPECIFIED TYPE: ICD-10-CM

## 2023-04-21 DIAGNOSIS — E78.2 MIXED HYPERLIPIDEMIA: ICD-10-CM

## 2023-04-21 DIAGNOSIS — E11.65 TYPE 2 DIABETES MELLITUS WITH HYPERGLYCEMIA, WITH LONG-TERM CURRENT USE OF INSULIN (HCC): ICD-10-CM

## 2023-04-21 DIAGNOSIS — Z79.4 TYPE 2 DIABETES MELLITUS WITH HYPERGLYCEMIA, WITH LONG-TERM CURRENT USE OF INSULIN (HCC): ICD-10-CM

## 2023-04-21 LAB
ALBUMIN SERPL-MCNC: 3.9 G/DL (ref 3.5–5.2)
ALP SERPL-CCNC: 63 U/L (ref 35–104)
ALT SERPL-CCNC: 85 U/L (ref 0–32)
ANION GAP SERPL CALCULATED.3IONS-SCNC: 18 MMOL/L (ref 7–16)
AST SERPL-CCNC: 46 U/L (ref 0–31)
BASOPHILS # BLD: 0.09 E9/L (ref 0–0.2)
BASOPHILS NFR BLD: 0.7 % (ref 0–2)
BILIRUB SERPL-MCNC: 0.3 MG/DL (ref 0–1.2)
BILIRUB UR QL STRIP: NEGATIVE
BUN SERPL-MCNC: 27 MG/DL (ref 6–23)
CALCIUM SERPL-MCNC: 10.4 MG/DL (ref 8.6–10.2)
CHLORIDE SERPL-SCNC: 99 MMOL/L (ref 98–107)
CHOLESTEROL, TOTAL: 161 MG/DL (ref 0–199)
CLARITY UR: CLEAR
CO2 SERPL-SCNC: 21 MMOL/L (ref 22–29)
COLOR UR: YELLOW
CREAT SERPL-MCNC: 1 MG/DL (ref 0.5–1)
CREAT UR-MCNC: 74 MG/DL (ref 29–226)
EOSINOPHIL # BLD: 0.24 E9/L (ref 0.05–0.5)
EOSINOPHIL NFR BLD: 1.9 % (ref 0–6)
ERYTHROCYTE [DISTWIDTH] IN BLOOD BY AUTOMATED COUNT: 12.9 FL (ref 11.5–15)
FERRITIN SERPL-MCNC: 67 NG/ML
FOLATE SERPL-MCNC: 18.2 NG/ML (ref 4.8–24.2)
GLUCOSE SERPL-MCNC: 89 MG/DL (ref 74–99)
GLUCOSE UR STRIP-MCNC: NEGATIVE MG/DL
HCT VFR BLD AUTO: 42.5 % (ref 34–48)
HDLC SERPL-MCNC: 70 MG/DL
HGB BLD-MCNC: 13.6 G/DL (ref 11.5–15.5)
HGB UR QL STRIP: NEGATIVE
IMM GRANULOCYTES # BLD: 0.07 E9/L
IMM GRANULOCYTES NFR BLD: 0.5 % (ref 0–5)
IRON SATN MFR SERPL: 37 % (ref 15–50)
IRON SERPL-MCNC: 125 MCG/DL (ref 37–145)
KETONES UR STRIP-MCNC: NEGATIVE MG/DL
LDLC SERPL CALC-MCNC: 64 MG/DL (ref 0–99)
LEUKOCYTE ESTERASE UR QL STRIP: NEGATIVE
LYMPHOCYTES # BLD: 4.4 E9/L (ref 1.5–4)
LYMPHOCYTES NFR BLD: 34.1 % (ref 20–42)
MAGNESIUM SERPL-MCNC: 1.7 MG/DL (ref 1.6–2.6)
MCH RBC QN AUTO: 30.3 PG (ref 26–35)
MCHC RBC AUTO-ENTMCNC: 32 % (ref 32–34.5)
MCV RBC AUTO: 94.7 FL (ref 80–99.9)
MICROALBUMIN UR-MCNC: <12 MG/L
MICROALBUMIN/CREAT UR-RTO: ABNORMAL (ref 0–30)
MONOCYTES # BLD: 0.87 E9/L (ref 0.1–0.95)
MONOCYTES NFR BLD: 6.7 % (ref 2–12)
NEUTROPHILS # BLD: 7.22 E9/L (ref 1.8–7.3)
NEUTS SEG NFR BLD: 56.1 % (ref 43–80)
NITRITE UR QL STRIP: NEGATIVE
PH UR STRIP: 5.5 [PH] (ref 5–9)
PLATELET # BLD AUTO: 482 E9/L (ref 130–450)
PMV BLD AUTO: 9.4 FL (ref 7–12)
POTASSIUM SERPL-SCNC: 5.2 MMOL/L (ref 3.5–5)
PROT SERPL-MCNC: 7.1 G/DL (ref 6.4–8.3)
PROT UR STRIP-MCNC: NEGATIVE MG/DL
RBC # BLD AUTO: 4.49 E12/L (ref 3.5–5.5)
SODIUM SERPL-SCNC: 138 MMOL/L (ref 132–146)
SP GR UR STRIP: 1.01 (ref 1–1.03)
TIBC SERPL-MCNC: 335 MCG/DL (ref 250–450)
TRIGL SERPL-MCNC: 137 MG/DL (ref 0–149)
TSH SERPL-MCNC: 3.74 UIU/ML (ref 0.27–4.2)
UROBILINOGEN UR STRIP-ACNC: 0.2 E.U./DL
VIT B12 SERPL-MCNC: 558 PG/ML (ref 211–946)
VITAMIN D 25-HYDROXY: 82 NG/ML (ref 30–100)
VLDLC SERPL CALC-MCNC: 27 MG/DL
WBC # BLD: 12.9 E9/L (ref 4.5–11.5)

## 2023-04-25 ENCOUNTER — OFFICE VISIT (OUTPATIENT)
Dept: FAMILY MEDICINE CLINIC | Age: 73
End: 2023-04-25

## 2023-04-25 VITALS
TEMPERATURE: 97.4 F | OXYGEN SATURATION: 97 % | HEIGHT: 63 IN | RESPIRATION RATE: 18 BRPM | SYSTOLIC BLOOD PRESSURE: 124 MMHG | DIASTOLIC BLOOD PRESSURE: 50 MMHG | BODY MASS INDEX: 34.55 KG/M2 | WEIGHT: 195 LBS | HEART RATE: 90 BPM

## 2023-04-25 DIAGNOSIS — I10 ESSENTIAL HYPERTENSION: ICD-10-CM

## 2023-04-25 DIAGNOSIS — E83.42 HYPOMAGNESEMIA: ICD-10-CM

## 2023-04-25 DIAGNOSIS — R79.89 ELEVATED LFTS: ICD-10-CM

## 2023-04-25 DIAGNOSIS — I77.9 CAROTID ARTERY DISEASE, UNSPECIFIED LATERALITY, UNSPECIFIED TYPE (HCC): ICD-10-CM

## 2023-04-25 DIAGNOSIS — D64.9 ANEMIA, UNSPECIFIED TYPE: ICD-10-CM

## 2023-04-25 DIAGNOSIS — K21.9 GASTROESOPHAGEAL REFLUX DISEASE WITHOUT ESOPHAGITIS: ICD-10-CM

## 2023-04-25 DIAGNOSIS — E87.5 HYPERKALEMIA: ICD-10-CM

## 2023-04-25 DIAGNOSIS — G47.09 OTHER INSOMNIA: ICD-10-CM

## 2023-04-25 DIAGNOSIS — J01.90 ACUTE SINUSITIS, RECURRENCE NOT SPECIFIED, UNSPECIFIED LOCATION: ICD-10-CM

## 2023-04-25 DIAGNOSIS — E55.9 VITAMIN D DEFICIENCY: ICD-10-CM

## 2023-04-25 DIAGNOSIS — E78.2 MIXED HYPERLIPIDEMIA: ICD-10-CM

## 2023-04-25 DIAGNOSIS — I65.23 BILATERAL CAROTID ARTERY STENOSIS: ICD-10-CM

## 2023-04-25 DIAGNOSIS — G47.33 OSA (OBSTRUCTIVE SLEEP APNEA): ICD-10-CM

## 2023-04-25 DIAGNOSIS — Z79.4 TYPE 2 DIABETES MELLITUS WITH HYPERGLYCEMIA, WITH LONG-TERM CURRENT USE OF INSULIN (HCC): Primary | ICD-10-CM

## 2023-04-25 DIAGNOSIS — R01.1 MURMUR: ICD-10-CM

## 2023-04-25 DIAGNOSIS — E11.65 TYPE 2 DIABETES MELLITUS WITH HYPERGLYCEMIA, WITH LONG-TERM CURRENT USE OF INSULIN (HCC): Primary | ICD-10-CM

## 2023-04-25 LAB — HBA1C MFR BLD: 7.3 %

## 2023-04-25 RX ORDER — INSULIN GLARGINE 100 [IU]/ML
65 INJECTION, SOLUTION SUBCUTANEOUS EVERY MORNING
Qty: 15 ADJUSTABLE DOSE PRE-FILLED PEN SYRINGE | Refills: 1 | Status: SHIPPED | OUTPATIENT
Start: 2023-04-25

## 2023-04-25 RX ORDER — MAGNESIUM OXIDE 400 MG/1
400 TABLET ORAL 2 TIMES DAILY
Qty: 180 TABLET | Refills: 1 | Status: SHIPPED | OUTPATIENT
Start: 2023-04-25

## 2023-04-25 RX ORDER — ATORVASTATIN CALCIUM 40 MG/1
40 TABLET, FILM COATED ORAL DAILY
Qty: 90 TABLET | Refills: 1 | Status: SHIPPED | OUTPATIENT
Start: 2023-04-25

## 2023-04-25 RX ORDER — ZOLPIDEM TARTRATE 10 MG/1
10 TABLET ORAL NIGHTLY PRN
Qty: 90 TABLET | Refills: 0 | Status: SHIPPED | OUTPATIENT
Start: 2023-04-25 | End: 2023-07-24

## 2023-04-25 ASSESSMENT — ENCOUNTER SYMPTOMS
DIARRHEA: 0
BLOOD IN STOOL: 0
NAUSEA: 0
COUGH: 0
ABDOMINAL PAIN: 0
RHINORRHEA: 0
VOMITING: 0
SORE THROAT: 0
CHEST TIGHTNESS: 0
SHORTNESS OF BREATH: 1
CONSTIPATION: 0
EYE PAIN: 0

## 2023-04-25 NOTE — PROGRESS NOTES
[facility]) Physicians   Internal Medicine     2023  Roslyn Khoury : 1950 Sex: female  Age:72 y.o. Chief Complaint   Patient presents with    Other     Was dx with the starting of pneumonia. Was but on antibiotic and steroids and is starting to feel better, but not 100%. Very fatigued. Hypertension     3 month f/u    Diabetes     3 month f/u    Cholesterol Problem     3 month f/u        HPI:   Patient presents to office for evaluation of the following medical concerns. - States had sinus pressure and congestion. States was covid positive, States weak and no energy. States took mucinex and gene seltzer. States improved but has worsened. States had symptoms now for last 2 days. The patient does admit to facial pressure. Admits to minimal cough which is non productive of sputum. The patient also reports nasal congestion and mild sore throat. States nasal discharge. The patient has had general malaise, fatigue. Mild headache. States was treated with doxycyline and prednisone with antihistamine and steroid nasal spray. States improved but has relapsed. Was seen in urgent care (2023) - retreated with prednisone and cefdinir. States improving.     - States has lost hearing. States was seen by audiology. Found to have poss perforate dear drum. To see ENT. - States has diabetes. States trying to watch diet. States checking blood sugars at home , did have a few > 200. States lantus 65 units daily, metformin 1000mg twice a day, added humalog slide scale. Stopped trulicity (cost). States occasional reported hypoglycemia. OStates follows with optho. Last visit with endo per reviewed note  (2023) -hemoglobin A1c 9.0, was forgetting humalog, change Lantus 65 units daily, encouraged Humalog sliding scale continue metformin 1000mg twice a day. Add ozempic. continue atorvastatin follow up in 3 months     - States has had some depression. Last PHQ score was 0 (2023).  Discussed

## 2023-05-08 ENCOUNTER — OFFICE VISIT (OUTPATIENT)
Dept: ENT CLINIC | Age: 73
End: 2023-05-08
Payer: MEDICARE

## 2023-05-08 VITALS
OXYGEN SATURATION: 97 % | WEIGHT: 195 LBS | HEIGHT: 63 IN | BODY MASS INDEX: 34.55 KG/M2 | DIASTOLIC BLOOD PRESSURE: 65 MMHG | TEMPERATURE: 97.2 F | SYSTOLIC BLOOD PRESSURE: 147 MMHG | HEART RATE: 84 BPM

## 2023-05-08 DIAGNOSIS — H69.83 EUSTACHIAN TUBE DYSFUNCTION, BILATERAL: ICD-10-CM

## 2023-05-08 DIAGNOSIS — H90.3 ASYMMETRICAL SENSORINEURAL HEARING LOSS: ICD-10-CM

## 2023-05-08 DIAGNOSIS — H90.3 BILATERAL SENSORINEURAL HEARING LOSS: Primary | ICD-10-CM

## 2023-05-08 PROCEDURE — 3077F SYST BP >= 140 MM HG: CPT | Performed by: OTOLARYNGOLOGY

## 2023-05-08 PROCEDURE — G8400 PT W/DXA NO RESULTS DOC: HCPCS | Performed by: OTOLARYNGOLOGY

## 2023-05-08 PROCEDURE — 1036F TOBACCO NON-USER: CPT | Performed by: OTOLARYNGOLOGY

## 2023-05-08 PROCEDURE — G8417 CALC BMI ABV UP PARAM F/U: HCPCS | Performed by: OTOLARYNGOLOGY

## 2023-05-08 PROCEDURE — 99205 OFFICE O/P NEW HI 60 MIN: CPT | Performed by: OTOLARYNGOLOGY

## 2023-05-08 PROCEDURE — G8427 DOCREV CUR MEDS BY ELIG CLIN: HCPCS | Performed by: OTOLARYNGOLOGY

## 2023-05-08 PROCEDURE — 3017F COLORECTAL CA SCREEN DOC REV: CPT | Performed by: OTOLARYNGOLOGY

## 2023-05-08 PROCEDURE — 1090F PRES/ABSN URINE INCON ASSESS: CPT | Performed by: OTOLARYNGOLOGY

## 2023-05-08 PROCEDURE — 1123F ACP DISCUSS/DSCN MKR DOCD: CPT | Performed by: OTOLARYNGOLOGY

## 2023-05-08 PROCEDURE — 3078F DIAST BP <80 MM HG: CPT | Performed by: OTOLARYNGOLOGY

## 2023-05-11 ENCOUNTER — OFFICE VISIT (OUTPATIENT)
Dept: CHIROPRACTIC MEDICINE | Age: 73
End: 2023-05-11

## 2023-05-11 VITALS
HEIGHT: 63 IN | BODY MASS INDEX: 34.55 KG/M2 | WEIGHT: 195 LBS | OXYGEN SATURATION: 96 % | TEMPERATURE: 97.7 F | HEART RATE: 102 BPM

## 2023-05-11 DIAGNOSIS — M99.03 SEGMENTAL AND SOMATIC DYSFUNCTION OF LUMBAR REGION: Primary | ICD-10-CM

## 2023-05-11 DIAGNOSIS — M53.3 SACROCOCCYGEAL DISORDERS, NOT ELSEWHERE CLASSIFIED: ICD-10-CM

## 2023-05-11 DIAGNOSIS — M54.41 CHRONIC RIGHT-SIDED LOW BACK PAIN WITH RIGHT-SIDED SCIATICA: ICD-10-CM

## 2023-05-11 DIAGNOSIS — M99.05 SEGMENTAL AND SOMATIC DYSFUNCTION OF PELVIC REGION: ICD-10-CM

## 2023-05-11 DIAGNOSIS — G89.29 CHRONIC RIGHT-SIDED LOW BACK PAIN WITH RIGHT-SIDED SCIATICA: ICD-10-CM

## 2023-05-11 NOTE — PROGRESS NOTES
Patient is here for back pain.  Helena Tesfaye MD  Electronically signed by Bunny Munroe LPN on 4/15/3625 at 1:92 PM

## 2023-05-11 NOTE — PROGRESS NOTES
23  Shari Valadez : 1950 Sex: female  Age: 74 y.o. Chief Complaint   Patient presents with    Back Pain       HPI:   Pain has worsened slightly. On average, pain is perceived as moderate (4-6 pain scale). R sided LBP, near her normal area. Patient denies new numbness, new weakness, new tingling  She did have some right shoulder pain the other night for no apparent cause. Use some Biofreeze and she is fine today. No issues    Current Outpatient Medications:     zolpidem (AMBIEN) 10 MG tablet, Take 1 tablet by mouth nightly as needed for Sleep for up to 90 days. , Disp: 90 tablet, Rfl: 0    insulin glargine (LANTUS SOLOSTAR) 100 UNIT/ML injection pen, Inject 65 Units into the skin every morning, Disp: 15 Adjustable Dose Pre-filled Pen Syringe, Rfl: 1    atorvastatin (LIPITOR) 40 MG tablet, Take 1 tablet by mouth daily, Disp: 90 tablet, Rfl: 1    magnesium oxide (MAG-OX) 400 MG tablet, Take 1 tablet by mouth 2 times daily, Disp: 180 tablet, Rfl: 1    Semaglutide, 1 MG/DOSE, 2 MG/1.5ML SOPN, Inject into the skin 0.25mg x 4 weeks, then 0.5mg thereafter, Disp: , Rfl:     lisinopril (PRINIVIL;ZESTRIL) 20 MG tablet, Take 1 tablet by mouth daily, Disp: 90 tablet, Rfl: 1    aspirin 81 MG EC tablet, Take 1 tablet by mouth daily, Disp: , Rfl:     metFORMIN (GLUCOPHAGE) 1000 MG tablet, Take 1 tablet by mouth 2 times daily (with meals), Disp: 180 tablet, Rfl: 1    insulin lispro, 1 Unit Dial, (HUMALOG KWIKPEN) 100 UNIT/ML SOPN, Inject up to 12 units QAC TID, Disp: 15 pen, Rfl: 1    Insulin Pen Needle 31G X 5 MM MISC, 1 each by Does not apply route daily, Disp: , Rfl:     Exam:   Vitals:    23 1357   Pulse: (!) 102   Temp: 97.7 °F (36.5 °C)   SpO2: 96%       There are hypertonic and tender fibers noted with palpation in the paraspinal muscles of the lumbar region. Joint fixation is noted with motion screening at L3-4 bilateral SI joints. Ya Corbett was seen today for back pain.     Diagnoses and all orders

## 2023-06-14 DIAGNOSIS — R79.89 ELEVATED LFTS: ICD-10-CM

## 2023-06-14 DIAGNOSIS — E87.5 HYPERKALEMIA: ICD-10-CM

## 2023-06-14 LAB
ALBUMIN SERPL-MCNC: 4 G/DL (ref 3.5–5.2)
ALP SERPL-CCNC: 64 U/L (ref 35–104)
ALT SERPL-CCNC: 58 U/L (ref 0–32)
ANION GAP SERPL CALCULATED.3IONS-SCNC: 18 MMOL/L (ref 7–16)
AST SERPL-CCNC: 33 U/L (ref 0–31)
BILIRUB SERPL-MCNC: <0.2 MG/DL (ref 0–1.2)
BUN SERPL-MCNC: 11 MG/DL (ref 6–23)
CALCIUM SERPL-MCNC: 10.4 MG/DL (ref 8.6–10.2)
CHLORIDE SERPL-SCNC: 101 MMOL/L (ref 98–107)
CO2 SERPL-SCNC: 23 MMOL/L (ref 22–29)
CREAT SERPL-MCNC: 0.8 MG/DL (ref 0.5–1)
GLUCOSE SERPL-MCNC: 99 MG/DL (ref 74–99)
POTASSIUM SERPL-SCNC: 4.6 MMOL/L (ref 3.5–5)
PROT SERPL-MCNC: 7 G/DL (ref 6.4–8.3)
SODIUM SERPL-SCNC: 142 MMOL/L (ref 132–146)

## 2023-06-18 ENCOUNTER — TELEPHONE (OUTPATIENT)
Dept: FAMILY MEDICINE CLINIC | Age: 73
End: 2023-06-18

## 2023-06-26 ENCOUNTER — OFFICE VISIT (OUTPATIENT)
Dept: CHIROPRACTIC MEDICINE | Age: 73
End: 2023-06-26
Payer: MEDICARE

## 2023-06-26 VITALS
WEIGHT: 195 LBS | BODY MASS INDEX: 34.55 KG/M2 | HEIGHT: 63 IN | OXYGEN SATURATION: 98 % | HEART RATE: 88 BPM | TEMPERATURE: 97.7 F

## 2023-06-26 DIAGNOSIS — M53.3 SACROCOCCYGEAL DISORDERS, NOT ELSEWHERE CLASSIFIED: ICD-10-CM

## 2023-06-26 DIAGNOSIS — G89.29 CHRONIC RIGHT-SIDED LOW BACK PAIN WITH RIGHT-SIDED SCIATICA: ICD-10-CM

## 2023-06-26 DIAGNOSIS — M99.05 SEGMENTAL AND SOMATIC DYSFUNCTION OF PELVIC REGION: ICD-10-CM

## 2023-06-26 DIAGNOSIS — M54.41 CHRONIC RIGHT-SIDED LOW BACK PAIN WITH RIGHT-SIDED SCIATICA: ICD-10-CM

## 2023-06-26 DIAGNOSIS — M99.03 SEGMENTAL AND SOMATIC DYSFUNCTION OF LUMBAR REGION: Primary | ICD-10-CM

## 2023-06-26 PROCEDURE — 98940 CHIROPRACT MANJ 1-2 REGIONS: CPT | Performed by: CHIROPRACTOR

## 2023-06-26 PROCEDURE — 99999 PR OFFICE/OUTPT VISIT,PROCEDURE ONLY: CPT | Performed by: CHIROPRACTOR

## 2023-07-06 DIAGNOSIS — F41.9 ANXIETY: ICD-10-CM

## 2023-07-06 DIAGNOSIS — G47.09 OTHER INSOMNIA: ICD-10-CM

## 2023-07-06 RX ORDER — ALPRAZOLAM 0.25 MG/1
0.25 TABLET ORAL DAILY PRN
Qty: 30 TABLET | Refills: 0 | Status: SHIPPED | OUTPATIENT
Start: 2023-07-06 | End: 2023-08-05

## 2023-07-06 RX ORDER — ZOLPIDEM TARTRATE 10 MG/1
10 TABLET ORAL NIGHTLY PRN
Qty: 90 TABLET | Refills: 0 | Status: SHIPPED
Start: 2023-07-06 | End: 2023-08-29 | Stop reason: SDUPTHER

## 2023-07-06 NOTE — TELEPHONE ENCOUNTER
----- Message from Lou Leyden sent at 7/5/2023 11:37 AM EDT -----  Subject: Refill Request    QUESTIONS  Name of Medication? ALPRAZolam (XANAX) 0.25 MG tablet  Patient-reported dosage and instructions? AS DIRECTED  How many days do you have left? 2  Preferred Pharmacy? [id number]  Pharmacy phone number (if available)? [phone]  Additional Information for Provider? PT WOULD LIKE A REFILL TO TRAVEL   WITH; LEAVING FOR A TRIP ON 07/07; PLEASE ADVISE WHEN SENT  ---------------------------------------------------------------------------  --------------  CALL BACK INFO  What is the best way for the office to contact you? OK to leave message on   voicemail  Preferred Call Back Phone Number? [phone]  ---------------------------------------------------------------------------  --------------  SCRIPT ANSWERS  Relationship to Patient?  Self

## 2023-07-06 NOTE — TELEPHONE ENCOUNTER
Order pending.      Last Appointment:  4/25/2023  Future Appointments   Date Time Provider [address]   7/10/2023 11:30 AM KERRY Smith [facility]   8/14/2023 11:00 AM MD PEE Guadarrama [facility] - BEHAVIORAL HE[facility] SERVICES ENT [facility]   8/29/2023 10:45 AM MD MARIAH Mcgill [facility]   9/18/2023 11:00 AM ELIEZER Coreas - CNS BDM ENDO [facility]

## 2023-07-06 NOTE — TELEPHONE ENCOUNTER
Last Appointment:  4/25/2023  Future Appointments   Date Time Provider [address]   7/10/2023 11:30 AM KERRY Winslow [facility]   8/14/2023 11:00 AM MD PEE Cooper [facility] - BEHAVIORAL HEALTH SERVICES ENT [facility]   8/29/2023 10:45 AM Ismael Mendes MD [facility]   9/18/2023 11:00 AM ELIEZER Hernandez - CNS BDM ENDO [facility]

## 2023-07-10 ENCOUNTER — OFFICE VISIT (OUTPATIENT)
Dept: CHIROPRACTIC MEDICINE | Age: 73
End: 2023-07-10
Payer: MEDICARE

## 2023-07-10 VITALS
WEIGHT: 195 LBS | HEIGHT: 63 IN | OXYGEN SATURATION: 96 % | BODY MASS INDEX: 34.55 KG/M2 | HEART RATE: 82 BPM | TEMPERATURE: 97.4 F

## 2023-07-10 DIAGNOSIS — M53.3 SACROCOCCYGEAL DISORDERS, NOT ELSEWHERE CLASSIFIED: ICD-10-CM

## 2023-07-10 DIAGNOSIS — M99.03 SEGMENTAL AND SOMATIC DYSFUNCTION OF LUMBAR REGION: Primary | ICD-10-CM

## 2023-07-10 DIAGNOSIS — M99.02 SEGMENTAL AND SOMATIC DYSFUNCTION OF THORACIC REGION: ICD-10-CM

## 2023-07-10 DIAGNOSIS — M54.41 CHRONIC RIGHT-SIDED LOW BACK PAIN WITH RIGHT-SIDED SCIATICA: ICD-10-CM

## 2023-07-10 DIAGNOSIS — G89.29 CHRONIC RIGHT-SIDED LOW BACK PAIN WITH RIGHT-SIDED SCIATICA: ICD-10-CM

## 2023-07-10 DIAGNOSIS — M99.05 SEGMENTAL AND SOMATIC DYSFUNCTION OF PELVIC REGION: ICD-10-CM

## 2023-07-10 DIAGNOSIS — M54.04 PANNICULITIS AFFECTING REGIONS OF NECK AND BACK, THORACIC REGION: ICD-10-CM

## 2023-07-10 PROCEDURE — 98941 CHIROPRACT MANJ 3-4 REGIONS: CPT | Performed by: CHIROPRACTOR

## 2023-07-10 PROCEDURE — 99999 PR OFFICE/OUTPT VISIT,PROCEDURE ONLY: CPT | Performed by: CHIROPRACTOR

## 2023-07-10 NOTE — PROGRESS NOTES
Patient is here for follow up back pain. Patient states right shoulder has had and increase of pain, onset 7/3/2023. Patient states no injury.  Ismael Mendes MD  Electronically signed by Fiorella Vargas LPN on 6/56/6171 at 13:94 AM

## 2023-07-20 ENCOUNTER — OFFICE VISIT (OUTPATIENT)
Dept: CHIROPRACTIC MEDICINE | Age: 73
End: 2023-07-20

## 2023-07-20 VITALS
HEART RATE: 90 BPM | TEMPERATURE: 97.7 F | WEIGHT: 195 LBS | OXYGEN SATURATION: 96 % | HEIGHT: 63 IN | BODY MASS INDEX: 34.55 KG/M2

## 2023-07-20 DIAGNOSIS — G89.29 CHRONIC RIGHT-SIDED LOW BACK PAIN WITH RIGHT-SIDED SCIATICA: ICD-10-CM

## 2023-07-20 DIAGNOSIS — M99.05 SEGMENTAL AND SOMATIC DYSFUNCTION OF PELVIC REGION: ICD-10-CM

## 2023-07-20 DIAGNOSIS — M53.3 SACROCOCCYGEAL DISORDERS, NOT ELSEWHERE CLASSIFIED: ICD-10-CM

## 2023-07-20 DIAGNOSIS — M99.03 SEGMENTAL AND SOMATIC DYSFUNCTION OF LUMBAR REGION: Primary | ICD-10-CM

## 2023-07-20 DIAGNOSIS — M54.41 CHRONIC RIGHT-SIDED LOW BACK PAIN WITH RIGHT-SIDED SCIATICA: ICD-10-CM

## 2023-07-20 NOTE — PROGRESS NOTES
Patient is here for follow up back pain.  Dolores Guillen MD  Electronically signed by Junior Virgil LPN on 5/34/8361 at 98:87 AM

## 2023-08-10 ENCOUNTER — OFFICE VISIT (OUTPATIENT)
Dept: CHIROPRACTIC MEDICINE | Age: 73
End: 2023-08-10

## 2023-08-10 VITALS
HEIGHT: 63 IN | OXYGEN SATURATION: 97 % | TEMPERATURE: 97.7 F | HEART RATE: 81 BPM | BODY MASS INDEX: 34.55 KG/M2 | WEIGHT: 195 LBS

## 2023-08-10 DIAGNOSIS — M99.03 SEGMENTAL AND SOMATIC DYSFUNCTION OF LUMBAR REGION: Primary | ICD-10-CM

## 2023-08-10 DIAGNOSIS — G89.29 CHRONIC RIGHT-SIDED LOW BACK PAIN WITH RIGHT-SIDED SCIATICA: ICD-10-CM

## 2023-08-10 DIAGNOSIS — M99.05 SEGMENTAL AND SOMATIC DYSFUNCTION OF PELVIC REGION: ICD-10-CM

## 2023-08-10 DIAGNOSIS — M54.41 CHRONIC RIGHT-SIDED LOW BACK PAIN WITH RIGHT-SIDED SCIATICA: ICD-10-CM

## 2023-08-10 DIAGNOSIS — M53.3 SACROCOCCYGEAL DISORDERS, NOT ELSEWHERE CLASSIFIED: ICD-10-CM

## 2023-08-10 NOTE — PROGRESS NOTES
Patient is here for lower back. Patient states increased pain into right hip.  Daria Olvera MD  Electronically signed by Jose Chavez LPN on 2/41/4295 at 87:67 AM
today for lower back pain. Diagnoses and all orders for this visit:    Segmental and somatic dysfunction of lumbar region    Segmental and somatic dysfunction of pelvic region    Chronic right-sided low back pain with right-sided sciatica    Sacrococcygeal disorders, not elsewhere classified        Treatment Plan: She sees her PCP later this month. If the hip still problematic I want her to discuss with him, have some x-rays taken. Explained that I cannot order x-rays of her hips due to Medicare. I did continue treatment today consisting of Berry flexion distraction manipulation at L3, protocol 1 followed by mechanically assisted manipulation of the SI joints. Tolerated well.   I will see her back as needed for further care      Seen By:  Fadumo Kay

## 2023-08-11 NOTE — PROGRESS NOTES
CC:   Chief Complaint   Patient presents with    Follow-up     3 month Bilateral Sensorineural hearing loss right ear retraction had hearing aid adjustments and seems to be helping with hearing     Terry Harden is a 74 y.o. female last seen 5/2023 (noted to have small retraction pocket on the right tympanic membrane) presented with bilateral SNHL; she has been wearing hearing aids for about 1 year and recently around a driving trip to [facility] and worsening right ear hearing; she does have bilateral hearing aids, no dizziness and no drainage or ear surgeries in the past. no pain or otorrhea; imaging discussed at the last visit; she does feel her hearing is a bit better     12/2022 4/2023       PAST MEDICAL HISTORY:   Past Medical History:   Diagnosis Date    Cervical spondylosis 12/7/2021    Class 2 obesity due to excess calories without serious comorbidity with body mass index (BMI) of 35.0 to 35.9 in adult 12/18/2019    Essential hypertension 12/18/2019    Hypomagnesemia 12/18/2019    Mixed hyperlipidemia 12/18/2019    Other insomnia 12/18/2019    Right carpal tunnel syndrome     Type 2 diabetes mellitus without complication ([address])     Vitamin D deficiency 12/18/2019        PAST SURGICAL HISTORY:   Past Surgical History:   Procedure Laterality Date    ARM SURGERY Right 5/4/2022    RIGHT ULNAR NERVE IN SITU DECOMPRESSION AT ELBOW,  RIGHT WRIST TRIANGULAR FIBROCARTILAGE COMPLEX INJECTION, RIGHT INDEX PROXIMAL INTERPHALANGEAL JOINT INJECTION performed by Chelsi Bustillos MD at [address] Right 5/4/2022    RIGHT CARPAL TUNNEL RELEASE performed by Chelsi Bustillos MD at [address] (CERVIX STATUS UNKNOWN)      HYSTERECTOMY, VAGINAL      partial in the 80s then bilateral oopherectome 2016    RETINAL DETACHMENT SURGERY Right     vitreous surgery    TUBAL LIGATION          SOCIAL HISTORY:

## 2023-08-14 ENCOUNTER — OFFICE VISIT (OUTPATIENT)
Dept: ENT CLINIC | Age: 73
End: 2023-08-14
Payer: MEDICARE

## 2023-08-14 VITALS — WEIGHT: 195 LBS | BODY MASS INDEX: 34.55 KG/M2 | HEIGHT: 63 IN

## 2023-08-14 DIAGNOSIS — H90.3 ASYMMETRICAL SENSORINEURAL HEARING LOSS: ICD-10-CM

## 2023-08-14 DIAGNOSIS — H73.891 RETRACTION POCKET OF TYMPANIC MEMBRANE, RIGHT: ICD-10-CM

## 2023-08-14 DIAGNOSIS — H90.3 BILATERAL SENSORINEURAL HEARING LOSS: Primary | ICD-10-CM

## 2023-08-14 DIAGNOSIS — H69.83 EUSTACHIAN TUBE DYSFUNCTION, BILATERAL: ICD-10-CM

## 2023-08-14 PROCEDURE — 1090F PRES/ABSN URINE INCON ASSESS: CPT | Performed by: OTOLARYNGOLOGY

## 2023-08-14 PROCEDURE — G8400 PT W/DXA NO RESULTS DOC: HCPCS | Performed by: OTOLARYNGOLOGY

## 2023-08-14 PROCEDURE — 69210 REMOVE IMPACTED EAR WAX UNI: CPT | Performed by: OTOLARYNGOLOGY

## 2023-08-14 PROCEDURE — 3017F COLORECTAL CA SCREEN DOC REV: CPT | Performed by: OTOLARYNGOLOGY

## 2023-08-14 PROCEDURE — 99215 OFFICE O/P EST HI 40 MIN: CPT | Performed by: OTOLARYNGOLOGY

## 2023-08-14 PROCEDURE — 1036F TOBACCO NON-USER: CPT | Performed by: OTOLARYNGOLOGY

## 2023-08-14 PROCEDURE — 1123F ACP DISCUSS/DSCN MKR DOCD: CPT | Performed by: OTOLARYNGOLOGY

## 2023-08-14 PROCEDURE — G8427 DOCREV CUR MEDS BY ELIG CLIN: HCPCS | Performed by: OTOLARYNGOLOGY

## 2023-08-14 PROCEDURE — G8417 CALC BMI ABV UP PARAM F/U: HCPCS | Performed by: OTOLARYNGOLOGY

## 2023-08-24 ENCOUNTER — TELEPHONE (OUTPATIENT)
Dept: FAMILY MEDICINE CLINIC | Age: 73
End: 2023-08-24

## 2023-08-24 NOTE — TELEPHONE ENCOUNTER
No blood work for this upcoming visit.   We will discuss future blood work at upcoming visit for both her and her

## 2023-08-24 NOTE — TELEPHONE ENCOUNTER
Carlos Pacheco calling about the appointment her and Yara Tucker have with you next wed. There is no lab orders for them at this time. She is asking if you wanted them to get them done before or after their appointment?

## 2023-08-29 ENCOUNTER — OFFICE VISIT (OUTPATIENT)
Dept: FAMILY MEDICINE CLINIC | Age: 73
End: 2023-08-29
Payer: MEDICARE

## 2023-08-29 VITALS
BODY MASS INDEX: 33.13 KG/M2 | RESPIRATION RATE: 20 BRPM | OXYGEN SATURATION: 97 % | WEIGHT: 187 LBS | TEMPERATURE: 98.9 F | SYSTOLIC BLOOD PRESSURE: 136 MMHG | HEIGHT: 63 IN | DIASTOLIC BLOOD PRESSURE: 60 MMHG | HEART RATE: 84 BPM

## 2023-08-29 DIAGNOSIS — E83.42 HYPOMAGNESEMIA: ICD-10-CM

## 2023-08-29 DIAGNOSIS — I77.9 CAROTID ARTERY DISEASE, UNSPECIFIED LATERALITY, UNSPECIFIED TYPE (HCC): ICD-10-CM

## 2023-08-29 DIAGNOSIS — G47.33 OSA (OBSTRUCTIVE SLEEP APNEA): ICD-10-CM

## 2023-08-29 DIAGNOSIS — I10 ESSENTIAL HYPERTENSION: ICD-10-CM

## 2023-08-29 DIAGNOSIS — Z13.820 ENCOUNTER FOR OSTEOPOROSIS SCREENING IN ASYMPTOMATIC POSTMENOPAUSAL PATIENT: ICD-10-CM

## 2023-08-29 DIAGNOSIS — R53.83 OTHER FATIGUE: ICD-10-CM

## 2023-08-29 DIAGNOSIS — D64.9 ANEMIA, UNSPECIFIED TYPE: ICD-10-CM

## 2023-08-29 DIAGNOSIS — E78.2 MIXED HYPERLIPIDEMIA: ICD-10-CM

## 2023-08-29 DIAGNOSIS — E87.5 HYPERKALEMIA: ICD-10-CM

## 2023-08-29 DIAGNOSIS — K21.9 GASTROESOPHAGEAL REFLUX DISEASE WITHOUT ESOPHAGITIS: ICD-10-CM

## 2023-08-29 DIAGNOSIS — F41.9 ANXIETY: ICD-10-CM

## 2023-08-29 DIAGNOSIS — Z78.0 ENCOUNTER FOR OSTEOPOROSIS SCREENING IN ASYMPTOMATIC POSTMENOPAUSAL PATIENT: ICD-10-CM

## 2023-08-29 DIAGNOSIS — R01.1 MURMUR: ICD-10-CM

## 2023-08-29 DIAGNOSIS — Z79.4 TYPE 2 DIABETES MELLITUS WITH HYPERGLYCEMIA, WITH LONG-TERM CURRENT USE OF INSULIN (HCC): Primary | ICD-10-CM

## 2023-08-29 DIAGNOSIS — G47.09 OTHER INSOMNIA: ICD-10-CM

## 2023-08-29 DIAGNOSIS — I65.23 BILATERAL CAROTID ARTERY STENOSIS: ICD-10-CM

## 2023-08-29 DIAGNOSIS — E11.65 TYPE 2 DIABETES MELLITUS WITH HYPERGLYCEMIA, WITH LONG-TERM CURRENT USE OF INSULIN (HCC): Primary | ICD-10-CM

## 2023-08-29 DIAGNOSIS — E55.9 VITAMIN D DEFICIENCY: ICD-10-CM

## 2023-08-29 LAB — HBA1C MFR BLD: 6.7 %

## 2023-08-29 PROCEDURE — 3078F DIAST BP <80 MM HG: CPT | Performed by: INTERNAL MEDICINE

## 2023-08-29 PROCEDURE — 1036F TOBACCO NON-USER: CPT | Performed by: INTERNAL MEDICINE

## 2023-08-29 PROCEDURE — 3044F HG A1C LEVEL LT 7.0%: CPT | Performed by: INTERNAL MEDICINE

## 2023-08-29 PROCEDURE — G8427 DOCREV CUR MEDS BY ELIG CLIN: HCPCS | Performed by: INTERNAL MEDICINE

## 2023-08-29 PROCEDURE — 3017F COLORECTAL CA SCREEN DOC REV: CPT | Performed by: INTERNAL MEDICINE

## 2023-08-29 PROCEDURE — G8400 PT W/DXA NO RESULTS DOC: HCPCS | Performed by: INTERNAL MEDICINE

## 2023-08-29 PROCEDURE — 99214 OFFICE O/P EST MOD 30 MIN: CPT | Performed by: INTERNAL MEDICINE

## 2023-08-29 PROCEDURE — 1090F PRES/ABSN URINE INCON ASSESS: CPT | Performed by: INTERNAL MEDICINE

## 2023-08-29 PROCEDURE — 3075F SYST BP GE 130 - 139MM HG: CPT | Performed by: INTERNAL MEDICINE

## 2023-08-29 PROCEDURE — 83036 HEMOGLOBIN GLYCOSYLATED A1C: CPT | Performed by: INTERNAL MEDICINE

## 2023-08-29 PROCEDURE — 2022F DILAT RTA XM EVC RTNOPTHY: CPT | Performed by: INTERNAL MEDICINE

## 2023-08-29 PROCEDURE — 1123F ACP DISCUSS/DSCN MKR DOCD: CPT | Performed by: INTERNAL MEDICINE

## 2023-08-29 PROCEDURE — G8417 CALC BMI ABV UP PARAM F/U: HCPCS | Performed by: INTERNAL MEDICINE

## 2023-08-29 RX ORDER — INSULIN LISPRO 100 [IU]/ML
INJECTION, SOLUTION INTRAVENOUS; SUBCUTANEOUS
Qty: 6 ADJUSTABLE DOSE PRE-FILLED PEN SYRINGE | Refills: 1 | Status: SHIPPED | OUTPATIENT
Start: 2023-08-29

## 2023-08-29 RX ORDER — LIRAGLUTIDE 6 MG/ML
1.2 INJECTION SUBCUTANEOUS DAILY
Qty: 6 ADJUSTABLE DOSE PRE-FILLED PEN SYRINGE | Refills: 1 | Status: SHIPPED | OUTPATIENT
Start: 2023-08-29

## 2023-08-29 RX ORDER — INSULIN GLARGINE 100 [IU]/ML
65 INJECTION, SOLUTION SUBCUTANEOUS EVERY MORNING
Qty: 15 ADJUSTABLE DOSE PRE-FILLED PEN SYRINGE | Refills: 1 | Status: SHIPPED | OUTPATIENT
Start: 2023-08-29

## 2023-08-29 RX ORDER — ZOLPIDEM TARTRATE 10 MG/1
10 TABLET ORAL NIGHTLY PRN
Qty: 90 TABLET | Refills: 0 | Status: SHIPPED | OUTPATIENT
Start: 2023-08-29 | End: 2023-11-27

## 2023-08-29 RX ORDER — ATORVASTATIN CALCIUM 40 MG/1
40 TABLET, FILM COATED ORAL DAILY
Qty: 90 TABLET | Refills: 3 | Status: SHIPPED | OUTPATIENT
Start: 2023-08-29

## 2023-08-29 RX ORDER — LISINOPRIL 20 MG/1
20 TABLET ORAL DAILY
Qty: 90 TABLET | Refills: 3 | Status: SHIPPED | OUTPATIENT
Start: 2023-08-29

## 2023-08-29 RX ORDER — MAGNESIUM OXIDE 400 MG/1
400 TABLET ORAL 2 TIMES DAILY
Qty: 180 TABLET | Refills: 3 | Status: SHIPPED | OUTPATIENT
Start: 2023-08-29

## 2023-08-29 RX ORDER — LIRAGLUTIDE 6 MG/ML
1.2 INJECTION SUBCUTANEOUS DAILY
COMMUNITY
End: 2023-08-29 | Stop reason: SDUPTHER

## 2023-08-29 ASSESSMENT — ENCOUNTER SYMPTOMS
DIARRHEA: 0
RHINORRHEA: 0
NAUSEA: 0
SORE THROAT: 0
CONSTIPATION: 0
CHEST TIGHTNESS: 0
BLOOD IN STOOL: 0
ABDOMINAL PAIN: 0
SHORTNESS OF BREATH: 1
VOMITING: 0
EYE PAIN: 0
COUGH: 0

## 2023-08-29 NOTE — PROGRESS NOTES
Standing Status:   Future     Standing Expiration Date:   8/28/2024    Vitamin D 25 Hydroxy     Standing Status:   Future     Standing Expiration Date:   8/29/2024    TSH     Standing Status:   Future     Standing Expiration Date:   8/29/2024    Urinalysis     Standing Status:   Future     Standing Expiration Date:   8/29/2024    Microalbumin, Ur     Standing Status:   Future     Standing Expiration Date:   8/29/2024    CBC with Auto Differential     Standing Status:   Future     Standing Expiration Date:   8/29/2024    Vitamin B12 & Folate     Standing Status:   Future     Standing Expiration Date:   8/29/2024    POCT glycosylated hemoglobin (Hb A1C)     Requested Prescriptions     Signed Prescriptions Disp Refills    zolpidem (AMBIEN) 10 MG tablet 90 tablet 0     Sig: Take 1 tablet by mouth nightly as needed for Sleep for up to 90 days.     magnesium oxide (MAG-OX) 400 MG tablet 180 tablet 3     Sig: Take 1 tablet by mouth 2 times daily    lisinopril (PRINIVIL;ZESTRIL) 20 MG tablet 90 tablet 3     Sig: Take 1 tablet by mouth daily    atorvastatin (LIPITOR) 40 MG tablet 90 tablet 3     Sig: Take 1 tablet by mouth daily    insulin glargine (LANTUS SOLOSTAR) 100 UNIT/ML injection pen 15 Adjustable Dose Pre-filled Pen Syringe 1     Sig: Inject 65 Units into the skin every morning    Liraglutide (VICTOZA) 18 MG/3ML SOPN SC injection 6 Adjustable Dose Pre-filled Pen Syringe 1     Sig: Inject 1.2 mg into the skin daily    metFORMIN (GLUCOPHAGE) 1000 MG tablet 180 tablet 1     Sig: Take 1 tablet by mouth 2 times daily (with meals)    insulin lispro, 1 Unit Dial, (HUMALOG KWIKPEN) 100 UNIT/ML SOPN 6 Adjustable Dose Pre-filled Pen Syringe 1     Sig: Inject up to 12 units [facility] FLAKO Guillen MD  8/29/2023  11:08 AM

## 2023-09-07 ENCOUNTER — TELEPHONE (OUTPATIENT)
Dept: ENT CLINIC | Age: 73
End: 2023-09-07

## 2023-09-08 ENCOUNTER — TELEPHONE (OUTPATIENT)
Dept: PRIMARY CARE CLINIC | Age: 73
End: 2023-09-08

## 2023-09-08 NOTE — TELEPHONE ENCOUNTER
Please let the patient know that bone density per radiology report suggested    Lumbar spine, total hip, left femoral neck all appeared normal, probability of fracture was also lower for any major osteoporotic fracture 8.6 and hip fracture at 1.1    Recommendations  -Calcium 600 mg twice a day  -Vitamin D daily  -Weightbearing exercises    Consider rechecking in 3 to 5 years    Thanks

## 2023-10-06 ENCOUNTER — HOSPITAL ENCOUNTER (OUTPATIENT)
Dept: CT IMAGING | Age: 73
End: 2023-10-06
Attending: OTOLARYNGOLOGY
Payer: MEDICARE

## 2023-10-06 ENCOUNTER — OFFICE VISIT (OUTPATIENT)
Dept: CARDIOLOGY CLINIC | Age: 73
End: 2023-10-06
Payer: MEDICARE

## 2023-10-06 VITALS
BODY MASS INDEX: 33.35 KG/M2 | RESPIRATION RATE: 16 BRPM | SYSTOLIC BLOOD PRESSURE: 136 MMHG | HEIGHT: 63 IN | OXYGEN SATURATION: 98 % | HEART RATE: 91 BPM | DIASTOLIC BLOOD PRESSURE: 58 MMHG | WEIGHT: 188.2 LBS

## 2023-10-06 DIAGNOSIS — I10 ESSENTIAL HYPERTENSION: Primary | ICD-10-CM

## 2023-10-06 DIAGNOSIS — H73.891 RETRACTION POCKET OF TYMPANIC MEMBRANE, RIGHT: ICD-10-CM

## 2023-10-06 DIAGNOSIS — H69.83 EUSTACHIAN TUBE DYSFUNCTION, BILATERAL: ICD-10-CM

## 2023-10-06 PROCEDURE — G8484 FLU IMMUNIZE NO ADMIN: HCPCS | Performed by: INTERNAL MEDICINE

## 2023-10-06 PROCEDURE — 1090F PRES/ABSN URINE INCON ASSESS: CPT | Performed by: INTERNAL MEDICINE

## 2023-10-06 PROCEDURE — G8399 PT W/DXA RESULTS DOCUMENT: HCPCS | Performed by: INTERNAL MEDICINE

## 2023-10-06 PROCEDURE — 3074F SYST BP LT 130 MM HG: CPT | Performed by: INTERNAL MEDICINE

## 2023-10-06 PROCEDURE — 93000 ELECTROCARDIOGRAM COMPLETE: CPT | Performed by: INTERNAL MEDICINE

## 2023-10-06 PROCEDURE — 3017F COLORECTAL CA SCREEN DOC REV: CPT | Performed by: INTERNAL MEDICINE

## 2023-10-06 PROCEDURE — 3078F DIAST BP <80 MM HG: CPT | Performed by: INTERNAL MEDICINE

## 2023-10-06 PROCEDURE — 99213 OFFICE O/P EST LOW 20 MIN: CPT | Performed by: INTERNAL MEDICINE

## 2023-10-06 PROCEDURE — G8417 CALC BMI ABV UP PARAM F/U: HCPCS | Performed by: INTERNAL MEDICINE

## 2023-10-06 PROCEDURE — 1036F TOBACCO NON-USER: CPT | Performed by: INTERNAL MEDICINE

## 2023-10-06 PROCEDURE — 70480 CT ORBIT/EAR/FOSSA W/O DYE: CPT

## 2023-10-06 PROCEDURE — 1123F ACP DISCUSS/DSCN MKR DOCD: CPT | Performed by: INTERNAL MEDICINE

## 2023-10-06 PROCEDURE — G8427 DOCREV CUR MEDS BY ELIG CLIN: HCPCS | Performed by: INTERNAL MEDICINE

## 2023-10-08 NOTE — PROGRESS NOTES
and oriented times three, pleasant and cooperative. Vital signs are as documented in vital signs section. Breathing comfortably, without stertor or stridor  Ear exam: External ears normal. Canals clear. TM left normal. Right TM with superior retraction pocket, no visible cholesteatoma  No nasal lesions, no erythema  No oral lesions. There is no palpable adenopathy or tenderness    Lab Results   Component Value Date/Time    WBC 12.9 (H) 04/21/2023 08:28 AM    HGB 13.6 04/21/2023 08:28 AM    HCT 42.5 04/21/2023 08:28 AM     (H) 04/21/2023 08:28 AM    MCV 94.7 04/21/2023 08:28 AM    MCH 30.3 04/21/2023 08:28 AM    MCHC 32.0 04/21/2023 08:28 AM    RDW 12.9 04/21/2023 08:28 AM    NEUTOPHILPCT 56.1 04/21/2023 08:28 AM    LYMPHOPCT 34.1 04/21/2023 08:28 AM    MONOPCT 6.7 04/21/2023 08:28 AM    EOSRELPCT 1.9 04/21/2023 08:28 AM    BASOPCT 0.7 04/21/2023 08:28 AM    NEUTROABS 7.22 04/21/2023 08:28 AM    LYMPHSABS 4.40 (H) 04/21/2023 08:28 AM    EOSABS 0.24 04/21/2023 08:28 AM       On this date 10/9/2023 I have spent 10 minutes reviewing previous notes, test results and 20 min face to face with the patient discussing the diagnosis and importance of compliance with the treatment plan as well as documenting on the day of the visit.     A/P:      Britney Lang is a 74 y.o. female noted to have small retraction pocket on the right tympanic membrane) presented with bilateral SNHL  CT scan for retraction pocket noted to have very tiny area of fluid medial to malleus without any bony destruction  Follow up in 6 mo, and if unchanged, can follow up yearly with audiograms    Ilda Castañeda MD MS FACS FAAP  10/09/23 11:02 AM   Director Otology and Cochlear Implant Programs

## 2023-10-09 ENCOUNTER — OFFICE VISIT (OUTPATIENT)
Dept: ENT CLINIC | Age: 73
End: 2023-10-09
Payer: MEDICARE

## 2023-10-09 VITALS
HEART RATE: 79 BPM | DIASTOLIC BLOOD PRESSURE: 67 MMHG | OXYGEN SATURATION: 96 % | SYSTOLIC BLOOD PRESSURE: 138 MMHG | HEIGHT: 63 IN | BODY MASS INDEX: 33.31 KG/M2 | WEIGHT: 188 LBS

## 2023-10-09 DIAGNOSIS — H90.3 BILATERAL SENSORINEURAL HEARING LOSS: Primary | ICD-10-CM

## 2023-10-09 DIAGNOSIS — H90.3 ASYMMETRICAL SENSORINEURAL HEARING LOSS: ICD-10-CM

## 2023-10-09 DIAGNOSIS — H73.891 RETRACTION POCKET OF TYMPANIC MEMBRANE, RIGHT: ICD-10-CM

## 2023-10-09 DIAGNOSIS — H69.83 EUSTACHIAN TUBE DYSFUNCTION, BILATERAL: ICD-10-CM

## 2023-10-09 PROCEDURE — 1090F PRES/ABSN URINE INCON ASSESS: CPT | Performed by: OTOLARYNGOLOGY

## 2023-10-09 PROCEDURE — 3017F COLORECTAL CA SCREEN DOC REV: CPT | Performed by: OTOLARYNGOLOGY

## 2023-10-09 PROCEDURE — 1123F ACP DISCUSS/DSCN MKR DOCD: CPT | Performed by: OTOLARYNGOLOGY

## 2023-10-09 PROCEDURE — 1036F TOBACCO NON-USER: CPT | Performed by: OTOLARYNGOLOGY

## 2023-10-09 PROCEDURE — G8427 DOCREV CUR MEDS BY ELIG CLIN: HCPCS | Performed by: OTOLARYNGOLOGY

## 2023-10-09 PROCEDURE — 3075F SYST BP GE 130 - 139MM HG: CPT | Performed by: OTOLARYNGOLOGY

## 2023-10-09 PROCEDURE — G8399 PT W/DXA RESULTS DOCUMENT: HCPCS | Performed by: OTOLARYNGOLOGY

## 2023-10-09 PROCEDURE — 3078F DIAST BP <80 MM HG: CPT | Performed by: OTOLARYNGOLOGY

## 2023-10-09 PROCEDURE — G8417 CALC BMI ABV UP PARAM F/U: HCPCS | Performed by: OTOLARYNGOLOGY

## 2023-10-09 PROCEDURE — G8484 FLU IMMUNIZE NO ADMIN: HCPCS | Performed by: OTOLARYNGOLOGY

## 2023-10-09 PROCEDURE — 99214 OFFICE O/P EST MOD 30 MIN: CPT | Performed by: OTOLARYNGOLOGY

## 2023-10-16 ENCOUNTER — OFFICE VISIT (OUTPATIENT)
Dept: CHIROPRACTIC MEDICINE | Age: 73
End: 2023-10-16

## 2023-10-16 VITALS — OXYGEN SATURATION: 95 % | HEART RATE: 80 BPM | BODY MASS INDEX: 33.31 KG/M2 | HEIGHT: 63 IN | WEIGHT: 188 LBS

## 2023-10-16 DIAGNOSIS — M99.03 SEGMENTAL AND SOMATIC DYSFUNCTION OF LUMBAR REGION: Primary | ICD-10-CM

## 2023-10-16 DIAGNOSIS — M53.3 SACROCOCCYGEAL DISORDERS, NOT ELSEWHERE CLASSIFIED: ICD-10-CM

## 2023-10-16 DIAGNOSIS — G89.29 CHRONIC RIGHT-SIDED LOW BACK PAIN WITH RIGHT-SIDED SCIATICA: ICD-10-CM

## 2023-10-16 DIAGNOSIS — M99.05 SEGMENTAL AND SOMATIC DYSFUNCTION OF PELVIC REGION: ICD-10-CM

## 2023-10-16 DIAGNOSIS — M54.41 CHRONIC RIGHT-SIDED LOW BACK PAIN WITH RIGHT-SIDED SCIATICA: ICD-10-CM

## 2023-10-16 NOTE — PROGRESS NOTES
Patient is here for follow up back. Patient states no new concerns.  Colby Lopez MD  Electronically signed by Maru Vinson LPN on 36/11/1925 at 11:16 AM

## 2023-10-16 NOTE — PROGRESS NOTES
10/16/23  Bernardo Fuller : 1950 Sex: female  Age: 74 y.o. No chief complaint on file. HPI:   Pain has worsened slightly. On average, pain is perceived as mild (1-3  pain scale). Patient denies new numbness, new weakness, new tingling. No new issues. Just feels she has taken a step back and wanted to get some relief. She is leaving for [place] in early November. Current Outpatient Medications:     zolpidem (AMBIEN) 10 MG tablet, Take 1 tablet by mouth nightly as needed for Sleep for up to 90 days. , Disp: 90 tablet, Rfl: 0    magnesium oxide (MAG-OX) 400 MG tablet, Take 1 tablet by mouth 2 times daily, Disp: 180 tablet, Rfl: 3    lisinopril (PRINIVIL;ZESTRIL) 20 MG tablet, Take 1 tablet by mouth daily, Disp: 90 tablet, Rfl: 3    atorvastatin (LIPITOR) 40 MG tablet, Take 1 tablet by mouth daily, Disp: 90 tablet, Rfl: 3    insulin glargine (LANTUS SOLOSTAR) 100 UNIT/ML injection pen, Inject 65 Units into the skin every morning, Disp: 15 Adjustable Dose Pre-filled Pen Syringe, Rfl: 1    Liraglutide (VICTOZA) 18 MG/3ML SOPN SC injection, Inject 1.2 mg into the skin daily, Disp: 6 Adjustable Dose Pre-filled Pen Syringe, Rfl: 1    metFORMIN (GLUCOPHAGE) 1000 MG tablet, Take 1 tablet by mouth 2 times daily (with meals), Disp: 180 tablet, Rfl: 1    insulin lispro, 1 Unit Dial, (HUMALOG KWIKPEN) 100 UNIT/ML SOPN, Inject up to 12 units QAC TID, Disp: 6 Adjustable Dose Pre-filled Pen Syringe, Rfl: 1    aspirin 81 MG EC tablet, Take 1 tablet by mouth daily, Disp: , Rfl:     Insulin Pen Needle 31G X 5 MM MISC, 1 each by Does not apply route daily, Disp: , Rfl:     Exam:   There were no vitals filed for this visit. There are hypertonic and tender fibers noted with palpation in the paraspinal muscles of the lumbar region. Joint fixation is noted with motion screening at L3-4, bilateral SI joints.     Diagnoses and all orders for this visit:    Segmental and somatic dysfunction of lumbar region    Segmental

## 2023-11-28 DIAGNOSIS — E11.65 TYPE 2 DIABETES MELLITUS WITH HYPERGLYCEMIA, WITH LONG-TERM CURRENT USE OF INSULIN (HCC): ICD-10-CM

## 2023-11-28 DIAGNOSIS — R53.83 OTHER FATIGUE: ICD-10-CM

## 2023-11-28 DIAGNOSIS — Z79.4 TYPE 2 DIABETES MELLITUS WITH HYPERGLYCEMIA, WITH LONG-TERM CURRENT USE OF INSULIN (HCC): ICD-10-CM

## 2023-11-28 DIAGNOSIS — D64.9 ANEMIA, UNSPECIFIED TYPE: ICD-10-CM

## 2023-11-28 DIAGNOSIS — E78.2 MIXED HYPERLIPIDEMIA: ICD-10-CM

## 2023-11-28 DIAGNOSIS — E55.9 VITAMIN D DEFICIENCY: ICD-10-CM

## 2023-11-28 LAB
ABSOLUTE IMMATURE GRANULOCYTE: 0.03 K/UL (ref 0–0.58)
ALBUMIN SERPL-MCNC: 3.8 G/DL (ref 3.5–5.2)
ALP BLD-CCNC: 64 U/L (ref 35–104)
ALT SERPL-CCNC: 39 U/L (ref 0–32)
ANION GAP SERPL CALCULATED.3IONS-SCNC: 16 MMOL/L (ref 7–16)
AST SERPL-CCNC: 29 U/L (ref 0–31)
BASOPHILS ABSOLUTE: 0.07 K/UL (ref 0–0.2)
BASOPHILS RELATIVE PERCENT: 1 % (ref 0–2)
BILIRUB SERPL-MCNC: 0.3 MG/DL (ref 0–1.2)
BILIRUBIN URINE: NEGATIVE
BUN BLDV-MCNC: 17 MG/DL (ref 6–23)
CALCIUM SERPL-MCNC: 9.6 MG/DL (ref 8.6–10.2)
CHLORIDE BLD-SCNC: 102 MMOL/L (ref 98–107)
CHOLESTEROL, FASTING: 135 MG/DL
CO2: 24 MMOL/L (ref 22–29)
COLOR: YELLOW
CREAT SERPL-MCNC: 0.8 MG/DL (ref 0.5–1)
CREATININE URINE: 67.9 MG/DL (ref 29–226)
EOSINOPHILS ABSOLUTE: 0.23 K/UL (ref 0.05–0.5)
EOSINOPHILS RELATIVE PERCENT: 2 % (ref 0–6)
FOLATE: >20 NG/ML (ref 4.8–24.2)
GFR SERPL CREATININE-BSD FRML MDRD: >60 ML/MIN/1.73M2
GLUCOSE BLD-MCNC: 96 MG/DL (ref 74–99)
GLUCOSE URINE: NEGATIVE MG/DL
HBA1C MFR BLD: 7.2 % (ref 4–5.6)
HCT VFR BLD CALC: 37.2 % (ref 34–48)
HDLC SERPL-MCNC: 63 MG/DL
HEMOGLOBIN: 12.1 G/DL (ref 11.5–15.5)
IMMATURE GRANULOCYTES: 0 % (ref 0–5)
KETONES, URINE: NEGATIVE MG/DL
LDL CHOLESTEROL: 54 MG/DL
LEUKOCYTE ESTERASE, URINE: ABNORMAL
LYMPHOCYTES ABSOLUTE: 4.06 K/UL (ref 1.5–4)
LYMPHOCYTES RELATIVE PERCENT: 36 % (ref 20–42)
MAGNESIUM: 1.4 MG/DL (ref 1.6–2.6)
MCH RBC QN AUTO: 30.9 PG (ref 26–35)
MCHC RBC AUTO-ENTMCNC: 32.5 G/DL (ref 32–34.5)
MCV RBC AUTO: 95.1 FL (ref 80–99.9)
MICROALBUMIN/CREAT 24H UR: <12 MG/L (ref 0–19)
MICROALBUMIN/CREAT UR-RTO: NORMAL MCG/MG CREAT (ref 0–30)
MONOCYTES ABSOLUTE: 0.83 K/UL (ref 0.1–0.95)
MONOCYTES RELATIVE PERCENT: 8 % (ref 2–12)
NEUTROPHILS ABSOLUTE: 5.92 K/UL (ref 1.8–7.3)
NEUTROPHILS RELATIVE PERCENT: 53 % (ref 43–80)
NITRITE, URINE: NEGATIVE
PDW BLD-RTO: 12.3 % (ref 11.5–15)
PH UA: 6.5 (ref 5–9)
PLATELET # BLD: 425 K/UL (ref 130–450)
PMV BLD AUTO: 9.8 FL (ref 7–12)
POTASSIUM SERPL-SCNC: 4.8 MMOL/L (ref 3.5–5)
PROTEIN UA: NEGATIVE MG/DL
RBC # BLD: 3.91 M/UL (ref 3.5–5.5)
RBC UA: ABNORMAL /HPF
RENAL EPITHELIAL, UA: PRESENT /HPF
SODIUM BLD-SCNC: 142 MMOL/L (ref 132–146)
SPECIFIC GRAVITY UA: 1.01 (ref 1–1.03)
TOTAL PROTEIN: 7 G/DL (ref 6.4–8.3)
TRIGLYCERIDE, FASTING: 90 MG/DL
TSH SERPL DL<=0.05 MIU/L-ACNC: 3.38 UIU/ML (ref 0.27–4.2)
TURBIDITY: CLEAR
URINE HGB: ABNORMAL
UROBILINOGEN, URINE: 0.2 EU/DL (ref 0–1)
VITAMIN B-12: 444 PG/ML (ref 211–946)
VITAMIN D 25-HYDROXY: 73.5 NG/ML (ref 30–100)
VLDLC SERPL CALC-MCNC: 18 MG/DL
WBC # BLD: 11.1 K/UL (ref 4.5–11.5)
WBC UA: ABNORMAL /HPF

## 2023-12-04 ENCOUNTER — OFFICE VISIT (OUTPATIENT)
Dept: CHIROPRACTIC MEDICINE | Age: 73
End: 2023-12-04
Payer: MEDICARE

## 2023-12-04 ENCOUNTER — OFFICE VISIT (OUTPATIENT)
Dept: FAMILY MEDICINE CLINIC | Age: 73
End: 2023-12-04
Payer: MEDICARE

## 2023-12-04 VITALS
RESPIRATION RATE: 18 BRPM | BODY MASS INDEX: 33.31 KG/M2 | OXYGEN SATURATION: 96 % | TEMPERATURE: 98.2 F | HEIGHT: 63 IN | DIASTOLIC BLOOD PRESSURE: 54 MMHG | SYSTOLIC BLOOD PRESSURE: 114 MMHG | WEIGHT: 188 LBS | HEART RATE: 90 BPM

## 2023-12-04 VITALS
WEIGHT: 188 LBS | HEART RATE: 65 BPM | OXYGEN SATURATION: 97 % | BODY MASS INDEX: 33.31 KG/M2 | HEIGHT: 63 IN | TEMPERATURE: 97.2 F

## 2023-12-04 DIAGNOSIS — E87.5 HYPERKALEMIA: ICD-10-CM

## 2023-12-04 DIAGNOSIS — M53.3 SACROCOCCYGEAL DISORDERS, NOT ELSEWHERE CLASSIFIED: ICD-10-CM

## 2023-12-04 DIAGNOSIS — I65.23 BILATERAL CAROTID ARTERY STENOSIS: ICD-10-CM

## 2023-12-04 DIAGNOSIS — E78.2 MIXED HYPERLIPIDEMIA: ICD-10-CM

## 2023-12-04 DIAGNOSIS — M99.03 SEGMENTAL AND SOMATIC DYSFUNCTION OF LUMBAR REGION: Primary | ICD-10-CM

## 2023-12-04 DIAGNOSIS — D64.9 ANEMIA, UNSPECIFIED TYPE: ICD-10-CM

## 2023-12-04 DIAGNOSIS — Z79.4 TYPE 2 DIABETES MELLITUS WITH HYPERGLYCEMIA, WITH LONG-TERM CURRENT USE OF INSULIN (HCC): Primary | ICD-10-CM

## 2023-12-04 DIAGNOSIS — E11.65 TYPE 2 DIABETES MELLITUS WITH HYPERGLYCEMIA, WITH LONG-TERM CURRENT USE OF INSULIN (HCC): Primary | ICD-10-CM

## 2023-12-04 DIAGNOSIS — R01.1 MURMUR: ICD-10-CM

## 2023-12-04 DIAGNOSIS — M54.41 CHRONIC RIGHT-SIDED LOW BACK PAIN WITH RIGHT-SIDED SCIATICA: ICD-10-CM

## 2023-12-04 DIAGNOSIS — Z23 NEED FOR INFLUENZA VACCINATION: ICD-10-CM

## 2023-12-04 DIAGNOSIS — E55.9 VITAMIN D DEFICIENCY: ICD-10-CM

## 2023-12-04 DIAGNOSIS — R31.29 MICROHEMATURIA: ICD-10-CM

## 2023-12-04 DIAGNOSIS — G47.09 OTHER INSOMNIA: ICD-10-CM

## 2023-12-04 DIAGNOSIS — K21.9 GASTROESOPHAGEAL REFLUX DISEASE WITHOUT ESOPHAGITIS: ICD-10-CM

## 2023-12-04 DIAGNOSIS — G89.29 CHRONIC RIGHT-SIDED LOW BACK PAIN WITH RIGHT-SIDED SCIATICA: ICD-10-CM

## 2023-12-04 DIAGNOSIS — E83.42 HYPOMAGNESEMIA: ICD-10-CM

## 2023-12-04 DIAGNOSIS — M99.05 SEGMENTAL AND SOMATIC DYSFUNCTION OF PELVIC REGION: ICD-10-CM

## 2023-12-04 DIAGNOSIS — I10 ESSENTIAL HYPERTENSION: ICD-10-CM

## 2023-12-04 DIAGNOSIS — F41.9 ANXIETY: ICD-10-CM

## 2023-12-04 DIAGNOSIS — I77.9 CAROTID ARTERY DISEASE, UNSPECIFIED LATERALITY, UNSPECIFIED TYPE (HCC): ICD-10-CM

## 2023-12-04 DIAGNOSIS — R53.83 OTHER FATIGUE: ICD-10-CM

## 2023-12-04 DIAGNOSIS — G47.33 OSA (OBSTRUCTIVE SLEEP APNEA): ICD-10-CM

## 2023-12-04 PROCEDURE — 1036F TOBACCO NON-USER: CPT | Performed by: INTERNAL MEDICINE

## 2023-12-04 PROCEDURE — 3051F HG A1C>EQUAL 7.0%<8.0%: CPT | Performed by: INTERNAL MEDICINE

## 2023-12-04 PROCEDURE — 99999 PR OFFICE/OUTPT VISIT,PROCEDURE ONLY: CPT | Performed by: CHIROPRACTOR

## 2023-12-04 PROCEDURE — 98940 CHIROPRACT MANJ 1-2 REGIONS: CPT | Performed by: CHIROPRACTOR

## 2023-12-04 PROCEDURE — G8427 DOCREV CUR MEDS BY ELIG CLIN: HCPCS | Performed by: INTERNAL MEDICINE

## 2023-12-04 PROCEDURE — G8484 FLU IMMUNIZE NO ADMIN: HCPCS | Performed by: INTERNAL MEDICINE

## 2023-12-04 PROCEDURE — 99214 OFFICE O/P EST MOD 30 MIN: CPT | Performed by: INTERNAL MEDICINE

## 2023-12-04 PROCEDURE — 3017F COLORECTAL CA SCREEN DOC REV: CPT | Performed by: INTERNAL MEDICINE

## 2023-12-04 PROCEDURE — 90694 VACC AIIV4 NO PRSRV 0.5ML IM: CPT | Performed by: INTERNAL MEDICINE

## 2023-12-04 PROCEDURE — 3078F DIAST BP <80 MM HG: CPT | Performed by: INTERNAL MEDICINE

## 2023-12-04 PROCEDURE — 1123F ACP DISCUSS/DSCN MKR DOCD: CPT | Performed by: INTERNAL MEDICINE

## 2023-12-04 PROCEDURE — 1090F PRES/ABSN URINE INCON ASSESS: CPT | Performed by: INTERNAL MEDICINE

## 2023-12-04 PROCEDURE — G8399 PT W/DXA RESULTS DOCUMENT: HCPCS | Performed by: INTERNAL MEDICINE

## 2023-12-04 PROCEDURE — G0008 ADMIN INFLUENZA VIRUS VAC: HCPCS | Performed by: INTERNAL MEDICINE

## 2023-12-04 PROCEDURE — G8417 CALC BMI ABV UP PARAM F/U: HCPCS | Performed by: INTERNAL MEDICINE

## 2023-12-04 PROCEDURE — 2022F DILAT RTA XM EVC RTNOPTHY: CPT | Performed by: INTERNAL MEDICINE

## 2023-12-04 PROCEDURE — 3074F SYST BP LT 130 MM HG: CPT | Performed by: INTERNAL MEDICINE

## 2023-12-04 RX ORDER — ZOLPIDEM TARTRATE 10 MG/1
10 TABLET ORAL NIGHTLY PRN
Qty: 90 TABLET | Refills: 0 | Status: SHIPPED | OUTPATIENT
Start: 2023-12-04 | End: 2024-03-03

## 2023-12-04 ASSESSMENT — ENCOUNTER SYMPTOMS
ABDOMINAL PAIN: 0
COUGH: 0
EYE PAIN: 0
SORE THROAT: 0
CHEST TIGHTNESS: 0
NAUSEA: 0
SHORTNESS OF BREATH: 1
RHINORRHEA: 0
CONSTIPATION: 0
VOMITING: 0
BLOOD IN STOOL: 0
DIARRHEA: 0

## 2023-12-04 NOTE — PROGRESS NOTES
Patient is here for follow up back.  Sydna Fabry, MD  Electronically signed by Carolyn Solomon LPN on 83/0/2582 at 53:95 AM

## 2023-12-04 NOTE — PROGRESS NOTES
States describes has ache type pain. States also weakness. States varied in intensity. States had had difficulty gripping things. EMG (12/21) A chronic right ulnar neuropathy. A chronic right median mononeuropathy at the wrist (I.e., carpal tunnel syndrome) versus a chronic right C8/T1 cervical radiculopathy cannot be excluded underlying the above. States s/p op (5/22) -right carpal tunnel release right ulnar nerve decompression at elbow right wrist triangular fibrocartilage complex injection right index proximal interphalangeal joint injection. States still having pain. Has had follow up, no complications. Only taking meds at night for pain. - States right hip and shoulder pain. No head injury. Has not sought care. States to see chiropractor. States has been taking ibuprofen. - labs from (11/28/2023) reviewed with patient 12/4/2023    Health Maintenance   - immunizations:   Influenza Vaccination - (2019), (2020), (2022), (2023)   Pneumonia Vaccination- (1/2023) - prevnar   Zoster/Shingles Vaccine  Tetanus Vaccination  covid (3/4/2021) #1, (4/1/2021) #2, (12/27/2021) #3 - moderna   RSV     - Screenings:   Bone Density Scan -  DEXA (9/23) -lumbar spine T score (0), left total hip T score (0.2), left femoral neck T score (0), Normal by WHO criteria. 10 year probability of major osteoporotic fracture is 8.6% and hip fractures 1.1%. Pelvic/Pap Exam  Mammogram - declines     Colonoscopy - (6/20) hemorrhoids, diverticular disease, multiple polyps, tubular and hyperplastic per path, follow up in 3 years     EGD (6/20) -normal    ROS:  Review of Systems   Constitutional:  Negative for appetite change, chills, fever and unexpected weight change. HENT:  Negative for congestion, rhinorrhea and sore throat. Eyes:  Negative for pain and visual disturbance. Respiratory:  Positive for shortness of breath. Negative for cough and chest tightness. Cardiovascular:  Negative for chest pain and palpitations.

## 2023-12-11 ENCOUNTER — TELEPHONE (OUTPATIENT)
Dept: FAMILY MEDICINE CLINIC | Age: 73
End: 2023-12-11

## 2023-12-11 NOTE — TELEPHONE ENCOUNTER
Pt called in and said she has some questions in regards to Ozempic. She was asking if someone could please call her back at [phone].

## 2023-12-13 ENCOUNTER — OFFICE VISIT (OUTPATIENT)
Dept: FAMILY MEDICINE CLINIC | Age: 73
End: 2023-12-13
Payer: MEDICARE

## 2023-12-13 VITALS
DIASTOLIC BLOOD PRESSURE: 60 MMHG | RESPIRATION RATE: 16 BRPM | OXYGEN SATURATION: 98 % | WEIGHT: 190 LBS | BODY MASS INDEX: 33.66 KG/M2 | HEART RATE: 97 BPM | SYSTOLIC BLOOD PRESSURE: 140 MMHG | TEMPERATURE: 97.8 F

## 2023-12-13 DIAGNOSIS — R05.9 COUGH, UNSPECIFIED TYPE: ICD-10-CM

## 2023-12-13 DIAGNOSIS — J06.9 VIRAL URI: Primary | ICD-10-CM

## 2023-12-13 LAB
INFLUENZA A ANTIBODY: NORMAL
INFLUENZA B ANTIBODY: NORMAL
Lab: 1234
PERFORMING INSTRUMENT: NORMAL
QC PASS/FAIL: NORMAL
SARS-COV-2, POC: NORMAL

## 2023-12-13 PROCEDURE — 87804 INFLUENZA ASSAY W/OPTIC: CPT | Performed by: NURSE PRACTITIONER

## 2023-12-13 PROCEDURE — G8399 PT W/DXA RESULTS DOCUMENT: HCPCS | Performed by: NURSE PRACTITIONER

## 2023-12-13 PROCEDURE — 99213 OFFICE O/P EST LOW 20 MIN: CPT | Performed by: NURSE PRACTITIONER

## 2023-12-13 PROCEDURE — 1090F PRES/ABSN URINE INCON ASSESS: CPT | Performed by: NURSE PRACTITIONER

## 2023-12-13 PROCEDURE — 3078F DIAST BP <80 MM HG: CPT | Performed by: NURSE PRACTITIONER

## 2023-12-13 PROCEDURE — 87426 SARSCOV CORONAVIRUS AG IA: CPT | Performed by: NURSE PRACTITIONER

## 2023-12-13 PROCEDURE — G8484 FLU IMMUNIZE NO ADMIN: HCPCS | Performed by: NURSE PRACTITIONER

## 2023-12-13 PROCEDURE — 3017F COLORECTAL CA SCREEN DOC REV: CPT | Performed by: NURSE PRACTITIONER

## 2023-12-13 PROCEDURE — G8427 DOCREV CUR MEDS BY ELIG CLIN: HCPCS | Performed by: NURSE PRACTITIONER

## 2023-12-13 PROCEDURE — 1123F ACP DISCUSS/DSCN MKR DOCD: CPT | Performed by: NURSE PRACTITIONER

## 2023-12-13 PROCEDURE — 1036F TOBACCO NON-USER: CPT | Performed by: NURSE PRACTITIONER

## 2023-12-13 PROCEDURE — 3077F SYST BP >= 140 MM HG: CPT | Performed by: NURSE PRACTITIONER

## 2023-12-13 PROCEDURE — G8417 CALC BMI ABV UP PARAM F/U: HCPCS | Performed by: NURSE PRACTITIONER

## 2023-12-13 RX ORDER — BENZONATATE 100 MG/1
100 CAPSULE ORAL 3 TIMES DAILY PRN
Qty: 30 CAPSULE | Refills: 0 | Status: SHIPPED | OUTPATIENT
Start: 2023-12-13 | End: 2023-12-23

## 2023-12-13 NOTE — PROGRESS NOTES
Chief Complaint   Cough (Started Monday), Generalized Body Aches, and Nasal Congestion      HPI   Source of history provided by: patient. Anamaria Michaels is a 74 y.o. old female who presents to walk-in care for evaluation of nasal congestion X 3 days. Associated symptoms include chills, headache, nasal congestion, rhinorrhea, cough, and malaise Since onset symptoms have been about the same. The patient is fully vaccinated for influenza. Has taken advil cold and flu OTC at home with some symptomatic relief. Denies fever, chest pain, shortness of breath, nausea, vomiting, lethargy, body aches, and otalgia. Pertinent PMH of: PMHpositive: nothing respiratory. Denies any PMH of URIhistory: COPD, asthma, recurrent bronchitis, and pneumonia. The patient has no history of tobacco abuse. ROS   Pertinent positives and negatives are stated within HPI, all other systems reviewed and are negative. Past Medical History:  has a past medical history of Cervical spondylosis, Class 2 obesity due to excess calories without serious comorbidity with body mass index (BMI) of 35.0 to 35.9 in adult, Essential hypertension, Hypomagnesemia, Mixed hyperlipidemia, Other insomnia, Right carpal tunnel syndrome, Type 2 diabetes mellitus without complication ([address]), and Vitamin D deficiency. Surgical History:  has a past surgical history that includes Hysterectomy, vaginal; Retinal detachment surgery (Right); Bunionectomy (Left); Tubal ligation; Cataract removal with implant; Hysterectomy; Cholecystectomy; Colonoscopy; Carpal tunnel release (Right, 5/4/2022); and Arm Surgery (Right, 5/4/2022). Social History:  reports that she quit smoking about 41 years ago. Her smoking use included cigarettes. She has a 12.00 pack-year smoking history. She has never used smokeless tobacco. She reports current alcohol use. She reports that she does not currently use drugs.   Family History: family history includes Diabetes in her mother; Kidney

## 2023-12-15 ENCOUNTER — TELEPHONE (OUTPATIENT)
Dept: FAMILY MEDICINE CLINIC | Age: 73
End: 2023-12-15

## 2023-12-15 RX ORDER — CEFDINIR 300 MG/1
300 CAPSULE ORAL 2 TIMES DAILY
Qty: 14 CAPSULE | Refills: 0 | Status: SHIPPED | OUTPATIENT
Start: 2023-12-15 | End: 2023-12-22

## 2023-12-15 NOTE — TELEPHONE ENCOUNTER
Requested Prescriptions     Signed Prescriptions Disp Refills    cefdinir (OMNICEF) 300 MG capsule 14 capsule 0     Sig: Take 1 capsule by mouth 2 times daily for 7 days     Authorizing Provider: April Albright to pharmacy

## 2023-12-15 NOTE — TELEPHONE ENCOUNTER
Patient was seen through [facility] this past Wednesday. She was tested for flu and covid and she has neither. She was diagnosed with a URI. She is now blowing our green mucous. She was only given something for a cough, can antibiotic be called into [facility] [address] In [place]?

## 2024-01-23 DIAGNOSIS — E78.2 MIXED HYPERLIPIDEMIA: ICD-10-CM

## 2024-01-23 RX ORDER — MAGNESIUM OXIDE 400 MG/1
400 TABLET ORAL 2 TIMES DAILY
Qty: 180 TABLET | Refills: 3 | Status: CANCELLED | OUTPATIENT
Start: 2024-01-23

## 2024-01-23 RX ORDER — ATORVASTATIN CALCIUM 40 MG/1
40 TABLET, FILM COATED ORAL DAILY
Qty: 90 TABLET | Refills: 3 | Status: CANCELLED | OUTPATIENT
Start: 2024-01-23

## 2024-01-23 NOTE — TELEPHONE ENCOUNTER
Last Appointment:  12/4/2023  Future Appointments   Date Time Provider Department Center   3/4/2024 10:30 AM Tae Husain MD [facility]   4/8/2024 11:00 AM Sharee New MD [facility]   4/16/2024 10:00 AM Frandy Young,  [facility]

## 2024-01-23 NOTE — TELEPHONE ENCOUNTER
Pt states she has heard from pt assistance company and they will me mailing Ozempic to the office in the near future.  Please keep an eye out for this an notify pt when it arrives.

## 2024-02-01 ENCOUNTER — TELEPHONE (OUTPATIENT)
Dept: FAMILY MEDICINE CLINIC | Age: 74
End: 2024-02-01

## 2024-02-01 NOTE — TELEPHONE ENCOUNTER
Left message letting patient know that her patient assistance Ozempic came in.  It is in the bottom of the fridge at the nurses station.

## 2024-02-29 ENCOUNTER — OFFICE VISIT (OUTPATIENT)
Dept: CHIROPRACTIC MEDICINE | Age: 74
End: 2024-02-29
Payer: MEDICARE

## 2024-02-29 VITALS
BODY MASS INDEX: 33.31 KG/M2 | HEART RATE: 83 BPM | WEIGHT: 188 LBS | OXYGEN SATURATION: 97 % | HEIGHT: 63 IN | TEMPERATURE: 97.4 F

## 2024-02-29 DIAGNOSIS — M53.3 SACROCOCCYGEAL DISORDERS, NOT ELSEWHERE CLASSIFIED: ICD-10-CM

## 2024-02-29 DIAGNOSIS — G89.29 CHRONIC RIGHT-SIDED LOW BACK PAIN WITH RIGHT-SIDED SCIATICA: ICD-10-CM

## 2024-02-29 DIAGNOSIS — M99.03 SEGMENTAL AND SOMATIC DYSFUNCTION OF LUMBAR REGION: Primary | ICD-10-CM

## 2024-02-29 DIAGNOSIS — M54.41 CHRONIC RIGHT-SIDED LOW BACK PAIN WITH RIGHT-SIDED SCIATICA: ICD-10-CM

## 2024-02-29 DIAGNOSIS — M99.05 SEGMENTAL AND SOMATIC DYSFUNCTION OF PELVIC REGION: ICD-10-CM

## 2024-02-29 PROCEDURE — 98940 CHIROPRACT MANJ 1-2 REGIONS: CPT | Performed by: CHIROPRACTOR

## 2024-02-29 PROCEDURE — 99999 PR OFFICE/OUTPT VISIT,PROCEDURE ONLY: CPT | Performed by: CHIROPRACTOR

## 2024-02-29 NOTE — PROGRESS NOTES
24  Beth John : 1950 Sex: female  Age: 73 y.o.    Chief Complaint   Patient presents with    Lower Back Pain     Onset approximately 2024       HPI:   Beth returns today for care of worsening low back pain.  Since I saw her in December she was ill reportedly with RSV.  She has recovered.  With the weather breaking she has been doing a bit more physical activity, walking more and has noticed an increase in her lower back pain as a result.  She has no leg symptoms.  No new injuries.      Current Outpatient Medications:     albuterol sulfate HFA (VENTOLIN HFA) 108 (90 Base) MCG/ACT inhaler, Inhale 2 puffs into the lungs 4 times daily as needed for Wheezing, Disp: 18 g, Rfl: 5    zolpidem (AMBIEN) 10 MG tablet, Take 1 tablet by mouth nightly as needed for Sleep for up to 90 days., Disp: 90 tablet, Rfl: 0    magnesium oxide (MAG-OX) 400 MG tablet, Take 1 tablet by mouth 2 times daily, Disp: 180 tablet, Rfl: 3    lisinopril (PRINIVIL;ZESTRIL) 20 MG tablet, Take 1 tablet by mouth daily, Disp: 90 tablet, Rfl: 3    atorvastatin (LIPITOR) 40 MG tablet, Take 1 tablet by mouth daily, Disp: 90 tablet, Rfl: 3    insulin glargine (LANTUS SOLOSTAR) 100 UNIT/ML injection pen, Inject 65 Units into the skin every morning, Disp: 15 Adjustable Dose Pre-filled Pen Syringe, Rfl: 1    Liraglutide (VICTOZA) 18 MG/3ML SOPN SC injection, Inject 1.2 mg into the skin daily, Disp: 6 Adjustable Dose Pre-filled Pen Syringe, Rfl: 1    metFORMIN (GLUCOPHAGE) 1000 MG tablet, Take 1 tablet by mouth 2 times daily (with meals), Disp: 180 tablet, Rfl: 1    insulin lispro, 1 Unit Dial, (HUMALOG KWIKPEN) 100 UNIT/ML SOPN, Inject up to 12 units QAC TID, Disp: 6 Adjustable Dose Pre-filled Pen Syringe, Rfl: 1    aspirin 81 MG EC tablet, Take 1 tablet by mouth daily, Disp: , Rfl:     Insulin Pen Needle 31G X 5 MM MISC, 1 each by Does not apply route daily, Disp: , Rfl:     Exam:   Vitals:    24 1115   Pulse: 83   Temp: 97.4 °F

## 2024-02-29 NOTE — PROGRESS NOTES
Patient is here for follow up back. Tae Husain MD  Electronically signed by Angela Figueroa LPN on 2/29/2024 at 11:16 AM

## 2024-03-04 ENCOUNTER — OFFICE VISIT (OUTPATIENT)
Dept: FAMILY MEDICINE CLINIC | Age: 74
End: 2024-03-04
Payer: MEDICARE

## 2024-03-04 VITALS
SYSTOLIC BLOOD PRESSURE: 128 MMHG | OXYGEN SATURATION: 97 % | HEIGHT: 63 IN | RESPIRATION RATE: 16 BRPM | HEART RATE: 79 BPM | TEMPERATURE: 97.4 F | DIASTOLIC BLOOD PRESSURE: 60 MMHG | BODY MASS INDEX: 32.96 KG/M2 | WEIGHT: 186 LBS

## 2024-03-04 VITALS
WEIGHT: 186 LBS | HEIGHT: 63 IN | HEART RATE: 79 BPM | OXYGEN SATURATION: 97 % | SYSTOLIC BLOOD PRESSURE: 128 MMHG | TEMPERATURE: 97.4 F | RESPIRATION RATE: 16 BRPM | BODY MASS INDEX: 32.96 KG/M2 | DIASTOLIC BLOOD PRESSURE: 60 MMHG

## 2024-03-04 DIAGNOSIS — Z12.11 SCREENING FOR MALIGNANT NEOPLASM OF COLON: ICD-10-CM

## 2024-03-04 DIAGNOSIS — D64.9 ANEMIA, UNSPECIFIED TYPE: ICD-10-CM

## 2024-03-04 DIAGNOSIS — E83.42 HYPOMAGNESEMIA: ICD-10-CM

## 2024-03-04 DIAGNOSIS — G47.09 OTHER INSOMNIA: ICD-10-CM

## 2024-03-04 DIAGNOSIS — R31.29 MICROHEMATURIA: Primary | ICD-10-CM

## 2024-03-04 DIAGNOSIS — R79.89 ELEVATED LFTS: ICD-10-CM

## 2024-03-04 DIAGNOSIS — I10 ESSENTIAL HYPERTENSION: ICD-10-CM

## 2024-03-04 DIAGNOSIS — Z00.00 MEDICARE ANNUAL WELLNESS VISIT, SUBSEQUENT: Primary | ICD-10-CM

## 2024-03-04 DIAGNOSIS — E55.9 VITAMIN D DEFICIENCY: ICD-10-CM

## 2024-03-04 DIAGNOSIS — R01.1 MURMUR: ICD-10-CM

## 2024-03-04 DIAGNOSIS — F41.9 ANXIETY: ICD-10-CM

## 2024-03-04 DIAGNOSIS — T14.8XXA SKIN ABRASION: ICD-10-CM

## 2024-03-04 DIAGNOSIS — E87.5 HYPERKALEMIA: ICD-10-CM

## 2024-03-04 DIAGNOSIS — E78.2 MIXED HYPERLIPIDEMIA: ICD-10-CM

## 2024-03-04 DIAGNOSIS — I77.9 CAROTID ARTERY DISEASE, UNSPECIFIED LATERALITY, UNSPECIFIED TYPE (HCC): ICD-10-CM

## 2024-03-04 DIAGNOSIS — Z79.4 TYPE 2 DIABETES MELLITUS WITH HYPERGLYCEMIA, WITH LONG-TERM CURRENT USE OF INSULIN (HCC): ICD-10-CM

## 2024-03-04 DIAGNOSIS — K21.9 GASTROESOPHAGEAL REFLUX DISEASE WITHOUT ESOPHAGITIS: ICD-10-CM

## 2024-03-04 DIAGNOSIS — Z86.010 H/O ADENOMATOUS POLYP OF COLON: ICD-10-CM

## 2024-03-04 DIAGNOSIS — E66.09 CLASS 1 OBESITY DUE TO EXCESS CALORIES WITHOUT SERIOUS COMORBIDITY WITH BODY MASS INDEX (BMI) OF 33.0 TO 33.9 IN ADULT: ICD-10-CM

## 2024-03-04 DIAGNOSIS — I65.23 BILATERAL CAROTID ARTERY STENOSIS: ICD-10-CM

## 2024-03-04 DIAGNOSIS — G47.33 OSA (OBSTRUCTIVE SLEEP APNEA): ICD-10-CM

## 2024-03-04 DIAGNOSIS — R53.83 OTHER FATIGUE: ICD-10-CM

## 2024-03-04 DIAGNOSIS — E11.65 TYPE 2 DIABETES MELLITUS WITH HYPERGLYCEMIA, WITH LONG-TERM CURRENT USE OF INSULIN (HCC): ICD-10-CM

## 2024-03-04 DIAGNOSIS — Z79.899 LONG TERM CURRENT USE OF THERAPEUTIC DRUG: ICD-10-CM

## 2024-03-04 PROCEDURE — G8399 PT W/DXA RESULTS DOCUMENT: HCPCS | Performed by: INTERNAL MEDICINE

## 2024-03-04 PROCEDURE — 3046F HEMOGLOBIN A1C LEVEL >9.0%: CPT | Performed by: INTERNAL MEDICINE

## 2024-03-04 PROCEDURE — 2022F DILAT RTA XM EVC RTNOPTHY: CPT | Performed by: INTERNAL MEDICINE

## 2024-03-04 PROCEDURE — 1123F ACP DISCUSS/DSCN MKR DOCD: CPT | Performed by: INTERNAL MEDICINE

## 2024-03-04 PROCEDURE — 1090F PRES/ABSN URINE INCON ASSESS: CPT | Performed by: INTERNAL MEDICINE

## 2024-03-04 PROCEDURE — G8417 CALC BMI ABV UP PARAM F/U: HCPCS | Performed by: INTERNAL MEDICINE

## 2024-03-04 PROCEDURE — 3078F DIAST BP <80 MM HG: CPT | Performed by: INTERNAL MEDICINE

## 2024-03-04 PROCEDURE — 3074F SYST BP LT 130 MM HG: CPT | Performed by: INTERNAL MEDICINE

## 2024-03-04 PROCEDURE — 1036F TOBACCO NON-USER: CPT | Performed by: INTERNAL MEDICINE

## 2024-03-04 PROCEDURE — G8427 DOCREV CUR MEDS BY ELIG CLIN: HCPCS | Performed by: INTERNAL MEDICINE

## 2024-03-04 PROCEDURE — 99214 OFFICE O/P EST MOD 30 MIN: CPT | Performed by: INTERNAL MEDICINE

## 2024-03-04 PROCEDURE — G0439 PPPS, SUBSEQ VISIT: HCPCS | Performed by: INTERNAL MEDICINE

## 2024-03-04 PROCEDURE — G8484 FLU IMMUNIZE NO ADMIN: HCPCS | Performed by: INTERNAL MEDICINE

## 2024-03-04 PROCEDURE — 3017F COLORECTAL CA SCREEN DOC REV: CPT | Performed by: INTERNAL MEDICINE

## 2024-03-04 RX ORDER — ALPRAZOLAM 0.25 MG/1
0.25 TABLET ORAL DAILY PRN
Qty: 30 TABLET | Refills: 0 | Status: SHIPPED | OUTPATIENT
Start: 2024-03-04 | End: 2024-04-03

## 2024-03-04 RX ORDER — SEMAGLUTIDE 1.34 MG/ML
INJECTION, SOLUTION SUBCUTANEOUS
COMMUNITY

## 2024-03-04 RX ORDER — ZOLPIDEM TARTRATE 10 MG/1
10 TABLET ORAL NIGHTLY PRN
Qty: 90 TABLET | Refills: 0 | Status: SHIPPED | OUTPATIENT
Start: 2024-03-04 | End: 2024-06-02

## 2024-03-04 RX ORDER — INSULIN GLARGINE 100 [IU]/ML
65 INJECTION, SOLUTION SUBCUTANEOUS EVERY MORNING
Qty: 15 ADJUSTABLE DOSE PRE-FILLED PEN SYRINGE | Refills: 1 | Status: SHIPPED | OUTPATIENT
Start: 2024-03-04

## 2024-03-04 ASSESSMENT — ENCOUNTER SYMPTOMS
SORE THROAT: 0
NAUSEA: 0
CONSTIPATION: 0
RHINORRHEA: 0
BLOOD IN STOOL: 0
COUGH: 0
ABDOMINAL PAIN: 0
DIARRHEA: 0
SHORTNESS OF BREATH: 1
EYE PAIN: 0
VOMITING: 0
CHEST TIGHTNESS: 0

## 2024-03-04 ASSESSMENT — PATIENT HEALTH QUESTIONNAIRE - PHQ9
2. FEELING DOWN, DEPRESSED OR HOPELESS: 1
SUM OF ALL RESPONSES TO PHQ9 QUESTIONS 1 & 2: 1
SUM OF ALL RESPONSES TO PHQ QUESTIONS 1-9: 1
SUM OF ALL RESPONSES TO PHQ QUESTIONS 1-9: 1
1. LITTLE INTEREST OR PLEASURE IN DOING THINGS: 0
SUM OF ALL RESPONSES TO PHQ QUESTIONS 1-9: 1
SUM OF ALL RESPONSES TO PHQ QUESTIONS 1-9: 1

## 2024-03-04 NOTE — PROGRESS NOTES
Yes    Body mass index is 33.48 kg/m². (!) Abnormal    Obesity Interventions:  See AVS for additional education material            Dentist Screen:  Have you seen the dentist within the past year?: (!) No    Intervention:  Advised to schedule with their dentist     Vision Screen:  Do you have difficulty driving, watching TV, or doing any of your daily activities because of your eyesight?: (!) Yes  Have you had an eye exam within the past year?: (!) No  No results found.    Interventions:   Patient encouraged to make appointment with their eye specialist      Advanced Directives:  Do you have a Living Will?: (!) No    Intervention:  has NO advanced directive - information provided                     Objective   Vitals:    03/04/24 1016   BP: 128/60   Pulse: 79   Resp: 16   Temp: 97.4 °F (36.3 °C)   TempSrc: Temporal   SpO2: 97%   Weight: 84.4 kg (186 lb)   Height: 1.588 m (5' 2.5\")      Body mass index is 33.48 kg/m².             No Known Allergies  Prior to Visit Medications    Medication Sig Taking? Authorizing Provider   Semaglutide,0.25 or 0.5MG/DOS, (OZEMPIC, 0.25 OR 0.5 MG/DOSE,) 2 MG/1.5ML SOPN Inject into the skin Yes Shahid Mendosa MD   magnesium oxide (MAG-OX) 400 MG tablet Take 1 tablet by mouth 2 times daily Yes Tae Husain MD   lisinopril (PRINIVIL;ZESTRIL) 20 MG tablet Take 1 tablet by mouth daily Yes Tae Husain MD   atorvastatin (LIPITOR) 40 MG tablet Take 1 tablet by mouth daily Yes Tae Husain MD   metFORMIN (GLUCOPHAGE) 1000 MG tablet Take 1 tablet by mouth 2 times daily (with meals) Yes Tae Husain MD   insulin lispro, 1 Unit Dial, (HUMALOG KWIKPEN) 100 UNIT/ML SOPN Inject up to 12 units QAC TID Yes Tae Husain MD   aspirin 81 MG EC tablet Take 1 tablet by mouth daily Yes Shahid Mendosa MD   Insulin Pen Needle 31G X 5 MM MISC 1 each by Does not apply route daily Yes Shahid Mendosa MD   ALPRAZolam (XANAX) 0.25 MG tablet Take 1 tablet by mouth

## 2024-03-15 ENCOUNTER — TELEPHONE (OUTPATIENT)
Dept: ENT CLINIC | Age: 74
End: 2024-03-15

## 2024-03-15 NOTE — TELEPHONE ENCOUNTER
Spoke with patient to inform changes in upcoming appointment, at this time patient wishes to not reschedule

## 2024-03-29 NOTE — PROGRESS NOTES
[facility],7Th Floor In Care  New Patient Note      CHIEF COMPLAINT:   Chief Complaint   Patient presents with    Knee Pain     right knee pain x 3 weeks, stepped off a step and injured right knee. Was doing better then stepped wrong again yesterday and now pain in knee and hip       HISTORY OF PRESENT ILLNESS:                The patient is a 66 y.o. female who presents today with complaints of right knee pain that began 3 weeks ago when she stepped off of a step injuring her right knee. Initially seen at [facility] and was given muscle relaxer at that time and her knee seem to improve. However, yesterday she stepped off of the step wrong at a hotel reinjuring the knee and subsequently now has hip pain as well. She localizes the knee pain to the anterior aspect of the knee and back of the knee. Hip pain is localized to the lateral hip near the greater trochanter. She denies any numbness, tingling, loss of sensation or radiation of symptoms into her foot. Pain is worse with standing, walking, going down steps. She has tried at home therapies of Voltaren gel, IcyHot, Tylenol without significant relief of symptoms.        Past Medical History:        Diagnosis Date    Cervical spondylosis 12/7/2021    Class 2 obesity due to excess calories without serious comorbidity with body mass index (BMI) of 35.0 to 35.9 in adult 12/18/2019    Essential hypertension 12/18/2019    Hypomagnesemia 12/18/2019    Mixed hyperlipidemia 12/18/2019    Other insomnia 12/18/2019    Right carpal tunnel syndrome     [facility]     Vitamin D deficiency 12/18/2019     Past Surgical History:        Procedure Laterality Date    ARM SURGERY Right 5/4/2022    RIGHT ULNAR NERVE IN SITU DECOMPRESSION AT ELBOW,  RIGHT WRIST TRIANGULAR FIBROCARTILAGE COMPLEX INJECTION, RIGHT INDEX PROXIMAL INTERPHALANGEAL JOINT INJECTION performed by Hao Borrero MD at [place] [address] Left He states that he was started on Eliquis but is unsure of the dose he is taking  He has had leg pain and swelling since his surgery but it recently has gotten worse along with pain in the back of his leg so my concern would be potential DVT though being on Eliquis his risk is lower  I will order a stat duplex for further evaluation  If negative, needs to keep leg elevated and wear compression socks as much as he can    CARPAL TUNNEL RELEASE Right 5/4/2022    RIGHT CARPAL TUNNEL RELEASE performed by Leonidas Spaulding MD at [address] [facility] [place],9D (CERVIX STATUS UNKNOWN)      HYSTERECTOMY, VAGINAL      partial in the 80s then bilateral oopherectome 2016    RETINAL DETACHMENT SURGERY Right     vitreous surgery    TUBAL LIGATION       Current Medications:   No current facility-administered medications for this visit. Allergies:  Patient has no known allergies. Social History:   TOBACCO:   reports that she quit smoking about 40 years ago. Her smoking use included cigarettes. She has a 12.00 pack-year smoking history. She has never used smokeless tobacco.  ETOH:   reports current alcohol use. DRUGS:   has no history on file for drug use. OCCUPATION: Retired    Review of Systems   Constitutional: Negative for fever, chills, diaphoresis, appetite change and fatigue. HENT: Negative for dental issues, hearing loss and tinnitus. Negative for congestion, sinus pressure, sneezing, sore throat. Negative for headache. Eyes: Negative for visual disturbance, blurred and double vision. Negative for pain, discharge, redness and itching  Respiratory: Negative for cough, shortness of breath and wheezing. Cardiovascular: Negative for chest pain, palpitations and leg swelling. No dyspnea on exertion   Gastrointestinal:   Negative for nausea, vomiting, abdominal pain, diarrhea, constipation  or black or bloody. Hematologic\Lymphatic:  negative for bleeding, petechiae,   Genitourinary: Negative for hematuria and difficulty urinating. Musculoskeletal: Negative for neck pain and stiffness. Negative for back pain, joint swelling. + Right knee pain, antalgic gait and hip pain  Skin: Negative for pallor, rash and wound. Neurological: Negative for dizziness, tremors, seizures, weakness, light-headedness, no TIA or stroke symptoms. No numbness and headaches. Psychiatric/Behavioral: Negative. Physical Examination:   General appearance: alert, well appearing, and in no distress,  normal appearing weight  Mental status: alert, oriented to person, place, and time, normal mood, behavior, speech, dress, motor activity, and thought processes  Resp:   resp easy and unlabored, no audible wheezes note  Cardiac: distal pulses palpable, skin well perfused  Neurological: alert, oriented X3, normal speech, no focal findings or movement disorder noted, motor and sensory grossly normal bilaterally, normal muscle tone  HEENT: normochephalic atraumatic, external ears and eyes normal, sclera normal, neck supple  Extremities:   peripheral pulses normal, no edema, redness or tenderness in the calves   Skin: normal coloration, no rashes or open wounds, no suspicious skin lesions noted  Psych: Affect euthymic   Musculoskeletal:   On visual inspection there is no obvious deformity to the right knee, no erythema, edema, ecchymosis, open wounds. There is no decreased sensation to light touch throughout the entire lower extremity. Patient is grossly neurovascularly intact. Right Knee: Patient is not tender to palpation anywhere throughout the knee. Active ROM Flexion 100, Extension 0 with discomfort, MMT 5/5 Flexion, 5/5 Extension  (-) Anterior/Posterior drawer, (-) Lachman, (-) Varus stress test, (-) Valgus stress test, (+) Nargis, (-) Ballotable patella, (+) Patellar grind, (-) Aimee    Hip: On visual inspection, there is no obvious deformity of the right hip. There is no erythema, edema, ecchymosis or open wounds. There is no decreased sensation to light touch throughout the right hip. The patient is grossly neurovascularly intact. Right Hip: Patient is tender to palpation at the greater trochanter extending posteriorly through the Culver medius and minimus region. There is no tenderness to palpation elsewhere throughout the hip.   Active Range of motion flexion 110, IR 10, ER 45, Abduction 30, Adduction 30. Side lying hip abduction 3+/5.  (-)Nargis    Ht 5' 3\" (1.6 m)   Wt 195 lb (88.5 kg)   BMI 34.54 kg/m²      XR: Patient had x-rays with her PCP, Dr. Munir Christensen, earlier today which were independently reviewed in the clinic. X-rays of her knees show mild osteoarthritic change in the medial and lateral compartments primarily. X-rays of her hip show joint space narrowing, osteophyte formation and sclerosis. The tib-fib x-rays are unremarkable. The imaging will be reviewed and interpreted by Radiologist.  The report was not complete at the time of this note. Please refer to Radiologist report for their interpretation. ASSESSMENT:   Diagnosis Orders   1. Localized osteoarthritis of right knee     2. Strain of gluteus medius of right lower extremity, initial encounter     3. Primary osteoarthritis of right hip       PLAN:  Patient is a pleasant 78-year-old female who presents to the clinic today for evaluation of right knee and hip pain. She was seen earlier today by her PCP, Dr. Munir Christensen, who referred her to the orthopedic walk-in care for her further evaluation and definitive treatment options for her knee and hip. On exam she is tender to palpation of the greater trochanter extending posteriorly through the gluteus medius and minimus regions. Rise her hip exam is unremarkable. As for her knee, she does have some limited range of motion with flexion that is not painful. She does have a positive Nargis as well. She had x-rays earlier today with her PCP Dr. Munir Christensen with a did show osteoarthritis of both her hip and her knee. The tib-fib x-rays were remarkable. At this time we discussed conservative treatment options for her knee, hip and gluteus medius strain. Dr. Munir Christensen did prescribe her ibuprofen earlier today.   I did tell the patient if patient would develop any GI upset, nausea, vomiting, change in appetite, blood in stools he should discontinue the medication and contact our office immediately. They are also aware that he should not take any other over-the-counter anti-inflammatories while on this. They can use Tylenol 500 mg 2 tablets every 8 hours as needed for pain in addition to the prescription anti-inflammatory provided with the visit today. She does have Voltaren gel at home, I did recommend that she can try this on the knee 4 g twice daily as needed. I recommended ice 20 minutes on 20 minutes off repeating throughout the day for both the knee and hip. Lastly, I did provide her with knee osteoarthritis exercises as well as basic glutes medius strengthening type exercises. If she sees improvements conservatively but needs to or wishes to advance her exercises, she will call the office and we can provide her with a prescription for formal physical therapy. Patient voiced understanding and agrees with the treatment plan outlined for her in the office today. If she any additional questions or concerns she should call our office. If her pain does not improve or worsens she should call the office or return to clinic for further evaluation. Otherwise, more than happy to see her on an as-needed basis. Electronically signed by Delmi Cooper PA-C on 6/30/22 at 3:54 PM EDT    **This report was transcribed using voice recognition software. Every effort was made to ensure accuracy; however, inadvertent computerized transcription errors may be present. **

## 2024-04-15 ENCOUNTER — OFFICE VISIT (OUTPATIENT)
Dept: FAMILY MEDICINE CLINIC | Age: 74
End: 2024-04-15
Payer: MEDICARE

## 2024-04-15 VITALS
HEART RATE: 94 BPM | TEMPERATURE: 97.7 F | DIASTOLIC BLOOD PRESSURE: 64 MMHG | RESPIRATION RATE: 16 BRPM | BODY MASS INDEX: 33.31 KG/M2 | WEIGHT: 188 LBS | HEIGHT: 63 IN | OXYGEN SATURATION: 96 % | SYSTOLIC BLOOD PRESSURE: 134 MMHG

## 2024-04-15 DIAGNOSIS — R68.89 FLU-LIKE SYMPTOMS: ICD-10-CM

## 2024-04-15 DIAGNOSIS — J40 BRONCHITIS: Primary | ICD-10-CM

## 2024-04-15 DIAGNOSIS — J02.9 SORE THROAT: ICD-10-CM

## 2024-04-15 LAB
INFLUENZA A ANTIBODY: NORMAL
INFLUENZA B ANTIBODY: NORMAL
Lab: NORMAL
PERFORMING INSTRUMENT: NORMAL
QC PASS/FAIL: NORMAL
S PYO AG THROAT QL: NORMAL
SARS-COV-2, POC: NORMAL

## 2024-04-15 PROCEDURE — G8427 DOCREV CUR MEDS BY ELIG CLIN: HCPCS | Performed by: NURSE PRACTITIONER

## 2024-04-15 PROCEDURE — 1090F PRES/ABSN URINE INCON ASSESS: CPT | Performed by: NURSE PRACTITIONER

## 2024-04-15 PROCEDURE — G8417 CALC BMI ABV UP PARAM F/U: HCPCS | Performed by: NURSE PRACTITIONER

## 2024-04-15 PROCEDURE — G8399 PT W/DXA RESULTS DOCUMENT: HCPCS | Performed by: NURSE PRACTITIONER

## 2024-04-15 PROCEDURE — 99214 OFFICE O/P EST MOD 30 MIN: CPT | Performed by: NURSE PRACTITIONER

## 2024-04-15 PROCEDURE — 3075F SYST BP GE 130 - 139MM HG: CPT | Performed by: NURSE PRACTITIONER

## 2024-04-15 PROCEDURE — 1123F ACP DISCUSS/DSCN MKR DOCD: CPT | Performed by: NURSE PRACTITIONER

## 2024-04-15 PROCEDURE — 87880 STREP A ASSAY W/OPTIC: CPT | Performed by: NURSE PRACTITIONER

## 2024-04-15 PROCEDURE — 87426 SARSCOV CORONAVIRUS AG IA: CPT | Performed by: NURSE PRACTITIONER

## 2024-04-15 PROCEDURE — 1036F TOBACCO NON-USER: CPT | Performed by: NURSE PRACTITIONER

## 2024-04-15 PROCEDURE — 87804 INFLUENZA ASSAY W/OPTIC: CPT | Performed by: NURSE PRACTITIONER

## 2024-04-15 PROCEDURE — 3017F COLORECTAL CA SCREEN DOC REV: CPT | Performed by: NURSE PRACTITIONER

## 2024-04-15 PROCEDURE — 3078F DIAST BP <80 MM HG: CPT | Performed by: NURSE PRACTITIONER

## 2024-04-15 RX ORDER — AZITHROMYCIN 250 MG/1
TABLET, FILM COATED ORAL
Qty: 6 TABLET | Refills: 0 | Status: SHIPPED | OUTPATIENT
Start: 2024-04-15

## 2024-04-22 ENCOUNTER — TELEPHONE (OUTPATIENT)
Dept: FAMILY MEDICINE CLINIC | Age: 74
End: 2024-04-22

## 2024-04-22 NOTE — TELEPHONE ENCOUNTER
Beth was seen by Karlo Clark in [facility] on the 15th for cough and congestion.    She was prescribed a Zpack and Mucinex, but has only improved a little.    She still has a lot of congestion and the cough is still pretty constant.     She is asking if there is something else she should be taking?

## 2024-04-22 NOTE — TELEPHONE ENCOUNTER
Would recommend an antihistamine such as Claritin Zyrtec Allegra or Xyzal and trial of steroid nasal spray such as Flonase or Nasacort    May need to consider reevaluation either appointment or urgent care

## 2024-05-08 ENCOUNTER — TELEPHONE (OUTPATIENT)
Dept: FAMILY MEDICINE CLINIC | Age: 74
End: 2024-05-08

## 2024-05-08 NOTE — TELEPHONE ENCOUNTER
Received patient's injections Ozempic today. They are located on the East side big refrigerator. I left a message informing patient that medication is here for pickup.     Information is documented in the books.

## 2024-06-12 DIAGNOSIS — R53.83 OTHER FATIGUE: ICD-10-CM

## 2024-06-12 DIAGNOSIS — E78.2 MIXED HYPERLIPIDEMIA: ICD-10-CM

## 2024-06-12 DIAGNOSIS — Z79.899 LONG TERM CURRENT USE OF THERAPEUTIC DRUG: ICD-10-CM

## 2024-06-12 DIAGNOSIS — Z79.4 TYPE 2 DIABETES MELLITUS WITH HYPERGLYCEMIA, WITH LONG-TERM CURRENT USE OF INSULIN (HCC): ICD-10-CM

## 2024-06-12 DIAGNOSIS — E55.9 VITAMIN D DEFICIENCY: ICD-10-CM

## 2024-06-12 DIAGNOSIS — E11.65 TYPE 2 DIABETES MELLITUS WITH HYPERGLYCEMIA, WITH LONG-TERM CURRENT USE OF INSULIN (HCC): ICD-10-CM

## 2024-06-12 LAB
ALBUMIN: 4 G/DL (ref 3.5–5.2)
ALP BLD-CCNC: 69 U/L (ref 35–104)
ALT SERPL-CCNC: 40 U/L (ref 0–32)
ANION GAP SERPL CALCULATED.3IONS-SCNC: 13 MMOL/L (ref 7–16)
AST SERPL-CCNC: 23 U/L (ref 0–31)
BACTERIA: ABNORMAL
BASOPHILS ABSOLUTE: 0.06 K/UL (ref 0–0.2)
BASOPHILS RELATIVE PERCENT: 1 % (ref 0–2)
BILIRUB SERPL-MCNC: <0.2 MG/DL (ref 0–1.2)
BILIRUBIN, URINE: NEGATIVE
BUN BLDV-MCNC: 18 MG/DL (ref 6–23)
CALCIUM SERPL-MCNC: 9.3 MG/DL (ref 8.6–10.2)
CHLORIDE BLD-SCNC: 102 MMOL/L (ref 98–107)
CHOLESTEROL, FASTING: 206 MG/DL
CO2: 24 MMOL/L (ref 22–29)
COLOR: YELLOW
CREAT SERPL-MCNC: 0.8 MG/DL (ref 0.5–1)
CREATININE URINE: 39 MG/DL (ref 29–226)
EOSINOPHILS ABSOLUTE: 0.34 K/UL (ref 0.05–0.5)
EOSINOPHILS RELATIVE PERCENT: 3 % (ref 0–6)
FOLATE: 12.3 NG/ML (ref 4.8–24.2)
GFR, ESTIMATED: 73 ML/MIN/1.73M2
GLUCOSE BLD-MCNC: 105 MG/DL (ref 74–99)
GLUCOSE URINE: NEGATIVE MG/DL
HBA1C MFR BLD: 7.9 % (ref 4–5.6)
HCT VFR BLD CALC: 37.5 % (ref 34–48)
HDLC SERPL-MCNC: 65 MG/DL
HEMOGLOBIN: 11.6 G/DL (ref 11.5–15.5)
IMMATURE GRANULOCYTES %: 0 % (ref 0–5)
IMMATURE GRANULOCYTES ABSOLUTE: 0.04 K/UL (ref 0–0.58)
KETONES, URINE: NEGATIVE MG/DL
LDL CHOLESTEROL: 122 MG/DL
LEUKOCYTE ESTERASE, URINE: ABNORMAL
LYMPHOCYTES ABSOLUTE: 2.95 K/UL (ref 1.5–4)
LYMPHOCYTES RELATIVE PERCENT: 29 % (ref 20–42)
MAGNESIUM: 1.4 MG/DL (ref 1.6–2.6)
MCH RBC QN AUTO: 30.4 PG (ref 26–35)
MCHC RBC AUTO-ENTMCNC: 30.9 G/DL (ref 32–34.5)
MCV RBC AUTO: 98.4 FL (ref 80–99.9)
MICROALBUMIN/CREAT 24H UR: <12 MG/L (ref 0–19)
MICROALBUMIN/CREAT UR-RTO: NORMAL MCG/MG CREAT (ref 0–30)
MONOCYTES ABSOLUTE: 0.81 K/UL (ref 0.1–0.95)
MONOCYTES RELATIVE PERCENT: 8 % (ref 2–12)
NEUTROPHILS ABSOLUTE: 6.02 K/UL (ref 1.8–7.3)
NEUTROPHILS RELATIVE PERCENT: 59 % (ref 43–80)
NITRITE, URINE: POSITIVE
PDW BLD-RTO: 12.8 % (ref 11.5–15)
PH, URINE: 6 (ref 5–9)
PLATELET # BLD: 410 K/UL (ref 130–450)
PMV BLD AUTO: 9.6 FL (ref 7–12)
POTASSIUM SERPL-SCNC: 4.8 MMOL/L (ref 3.5–5)
PROTEIN UA: NEGATIVE MG/DL
RBC # BLD: 3.81 M/UL (ref 3.5–5.5)
RBC UA: ABNORMAL /HPF
SODIUM BLD-SCNC: 139 MMOL/L (ref 132–146)
SPECIFIC GRAVITY UA: 1.01 (ref 1–1.03)
TOTAL PROTEIN: 6.6 G/DL (ref 6.4–8.3)
TRIGLYCERIDE, FASTING: 96 MG/DL
TSH SERPL DL<=0.05 MIU/L-ACNC: 4.78 UIU/ML (ref 0.27–4.2)
TURBIDITY: CLEAR
URINE HGB: NEGATIVE
UROBILINOGEN, URINE: 0.2 EU/DL (ref 0–1)
VITAMIN B-12: 702 PG/ML (ref 211–946)
VITAMIN D 25-HYDROXY: 59.7 NG/ML (ref 30–100)
VLDLC SERPL CALC-MCNC: 19 MG/DL
WBC # BLD: 10.2 K/UL (ref 4.5–11.5)
WBC UA: ABNORMAL /HPF

## 2024-06-14 ENCOUNTER — OFFICE VISIT (OUTPATIENT)
Dept: SURGERY | Age: 74
End: 2024-06-14

## 2024-06-14 VITALS
DIASTOLIC BLOOD PRESSURE: 62 MMHG | HEIGHT: 63 IN | OXYGEN SATURATION: 96 % | WEIGHT: 187 LBS | SYSTOLIC BLOOD PRESSURE: 132 MMHG | BODY MASS INDEX: 33.13 KG/M2 | HEART RATE: 88 BPM | TEMPERATURE: 98.6 F

## 2024-06-14 DIAGNOSIS — Z86.010 HISTORY OF COLON POLYPS: Primary | ICD-10-CM

## 2024-06-14 NOTE — PROGRESS NOTES
[facility] Note    Assessment/Plan:      Diagnosis Orders   1. History of colon polyps      We will plan for colonoscopy            Return for Colonoscopy.      Chief Complaint   Patient presents with    New Patient    Colonoscopy     Colonoscopy, hx of polyps       PCP: Tae Husain MD    HPI: Beth John is a 73 y.o. female who presents in consultation for history of colon polyps.  Her last colonoscopy was 3 years ago exam recommend.  She does note some constipation.  She says she uses stool softeners as needed.  She did have recent gastrointestinal illness but generally has no issues with her bowels.  There is no blood in the stool.  She has no abdominal pain or unintentional weight loss.  There is no family history of colon cancer or inflammatory bowel disease.  She is on Ozempic but is stopping it      Past Medical History:   Diagnosis Date    Cervical spondylosis 12/7/2021    Class 2 obesity due to excess calories without serious comorbidity with body mass index (BMI) of 35.0 to 35.9 in adult 12/18/2019    Essential hypertension 12/18/2019    Hypomagnesemia 12/18/2019    Mixed hyperlipidemia 12/18/2019    Other insomnia 12/18/2019    Right carpal tunnel syndrome     Type 2 diabetes mellitus without complication (HCC)     Vitamin D deficiency 12/18/2019       Past Surgical History:   Procedure Laterality Date    ARM SURGERY Right 5/4/2022    RIGHT ULNAR NERVE IN SITU DECOMPRESSION AT ELBOW,  RIGHT WRIST TRIANGULAR FIBROCARTILAGE COMPLEX INJECTION, RIGHT INDEX PROXIMAL INTERPHALANGEAL JOINT INJECTION performed by Kota Gonzalez MD at [facility] OR    BUNIONECTOMY Left     CARPAL TUNNEL RELEASE Right 5/4/2022    RIGHT CARPAL TUNNEL RELEASE performed by Kota Gonzalez MD at [facility] OR    CATARACT REMOVAL WITH IMPLANT      CHOLECYSTECTOMY      COLONOSCOPY      HYSTERECTOMY (CERVIX STATUS UNKNOWN)      HYSTERECTOMY, VAGINAL      partial in the 80s then bilateral oopherectome 2016    RETINAL

## 2024-06-17 ENCOUNTER — OFFICE VISIT (OUTPATIENT)
Dept: CHIROPRACTIC MEDICINE | Age: 74
End: 2024-06-17
Payer: MEDICARE

## 2024-06-17 VITALS
WEIGHT: 187 LBS | HEIGHT: 63 IN | TEMPERATURE: 97.1 F | OXYGEN SATURATION: 96 % | HEART RATE: 95 BPM | BODY MASS INDEX: 33.13 KG/M2

## 2024-06-17 DIAGNOSIS — M99.05 SEGMENTAL AND SOMATIC DYSFUNCTION OF PELVIC REGION: ICD-10-CM

## 2024-06-17 DIAGNOSIS — M99.03 SEGMENTAL AND SOMATIC DYSFUNCTION OF LUMBAR REGION: Primary | ICD-10-CM

## 2024-06-17 DIAGNOSIS — M53.3 SACROCOCCYGEAL DISORDERS, NOT ELSEWHERE CLASSIFIED: ICD-10-CM

## 2024-06-17 DIAGNOSIS — G89.29 CHRONIC RIGHT-SIDED LOW BACK PAIN WITHOUT SCIATICA: ICD-10-CM

## 2024-06-17 DIAGNOSIS — M54.50 CHRONIC RIGHT-SIDED LOW BACK PAIN WITHOUT SCIATICA: ICD-10-CM

## 2024-06-17 PROCEDURE — 98940 CHIROPRACT MANJ 1-2 REGIONS: CPT | Performed by: CHIROPRACTOR

## 2024-06-17 NOTE — PROGRESS NOTES
Patient is here for follow up back. Tea Husain MD  Electronically signed by Angela Figueroa LPN on 6/17/2024 at 11:22 AM

## 2024-06-17 NOTE — PROGRESS NOTES
24  Beth John : 1950 Sex: female  Age: 73 y.o.    Chief Complaint   Patient presents with    Back Pain       HPI:   Beth returns today for continued care.  She was away for over a month in [place].  No new symptoms or injuries.  She is reporting right worse than left lower back pain today.  No radicular component.  No changes otherwise      Current Outpatient Medications:     Semaglutide,0.25 or 0.5MG/DOS, (OZEMPIC, 0.25 OR 0.5 MG/DOSE,) 2 MG/1.5ML SOPN, Inject into the skin, Disp: , Rfl:     insulin glargine (LANTUS SOLOSTAR) 100 UNIT/ML injection pen, Inject 65 Units into the skin every morning, Disp: 15 Adjustable Dose Pre-filled Pen Syringe, Rfl: 1    lisinopril (PRINIVIL;ZESTRIL) 20 MG tablet, Take 1 tablet by mouth daily, Disp: 90 tablet, Rfl: 3    metFORMIN (GLUCOPHAGE) 1000 MG tablet, Take 1 tablet by mouth 2 times daily (with meals), Disp: 180 tablet, Rfl: 1    insulin lispro, 1 Unit Dial, (HUMALOG KWIKPEN) 100 UNIT/ML SOPN, Inject up to 12 units QAC TID, Disp: 6 Adjustable Dose Pre-filled Pen Syringe, Rfl: 1    aspirin 81 MG EC tablet, Take 1 tablet by mouth daily, Disp: , Rfl:     Insulin Pen Needle 31G X 5 MM MISC, 1 each by Does not apply route daily, Disp: , Rfl:     zolpidem (AMBIEN) 10 MG tablet, Take 1 tablet by mouth nightly as needed for Sleep for up to 90 days., Disp: 90 tablet, Rfl: 0    magnesium oxide (MAG-OX) 400 MG tablet, Take 1 tablet by mouth 2 times daily (Patient not taking: Reported on 2024), Disp: 180 tablet, Rfl: 3    Exam:   Vitals:    24 1121   Pulse: 95   Temp: 97.1 °F (36.2 °C)   SpO2: 96%       There are hypertonic and tender fibers noted with palpation in the paraspinal muscles of the lumbar region. Joint fixation is noted with motion screening at L3-4, bilateral SI joint.    Beth was seen today for back pain.    Diagnoses and all orders for this visit:    Segmental and somatic dysfunction of lumbar region    Segmental and somatic dysfunction of

## 2024-06-18 ENCOUNTER — OFFICE VISIT (OUTPATIENT)
Dept: FAMILY MEDICINE CLINIC | Age: 74
End: 2024-06-18

## 2024-06-18 ENCOUNTER — OFFICE VISIT (OUTPATIENT)
Dept: CARDIOLOGY CLINIC | Age: 74
End: 2024-06-18
Payer: MEDICARE

## 2024-06-18 VITALS
DIASTOLIC BLOOD PRESSURE: 72 MMHG | HEART RATE: 85 BPM | HEIGHT: 63 IN | BODY MASS INDEX: 32.89 KG/M2 | RESPIRATION RATE: 18 BRPM | SYSTOLIC BLOOD PRESSURE: 128 MMHG | WEIGHT: 185.6 LBS

## 2024-06-18 VITALS
DIASTOLIC BLOOD PRESSURE: 64 MMHG | WEIGHT: 186 LBS | BODY MASS INDEX: 32.96 KG/M2 | HEART RATE: 80 BPM | HEIGHT: 63 IN | RESPIRATION RATE: 18 BRPM | SYSTOLIC BLOOD PRESSURE: 120 MMHG | OXYGEN SATURATION: 97 % | TEMPERATURE: 99 F

## 2024-06-18 DIAGNOSIS — E66.09 CLASS 1 OBESITY DUE TO EXCESS CALORIES WITHOUT SERIOUS COMORBIDITY WITH BODY MASS INDEX (BMI) OF 33.0 TO 33.9 IN ADULT: ICD-10-CM

## 2024-06-18 DIAGNOSIS — R30.0 DYSURIA: Primary | ICD-10-CM

## 2024-06-18 DIAGNOSIS — R01.1 MURMUR: ICD-10-CM

## 2024-06-18 DIAGNOSIS — I10 ESSENTIAL HYPERTENSION: Primary | ICD-10-CM

## 2024-06-18 DIAGNOSIS — I10 ESSENTIAL HYPERTENSION: ICD-10-CM

## 2024-06-18 DIAGNOSIS — Z79.4 TYPE 2 DIABETES MELLITUS WITH BOTH EYES AFFECTED BY RETINOPATHY WITHOUT MACULAR EDEMA, WITH LONG-TERM CURRENT USE OF INSULIN, UNSPECIFIED RETINOPATHY SEVERITY (HCC): ICD-10-CM

## 2024-06-18 DIAGNOSIS — R79.89 ELEVATED LFTS: ICD-10-CM

## 2024-06-18 DIAGNOSIS — E11.65 TYPE 2 DIABETES MELLITUS WITH HYPERGLYCEMIA, WITH LONG-TERM CURRENT USE OF INSULIN (HCC): ICD-10-CM

## 2024-06-18 DIAGNOSIS — G47.09 OTHER INSOMNIA: ICD-10-CM

## 2024-06-18 DIAGNOSIS — I65.23 BILATERAL CAROTID ARTERY STENOSIS: ICD-10-CM

## 2024-06-18 DIAGNOSIS — E78.2 MIXED HYPERLIPIDEMIA: ICD-10-CM

## 2024-06-18 DIAGNOSIS — G47.33 OSA (OBSTRUCTIVE SLEEP APNEA): ICD-10-CM

## 2024-06-18 DIAGNOSIS — E55.9 VITAMIN D DEFICIENCY: ICD-10-CM

## 2024-06-18 DIAGNOSIS — K21.9 GASTROESOPHAGEAL REFLUX DISEASE WITHOUT ESOPHAGITIS: ICD-10-CM

## 2024-06-18 DIAGNOSIS — E83.42 HYPOMAGNESEMIA: ICD-10-CM

## 2024-06-18 DIAGNOSIS — Z79.4 TYPE 2 DIABETES MELLITUS WITH HYPERGLYCEMIA, WITH LONG-TERM CURRENT USE OF INSULIN (HCC): ICD-10-CM

## 2024-06-18 DIAGNOSIS — E11.319 TYPE 2 DIABETES MELLITUS WITH BOTH EYES AFFECTED BY RETINOPATHY WITHOUT MACULAR EDEMA, WITH LONG-TERM CURRENT USE OF INSULIN, UNSPECIFIED RETINOPATHY SEVERITY (HCC): ICD-10-CM

## 2024-06-18 DIAGNOSIS — D64.9 ANEMIA, UNSPECIFIED TYPE: ICD-10-CM

## 2024-06-18 DIAGNOSIS — E87.5 HYPERKALEMIA: ICD-10-CM

## 2024-06-18 DIAGNOSIS — R31.29 MICROHEMATURIA: ICD-10-CM

## 2024-06-18 LAB
BILIRUBIN, POC: NORMAL
BLOOD URINE, POC: NORMAL
CLARITY, POC: CLEAR
COLOR, POC: YELLOW
GLUCOSE URINE, POC: NORMAL
KETONES, POC: NORMAL
LEUKOCYTE EST, POC: NORMAL
NITRITE, POC: NORMAL
PH, POC: 6
PROTEIN, POC: NORMAL
SPECIFIC GRAVITY, POC: 1.01
UROBILINOGEN, POC: 0.2

## 2024-06-18 PROCEDURE — 3078F DIAST BP <80 MM HG: CPT | Performed by: INTERNAL MEDICINE

## 2024-06-18 PROCEDURE — 1090F PRES/ABSN URINE INCON ASSESS: CPT | Performed by: INTERNAL MEDICINE

## 2024-06-18 PROCEDURE — 99214 OFFICE O/P EST MOD 30 MIN: CPT | Performed by: INTERNAL MEDICINE

## 2024-06-18 PROCEDURE — 1123F ACP DISCUSS/DSCN MKR DOCD: CPT | Performed by: INTERNAL MEDICINE

## 2024-06-18 PROCEDURE — G8399 PT W/DXA RESULTS DOCUMENT: HCPCS | Performed by: INTERNAL MEDICINE

## 2024-06-18 PROCEDURE — G8417 CALC BMI ABV UP PARAM F/U: HCPCS | Performed by: INTERNAL MEDICINE

## 2024-06-18 PROCEDURE — 93000 ELECTROCARDIOGRAM COMPLETE: CPT | Performed by: INTERNAL MEDICINE

## 2024-06-18 PROCEDURE — 3074F SYST BP LT 130 MM HG: CPT | Performed by: INTERNAL MEDICINE

## 2024-06-18 PROCEDURE — G8427 DOCREV CUR MEDS BY ELIG CLIN: HCPCS | Performed by: INTERNAL MEDICINE

## 2024-06-18 PROCEDURE — 1036F TOBACCO NON-USER: CPT | Performed by: INTERNAL MEDICINE

## 2024-06-18 PROCEDURE — 3017F COLORECTAL CA SCREEN DOC REV: CPT | Performed by: INTERNAL MEDICINE

## 2024-06-18 RX ORDER — ACYCLOVIR 400 MG/1
TABLET ORAL
Qty: 1 EACH | Refills: 0 | Status: SHIPPED | OUTPATIENT
Start: 2024-06-18

## 2024-06-18 RX ORDER — LISINOPRIL 20 MG/1
20 TABLET ORAL DAILY
Qty: 90 TABLET | Refills: 3 | Status: SHIPPED | OUTPATIENT
Start: 2024-06-18

## 2024-06-18 RX ORDER — ATORVASTATIN CALCIUM 40 MG/1
40 TABLET, FILM COATED ORAL DAILY
Qty: 90 TABLET | Refills: 1 | Status: SHIPPED | OUTPATIENT
Start: 2024-06-18

## 2024-06-18 RX ORDER — INSULIN LISPRO 100 [IU]/ML
INJECTION, SOLUTION INTRAVENOUS; SUBCUTANEOUS
Qty: 6 ADJUSTABLE DOSE PRE-FILLED PEN SYRINGE | Refills: 1 | Status: SHIPPED | OUTPATIENT
Start: 2024-06-18

## 2024-06-18 RX ORDER — ZOLPIDEM TARTRATE 10 MG/1
10 TABLET ORAL NIGHTLY PRN
Qty: 90 TABLET | Refills: 0 | Status: SHIPPED | OUTPATIENT
Start: 2024-06-18 | End: 2024-09-16

## 2024-06-18 RX ORDER — CALCIUM CARBONATE 300MG(750)
400 TABLET,CHEWABLE ORAL 2 TIMES DAILY
Qty: 180 TABLET | Refills: 1 | Status: SHIPPED | OUTPATIENT
Start: 2024-06-18

## 2024-06-18 RX ORDER — ERGOCALCIFEROL 1.25 MG/1
50000 CAPSULE ORAL WEEKLY
COMMUNITY

## 2024-06-18 RX ORDER — OMEPRAZOLE 20 MG/1
20 CAPSULE, DELAYED RELEASE ORAL DAILY
COMMUNITY

## 2024-06-18 RX ORDER — ACYCLOVIR 400 MG/1
TABLET ORAL
Qty: 3 EACH | Refills: 11 | Status: SHIPPED | OUTPATIENT
Start: 2024-06-18

## 2024-06-18 RX ORDER — INSULIN GLARGINE 100 [IU]/ML
65 INJECTION, SOLUTION SUBCUTANEOUS EVERY MORNING
Qty: 15 ADJUSTABLE DOSE PRE-FILLED PEN SYRINGE | Refills: 1 | Status: SHIPPED | OUTPATIENT
Start: 2024-06-18

## 2024-06-18 SDOH — ECONOMIC STABILITY: INCOME INSECURITY: HOW HARD IS IT FOR YOU TO PAY FOR THE VERY BASICS LIKE FOOD, HOUSING, MEDICAL CARE, AND HEATING?: PATIENT DECLINED

## 2024-06-18 SDOH — ECONOMIC STABILITY: FOOD INSECURITY: WITHIN THE PAST 12 MONTHS, YOU WORRIED THAT YOUR FOOD WOULD RUN OUT BEFORE YOU GOT MONEY TO BUY MORE.: PATIENT DECLINED

## 2024-06-18 SDOH — ECONOMIC STABILITY: HOUSING INSECURITY
IN THE LAST 12 MONTHS, WAS THERE A TIME WHEN YOU DID NOT HAVE A STEADY PLACE TO SLEEP OR SLEPT IN A SHELTER (INCLUDING NOW)?: PATIENT DECLINED

## 2024-06-18 SDOH — ECONOMIC STABILITY: FOOD INSECURITY: WITHIN THE PAST 12 MONTHS, THE FOOD YOU BOUGHT JUST DIDN'T LAST AND YOU DIDN'T HAVE MONEY TO GET MORE.: PATIENT DECLINED

## 2024-06-18 ASSESSMENT — ENCOUNTER SYMPTOMS
EYE PAIN: 0
COUGH: 0
ABDOMINAL PAIN: 0
RHINORRHEA: 0
CONSTIPATION: 0
CHEST TIGHTNESS: 0
NAUSEA: 0
SHORTNESS OF BREATH: 1
BLOOD IN STOOL: 0
SORE THROAT: 0
VOMITING: 0
DIARRHEA: 0

## 2024-06-18 NOTE — PROGRESS NOTES
CHIEF COMPLAINT: Murmur/MARÍTNEZ    HISTORY OF PRESENT ILLNESS: Patient is a 73 y.o. female seen at the request of Tae Husain MD.      No overt CP or angina.Baseline MARTÍNEZ.     States she has had a murmur since childhood.     Past Medical History:   Diagnosis Date    Cervical spondylosis 12/7/2021    Class 2 obesity due to excess calories without serious comorbidity with body mass index (BMI) of 35.0 to 35.9 in adult 12/18/2019    Essential hypertension 12/18/2019    Hypomagnesemia 12/18/2019    Mixed hyperlipidemia 12/18/2019    Other insomnia 12/18/2019    Right carpal tunnel syndrome     Type 2 diabetes mellitus without complication (HCC)     Vitamin D deficiency 12/18/2019       Patient Active Problem List   Diagnosis    Type 2 diabetes mellitus with hyperglycemia, with long-term current use of insulin (HCC)    Essential hypertension    Mixed hyperlipidemia    Gastroesophageal reflux disease without esophagitis    Other insomnia    Class 2 obesity due to excess calories without serious comorbidity with body mass index (BMI) of 35.0 to 35.9 in adult    Vitamin D deficiency    Hypomagnesemia    Elevated LFTs    Murmur    Anemia    Cervical spondylosis    Bilateral carotid artery stenosis       No Known Allergies    Current Outpatient Medications   Medication Sig Dispense Refill    vitamin D (ERGOCALCIFEROL) 1.25 MG (38904 UT) CAPS capsule Take 1 capsule by mouth once a week      omeprazole (PRILOSEC) 20 MG delayed release capsule Take 1 capsule by mouth daily      zolpidem (AMBIEN) 10 MG tablet Take 1 tablet by mouth nightly as needed for Sleep for up to 90 days. 90 tablet 0    insulin glargine (LANTUS SOLOSTAR) 100 UNIT/ML injection pen Inject 65 Units into the skin every morning 15 Adjustable Dose Pre-filled Pen Syringe 1    lisinopril (PRINIVIL;ZESTRIL) 20 MG tablet Take 1 tablet by mouth daily 90 tablet 3    metFORMIN (GLUCOPHAGE) 1000 MG tablet Take 1 tablet by mouth 2 times daily (with meals) 180

## 2024-06-18 NOTE — PROGRESS NOTES
(GLUCOPHAGE) 1000 MG tablet, Take 1 tablet by mouth 2 times daily (with meals), Disp: 180 tablet, Rfl: 1    zolpidem (AMBIEN) 10 MG tablet, Take 1 tablet by mouth nightly as needed for Sleep for up to 90 days., Disp: 90 tablet, Rfl: 0    atorvastatin (LIPITOR) 40 MG tablet, Take 1 tablet by mouth daily, Disp: 90 tablet, Rfl: 1    Magnesium 400 MG TABS, Take 400 mg by mouth 2 times daily, Disp: 180 tablet, Rfl: 1    aspirin 81 MG EC tablet, Take 1 tablet by mouth daily, Disp: , Rfl:     Insulin Pen Needle 31G X 5 MM MISC, 1 each by Does not apply route daily, Disp: , Rfl:     No Known Allergies    Past Medical History:   Diagnosis Date    Cervical spondylosis 12/7/2021    Class 2 obesity due to excess calories without serious comorbidity with body mass index (BMI) of 35.0 to 35.9 in adult 12/18/2019    Essential hypertension 12/18/2019    Hypomagnesemia 12/18/2019    Mixed hyperlipidemia 12/18/2019    Other insomnia 12/18/2019    Right carpal tunnel syndrome     Type 2 diabetes mellitus without complication (HCC)     Vitamin D deficiency 12/18/2019       Past Surgical History:   Procedure Laterality Date    ARM SURGERY Right 5/4/2022    RIGHT ULNAR NERVE IN SITU DECOMPRESSION AT ELBOW,  RIGHT WRIST TRIANGULAR FIBROCARTILAGE COMPLEX INJECTION, RIGHT INDEX PROXIMAL INTERPHALANGEAL JOINT INJECTION performed by Kota Gonzalez MD at [facility] OR    BUNIONECTOMY Left     CARPAL TUNNEL RELEASE Right 5/4/2022    RIGHT CARPAL TUNNEL RELEASE performed by Kota Gonzalez MD at [facility] OR    CATARACT REMOVAL WITH IMPLANT      CHOLECYSTECTOMY      COLONOSCOPY      HYSTERECTOMY (CERVIX STATUS UNKNOWN)      HYSTERECTOMY, VAGINAL      partial in the 80s then bilateral oopherectome 2016    RETINAL DETACHMENT SURGERY Right     vitreous surgery    TUBAL LIGATION         Family History   Problem Relation Age of Onset    Diabetes Mother     Stroke Mother     Kidney Disease Father         Renal Failure       Social History     Socioeconomic

## 2024-06-21 ENCOUNTER — TELEPHONE (OUTPATIENT)
Dept: PRIMARY CARE CLINIC | Age: 74
End: 2024-06-21

## 2024-06-21 LAB
CULTURE: ABNORMAL
SPECIMEN DESCRIPTION: ABNORMAL

## 2024-06-21 RX ORDER — CEFDINIR 300 MG/1
300 CAPSULE ORAL 2 TIMES DAILY
Qty: 14 CAPSULE | Refills: 0 | Status: SHIPPED | OUTPATIENT
Start: 2024-06-21 | End: 2024-06-28

## 2024-06-21 NOTE — TELEPHONE ENCOUNTER
Please let the patient know that urine culture was positive for bacteria.  When analyzed against antibiotics shown to be sensitive to most antibiotics    Will prescribe cefdinir 300 mg twice a day x 7 days    Requested Prescriptions     Signed Prescriptions Disp Refills    cefdinir (OMNICEF) 300 MG capsule 14 capsule 0     Sig: Take 1 capsule by mouth 2 times daily for 7 days     Authorizing Provider: MARISELA LATHAM     Sent to pharmacy

## 2024-06-24 ENCOUNTER — TELEPHONE (OUTPATIENT)
Dept: FAMILY MEDICINE CLINIC | Age: 74
End: 2024-06-24

## 2024-06-27 ENCOUNTER — PREP FOR PROCEDURE (OUTPATIENT)
Dept: SURGERY | Age: 74
End: 2024-06-27

## 2024-06-27 DIAGNOSIS — Z86.010 HISTORY OF COLONIC POLYPS: ICD-10-CM

## 2024-06-27 PROBLEM — Z86.0100 HISTORY OF COLONIC POLYPS: Status: ACTIVE | Noted: 2024-06-27

## 2024-07-15 ENCOUNTER — TELEPHONE (OUTPATIENT)
Dept: SURGERY | Age: 74
End: 2024-07-15

## 2024-07-15 NOTE — TELEPHONE ENCOUNTER
Prior Authorization Form:      DEMOGRAPHICS:                     Patient Name:  Beth Robles  Patient :  1950            Insurance:  Payor: [employer] MEDICARE / Plan: [employer] GOLD PLUS HMO / Product Type: *No Product type* /   Insurance ID Number:    Payer/Plan Subscr  Sex Relation Sub. Ins. ID Effective Group Num   1. [employer] MEDICA* BETH ROBLES 1950 Female Self [id number]                                   [address] [address]         DIAGNOSIS & PROCEDURE:                       Procedure/Operation: colon           CPT Code: 91420    Diagnosis:  hx colon polyps    ICD10 Code: z86.010    Location:  [facility]    Surgeon:  vanessa    SCHEDULING INFORMATION:                          Date: 10/3/24    Time: 730              Anesthesia:  MAC/TIVA                                                       Status:  Outpatient        Special Comments:         Electronically signed by Christen Crawford MA on 7/15/2024 at 1:36 PM

## 2024-07-23 ENCOUNTER — TELEPHONE (OUTPATIENT)
Dept: FAMILY MEDICINE CLINIC | Age: 74
End: 2024-07-23

## 2024-07-23 NOTE — TELEPHONE ENCOUNTER
Typically Ozempic does not contribute to low blood sugars or hypoglycemia.  The hyperglycemia could be coming from the insulin therapy.  May need to consider adjusting insulin dosage and consideration for small evening snack before bed.     In regards to side effects of Ozempic are possible and if would like to make a switch from Ozempic to Mounjaro we can consider that.     May need to consider office appointment to discuss

## 2024-07-23 NOTE — TELEPHONE ENCOUNTER
Patient calling in with update on Ozempic.  Patient's current dose is 1mg.  Patient has some concerns that her morning fasting blood sugars are running low.  This morning was 51, yesterday 60.  The highest fasting blood sugar was 117.  Patient reports that her blood sugars normalize throughout the day.  Patient states that she has not lost any weight while on the Ozempic.  Patient is having some GI upset with the Ozempic.  Patient is asking if maybe Ozempic is not the right medication for her.  Patient was asking about Mounjaro.  Please advise.    Ok to leave detailed message with Dr. Husain's response if she does not answer.

## 2024-07-24 NOTE — TELEPHONE ENCOUNTER
Patient stopped in today because she was having trouble getting through by phone.  She said that her sugar definitely changed once starting the Ozempic.  She was not having these low blood sugars before.  Patient also states she already has a small  snack before bed.      She did decide to come in and see you to discuss this and I have her scheduled for tomorrow.

## 2024-07-25 ENCOUNTER — OFFICE VISIT (OUTPATIENT)
Dept: FAMILY MEDICINE CLINIC | Age: 74
End: 2024-07-25

## 2024-07-25 VITALS
BODY MASS INDEX: 33.13 KG/M2 | WEIGHT: 187 LBS | HEIGHT: 63 IN | OXYGEN SATURATION: 95 % | HEART RATE: 100 BPM | RESPIRATION RATE: 20 BRPM | DIASTOLIC BLOOD PRESSURE: 70 MMHG | SYSTOLIC BLOOD PRESSURE: 158 MMHG | TEMPERATURE: 97.3 F

## 2024-07-25 DIAGNOSIS — Z79.4 TYPE 2 DIABETES MELLITUS WITH HYPERGLYCEMIA, WITH LONG-TERM CURRENT USE OF INSULIN (HCC): ICD-10-CM

## 2024-07-25 DIAGNOSIS — Z79.4 TYPE 2 DIABETES MELLITUS WITH BOTH EYES AFFECTED BY RETINOPATHY WITHOUT MACULAR EDEMA, WITH LONG-TERM CURRENT USE OF INSULIN, UNSPECIFIED RETINOPATHY SEVERITY (HCC): Primary | ICD-10-CM

## 2024-07-25 DIAGNOSIS — E11.319 TYPE 2 DIABETES MELLITUS WITH BOTH EYES AFFECTED BY RETINOPATHY WITHOUT MACULAR EDEMA, WITH LONG-TERM CURRENT USE OF INSULIN, UNSPECIFIED RETINOPATHY SEVERITY (HCC): Primary | ICD-10-CM

## 2024-07-25 DIAGNOSIS — E11.65 TYPE 2 DIABETES MELLITUS WITH HYPERGLYCEMIA, WITH LONG-TERM CURRENT USE OF INSULIN (HCC): ICD-10-CM

## 2024-07-25 RX ORDER — INSULIN GLARGINE 100 [IU]/ML
60 INJECTION, SOLUTION SUBCUTANEOUS EVERY MORNING
Qty: 15 ADJUSTABLE DOSE PRE-FILLED PEN SYRINGE | Refills: 1
Start: 2024-07-25

## 2024-07-25 ASSESSMENT — ENCOUNTER SYMPTOMS
ABDOMINAL PAIN: 0
NAUSEA: 0
DIARRHEA: 0
BLOOD IN STOOL: 0
COUGH: 0
CONSTIPATION: 0
SHORTNESS OF BREATH: 1
SORE THROAT: 0
VOMITING: 0
RHINORRHEA: 0
CHEST TIGHTNESS: 0
EYE PAIN: 0

## 2024-07-25 NOTE — PROGRESS NOTES
tolerance/dependence & alternative treatments discussed.;No signs of potential drug abuse or diversion identified.       Class 1 obesity due to excess calories without serious comorbidity with body mass index (BMI) of 33.0 to 33.9 in adult  - watch diet   - discussed diet and exercises   - failed ozempic and trulicity  - would like to try mounjaro (7/2024)     Vitamin D deficiency  - on otc supplement   - follow labs     Hypomagnesemia  - on otc supplement   - follow labs   - increased magnesium supplement to twice a day   - states self stopped medciation (6/2024)   - last lab (6/2024) - still low   - restart supplement (6/2024)     Elevated LFTs  - uncertain etiology at present   - recheck labs - last lab (6/2024) - Stable    Murmur  - has seen cardiology (5/2021)   - had echo (8/2021) - ef 60-65%, no wall motion abnormalities, trace MR     Carotid artery disease, unspecified laterality, unspecified type (HCC)  - US carotid (5/2021) - 50-69% stenosis b/l    - last US carotid (2023)   - declines recheck 7/25/2024    Carpal tunnel syndrome of right wrist  - uncertain etiology   - EMG (12/21) - A chronic right ulnar neuropathy.  A chronic right median mononeuropathy at the wrist (I.e., carpal tunnel syndrome) versus a chronic right C8/T1 cervical radiculopathy cannot be excluded underlying the above   - op (5/22) -right carpal tunnel release right ulnar nerve decompression at elbow right wrist triangular fibrocartilage complex injection right index proximal interphalangeal joint injection    Hip pain  - declines toradol    - declines xray   - chiropractor     Anxiety   - situational   - xanax prn travel     No follow-ups on file.    Orders Placed This Encounter   Procedures    [facility] - Chaitanya Jeffrey MD, Endocrinology, [place]     Referral Priority:   Routine     Referral Type:   Eval and Treat     Referral Reason:   Specialty Services Required     Referred to Provider:   Chaitanya Jeffrey MD     Requested

## 2024-07-28 NOTE — TELEPHONE ENCOUNTER
Beth John is scheduled for colon with Dr Camacho on 10/3/24 at 730. Patient needs to be NPO after midnight the night before procedure. All surgery instructions were explained to the patient and a surgery letter was also mailed out. MA informed patient that PAT will also be calling to review pre-op instructions and medications. Patient verbalized understanding.  
Continue to Observe

## 2024-08-05 ENCOUNTER — TELEPHONE (OUTPATIENT)
Dept: SURGERY | Age: 74
End: 2024-08-05

## 2024-08-05 NOTE — TELEPHONE ENCOUNTER
Prior Authorization Form:      DEMOGRAPHICS:                     Patient Name:  Beth Robles  Patient :  1950            Insurance:  Payor: HU[employer]NA MEDICARE / Plan: HU[employer]NA GOLD PLUS HMO / Product Type: *No Product type* /   Insurance ID Number:    Payer/Plan Subscr  Sex Relation Sub. Ins. ID Effective Group Num   1. [employer] MEDICA* BETH ROBLES 1950 Female Self [id number]                                   [address] [address]         DIAGNOSIS & PROCEDURE:                       Procedure/Operation: Colonoscopy            CPT Code: 74258    Diagnosis:  History of colon polyps    ICD10 Code: Z86.010    Location:  [facility]    Surgeon:  Dr Camacho    SCHEDULING INFORMATION:                          Date: 24    Time: 10:00 am              Anesthesia:  LMAC                                                       Status:  Outpatient        Special Comments:  RS from 24       Electronically signed by Maki Saldaña MA on 2024 at 4:03 PM

## 2024-08-05 NOTE — TELEPHONE ENCOUNTER
Patient called and needed to reschedule her colonoscopy because she was going to be out of town.  MA rescheduled colonoscopy to 11-01-24 at [facility].  Electronically signed by Maki Saldaña MA on 8/5/2024 at 4:01 PM

## 2024-08-16 ENCOUNTER — TELEPHONE (OUTPATIENT)
Dept: FAMILY MEDICINE CLINIC | Age: 74
End: 2024-08-16

## 2024-08-16 NOTE — TELEPHONE ENCOUNTER
Called to let pt know that her Ozempic pens are here at [place]. Pt isnt sure if you wanted her to still use them, she wanted me to ask you

## 2024-08-16 NOTE — TELEPHONE ENCOUNTER
We had stopped Ozempic due to side effects    Per my last note we reduced insulin and patient wanted to try Mounjaro

## 2024-08-21 ENCOUNTER — OFFICE VISIT (OUTPATIENT)
Dept: CHIROPRACTIC MEDICINE | Age: 74
End: 2024-08-21
Payer: MEDICARE

## 2024-08-21 VITALS
WEIGHT: 187 LBS | BODY MASS INDEX: 33.13 KG/M2 | OXYGEN SATURATION: 97 % | HEIGHT: 63 IN | TEMPERATURE: 97.3 F | HEART RATE: 79 BPM

## 2024-08-21 DIAGNOSIS — G89.29 CHRONIC RIGHT-SIDED LOW BACK PAIN WITH RIGHT-SIDED SCIATICA: ICD-10-CM

## 2024-08-21 DIAGNOSIS — M53.3 SACROCOCCYGEAL DISORDERS, NOT ELSEWHERE CLASSIFIED: ICD-10-CM

## 2024-08-21 DIAGNOSIS — M99.03 SEGMENTAL AND SOMATIC DYSFUNCTION OF LUMBAR REGION: Primary | ICD-10-CM

## 2024-08-21 DIAGNOSIS — M54.41 CHRONIC RIGHT-SIDED LOW BACK PAIN WITH RIGHT-SIDED SCIATICA: ICD-10-CM

## 2024-08-21 DIAGNOSIS — M54.04 PANNICULITIS AFFECTING REGIONS OF NECK AND BACK, THORACIC REGION: ICD-10-CM

## 2024-08-21 DIAGNOSIS — M99.02 SEGMENTAL AND SOMATIC DYSFUNCTION OF THORACIC REGION: ICD-10-CM

## 2024-08-21 DIAGNOSIS — M99.05 SEGMENTAL AND SOMATIC DYSFUNCTION OF PELVIC REGION: ICD-10-CM

## 2024-08-21 PROCEDURE — 98941 CHIROPRACT MANJ 3-4 REGIONS: CPT | Performed by: CHIROPRACTOR

## 2024-09-26 ENCOUNTER — OFFICE VISIT (OUTPATIENT)
Dept: FAMILY MEDICINE CLINIC | Age: 74
End: 2024-09-26

## 2024-09-26 ENCOUNTER — OFFICE VISIT (OUTPATIENT)
Dept: CHIROPRACTIC MEDICINE | Age: 74
End: 2024-09-26
Payer: MEDICARE

## 2024-09-26 VITALS
TEMPERATURE: 98.3 F | RESPIRATION RATE: 18 BRPM | DIASTOLIC BLOOD PRESSURE: 50 MMHG | BODY MASS INDEX: 33.13 KG/M2 | OXYGEN SATURATION: 97 % | WEIGHT: 187 LBS | HEART RATE: 81 BPM | HEIGHT: 63 IN | SYSTOLIC BLOOD PRESSURE: 114 MMHG

## 2024-09-26 VITALS
HEIGHT: 63 IN | TEMPERATURE: 97.5 F | OXYGEN SATURATION: 94 % | HEART RATE: 84 BPM | BODY MASS INDEX: 33.13 KG/M2 | WEIGHT: 187 LBS

## 2024-09-26 DIAGNOSIS — Z23 NEED FOR INFLUENZA VACCINATION: ICD-10-CM

## 2024-09-26 DIAGNOSIS — Z79.899 LONG TERM CURRENT USE OF THERAPEUTIC DRUG: ICD-10-CM

## 2024-09-26 DIAGNOSIS — E83.42 HYPOMAGNESEMIA: ICD-10-CM

## 2024-09-26 DIAGNOSIS — M53.3 SACROCOCCYGEAL DISORDERS, NOT ELSEWHERE CLASSIFIED: ICD-10-CM

## 2024-09-26 DIAGNOSIS — E87.5 HYPERKALEMIA: ICD-10-CM

## 2024-09-26 DIAGNOSIS — K21.9 GASTROESOPHAGEAL REFLUX DISEASE WITHOUT ESOPHAGITIS: ICD-10-CM

## 2024-09-26 DIAGNOSIS — G47.09 OTHER INSOMNIA: ICD-10-CM

## 2024-09-26 DIAGNOSIS — R31.29 MICROHEMATURIA: ICD-10-CM

## 2024-09-26 DIAGNOSIS — E55.9 VITAMIN D DEFICIENCY: ICD-10-CM

## 2024-09-26 DIAGNOSIS — G47.33 OSA (OBSTRUCTIVE SLEEP APNEA): ICD-10-CM

## 2024-09-26 DIAGNOSIS — R53.83 OTHER FATIGUE: ICD-10-CM

## 2024-09-26 DIAGNOSIS — D64.9 ANEMIA, UNSPECIFIED TYPE: ICD-10-CM

## 2024-09-26 DIAGNOSIS — Z79.4 TYPE 2 DIABETES MELLITUS WITH BOTH EYES AFFECTED BY RETINOPATHY WITHOUT MACULAR EDEMA, WITH LONG-TERM CURRENT USE OF INSULIN, UNSPECIFIED RETINOPATHY SEVERITY (HCC): ICD-10-CM

## 2024-09-26 DIAGNOSIS — M54.41 CHRONIC RIGHT-SIDED LOW BACK PAIN WITH RIGHT-SIDED SCIATICA: ICD-10-CM

## 2024-09-26 DIAGNOSIS — Z79.4 TYPE 2 DIABETES MELLITUS WITH HYPERGLYCEMIA, WITH LONG-TERM CURRENT USE OF INSULIN (HCC): ICD-10-CM

## 2024-09-26 DIAGNOSIS — R79.89 ELEVATED LFTS: ICD-10-CM

## 2024-09-26 DIAGNOSIS — G89.29 CHRONIC RIGHT-SIDED LOW BACK PAIN WITH RIGHT-SIDED SCIATICA: ICD-10-CM

## 2024-09-26 DIAGNOSIS — I65.23 BILATERAL CAROTID ARTERY STENOSIS: ICD-10-CM

## 2024-09-26 DIAGNOSIS — I10 ESSENTIAL HYPERTENSION: ICD-10-CM

## 2024-09-26 DIAGNOSIS — E78.2 MIXED HYPERLIPIDEMIA: ICD-10-CM

## 2024-09-26 DIAGNOSIS — M99.03 SEGMENTAL AND SOMATIC DYSFUNCTION OF LUMBAR REGION: Primary | ICD-10-CM

## 2024-09-26 DIAGNOSIS — F41.9 ANXIETY: Primary | ICD-10-CM

## 2024-09-26 DIAGNOSIS — E11.65 TYPE 2 DIABETES MELLITUS WITH HYPERGLYCEMIA, WITH LONG-TERM CURRENT USE OF INSULIN (HCC): ICD-10-CM

## 2024-09-26 DIAGNOSIS — E11.319 TYPE 2 DIABETES MELLITUS WITH BOTH EYES AFFECTED BY RETINOPATHY WITHOUT MACULAR EDEMA, WITH LONG-TERM CURRENT USE OF INSULIN, UNSPECIFIED RETINOPATHY SEVERITY (HCC): ICD-10-CM

## 2024-09-26 DIAGNOSIS — M99.05 SEGMENTAL AND SOMATIC DYSFUNCTION OF PELVIC REGION: ICD-10-CM

## 2024-09-26 PROCEDURE — 98940 CHIROPRACT MANJ 1-2 REGIONS: CPT | Performed by: CHIROPRACTOR

## 2024-09-26 RX ORDER — ZOLPIDEM TARTRATE 10 MG/1
10 TABLET ORAL NIGHTLY PRN
Qty: 90 TABLET | Refills: 0 | Status: SHIPPED | OUTPATIENT
Start: 2024-09-26 | End: 2024-12-25

## 2024-09-26 RX ORDER — ALPRAZOLAM 0.25 MG
0.25 TABLET ORAL DAILY PRN
Qty: 30 TABLET | Refills: 0 | Status: SHIPPED | OUTPATIENT
Start: 2024-09-26 | End: 2024-10-26

## 2024-09-26 ASSESSMENT — ENCOUNTER SYMPTOMS
SHORTNESS OF BREATH: 1
EYE PAIN: 0
CONSTIPATION: 0
CHEST TIGHTNESS: 0
BLOOD IN STOOL: 0
VOMITING: 0
COUGH: 0
ABDOMINAL PAIN: 0
DIARRHEA: 0
SORE THROAT: 0
RHINORRHEA: 0
NAUSEA: 0

## 2024-09-30 DIAGNOSIS — Z79.4 TYPE 2 DIABETES MELLITUS WITH HYPERGLYCEMIA, WITH LONG-TERM CURRENT USE OF INSULIN (HCC): ICD-10-CM

## 2024-09-30 DIAGNOSIS — E11.319 TYPE 2 DIABETES MELLITUS WITH BOTH EYES AFFECTED BY RETINOPATHY WITHOUT MACULAR EDEMA, WITH LONG-TERM CURRENT USE OF INSULIN, UNSPECIFIED RETINOPATHY SEVERITY (HCC): ICD-10-CM

## 2024-09-30 DIAGNOSIS — E11.65 TYPE 2 DIABETES MELLITUS WITH HYPERGLYCEMIA, WITH LONG-TERM CURRENT USE OF INSULIN (HCC): ICD-10-CM

## 2024-09-30 DIAGNOSIS — Z79.4 TYPE 2 DIABETES MELLITUS WITH BOTH EYES AFFECTED BY RETINOPATHY WITHOUT MACULAR EDEMA, WITH LONG-TERM CURRENT USE OF INSULIN, UNSPECIFIED RETINOPATHY SEVERITY (HCC): ICD-10-CM

## 2024-09-30 NOTE — TELEPHONE ENCOUNTER
Beth can't afford the cost of 12 pens of Mounjarno at one time.       She is asking for a script for only 4 of them at a time.       I have this ready to send for 4 pens with refills.      Last Appointment:  9/26/2024  Future Appointments   Date Time Provider Department Center   11/15/2024  9:45 AM Tyler Camacho MD COL SURG [facility]   1/6/2025  9:00 AM Tae Husain MD [facility] E[facility] DEP   1/8/2025 10:00 AM Sunshine Real, APRN - CNP [facility]

## 2024-10-30 DIAGNOSIS — F41.9 ANXIETY: ICD-10-CM

## 2024-10-30 RX ORDER — ALPRAZOLAM 0.25 MG/1
0.25 TABLET ORAL DAILY PRN
Qty: 30 TABLET | Refills: 0 | Status: SHIPPED | OUTPATIENT
Start: 2024-10-30 | End: 2024-11-29

## 2024-10-30 NOTE — TELEPHONE ENCOUNTER
Beth needs a new script for Alprazolam to be sent to [facility] in [place].      Last Appointment:  9/26/2024  Future Appointments   Date Time Provider Department Center   11/15/2024  9:45 AM Tyler Camacho MD COL SURG [facility]   1/6/2025  9:00 AM Tae Husain MD [facility] E[facility] DEP   1/8/2025 10:00 AM Sunshine Real APRN - CNP [facility]

## 2024-10-30 NOTE — TELEPHONE ENCOUNTER
Controlled Substance Monitoring:    Acute and Chronic Pain Monitoring:   RX Monitoring Periodic Controlled Substance Monitoring   10/30/2024   1:05 PM No signs of potential drug abuse or diversion identified.       Requested Prescriptions     Pending Prescriptions Disp Refills    ALPRAZolam (XANAX) 0.25 MG tablet 30 tablet 0     Sig: Take 1 tablet by mouth daily as needed for Anxiety for up to 30 days. Max Daily Amount: 0.25 mg     I sent the Rx to the pharmacy.    Electronically signed by Tae Husain MD on 10/30/2024 at 1:06 PM

## 2024-11-04 ENCOUNTER — OFFICE VISIT (OUTPATIENT)
Dept: CHIROPRACTIC MEDICINE | Age: 74
End: 2024-11-04
Payer: MEDICARE

## 2024-11-04 VITALS — WEIGHT: 186.07 LBS | BODY MASS INDEX: 32.97 KG/M2 | HEIGHT: 63 IN

## 2024-11-04 DIAGNOSIS — M54.41 CHRONIC RIGHT-SIDED LOW BACK PAIN WITH RIGHT-SIDED SCIATICA: ICD-10-CM

## 2024-11-04 DIAGNOSIS — M99.03 SEGMENTAL AND SOMATIC DYSFUNCTION OF LUMBAR REGION: Primary | ICD-10-CM

## 2024-11-04 DIAGNOSIS — G89.29 CHRONIC RIGHT-SIDED LOW BACK PAIN WITH RIGHT-SIDED SCIATICA: ICD-10-CM

## 2024-11-04 DIAGNOSIS — M99.05 SEGMENTAL AND SOMATIC DYSFUNCTION OF PELVIC REGION: ICD-10-CM

## 2024-11-04 DIAGNOSIS — M53.3 SACROCOCCYGEAL DISORDERS, NOT ELSEWHERE CLASSIFIED: ICD-10-CM

## 2024-11-04 PROCEDURE — 98940 CHIROPRACT MANJ 1-2 REGIONS: CPT | Performed by: CHIROPRACTOR

## 2024-11-04 NOTE — PROGRESS NOTES
Patient is here for follow up back. Tae Husain MD  Electronically signed by Angela Figueroa LPN on 11/4/2024 at 2:00 PM

## 2024-11-04 NOTE — PROGRESS NOTES
24  Beth John : 1950 Sex: female  Age: 74 y.o.    Chief Complaint   Patient presents with    Back Pain       HPI:   Pain has worsened slightly. R side again, some sciatica.  No injury.  On average, pain is perceived as moderate (4-6 pain scale). Patient denies new numbness, new weakness, new tingling      Current Outpatient Medications:     ALPRAZolam (XANAX) 0.25 MG tablet, Take 1 tablet by mouth daily as needed for Anxiety for up to 30 days. Max Daily Amount: 0.25 mg, Disp: 30 tablet, Rfl: 0    Tirzepatide (MOUNJARO) 5 MG/0.5ML SOPN SC injection, Inject 0.5 mLs into the skin once a week, Disp: 4 Adjustable Dose Pre-filled Pen Syringe, Rfl: 1    zolpidem (AMBIEN) 10 MG tablet, Take 1 tablet by mouth nightly as needed for Sleep for up to 90 days., Disp: 90 tablet, Rfl: 0    insulin glargine (LANTUS SOLOSTAR) 100 UNIT/ML injection pen, Inject 60 Units into the skin every morning, Disp: 15 Adjustable Dose Pre-filled Pen Syringe, Rfl: 1    vitamin D (ERGOCALCIFEROL) 1.25 MG (94361 UT) CAPS capsule, Take 1 capsule by mouth once a week, Disp: , Rfl:     omeprazole (PRILOSEC) 20 MG delayed release capsule, Take 1 capsule by mouth daily, Disp: , Rfl:     Continuous Glucose Sensor (DEXCOM G7 SENSOR) MISC, Use as directed, Disp: 3 each, Rfl: 11    Continuous Glucose  (DEXCOM G7 ) [facility] as directed, Disp: 1 each, Rfl: 0    insulin lispro, 1 Unit Dial, (HUMALOG KWIKPEN) 100 UNIT/ML SOPN, Inject up to 12 units QAC TID, Disp: 6 Adjustable Dose Pre-filled Pen Syringe, Rfl: 1    lisinopril (PRINIVIL;ZESTRIL) 20 MG tablet, Take 1 tablet by mouth daily, Disp: 90 tablet, Rfl: 3    metFORMIN (GLUCOPHAGE) 1000 MG tablet, Take 1 tablet by mouth 2 times daily (with meals), Disp: 180 tablet, Rfl: 1    atorvastatin (LIPITOR) 40 MG tablet, Take 1 tablet by mouth daily, Disp: 90 tablet, Rfl: 1    Magnesium 400 MG TABS, Take 400 mg by mouth 2 times daily, Disp: 180 tablet, Rfl: 1    aspirin 81 MG EC

## 2024-11-18 ENCOUNTER — TELEPHONE (OUTPATIENT)
Dept: FAMILY MEDICINE CLINIC | Age: 74
End: 2024-11-18

## 2024-11-18 DIAGNOSIS — E11.319 TYPE 2 DIABETES MELLITUS WITH BOTH EYES AFFECTED BY RETINOPATHY WITHOUT MACULAR EDEMA, WITH LONG-TERM CURRENT USE OF INSULIN, UNSPECIFIED RETINOPATHY SEVERITY (HCC): Primary | ICD-10-CM

## 2024-11-18 DIAGNOSIS — E11.65 TYPE 2 DIABETES MELLITUS WITH HYPERGLYCEMIA, WITH LONG-TERM CURRENT USE OF INSULIN (HCC): ICD-10-CM

## 2024-11-18 DIAGNOSIS — Z79.4 TYPE 2 DIABETES MELLITUS WITH BOTH EYES AFFECTED BY RETINOPATHY WITHOUT MACULAR EDEMA, WITH LONG-TERM CURRENT USE OF INSULIN, UNSPECIFIED RETINOPATHY SEVERITY (HCC): Primary | ICD-10-CM

## 2024-11-18 DIAGNOSIS — Z79.4 TYPE 2 DIABETES MELLITUS WITH HYPERGLYCEMIA, WITH LONG-TERM CURRENT USE OF INSULIN (HCC): ICD-10-CM

## 2024-11-18 NOTE — TELEPHONE ENCOUNTER
Beth calling for a new script for Tirzepatide to be sent to [facility] in [place].      I am trying to pend this for you, but it gets flagged as:         ( Medication No Longer Available.)

## 2024-11-18 NOTE — TELEPHONE ENCOUNTER
Requested Prescriptions     Signed Prescriptions Disp Refills    Tirzepatide 5 MG/0.5ML SOAJ 2 mL 5     Sig: Inject 5 mg into the skin once a week     Authorizing Provider: MARISELA LATHAM

## 2024-11-25 ENCOUNTER — OFFICE VISIT (OUTPATIENT)
Dept: CHIROPRACTIC MEDICINE | Age: 74
End: 2024-11-25
Payer: MEDICARE

## 2024-11-25 VITALS
SYSTOLIC BLOOD PRESSURE: 122 MMHG | DIASTOLIC BLOOD PRESSURE: 68 MMHG | WEIGHT: 186 LBS | OXYGEN SATURATION: 97 % | HEART RATE: 88 BPM | BODY MASS INDEX: 32.96 KG/M2 | TEMPERATURE: 97.3 F | HEIGHT: 63 IN

## 2024-11-25 DIAGNOSIS — M53.3 SACROCOCCYGEAL DISORDERS, NOT ELSEWHERE CLASSIFIED: ICD-10-CM

## 2024-11-25 DIAGNOSIS — M99.05 SEGMENTAL AND SOMATIC DYSFUNCTION OF PELVIC REGION: ICD-10-CM

## 2024-11-25 DIAGNOSIS — G89.29 CHRONIC RIGHT-SIDED LOW BACK PAIN WITH RIGHT-SIDED SCIATICA: ICD-10-CM

## 2024-11-25 DIAGNOSIS — M99.03 SEGMENTAL AND SOMATIC DYSFUNCTION OF LUMBAR REGION: Primary | ICD-10-CM

## 2024-11-25 DIAGNOSIS — M54.41 CHRONIC RIGHT-SIDED LOW BACK PAIN WITH RIGHT-SIDED SCIATICA: ICD-10-CM

## 2024-11-25 PROCEDURE — 98940 CHIROPRACT MANJ 1-2 REGIONS: CPT | Performed by: CHIROPRACTOR

## 2024-11-25 NOTE — PROGRESS NOTES
24  Beth John : 1950 Sex: female  Age: 74 y.o.    Chief Complaint   Patient presents with    Lower Back Pain       HPI:   Reports a worsening of her low back pain to the right lower extremity today.  No specific injury.  But on 2 occasions since I last saw her she was trying to get up from a seated position and had pain in the lower back, problems transitioning to standing.  She has not done anything for it.  A bit better today, she has been up and moving.  But periods of activity seem to bother her.  Also getting up from her recliner.    Current Outpatient Medications:     Tirzepatide 5 MG/0.5ML SOAJ, Inject 5 mg into the skin once a week, Disp: 2 mL, Rfl: 5    ALPRAZolam (XANAX) 0.25 MG tablet, Take 1 tablet by mouth daily as needed for Anxiety for up to 30 days. Max Daily Amount: 0.25 mg, Disp: 30 tablet, Rfl: 0    zolpidem (AMBIEN) 10 MG tablet, Take 1 tablet by mouth nightly as needed for Sleep for up to 90 days., Disp: 90 tablet, Rfl: 0    insulin glargine (LANTUS SOLOSTAR) 100 UNIT/ML injection pen, Inject 60 Units into the skin every morning, Disp: 15 Adjustable Dose Pre-filled Pen Syringe, Rfl: 1    vitamin D (ERGOCALCIFEROL) 1.25 MG (69262 UT) CAPS capsule, Take 1 capsule by mouth once a week, Disp: , Rfl:     omeprazole (PRILOSEC) 20 MG delayed release capsule, Take 1 capsule by mouth daily, Disp: , Rfl:     Continuous Glucose Sensor (DEXCOM G7 SENSOR) MISC, Use as directed, Disp: 3 each, Rfl: 11    Continuous Glucose  (DEXCOM G7 ) WILY, Us as directed, Disp: 1 each, Rfl: 0    insulin lispro, 1 Unit Dial, (HUMALOG KWIKPEN) 100 UNIT/ML SOPN, Inject up to 12 units QAC TID, Disp: 6 Adjustable Dose Pre-filled Pen Syringe, Rfl: 1    lisinopril (PRINIVIL;ZESTRIL) 20 MG tablet, Take 1 tablet by mouth daily, Disp: 90 tablet, Rfl: 3    metFORMIN (GLUCOPHAGE) 1000 MG tablet, Take 1 tablet by mouth 2 times daily (with meals), Disp: 180 tablet, Rfl: 1    atorvastatin (LIPITOR) 40

## 2024-12-02 ENCOUNTER — OFFICE VISIT (OUTPATIENT)
Dept: CHIROPRACTIC MEDICINE | Age: 74
End: 2024-12-02
Payer: MEDICARE

## 2024-12-02 VITALS
WEIGHT: 186 LBS | HEIGHT: 63 IN | BODY MASS INDEX: 32.96 KG/M2 | HEART RATE: 86 BPM | SYSTOLIC BLOOD PRESSURE: 130 MMHG | DIASTOLIC BLOOD PRESSURE: 76 MMHG | OXYGEN SATURATION: 97 % | TEMPERATURE: 97.3 F

## 2024-12-02 DIAGNOSIS — M53.3 SACROCOCCYGEAL DISORDERS, NOT ELSEWHERE CLASSIFIED: ICD-10-CM

## 2024-12-02 DIAGNOSIS — M99.03 SEGMENTAL AND SOMATIC DYSFUNCTION OF LUMBAR REGION: Primary | ICD-10-CM

## 2024-12-02 DIAGNOSIS — G89.29 CHRONIC RIGHT-SIDED LOW BACK PAIN WITH RIGHT-SIDED SCIATICA: ICD-10-CM

## 2024-12-02 DIAGNOSIS — M54.41 CHRONIC RIGHT-SIDED LOW BACK PAIN WITH RIGHT-SIDED SCIATICA: ICD-10-CM

## 2024-12-02 DIAGNOSIS — M99.05 SEGMENTAL AND SOMATIC DYSFUNCTION OF PELVIC REGION: ICD-10-CM

## 2024-12-02 PROCEDURE — 98940 CHIROPRACT MANJ 1-2 REGIONS: CPT | Performed by: CHIROPRACTOR

## 2024-12-02 NOTE — PROGRESS NOTES
Patient is here for follow up back. Patient states improvement. Tae Husain MD  Electronically signed by Angela Figueroa LPN on 12/2/2024 at 1:07 PM

## 2024-12-02 NOTE — PROGRESS NOTES
24  Beth John : 1950 Sex: female  Age: 74 y.o.    Chief Complaint   Patient presents with    Back Pain       HPI:   Pain has improved.  Still there but feels she is moving in the right direction.  She continues to use some heat and topical analgesic patches for relief.  Did have 1 bad day since I last saw her.  Patient denies new numbness, new weakness, new tingling      Current Outpatient Medications:     Tirzepatide 5 MG/0.5ML SOAJ, Inject 5 mg into the skin once a week, Disp: 2 mL, Rfl: 5    zolpidem (AMBIEN) 10 MG tablet, Take 1 tablet by mouth nightly as needed for Sleep for up to 90 days., Disp: 90 tablet, Rfl: 0    insulin glargine (LANTUS SOLOSTAR) 100 UNIT/ML injection pen, Inject 60 Units into the skin every morning, Disp: 15 Adjustable Dose Pre-filled Pen Syringe, Rfl: 1    vitamin D (ERGOCALCIFEROL) 1.25 MG (65323 UT) CAPS capsule, Take 1 capsule by mouth once a week, Disp: , Rfl:     omeprazole (PRILOSEC) 20 MG delayed release capsule, Take 1 capsule by mouth daily, Disp: , Rfl:     Continuous Glucose Sensor (DEXCOM G7 SENSOR) MISC, Use as directed, Disp: 3 each, Rfl: 11    Continuous Glucose  (DEXCOM G7 ) [facility], Us as directed, Disp: 1 each, Rfl: 0    insulin lispro, 1 Unit Dial, (HUMALOG KWIKPEN) 100 UNIT/ML SOPN, Inject up to 12 units QAC TID, Disp: 6 Adjustable Dose Pre-filled Pen Syringe, Rfl: 1    lisinopril (PRINIVIL;ZESTRIL) 20 MG tablet, Take 1 tablet by mouth daily, Disp: 90 tablet, Rfl: 3    metFORMIN (GLUCOPHAGE) 1000 MG tablet, Take 1 tablet by mouth 2 times daily (with meals), Disp: 180 tablet, Rfl: 1    atorvastatin (LIPITOR) 40 MG tablet, Take 1 tablet by mouth daily, Disp: 90 tablet, Rfl: 1    Magnesium 400 MG TABS, Take 400 mg by mouth 2 times daily, Disp: 180 tablet, Rfl: 1    aspirin 81 MG EC tablet, Take 1 tablet by mouth daily, Disp: , Rfl:     Insulin Pen Needle 31G X 5 MM MISC, 1 each by Does not apply route daily, Disp: , Rfl:     Exam:

## 2024-12-10 ENCOUNTER — OFFICE VISIT (OUTPATIENT)
Dept: CHIROPRACTIC MEDICINE | Age: 74
End: 2024-12-10

## 2024-12-10 VITALS
WEIGHT: 186 LBS | HEART RATE: 88 BPM | SYSTOLIC BLOOD PRESSURE: 122 MMHG | DIASTOLIC BLOOD PRESSURE: 72 MMHG | TEMPERATURE: 97.3 F | HEIGHT: 63 IN | OXYGEN SATURATION: 97 % | BODY MASS INDEX: 32.96 KG/M2

## 2024-12-10 DIAGNOSIS — M99.03 SEGMENTAL AND SOMATIC DYSFUNCTION OF LUMBAR REGION: Primary | ICD-10-CM

## 2024-12-10 DIAGNOSIS — G89.29 CHRONIC RIGHT-SIDED LOW BACK PAIN WITH RIGHT-SIDED SCIATICA: ICD-10-CM

## 2024-12-10 DIAGNOSIS — M99.05 SEGMENTAL AND SOMATIC DYSFUNCTION OF PELVIC REGION: ICD-10-CM

## 2024-12-10 DIAGNOSIS — M54.41 CHRONIC RIGHT-SIDED LOW BACK PAIN WITH RIGHT-SIDED SCIATICA: ICD-10-CM

## 2024-12-10 DIAGNOSIS — M53.3 SACROCOCCYGEAL DISORDERS, NOT ELSEWHERE CLASSIFIED: ICD-10-CM

## 2024-12-10 NOTE — PROGRESS NOTES
12/10/24  Beth John : 1950 Sex: female  Age: 74 y.o.    Chief Complaint   Patient presents with    Back Pain       HPI:   Pain has significantly improved. On average, pain is very mild.  Happy with her progress.  Leaving on  for [place]. Patient denies new numbness, new weakness, new tingling      Current Outpatient Medications:     Tirzepatide 5 MG/0.5ML SOAJ, Inject 5 mg into the skin once a week, Disp: 2 mL, Rfl: 5    zolpidem (AMBIEN) 10 MG tablet, Take 1 tablet by mouth nightly as needed for Sleep for up to 90 days., Disp: 90 tablet, Rfl: 0    insulin glargine (LANTUS SOLOSTAR) 100 UNIT/ML injection pen, Inject 60 Units into the skin every morning, Disp: 15 Adjustable Dose Pre-filled Pen Syringe, Rfl: 1    vitamin D (ERGOCALCIFEROL) 1.25 MG (61689 UT) CAPS capsule, Take 1 capsule by mouth once a week, Disp: , Rfl:     omeprazole (PRILOSEC) 20 MG delayed release capsule, Take 1 capsule by mouth daily, Disp: , Rfl:     Continuous Glucose Sensor (DEXCOM G7 SENSOR) MISC, Use as directed, Disp: 3 each, Rfl: 11    Continuous Glucose  (DEXCOM G7 ) [facility], Us as directed, Disp: 1 each, Rfl: 0    insulin lispro, 1 Unit Dial, (HUMALOG KWIKPEN) 100 UNIT/ML SOPN, Inject up to 12 units QAC TID, Disp: 6 Adjustable Dose Pre-filled Pen Syringe, Rfl: 1    lisinopril (PRINIVIL;ZESTRIL) 20 MG tablet, Take 1 tablet by mouth daily, Disp: 90 tablet, Rfl: 3    metFORMIN (GLUCOPHAGE) 1000 MG tablet, Take 1 tablet by mouth 2 times daily (with meals), Disp: 180 tablet, Rfl: 1    atorvastatin (LIPITOR) 40 MG tablet, Take 1 tablet by mouth daily, Disp: 90 tablet, Rfl: 1    Magnesium 400 MG TABS, Take 400 mg by mouth 2 times daily, Disp: 180 tablet, Rfl: 1    aspirin 81 MG EC tablet, Take 1 tablet by mouth daily, Disp: , Rfl:     Insulin Pen Needle 31G X 5 MM MISC, 1 each by Does not apply route daily, Disp: , Rfl:     Exam:   Vitals:    12/10/24 1107   BP: 122/72   Pulse: 88   Temp: 97.3 °F (36.3 °C)

## 2024-12-10 NOTE — PROGRESS NOTES
Patient is here for follow up back. Patient states improvement. Tae Husain MD  Electronically signed by Angela Figueroa LPN on 12/10/2024 at 11:08 AM

## 2024-12-13 DIAGNOSIS — E11.319 TYPE 2 DIABETES MELLITUS WITH BOTH EYES AFFECTED BY RETINOPATHY WITHOUT MACULAR EDEMA, WITH LONG-TERM CURRENT USE OF INSULIN, UNSPECIFIED RETINOPATHY SEVERITY (HCC): ICD-10-CM

## 2024-12-13 DIAGNOSIS — Z79.4 TYPE 2 DIABETES MELLITUS WITH BOTH EYES AFFECTED BY RETINOPATHY WITHOUT MACULAR EDEMA, WITH LONG-TERM CURRENT USE OF INSULIN, UNSPECIFIED RETINOPATHY SEVERITY (HCC): ICD-10-CM

## 2024-12-13 DIAGNOSIS — E55.9 VITAMIN D DEFICIENCY: ICD-10-CM

## 2024-12-13 DIAGNOSIS — E78.2 MIXED HYPERLIPIDEMIA: ICD-10-CM

## 2024-12-13 DIAGNOSIS — D64.9 ANEMIA, UNSPECIFIED TYPE: ICD-10-CM

## 2024-12-13 DIAGNOSIS — E11.65 TYPE 2 DIABETES MELLITUS WITH HYPERGLYCEMIA, WITH LONG-TERM CURRENT USE OF INSULIN (HCC): ICD-10-CM

## 2024-12-13 DIAGNOSIS — Z79.4 TYPE 2 DIABETES MELLITUS WITH HYPERGLYCEMIA, WITH LONG-TERM CURRENT USE OF INSULIN (HCC): ICD-10-CM

## 2024-12-13 DIAGNOSIS — R53.83 OTHER FATIGUE: ICD-10-CM

## 2024-12-13 DIAGNOSIS — Z79.899 LONG TERM CURRENT USE OF THERAPEUTIC DRUG: ICD-10-CM

## 2024-12-13 LAB
ALBUMIN: 3.7 G/DL (ref 3.5–5.2)
ALP BLD-CCNC: 80 U/L (ref 35–104)
ALT SERPL-CCNC: 34 U/L (ref 0–32)
ANION GAP SERPL CALCULATED.3IONS-SCNC: 13 MMOL/L (ref 7–16)
AST SERPL-CCNC: 25 U/L (ref 0–31)
BASOPHILS ABSOLUTE: 0.09 K/UL (ref 0–0.2)
BASOPHILS RELATIVE PERCENT: 1 % (ref 0–2)
BILIRUB SERPL-MCNC: 0.2 MG/DL (ref 0–1.2)
BILIRUBIN, URINE: NEGATIVE
BUN BLDV-MCNC: 12 MG/DL (ref 6–23)
CALCIUM SERPL-MCNC: 9.4 MG/DL (ref 8.6–10.2)
CHLORIDE BLD-SCNC: 107 MMOL/L (ref 98–107)
CHOLESTEROL, FASTING: 164 MG/DL
CO2: 24 MMOL/L (ref 22–29)
COLOR, UA: YELLOW
COMMENT: NORMAL
CREAT SERPL-MCNC: 0.8 MG/DL (ref 0.5–1)
EOSINOPHILS ABSOLUTE: 0.72 K/UL (ref 0.05–0.5)
EOSINOPHILS RELATIVE PERCENT: 7 % (ref 0–6)
FERRITIN: 25 NG/ML
FOLATE: 15.9 NG/ML (ref 4.8–24.2)
GFR, ESTIMATED: 76 ML/MIN/1.73M2
GLUCOSE BLD-MCNC: 123 MG/DL (ref 74–99)
GLUCOSE URINE: NEGATIVE MG/DL
HBA1C MFR BLD: 7.3 % (ref 4–5.6)
HCT VFR BLD CALC: 35 % (ref 34–48)
HDLC SERPL-MCNC: 66 MG/DL
HEMOGLOBIN: 11 G/DL (ref 11.5–15.5)
IMMATURE GRANULOCYTES %: 0 % (ref 0–5)
IMMATURE GRANULOCYTES ABSOLUTE: 0.03 K/UL (ref 0–0.58)
IRON % SATURATION: 21 % (ref 15–50)
IRON: 69 UG/DL (ref 37–145)
KETONES, URINE: NEGATIVE MG/DL
LDL CHOLESTEROL: 70 MG/DL
LEUKOCYTE ESTERASE, URINE: NEGATIVE
LYMPHOCYTES ABSOLUTE: 3.64 K/UL (ref 1.5–4)
LYMPHOCYTES RELATIVE PERCENT: 35 % (ref 20–42)
MAGNESIUM: 1.2 MG/DL (ref 1.6–2.6)
MCH RBC QN AUTO: 29.3 PG (ref 26–35)
MCHC RBC AUTO-ENTMCNC: 31.4 G/DL (ref 32–34.5)
MCV RBC AUTO: 93.1 FL (ref 80–99.9)
MONOCYTES ABSOLUTE: 0.76 K/UL (ref 0.1–0.95)
MONOCYTES RELATIVE PERCENT: 7 % (ref 2–12)
NEUTROPHILS ABSOLUTE: 5.2 K/UL (ref 1.8–7.3)
NEUTROPHILS RELATIVE PERCENT: 50 % (ref 43–80)
NITRITE, URINE: NEGATIVE
PDW BLD-RTO: 12.6 % (ref 11.5–15)
PH, URINE: 5.5 (ref 5–9)
PLATELET # BLD: 429 K/UL (ref 130–450)
PMV BLD AUTO: 9.7 FL (ref 7–12)
POTASSIUM SERPL-SCNC: 5 MMOL/L (ref 3.5–5)
PROTEIN UA: NEGATIVE MG/DL
RBC # BLD: 3.76 M/UL (ref 3.5–5.5)
SODIUM BLD-SCNC: 144 MMOL/L (ref 132–146)
SPECIFIC GRAVITY UA: 1.01 (ref 1–1.03)
TOTAL IRON BINDING CAPACITY: 323 UG/DL (ref 250–450)
TOTAL PROTEIN: 6.5 G/DL (ref 6.4–8.3)
TRIGLYCERIDE, FASTING: 141 MG/DL
TSH SERPL DL<=0.05 MIU/L-ACNC: 4.85 UIU/ML (ref 0.27–4.2)
TURBIDITY: CLEAR
URINE HGB: NEGATIVE
UROBILINOGEN, URINE: 0.2 EU/DL (ref 0–1)
VITAMIN B-12: >2000 PG/ML (ref 211–946)
VITAMIN D 25-HYDROXY: 47.9 NG/ML (ref 30–100)
VLDLC SERPL CALC-MCNC: 28 MG/DL
WBC # BLD: 10.4 K/UL (ref 4.5–11.5)

## 2024-12-14 ENCOUNTER — TELEPHONE (OUTPATIENT)
Dept: FAMILY MEDICINE CLINIC | Age: 74
End: 2024-12-14

## 2024-12-14 LAB
CREATININE URINE: 72.6 MG/DL (ref 29–226)
MICROALBUMIN/CREAT 24H UR: 35 MG/L (ref 0–19)
MICROALBUMIN/CREAT UR-RTO: 49 MCG/MG CREAT (ref 0–30)

## 2024-12-14 NOTE — TELEPHONE ENCOUNTER
Please let the patient know that blood work results showed    Sugar was elevated.  Hemoglobin A1c is a measure 3-month sugar control was still borderline elevated above goal but improved now at 7.3.     Thyroid function test indicated by precursor TSH suggested slight underactive but appeared similar when compared to previous    Magnesium level was low and would recommend taking magnesium supplement if not already taking    Potassium level was normal but on the upper end of normal this could be related to medications diet and/or lab error, recommend low potassium food diet    1 liver function test was borderline elevated of uncertain cause and considered borderline.  Other liver functions were normal    Blood counts showed slight anemia.  Other blood counts were normal    Iron levels including storage form ferritin were in normal range    Cholesterol levels were improved when compared to previous    Vitamin D levels were normal    Vitamin B12 and folic acid levels were normal    Urine analysis showed slight microscopic protein but otherwise was normal    Other electrolytes and kidney function values were normal    Thanks

## 2025-01-06 ENCOUNTER — OFFICE VISIT (OUTPATIENT)
Dept: FAMILY MEDICINE CLINIC | Age: 75
End: 2025-01-06
Payer: MEDICARE

## 2025-01-06 ENCOUNTER — TELEPHONE (OUTPATIENT)
Dept: FAMILY MEDICINE CLINIC | Age: 75
End: 2025-01-06

## 2025-01-06 VITALS
TEMPERATURE: 98.4 F | HEART RATE: 79 BPM | BODY MASS INDEX: 33.49 KG/M2 | HEIGHT: 63 IN | DIASTOLIC BLOOD PRESSURE: 60 MMHG | OXYGEN SATURATION: 98 % | SYSTOLIC BLOOD PRESSURE: 130 MMHG | RESPIRATION RATE: 18 BRPM | WEIGHT: 189 LBS

## 2025-01-06 DIAGNOSIS — I10 ESSENTIAL HYPERTENSION: ICD-10-CM

## 2025-01-06 DIAGNOSIS — K21.9 GASTROESOPHAGEAL REFLUX DISEASE WITHOUT ESOPHAGITIS: ICD-10-CM

## 2025-01-06 DIAGNOSIS — Z79.4 TYPE 2 DIABETES MELLITUS WITH BOTH EYES AFFECTED BY RETINOPATHY WITHOUT MACULAR EDEMA, WITH LONG-TERM CURRENT USE OF INSULIN, UNSPECIFIED RETINOPATHY SEVERITY (HCC): Primary | ICD-10-CM

## 2025-01-06 DIAGNOSIS — E87.5 HYPERKALEMIA: ICD-10-CM

## 2025-01-06 DIAGNOSIS — R31.29 MICROHEMATURIA: ICD-10-CM

## 2025-01-06 DIAGNOSIS — Z79.4 TYPE 2 DIABETES MELLITUS WITH HYPERGLYCEMIA, WITH LONG-TERM CURRENT USE OF INSULIN (HCC): ICD-10-CM

## 2025-01-06 DIAGNOSIS — I65.23 BILATERAL CAROTID ARTERY STENOSIS: ICD-10-CM

## 2025-01-06 DIAGNOSIS — D64.9 ANEMIA, UNSPECIFIED TYPE: ICD-10-CM

## 2025-01-06 DIAGNOSIS — E11.65 TYPE 2 DIABETES MELLITUS WITH HYPERGLYCEMIA, WITH LONG-TERM CURRENT USE OF INSULIN (HCC): ICD-10-CM

## 2025-01-06 DIAGNOSIS — R79.89 ELEVATED LFTS: ICD-10-CM

## 2025-01-06 DIAGNOSIS — E83.42 HYPOMAGNESEMIA: ICD-10-CM

## 2025-01-06 DIAGNOSIS — E11.319 TYPE 2 DIABETES MELLITUS WITH BOTH EYES AFFECTED BY RETINOPATHY WITHOUT MACULAR EDEMA, WITH LONG-TERM CURRENT USE OF INSULIN, UNSPECIFIED RETINOPATHY SEVERITY (HCC): Primary | ICD-10-CM

## 2025-01-06 DIAGNOSIS — G47.33 OSA (OBSTRUCTIVE SLEEP APNEA): ICD-10-CM

## 2025-01-06 DIAGNOSIS — E55.9 VITAMIN D DEFICIENCY: ICD-10-CM

## 2025-01-06 DIAGNOSIS — E78.2 MIXED HYPERLIPIDEMIA: ICD-10-CM

## 2025-01-06 DIAGNOSIS — Z12.11 SCREENING FOR MALIGNANT NEOPLASM OF COLON: Primary | ICD-10-CM

## 2025-01-06 DIAGNOSIS — G47.09 OTHER INSOMNIA: ICD-10-CM

## 2025-01-06 PROCEDURE — 1159F MED LIST DOCD IN RCRD: CPT | Performed by: INTERNAL MEDICINE

## 2025-01-06 PROCEDURE — 3078F DIAST BP <80 MM HG: CPT | Performed by: INTERNAL MEDICINE

## 2025-01-06 PROCEDURE — 1160F RVW MEDS BY RX/DR IN RCRD: CPT | Performed by: INTERNAL MEDICINE

## 2025-01-06 PROCEDURE — 99214 OFFICE O/P EST MOD 30 MIN: CPT | Performed by: INTERNAL MEDICINE

## 2025-01-06 PROCEDURE — 1123F ACP DISCUSS/DSCN MKR DOCD: CPT | Performed by: INTERNAL MEDICINE

## 2025-01-06 PROCEDURE — 3075F SYST BP GE 130 - 139MM HG: CPT | Performed by: INTERNAL MEDICINE

## 2025-01-06 RX ORDER — CALCIUM CARBONATE 300MG(750)
400 TABLET,CHEWABLE ORAL 2 TIMES DAILY
Qty: 180 TABLET | Refills: 1 | Status: SHIPPED | OUTPATIENT
Start: 2025-01-06

## 2025-01-06 RX ORDER — INSULIN LISPRO 100 [IU]/ML
INJECTION, SOLUTION INTRAVENOUS; SUBCUTANEOUS
Qty: 6 ADJUSTABLE DOSE PRE-FILLED PEN SYRINGE | Refills: 1 | Status: SHIPPED | OUTPATIENT
Start: 2025-01-06

## 2025-01-06 RX ORDER — ATORVASTATIN CALCIUM 40 MG/1
40 TABLET, FILM COATED ORAL DAILY
Qty: 90 TABLET | Refills: 1 | Status: SHIPPED | OUTPATIENT
Start: 2025-01-06

## 2025-01-06 RX ORDER — ZOLPIDEM TARTRATE 10 MG/1
10 TABLET ORAL NIGHTLY PRN
Qty: 90 TABLET | Refills: 0 | Status: SHIPPED | OUTPATIENT
Start: 2025-01-06 | End: 2025-04-06

## 2025-01-06 ASSESSMENT — ENCOUNTER SYMPTOMS
COUGH: 0
CONSTIPATION: 0
VOMITING: 0
CHEST TIGHTNESS: 0
SORE THROAT: 0
RHINORRHEA: 0
NAUSEA: 0
SHORTNESS OF BREATH: 1
DIARRHEA: 0
EYE PAIN: 0
ABDOMINAL PAIN: 0
BLOOD IN STOOL: 0

## 2025-01-06 NOTE — PROGRESS NOTES
Monitoring:    Acute and Chronic Pain Monitoring:   RX Monitoring Periodic Controlled Substance Monitoring   1/6/2025   9:49 AM Possible medication side effects, risk of tolerance/dependence & alternative treatments discussed.;No signs of potential drug abuse or diversion identified.       Class 1 obesity due to excess calories without serious comorbidity with body mass index (BMI) of 33.0 to 33.9 in adult  - watch diet   - discussed diet and exercises   - failed ozempic and trulicity  - Stopped mounjaro - did not lose weight     Vitamin D deficiency  - on otc supplement   - follow labs     Hypomagnesemia  - on otc supplement   - follow labs   - increased magnesium supplement to twice a day   - states self stopped medciation (6/2024)   - last lab (12/2024) - still low   - restart supplement (1/2025)     Elevated LFTs  - uncertain etiology at present   - recheck labs - last lab (6/2024) - Stable    Murmur  - has seen cardiology (5/2021)   - had echo (8/2021) - ef 60-65%, no wall motion abnormalities, trace MR     Carotid artery disease, unspecified laterality, unspecified type (HCC)  - US carotid (5/2021) - 50-69% stenosis b/l    - last US carotid (2023)   - declines recheck 1/6/2025    Carpal tunnel syndrome of right wrist  - uncertain etiology   - EMG (12/21) - A chronic right ulnar neuropathy.  A chronic right median mononeuropathy at the wrist (I.e., carpal tunnel syndrome) versus a chronic right C8/T1 cervical radiculopathy cannot be excluded underlying the above   - op (5/22) -right carpal tunnel release right ulnar nerve decompression at elbow right wrist triangular fibrocartilage complex injection right index proximal interphalangeal joint injection    Hip pain  - declines toradol    - declines xray   - chiropractor     Anxiety   - situational   - xanax prn travel - refill (9/2024)     Return in about 3 months (around 4/6/2025) for check up and review.    No orders of the defined types were placed in this

## 2025-01-06 NOTE — TELEPHONE ENCOUNTER
Orders Placed This Encounter   Procedures    POCT Fecal Immunochemical Test (FIT)     Standing Status:   Future     Standing Expiration Date:   1/6/2026     Order placed

## 2025-01-08 ENCOUNTER — FOLLOWUP TELEPHONE ENCOUNTER (OUTPATIENT)
Dept: ENDOCRINOLOGY | Age: 75
End: 2025-01-08

## 2025-01-08 ENCOUNTER — OFFICE VISIT (OUTPATIENT)
Dept: ENDOCRINOLOGY | Age: 75
End: 2025-01-08
Payer: MEDICARE

## 2025-01-08 VITALS
HEIGHT: 63 IN | HEART RATE: 82 BPM | TEMPERATURE: 98.6 F | SYSTOLIC BLOOD PRESSURE: 146 MMHG | RESPIRATION RATE: 18 BRPM | WEIGHT: 188.8 LBS | BODY MASS INDEX: 33.45 KG/M2 | DIASTOLIC BLOOD PRESSURE: 82 MMHG | OXYGEN SATURATION: 95 %

## 2025-01-08 DIAGNOSIS — E78.2 MIXED HYPERLIPIDEMIA: ICD-10-CM

## 2025-01-08 DIAGNOSIS — E11.65 TYPE 2 DIABETES MELLITUS WITH HYPERGLYCEMIA, WITH LONG-TERM CURRENT USE OF INSULIN (HCC): Primary | ICD-10-CM

## 2025-01-08 DIAGNOSIS — E66.09 CLASS 1 OBESITY DUE TO EXCESS CALORIES WITH SERIOUS COMORBIDITY AND BODY MASS INDEX (BMI) OF 33.0 TO 33.9 IN ADULT: ICD-10-CM

## 2025-01-08 DIAGNOSIS — Z79.4 TYPE 2 DIABETES MELLITUS WITH HYPERGLYCEMIA, WITH LONG-TERM CURRENT USE OF INSULIN (HCC): Primary | ICD-10-CM

## 2025-01-08 DIAGNOSIS — E11.29 TYPE 2 DIABETES MELLITUS WITH DIABETIC MICROALBUMINURIA, WITH LONG-TERM CURRENT USE OF INSULIN (HCC): ICD-10-CM

## 2025-01-08 DIAGNOSIS — Z79.4 TYPE 2 DIABETES MELLITUS WITH DIABETIC MICROALBUMINURIA, WITH LONG-TERM CURRENT USE OF INSULIN (HCC): ICD-10-CM

## 2025-01-08 DIAGNOSIS — R80.9 TYPE 2 DIABETES MELLITUS WITH DIABETIC MICROALBUMINURIA, WITH LONG-TERM CURRENT USE OF INSULIN (HCC): ICD-10-CM

## 2025-01-08 DIAGNOSIS — E55.9 VITAMIN D DEFICIENCY: ICD-10-CM

## 2025-01-08 DIAGNOSIS — E66.811 CLASS 1 OBESITY DUE TO EXCESS CALORIES WITH SERIOUS COMORBIDITY AND BODY MASS INDEX (BMI) OF 33.0 TO 33.9 IN ADULT: ICD-10-CM

## 2025-01-08 PROCEDURE — 99205 OFFICE O/P NEW HI 60 MIN: CPT | Performed by: FAMILY MEDICINE

## 2025-01-08 PROCEDURE — 3079F DIAST BP 80-89 MM HG: CPT | Performed by: FAMILY MEDICINE

## 2025-01-08 PROCEDURE — 3077F SYST BP >= 140 MM HG: CPT | Performed by: FAMILY MEDICINE

## 2025-01-08 PROCEDURE — 1123F ACP DISCUSS/DSCN MKR DOCD: CPT | Performed by: FAMILY MEDICINE

## 2025-01-08 PROCEDURE — 1159F MED LIST DOCD IN RCRD: CPT | Performed by: FAMILY MEDICINE

## 2025-01-08 RX ORDER — HYDROCHLOROTHIAZIDE 12.5 MG/1
CAPSULE ORAL
Qty: 6 EACH | Refills: 3 | Status: SHIPPED | OUTPATIENT
Start: 2025-01-08

## 2025-01-08 NOTE — PROGRESS NOTES
Adjustable Dose Pre-filled Pen Syringe 1    Magnesium 400 MG TABS Take 400 mg by mouth 2 times daily 180 tablet 1    metFORMIN (GLUCOPHAGE) 1000 MG tablet Take 1 tablet by mouth 2 times daily (with meals) 180 tablet 1    zolpidem (AMBIEN) 10 MG tablet Take 1 tablet by mouth nightly as needed for Sleep for up to 90 days. 90 tablet 0    insulin glargine (LANTUS SOLOSTAR) 100 UNIT/ML injection pen Inject 60 Units into the skin every morning (Patient taking differently: Inject 60 Units into the skin every morning 65 units in am) 15 Adjustable Dose Pre-filled Pen Syringe 1    vitamin D (ERGOCALCIFEROL) 1.25 MG (78881 UT) CAPS capsule Take 1 capsule by mouth once a week      omeprazole (PRILOSEC) 20 MG delayed release capsule Take 1 capsule by mouth daily      Continuous Glucose Sensor (DEXCOM G7 SENSOR) MISC Use as directed (Patient not taking: Reported on 1/8/2025) 3 each 11    Continuous Glucose  (DEXCOM G7 ) [facility] Us as directed (Patient not taking: Reported on 1/8/2025) 1 each 0    lisinopril (PRINIVIL;ZESTRIL) 20 MG tablet Take 1 tablet by mouth daily 90 tablet 3    aspirin 81 MG EC tablet Take 1 tablet by mouth daily      Insulin Pen Needle 31G X 5 MM MISC 1 each by Does not apply route daily       No current facility-administered medications for this visit.       Review of Systems  Constitutional: No fever, no chills, no diaphoresis, no generalized weakness.  HEENT: No blurred vision, No sore throat, no ear pain, no hair loss  Neck: denied any neck swelling, difficulty swallowing,   Cardio-pulmonary: No CP, SOB or palpitation, No orthopnea or PND. No cough or wheezing.  GI: No N/V/D, no constipation, No abdominal pain, no melena or hematochezia   : Denied any dysuria, hematuria, flank pain, discharge, or incontinence.   Skin: denied any rash, ulcer, Hirsute, or hyperpigmentation.   MSK: denied any joint deformity, joint pain/swelling, muscle pain, or back pain.  Neuro: no numbness, no tingling, no

## 2025-01-08 NOTE — PATIENT INSTRUCTIONS
Lantus 55 units daily    Humalog 8 units three times per day with meals plus sliding scale  -200 add 2 Units, -250 add 4 Units , -300 add 6 Units, -350 add 8 Units , -400 add 10 Units , BS over 400 add 12 units      Call for weekly Paolo download for insulin adjustments

## 2025-01-13 ENCOUNTER — OFFICE VISIT (OUTPATIENT)
Dept: CHIROPRACTIC MEDICINE | Age: 75
End: 2025-01-13
Payer: MEDICARE

## 2025-01-13 VITALS
HEART RATE: 83 BPM | DIASTOLIC BLOOD PRESSURE: 74 MMHG | BODY MASS INDEX: 33.31 KG/M2 | WEIGHT: 188 LBS | SYSTOLIC BLOOD PRESSURE: 124 MMHG | TEMPERATURE: 97.3 F | HEIGHT: 63 IN | OXYGEN SATURATION: 97 %

## 2025-01-13 DIAGNOSIS — G89.29 CHRONIC RIGHT-SIDED LOW BACK PAIN WITH RIGHT-SIDED SCIATICA: ICD-10-CM

## 2025-01-13 DIAGNOSIS — M53.3 SACROCOCCYGEAL DISORDERS, NOT ELSEWHERE CLASSIFIED: ICD-10-CM

## 2025-01-13 DIAGNOSIS — M99.05 SEGMENTAL AND SOMATIC DYSFUNCTION OF PELVIC REGION: ICD-10-CM

## 2025-01-13 DIAGNOSIS — M54.41 CHRONIC RIGHT-SIDED LOW BACK PAIN WITH RIGHT-SIDED SCIATICA: ICD-10-CM

## 2025-01-13 DIAGNOSIS — M99.03 SEGMENTAL AND SOMATIC DYSFUNCTION OF LUMBAR REGION: Primary | ICD-10-CM

## 2025-01-13 PROCEDURE — 98940 CHIROPRACT MANJ 1-2 REGIONS: CPT | Performed by: CHIROPRACTOR

## 2025-01-13 NOTE — PROGRESS NOTES
Patient is here for follow up back. Tae Husain MD  Electronically signed by Angela Figueroa LPN on 1/13/2025 at 10:58 AM    
tender fibers noted with palpation in the paraspinal muscles of the lumbar region. Joint fixation is noted with motion screening at L3-4, bilateral SI joints.    Beth was seen today for back pain.    Diagnoses and all orders for this visit:    Segmental and somatic dysfunction of lumbar region    Segmental and somatic dysfunction of pelvic region    Sacrococcygeal disorders, not elsewhere classified    Chronic right-sided low back pain with right-sided sciatica        Treatment Plan:    Vibratory massage to low back today for approximately 2 minutes. Then, Berry flexion distraction manipulation at L 3, protocol 2. Mechanically assisted manipulation to the SI joints. Tolerated well. I will see her back as needed for further care       Seen By:  Ravin Malave, DC

## 2025-01-15 ENCOUNTER — TELEPHONE (OUTPATIENT)
Dept: FAMILY MEDICINE CLINIC | Age: 75
End: 2025-01-15

## 2025-01-15 DIAGNOSIS — Z79.4 TYPE 2 DIABETES MELLITUS WITH HYPERGLYCEMIA, WITH LONG-TERM CURRENT USE OF INSULIN (HCC): ICD-10-CM

## 2025-01-15 DIAGNOSIS — E11.65 TYPE 2 DIABETES MELLITUS WITH HYPERGLYCEMIA, WITH LONG-TERM CURRENT USE OF INSULIN (HCC): ICD-10-CM

## 2025-01-15 NOTE — TELEPHONE ENCOUNTER
Patient calling in stating that her insurance filled insulin lispro this one time but for future refills must be sent in as Humalog.

## 2025-01-22 DIAGNOSIS — Z79.4 TYPE 2 DIABETES MELLITUS WITH HYPERGLYCEMIA, WITH LONG-TERM CURRENT USE OF INSULIN (HCC): Primary | ICD-10-CM

## 2025-01-22 DIAGNOSIS — E11.65 TYPE 2 DIABETES MELLITUS WITH HYPERGLYCEMIA, WITH LONG-TERM CURRENT USE OF INSULIN (HCC): Primary | ICD-10-CM

## 2025-01-22 NOTE — TELEPHONE ENCOUNTER
Yes.  If she has coverage for Dexcom, we can try that.  Otherwise, finger sticking is fine before meals and at bedtime

## 2025-01-23 RX ORDER — ACYCLOVIR 400 MG/1
TABLET ORAL
Qty: 3 EACH | Refills: 3 | Status: SHIPPED | OUTPATIENT
Start: 2025-01-23

## 2025-01-23 RX ORDER — ACYCLOVIR 400 MG/1
TABLET ORAL
COMMUNITY
End: 2025-01-23 | Stop reason: SDUPTHER

## 2025-01-27 NOTE — TELEPHONE ENCOUNTER
The insurance plan called to stste the dexcom needed a prior auth through [employer].     I attempted the PA through [employer] on covermymeds and it comes back that it is a plan exclusion.

## 2025-03-03 ENCOUNTER — TELEPHONE (OUTPATIENT)
Dept: FAMILY MEDICINE CLINIC | Age: 75
End: 2025-03-03

## 2025-03-03 NOTE — TELEPHONE ENCOUNTER
Left message asking patient to call the office back.  I found 5 boxes of Ozempic in our fridge for her from patient assistance that we received back in August.  I don't see this medication on her med list.  Is she taking it?  If so, she can come pick it up.  If not, we will need to dispose of it.

## 2025-03-13 ENCOUNTER — OFFICE VISIT (OUTPATIENT)
Dept: CHIROPRACTIC MEDICINE | Age: 75
End: 2025-03-13

## 2025-03-13 ENCOUNTER — TELEPHONE (OUTPATIENT)
Dept: SURGERY | Age: 75
End: 2025-03-13

## 2025-03-13 VITALS
WEIGHT: 188 LBS | OXYGEN SATURATION: 96 % | HEART RATE: 73 BPM | DIASTOLIC BLOOD PRESSURE: 70 MMHG | BODY MASS INDEX: 33.31 KG/M2 | TEMPERATURE: 97.6 F | SYSTOLIC BLOOD PRESSURE: 130 MMHG | HEIGHT: 63 IN

## 2025-03-13 DIAGNOSIS — M53.3 SACROCOCCYGEAL DISORDERS, NOT ELSEWHERE CLASSIFIED: ICD-10-CM

## 2025-03-13 DIAGNOSIS — M99.03 SEGMENTAL AND SOMATIC DYSFUNCTION OF LUMBAR REGION: Primary | ICD-10-CM

## 2025-03-13 DIAGNOSIS — M54.41 CHRONIC RIGHT-SIDED LOW BACK PAIN WITH RIGHT-SIDED SCIATICA: ICD-10-CM

## 2025-03-13 DIAGNOSIS — G89.29 CHRONIC RIGHT-SIDED LOW BACK PAIN WITH RIGHT-SIDED SCIATICA: ICD-10-CM

## 2025-03-13 DIAGNOSIS — M99.05 SEGMENTAL AND SOMATIC DYSFUNCTION OF PELVIC REGION: ICD-10-CM

## 2025-03-13 NOTE — TELEPHONE ENCOUNTER
Left patient message to call and reschedule colonoscopy or let us know if she was doing Fit test that was ordered by Dr Husain in January.

## 2025-03-13 NOTE — PROGRESS NOTES
Patient is here for follow up lower back into right hip. Tae Husain MD  Electronically signed by Angela Figueroa LPN on 3/13/2025 at 11:28 AM

## 2025-03-13 NOTE — PROGRESS NOTES
3/13/25  Beth John : 1950 Sex: female  Age: 74 y.o.    Chief Complaint   Patient presents with    Back Pain       HPI:   1 week - worsening of R sided LBP to the R thigh.  No injury.    Hasn't done anything for it other than OTC ibuprofen.    Bothers her at night at rest.  Sleeping on R side especially  Better when up and moving during the day.       Current Outpatient Medications:     Continuous Glucose Sensor (DEXCOM G7 SENSOR) MISC, Change every 10 days, Disp: 3 each, Rfl: 3    Continuous Glucose Sensor (FREESTYLE BYRON 3 PLUS SENSOR) MISC, Change every 15 days, Disp: 6 each, Rfl: 3    atorvastatin (LIPITOR) 40 MG tablet, Take 1 tablet by mouth daily, Disp: 90 tablet, Rfl: 1    insulin lispro, 1 Unit Dial, (HUMALOG KWIKPEN) 100 UNIT/ML SOPN, Inject up to 12 units QAC TID (Patient taking differently: Indications: As of 1/15/25, future refills will need to be sent in as Humalog per patient's insurance Inject up to 12 units QAC TID), Disp: 6 Adjustable Dose Pre-filled Pen Syringe, Rfl: 1    Magnesium 400 MG TABS, Take 400 mg by mouth 2 times daily, Disp: 180 tablet, Rfl: 1    metFORMIN (GLUCOPHAGE) 1000 MG tablet, Take 1 tablet by mouth 2 times daily (with meals), Disp: 180 tablet, Rfl: 1    zolpidem (AMBIEN) 10 MG tablet, Take 1 tablet by mouth nightly as needed for Sleep for up to 90 days., Disp: 90 tablet, Rfl: 0    insulin glargine (LANTUS SOLOSTAR) 100 UNIT/ML injection pen, Inject 60 Units into the skin every morning (Patient taking differently: Inject 60 Units into the skin every morning 65 units in am), Disp: 15 Adjustable Dose Pre-filled Pen Syringe, Rfl: 1    vitamin D (ERGOCALCIFEROL) 1.25 MG (64577 UT) CAPS capsule, Take 1 capsule by mouth once a week, Disp: , Rfl:     omeprazole (PRILOSEC) 20 MG delayed release capsule, Take 1 capsule by mouth daily, Disp: , Rfl:     lisinopril (PRINIVIL;ZESTRIL) 20 MG tablet, Take 1 tablet by mouth daily, Disp: 90 tablet, Rfl: 3    aspirin 81 MG EC

## 2025-03-20 ENCOUNTER — OFFICE VISIT (OUTPATIENT)
Dept: CHIROPRACTIC MEDICINE | Age: 75
End: 2025-03-20

## 2025-03-20 VITALS
WEIGHT: 188 LBS | TEMPERATURE: 97.5 F | HEIGHT: 63 IN | DIASTOLIC BLOOD PRESSURE: 64 MMHG | HEART RATE: 94 BPM | SYSTOLIC BLOOD PRESSURE: 122 MMHG | BODY MASS INDEX: 33.31 KG/M2 | OXYGEN SATURATION: 100 %

## 2025-03-20 DIAGNOSIS — M54.41 CHRONIC RIGHT-SIDED LOW BACK PAIN WITH RIGHT-SIDED SCIATICA: ICD-10-CM

## 2025-03-20 DIAGNOSIS — M53.3 SACROCOCCYGEAL DISORDERS, NOT ELSEWHERE CLASSIFIED: ICD-10-CM

## 2025-03-20 DIAGNOSIS — G89.29 CHRONIC RIGHT-SIDED LOW BACK PAIN WITH RIGHT-SIDED SCIATICA: ICD-10-CM

## 2025-03-20 DIAGNOSIS — M99.03 SEGMENTAL AND SOMATIC DYSFUNCTION OF LUMBAR REGION: Primary | ICD-10-CM

## 2025-03-20 DIAGNOSIS — M99.05 SEGMENTAL AND SOMATIC DYSFUNCTION OF PELVIC REGION: ICD-10-CM

## 2025-03-20 NOTE — PROGRESS NOTES
Patient is here for follow up back into hip. Patient states improvement. Tae Husain MD  Electronically signed by Angela Figueroa LPN on 3/20/2025 at 11:03 AM    
MISC, 1 each by Does not apply route daily, Disp: , Rfl:     Exam:   Vitals:    03/20/25 1102   Pulse: 94   Temp: 97.5 °F (36.4 °C)   SpO2: 100%       There are hypertonic and tender fibers noted with palpation in the paraspinal muscles of the lumbar region. Joint fixation is noted with motion screening at L3-4, bilateral SI joints.    Beth was seen today for back pain.    Diagnoses and all orders for this visit:    Segmental and somatic dysfunction of lumbar region    Segmental and somatic dysfunction of pelvic region    Sacrococcygeal disorders, not elsewhere classified    Chronic right-sided low back pain with right-sided sciatica        Treatment Plan:    Vibratory massage to low back today for approximately 2 minutes. Then, Berry flexion distraction manipulation at L 3, protocol 2. Mechanically assisted manipulation to the SI joints. Tolerated well.  She understands I will be out of the office next week.  I will see her back PRN - she is leaving for [place] next week too      Seen By:  Ravin Malave DC

## 2025-03-21 RX ORDER — CALCIUM CITRATE/VITAMIN D3 200MG-6.25
1 TABLET ORAL 4 TIMES DAILY
Qty: 100 EACH | Refills: 3 | Status: SHIPPED | OUTPATIENT
Start: 2025-03-21

## 2025-03-21 NOTE — TELEPHONE ENCOUNTER
Beth calling for a new glucometer & test strips sent to [facility] in [place].    She asked for the Store Brand Meter and strips.  I do have this ordered for the [employer] Brand.      Last Appointment:  1/6/2025  Future Appointments   Date Time Provider Department Center   4/15/2025 10:30 AM Tae Husain MD [facility] DEP   5/7/2025 10:30 AM Sunshine Real APRN - CNP [facility]

## 2025-04-11 ENCOUNTER — TELEPHONE (OUTPATIENT)
Dept: FAMILY MEDICINE CLINIC | Age: 75
End: 2025-04-11

## 2025-04-11 NOTE — TELEPHONE ENCOUNTER
Can fill what is covered and what pharmacy has. Orders were sent over previously   Results reviewed  Jaclyn Childress MD

## 2025-04-11 NOTE — TELEPHONE ENCOUNTER
Patients insurance called. True Metrix is not covered by patients insurance. Free style Lyte or the One Touch Ultra are the only covered options. Patient needs meter, test strips and lancets.

## 2025-04-15 ENCOUNTER — OFFICE VISIT (OUTPATIENT)
Dept: FAMILY MEDICINE CLINIC | Age: 75
End: 2025-04-15
Payer: MEDICARE

## 2025-04-15 ENCOUNTER — TELEPHONE (OUTPATIENT)
Dept: FAMILY MEDICINE CLINIC | Age: 75
End: 2025-04-15

## 2025-04-15 VITALS
RESPIRATION RATE: 22 BRPM | DIASTOLIC BLOOD PRESSURE: 60 MMHG | BODY MASS INDEX: 33.84 KG/M2 | OXYGEN SATURATION: 97 % | WEIGHT: 191 LBS | TEMPERATURE: 98.1 F | SYSTOLIC BLOOD PRESSURE: 134 MMHG | HEIGHT: 63 IN | HEART RATE: 78 BPM

## 2025-04-15 VITALS
DIASTOLIC BLOOD PRESSURE: 60 MMHG | RESPIRATION RATE: 22 BRPM | HEIGHT: 63 IN | BODY MASS INDEX: 33.84 KG/M2 | HEART RATE: 78 BPM | TEMPERATURE: 98.1 F | OXYGEN SATURATION: 97 % | SYSTOLIC BLOOD PRESSURE: 134 MMHG | WEIGHT: 191 LBS

## 2025-04-15 DIAGNOSIS — F41.9 ANXIETY: ICD-10-CM

## 2025-04-15 DIAGNOSIS — G47.33 OSA (OBSTRUCTIVE SLEEP APNEA): ICD-10-CM

## 2025-04-15 DIAGNOSIS — E11.65 TYPE 2 DIABETES MELLITUS WITH HYPERGLYCEMIA, WITH LONG-TERM CURRENT USE OF INSULIN (HCC): ICD-10-CM

## 2025-04-15 DIAGNOSIS — R53.83 OTHER FATIGUE: ICD-10-CM

## 2025-04-15 DIAGNOSIS — Z79.4 TYPE 2 DIABETES MELLITUS WITH BOTH EYES AFFECTED BY RETINOPATHY WITHOUT MACULAR EDEMA, WITH LONG-TERM CURRENT USE OF INSULIN, UNSPECIFIED RETINOPATHY SEVERITY (HCC): Primary | ICD-10-CM

## 2025-04-15 DIAGNOSIS — D64.9 ANEMIA, UNSPECIFIED TYPE: ICD-10-CM

## 2025-04-15 DIAGNOSIS — Z79.899 LONG TERM CURRENT USE OF THERAPEUTIC DRUG: ICD-10-CM

## 2025-04-15 DIAGNOSIS — Z00.00 MEDICARE ANNUAL WELLNESS VISIT, SUBSEQUENT: Primary | ICD-10-CM

## 2025-04-15 DIAGNOSIS — G47.09 OTHER INSOMNIA: ICD-10-CM

## 2025-04-15 DIAGNOSIS — I10 ESSENTIAL HYPERTENSION: ICD-10-CM

## 2025-04-15 DIAGNOSIS — E83.42 HYPOMAGNESEMIA: ICD-10-CM

## 2025-04-15 DIAGNOSIS — Z79.4 TYPE 2 DIABETES MELLITUS WITH HYPERGLYCEMIA, WITH LONG-TERM CURRENT USE OF INSULIN (HCC): ICD-10-CM

## 2025-04-15 DIAGNOSIS — R79.89 ELEVATED LFTS: ICD-10-CM

## 2025-04-15 DIAGNOSIS — E78.2 MIXED HYPERLIPIDEMIA: ICD-10-CM

## 2025-04-15 DIAGNOSIS — Z12.11 SCREENING FOR MALIGNANT NEOPLASM OF COLON: ICD-10-CM

## 2025-04-15 DIAGNOSIS — I65.23 BILATERAL CAROTID ARTERY STENOSIS: ICD-10-CM

## 2025-04-15 DIAGNOSIS — E55.9 VITAMIN D DEFICIENCY: ICD-10-CM

## 2025-04-15 DIAGNOSIS — R31.29 MICROHEMATURIA: ICD-10-CM

## 2025-04-15 DIAGNOSIS — E87.5 HYPERKALEMIA: ICD-10-CM

## 2025-04-15 DIAGNOSIS — K21.9 GASTROESOPHAGEAL REFLUX DISEASE WITHOUT ESOPHAGITIS: ICD-10-CM

## 2025-04-15 DIAGNOSIS — E11.319 TYPE 2 DIABETES MELLITUS WITH BOTH EYES AFFECTED BY RETINOPATHY WITHOUT MACULAR EDEMA, WITH LONG-TERM CURRENT USE OF INSULIN, UNSPECIFIED RETINOPATHY SEVERITY (HCC): Primary | ICD-10-CM

## 2025-04-15 PROCEDURE — 3078F DIAST BP <80 MM HG: CPT | Performed by: INTERNAL MEDICINE

## 2025-04-15 PROCEDURE — 1160F RVW MEDS BY RX/DR IN RCRD: CPT | Performed by: INTERNAL MEDICINE

## 2025-04-15 PROCEDURE — 3075F SYST BP GE 130 - 139MM HG: CPT | Performed by: INTERNAL MEDICINE

## 2025-04-15 PROCEDURE — G0439 PPPS, SUBSEQ VISIT: HCPCS | Performed by: INTERNAL MEDICINE

## 2025-04-15 PROCEDURE — 1159F MED LIST DOCD IN RCRD: CPT | Performed by: INTERNAL MEDICINE

## 2025-04-15 PROCEDURE — 1123F ACP DISCUSS/DSCN MKR DOCD: CPT | Performed by: INTERNAL MEDICINE

## 2025-04-15 PROCEDURE — 99214 OFFICE O/P EST MOD 30 MIN: CPT | Performed by: INTERNAL MEDICINE

## 2025-04-15 RX ORDER — ZOLPIDEM TARTRATE 10 MG/1
10 TABLET ORAL NIGHTLY PRN
Qty: 90 TABLET | Refills: 0 | Status: SHIPPED | OUTPATIENT
Start: 2025-04-15 | End: 2025-07-14

## 2025-04-15 RX ORDER — LISINOPRIL 20 MG/1
20 TABLET ORAL DAILY
Qty: 90 TABLET | Refills: 0 | Status: SHIPPED | OUTPATIENT
Start: 2025-04-15

## 2025-04-15 RX ORDER — INSULIN GLARGINE 100 [IU]/ML
60 INJECTION, SOLUTION SUBCUTANEOUS EVERY MORNING
Qty: 18 ADJUSTABLE DOSE PRE-FILLED PEN SYRINGE | Refills: 0 | Status: SHIPPED | OUTPATIENT
Start: 2025-04-15

## 2025-04-15 RX ORDER — TIRZEPATIDE 5 MG/.5ML
INJECTION, SOLUTION SUBCUTANEOUS
COMMUNITY
Start: 2025-03-04

## 2025-04-15 SDOH — ECONOMIC STABILITY: FOOD INSECURITY: WITHIN THE PAST 12 MONTHS, THE FOOD YOU BOUGHT JUST DIDN'T LAST AND YOU DIDN'T HAVE MONEY TO GET MORE.: PATIENT DECLINED

## 2025-04-15 SDOH — ECONOMIC STABILITY: FOOD INSECURITY: WITHIN THE PAST 12 MONTHS, YOU WORRIED THAT YOUR FOOD WOULD RUN OUT BEFORE YOU GOT MONEY TO BUY MORE.: PATIENT DECLINED

## 2025-04-15 ASSESSMENT — ENCOUNTER SYMPTOMS
BLOOD IN STOOL: 0
DIARRHEA: 0
SORE THROAT: 0
VOMITING: 0
SHORTNESS OF BREATH: 1
NAUSEA: 0
CONSTIPATION: 0
ABDOMINAL PAIN: 0
RHINORRHEA: 0
EYE PAIN: 0
CHEST TIGHTNESS: 0
COUGH: 0

## 2025-04-15 ASSESSMENT — PATIENT HEALTH QUESTIONNAIRE - PHQ9
SUM OF ALL RESPONSES TO PHQ QUESTIONS 1-9: 0
2. FEELING DOWN, DEPRESSED OR HOPELESS: NOT AT ALL
1. LITTLE INTEREST OR PLEASURE IN DOING THINGS: NOT AT ALL

## 2025-04-15 NOTE — PROGRESS NOTES
[facility]   Internal Medicine     4/15/2025  Beth John : 1950 Sex: female  Age:74 y.o.    Chief Complaint   Patient presents with    Follow-up     Routine 3 month f/u     Fatigue     Lack of energy lately        HPI:   Patient presents to office for evaluation of the following medical concerns    Surg () - h/o colon polyps plan colonoscopy. Did not follow through due to hypoglycemia. Did not want to risk the prep. Would like to use FIT testing.     - States has lost hearing. States was seen by audiology. Found to have poss perforate dear drum. ENT marissa (10/23) -small retraction pocket on right tympanic membrane CT scan fluid medial to the malleus with no bone destruction follow-up 6 months if unchanged yearly audiograms    - States has diabetes. States trying to watch diet. States checking blood sugars at home , did have a few > 200. States occasional reported hypoglycemia.  - Stopped trulicity and ozempic (cost/not helping with weight loss).   - Stopped victoza.   - Stopped ozempic again due to nausea abdo discomfort.   - Stopped mounjaro - no reported side effects but has not lost weight   - States lantus 65 units daily, metformin 1000mg twice a day, added humalog slide scale.   - No longer following with endo.   - Last A1c was 7.3 (2024).   - Lab () urine albumin 35 borderline elevated  - Last visit with endocrinology (2025) - suggested decrease in lantus to 55units, humalog to 5 units three times a day  - States taking mounjaro for last 1 month   - States currently taking lantus 65units daily. States has slide scale if needed   - States will not be following with endocrinology at present 4/15/2025    - States has had some depression. Last PHQ score was 0 (4/15/2025). Discussed treatment - psychology and or medication - declines.   - Has anxiety and stress of driving on long trip to [place], was given alprazolam for trip. Did well with medication no side effects.

## 2025-04-15 NOTE — PROGRESS NOTES
Yes    Interventions:  Follows with audiology     Vision Screen:  Do you have difficulty driving, watching TV, or doing any of your daily activities because of your eyesight?: (!) Yes  Have you had an eye exam within the past year?: Yes  Interventions:   Patient encouraged to make appointment with their eye specialist      Advanced Directives:  Do you have a Living Will?: (!) No    Intervention:  has NO advanced directive - information provided                     Objective   Vitals:    04/15/25 1024   BP: 134/60   BP Site: Left Upper Arm   Patient Position: Sitting   BP Cuff Size: Large Adult   Pulse: 78   Resp: 22   Temp: 98.1 °F (36.7 °C)   TempSrc: Temporal   SpO2: 97%   Weight: 86.6 kg (191 lb)   Height: 1.6 m (5' 3\")      Body mass index is 33.83 kg/m².                No Known Allergies  Prior to Visit Medications    Medication Sig Taking? Authorizing Provider   MOUNJARO 5 MG/0.5ML SOAJ   ProviderShahid MD   zolpidem (AMBIEN) 10 MG tablet Take 1 tablet by mouth nightly as needed for Sleep for up to 90 days.  Tae Husain MD   lisinopril (PRINIVIL;ZESTRIL) 20 MG tablet Take 1 tablet by mouth daily  Tae Husain MD   insulin glargine (LANTUS SOLOSTAR) 100 UNIT/ML injection pen Inject 60 Units into the skin every morning 65 units in am  Tae Husain MD   blood glucose test strips (TRUE METRIX BLOOD GLUCOSE TEST) strip 1 each by In Vitro route 4 times daily As needed.  Tiffany An PA-C   Blood Glucose Monitoring Suppl (TRUE METRIX METER) w/Device KIT Use to test blood sugar 4 times daily  Tiffany An PA-C   Continuous Glucose Sensor (DEXCOM G7 SENSOR) MISC Change every 10 days  Sunshine Real APRN - CNP   Continuous Glucose Sensor (FREESTYLE BYRON 3 PLUS SENSOR) MISC Change every 15 days  Sunshine Real APRN - CNP   atorvastatin (LIPITOR) 40 MG tablet Take 1 tablet by mouth daily  Tae Husain MD   insulin lispro, 1 Unit Dial, (HUMALOG KWIKPEN) 100 UNIT/ML SOPN Inject up to 12

## 2025-04-15 NOTE — TELEPHONE ENCOUNTER
[facility] called for clarification of Lantus Rx:    Sig: Inject 60 Units into the skin every morning 65 units in am       Do you want the patient to inject 60 or 65 units in the morning?

## 2025-04-15 NOTE — PATIENT INSTRUCTIONS
wishes in writing, your loved ones and others will know what kind of care you want. They won't need to guess. This can ease your mind and be helpful to others. And you can change or cancel your living will at any time.  A living will is not the same as an estate or property will. An estate will explains what you want to happen with your money and property after you die.  How do you use it?  Keep these facts in mind about how a living will is used.  Your living will is used only if you can't speak or make decisions for yourself. Most often, one or more doctors must certify that you can't speak or decide for yourself before your living will takes effect.  If you get better and can speak for yourself again, you can accept or refuse any treatment. It doesn't matter what you said in your living will.  Some states may limit your right to refuse treatment in certain cases. For example, you may need to clearly state in your living will that you don't want artificial hydration and nutrition, such as being fed through a tube.  Is a living will a legal document?  A living will is a legal document. Each state has its own laws about living mahoney. And a living will may be called something else in your state.  Here are some things to know about living mahoney.  You don't need an  to complete a living will. But legal advice can be helpful if your state's laws are unclear. It can also help if your health history is complicated or your family can't agree on what should be in your living will.  You can change your living will at any time. Some people find that their wishes about end-of-life care change as their health changes. If you make big changes to your living will, complete a new form.  If you move to another state, make sure that your living will is legal in the state where you now live. In most cases, doctors will respect your wishes even if you have a form from a different state.  You might use a universal form that has

## 2025-04-23 ENCOUNTER — RESULTS FOLLOW-UP (OUTPATIENT)
Dept: PRIMARY CARE CLINIC | Age: 75
End: 2025-04-23

## 2025-04-23 NOTE — TELEPHONE ENCOUNTER
Please let the patient know that ultrasound of the carotid per radiology report showed    Left carotid artery showed narrowing still about 50 to 69% narrowing which appeared similar when compared to previous    Right carotid artery with still about the same at less than 50% narrowing    Would recommend follow-up again in 1 year or consideration of vascular surgery evaluation    Thanks

## 2025-04-29 LAB — NONINV COLON CA DNA+OCC BLD SCRN STL QL: NEGATIVE

## 2025-05-02 ENCOUNTER — TELEPHONE (OUTPATIENT)
Dept: PRIMARY CARE CLINIC | Age: 75
End: 2025-05-02

## 2025-05-02 NOTE — TELEPHONE ENCOUNTER
Please let the patient know that Cologuard results were considered negative,     Recommending follow-up examination in 3 years or colonoscopy or sooner evaluation if any new changes or concerns    Thanks

## 2025-05-15 ENCOUNTER — OFFICE VISIT (OUTPATIENT)
Dept: CHIROPRACTIC MEDICINE | Age: 75
End: 2025-05-15
Payer: MEDICARE

## 2025-05-15 VITALS
SYSTOLIC BLOOD PRESSURE: 128 MMHG | OXYGEN SATURATION: 96 % | BODY MASS INDEX: 33.84 KG/M2 | HEART RATE: 92 BPM | HEIGHT: 63 IN | TEMPERATURE: 97.7 F | WEIGHT: 191 LBS | DIASTOLIC BLOOD PRESSURE: 72 MMHG

## 2025-05-15 DIAGNOSIS — M53.3 SACROCOCCYGEAL DISORDERS, NOT ELSEWHERE CLASSIFIED: ICD-10-CM

## 2025-05-15 DIAGNOSIS — M54.41 CHRONIC RIGHT-SIDED LOW BACK PAIN WITH RIGHT-SIDED SCIATICA: ICD-10-CM

## 2025-05-15 DIAGNOSIS — M99.05 SEGMENTAL AND SOMATIC DYSFUNCTION OF PELVIC REGION: ICD-10-CM

## 2025-05-15 DIAGNOSIS — G89.29 CHRONIC RIGHT-SIDED LOW BACK PAIN WITH RIGHT-SIDED SCIATICA: ICD-10-CM

## 2025-05-15 DIAGNOSIS — M99.03 SEGMENTAL AND SOMATIC DYSFUNCTION OF LUMBAR REGION: Primary | ICD-10-CM

## 2025-05-15 PROCEDURE — 98940 CHIROPRACT MANJ 1-2 REGIONS: CPT | Performed by: CHIROPRACTOR

## 2025-05-15 NOTE — PROGRESS NOTES
5/15/25  Beth John : 1950 Sex: female  Age: 74 y.o.    Chief Complaint   Patient presents with    Back Pain       HPI:   On Saturday, she reportedly had an increase in her right-sided lower back pain traveling to the right lateral hip, lateral thigh.  No injury.  Does not know why it occurred.  Decided to get back in for some treatment.  She has seen her PCP since last visit with me.      Current Outpatient Medications:     MOUNJARO 5 MG/0.5ML SOAJ, , Disp: , Rfl:     zolpidem (AMBIEN) 10 MG tablet, Take 1 tablet by mouth nightly as needed for Sleep for up to 90 days., Disp: 90 tablet, Rfl: 0    lisinopril (PRINIVIL;ZESTRIL) 20 MG tablet, Take 1 tablet by mouth daily, Disp: 90 tablet, Rfl: 0    insulin glargine (LANTUS SOLOSTAR) 100 UNIT/ML injection pen, Inject 60 Units into the skin every morning 65 units in am, Disp: 18 Adjustable Dose Pre-filled Pen Syringe, Rfl: 0    blood glucose test strips (TRUE METRIX BLOOD GLUCOSE TEST) strip, 1 each by In Vitro route 4 times daily As needed., Disp: 100 each, Rfl: 3    Blood Glucose Monitoring Suppl (TRUE METRIX METER) w/Device KIT, Use to test blood sugar 4 times daily, Disp: 1 kit, Rfl: 0    Continuous Glucose Sensor (DEXCOM G7 SENSOR) MISC, Change every 10 days, Disp: 3 each, Rfl: 3    Continuous Glucose Sensor (FREESTYLE BYRON 3 PLUS SENSOR) MISC, Change every 15 days, Disp: 6 each, Rfl: 3    atorvastatin (LIPITOR) 40 MG tablet, Take 1 tablet by mouth daily, Disp: 90 tablet, Rfl: 1    insulin lispro, 1 Unit Dial, (HUMALOG KWIKPEN) 100 UNIT/ML SOPN, Inject up to 12 units QAC TID (Patient taking differently: Indications: As of 1/15/25, future refills will need to be sent in as Humalog per patient's insurance Inject up to 12 units QAC TID), Disp: 6 Adjustable Dose Pre-filled Pen Syringe, Rfl: 1    Magnesium 400 MG TABS, Take 400 mg by mouth 2 times daily, Disp: 180 tablet, Rfl: 1    metFORMIN (GLUCOPHAGE) 1000 MG tablet, Take 1 tablet by mouth 2 times daily

## 2025-05-15 NOTE — PROGRESS NOTES
Patient is here for follow up back. Tae Husain MD  Electronically signed by Angela Figueroa LPN on 5/15/2025 at 11:05 AM

## 2025-05-26 ENCOUNTER — APPOINTMENT (OUTPATIENT)
Dept: CT IMAGING | Age: 75
DRG: 391 | End: 2025-05-26
Payer: MEDICARE

## 2025-05-26 ENCOUNTER — APPOINTMENT (OUTPATIENT)
Dept: ULTRASOUND IMAGING | Age: 75
DRG: 391 | End: 2025-05-26
Payer: MEDICARE

## 2025-05-26 ENCOUNTER — APPOINTMENT (OUTPATIENT)
Dept: GENERAL RADIOLOGY | Age: 75
DRG: 391 | End: 2025-05-26
Payer: MEDICARE

## 2025-05-26 ENCOUNTER — HOSPITAL ENCOUNTER (INPATIENT)
Age: 75
LOS: 3 days | Discharge: HOME | DRG: 391 | End: 2025-05-29
Attending: STUDENT IN AN ORGANIZED HEALTH CARE EDUCATION/TRAINING PROGRAM | Admitting: STUDENT IN AN ORGANIZED HEALTH CARE EDUCATION/TRAINING PROGRAM
Payer: MEDICARE

## 2025-05-26 DIAGNOSIS — R53.83 OTHER FATIGUE: Primary | ICD-10-CM

## 2025-05-26 DIAGNOSIS — R06.00 DYSPNEA, UNSPECIFIED TYPE: ICD-10-CM

## 2025-05-26 DIAGNOSIS — K92.2 GASTROINTESTINAL HEMORRHAGE, UNSPECIFIED GASTROINTESTINAL HEMORRHAGE TYPE: ICD-10-CM

## 2025-05-26 DIAGNOSIS — D64.9 ANEMIA, UNSPECIFIED TYPE: ICD-10-CM

## 2025-05-26 DIAGNOSIS — K52.9 COLITIS: ICD-10-CM

## 2025-05-26 PROBLEM — E66.811 CLASS 1 OBESITY WITH SERIOUS COMORBIDITY AND BODY MASS INDEX (BMI) OF 33.0 TO 33.9 IN ADULT: Status: ACTIVE | Noted: 2019-12-18

## 2025-05-26 PROBLEM — N17.9 AKI (ACUTE KIDNEY INJURY): Status: ACTIVE | Noted: 2025-05-26

## 2025-05-26 LAB
ABO + RH BLD: NORMAL
ALBUMIN SERPL-MCNC: 3.7 G/DL (ref 3.5–5.2)
ALP SERPL-CCNC: 85 U/L (ref 35–104)
ALT SERPL-CCNC: 41 U/L (ref 0–32)
ANION GAP SERPL CALCULATED.3IONS-SCNC: 16 MMOL/L (ref 7–16)
ARM BAND NUMBER: NORMAL
AST SERPL-CCNC: 28 U/L (ref 0–31)
ATYPICAL LYMPHOCYTE ABSOLUTE COUNT: 0.88 K/UL (ref 0–0.46)
ATYPICAL LYMPHOCYTES: 4 % (ref 0–4)
BASOPHILS # BLD: 0.22 K/UL (ref 0–0.2)
BASOPHILS NFR BLD: 1 % (ref 0–2)
BILIRUB DIRECT SERPL-MCNC: <0.2 MG/DL (ref 0–0.3)
BILIRUB INDIRECT SERPL-MCNC: ABNORMAL MG/DL (ref 0–1)
BILIRUB SERPL-MCNC: 0.2 MG/DL (ref 0–1.2)
BILIRUB UR QL STRIP: NEGATIVE
BLOOD BANK SAMPLE EXPIRATION: NORMAL
BLOOD GROUP ANTIBODIES SERPL: NEGATIVE
BNP SERPL-MCNC: 69 PG/ML (ref 0–450)
BUN SERPL-MCNC: 24 MG/DL (ref 6–23)
CALCIUM SERPL-MCNC: 9.4 MG/DL (ref 8.6–10.2)
CHLORIDE SERPL-SCNC: 102 MMOL/L (ref 98–107)
CHP ED QC CHECK: YES
CK SERPL-CCNC: 72 U/L (ref 20–180)
CLARITY UR: CLEAR
CO2 SERPL-SCNC: 20 MMOL/L (ref 22–29)
COLOR UR: YELLOW
CREAT SERPL-MCNC: 1.2 MG/DL (ref 0.5–1)
EOSINOPHIL # BLD: 0 K/UL (ref 0.05–0.5)
EOSINOPHILS RELATIVE PERCENT: 0 % (ref 0–6)
ERYTHROCYTE [DISTWIDTH] IN BLOOD BY AUTOMATED COUNT: 13.2 % (ref 11.5–15)
GFR, ESTIMATED: 47 ML/MIN/1.73M2
GLUCOSE BLD-MCNC: 115 MG/DL (ref 74–99)
GLUCOSE BLD-MCNC: 176 MG/DL
GLUCOSE BLD-MCNC: 176 MG/DL (ref 74–99)
GLUCOSE BLD-MCNC: 177 MG/DL (ref 74–99)
GLUCOSE SERPL-MCNC: 240 MG/DL (ref 74–99)
GLUCOSE UR STRIP-MCNC: NEGATIVE MG/DL
HCT VFR BLD AUTO: 35.1 % (ref 34–48)
HCT VFR BLD AUTO: 36.6 % (ref 34–48)
HGB BLD-MCNC: 11.5 G/DL (ref 11.5–15.5)
HGB BLD-MCNC: 11.6 G/DL (ref 11.5–15.5)
HGB UR QL STRIP.AUTO: NEGATIVE
INFLUENZA A BY PCR: NOT DETECTED
INFLUENZA B BY PCR: NOT DETECTED
KETONES UR STRIP-MCNC: NEGATIVE MG/DL
LACTATE BLDV-SCNC: 3.4 MMOL/L (ref 0.5–2.2)
LEUKOCYTE ESTERASE UR QL STRIP: NEGATIVE
LIPASE SERPL-CCNC: 30 U/L (ref 13–60)
LYMPHOCYTES NFR BLD: 2.19 K/UL (ref 1.5–4)
LYMPHOCYTES RELATIVE PERCENT: 9 % (ref 20–42)
MCH RBC QN AUTO: 29.5 PG (ref 26–35)
MCHC RBC AUTO-ENTMCNC: 32.8 G/DL (ref 32–34.5)
MCV RBC AUTO: 90 FL (ref 80–99.9)
MONOCYTES NFR BLD: 0.44 K/UL (ref 0.1–0.95)
MONOCYTES NFR BLD: 2 % (ref 2–12)
NEUTROPHILS NFR BLD: 85 % (ref 43–80)
NEUTS SEG NFR BLD: 21.47 K/UL (ref 1.8–7.3)
NITRITE UR QL STRIP: NEGATIVE
PH UR STRIP: 6.5 [PH] (ref 5–8)
PLATELET # BLD AUTO: 392 K/UL (ref 130–450)
PMV BLD AUTO: 9.2 FL (ref 7–12)
POTASSIUM SERPL-SCNC: 4.2 MMOL/L (ref 3.5–5)
PROT SERPL-MCNC: 6.5 G/DL (ref 6.4–8.3)
PROT UR STRIP-MCNC: NEGATIVE MG/DL
RBC # BLD AUTO: 3.9 M/UL (ref 3.5–5.5)
RBC # BLD: ABNORMAL 10*6/UL
RBC #/AREA URNS HPF: ABNORMAL /HPF
SARS-COV-2 RDRP RESP QL NAA+PROBE: NOT DETECTED
SODIUM SERPL-SCNC: 138 MMOL/L (ref 132–146)
SP GR UR STRIP: <1.005 (ref 1–1.03)
SPECIMEN DESCRIPTION: NORMAL
TROPONIN I SERPL HS-MCNC: 15 NG/L (ref 0–14)
TROPONIN I SERPL HS-MCNC: 30 NG/L (ref 0–14)
URATE SERPL-MCNC: 6.4 MG/DL (ref 2.4–5.7)
UROBILINOGEN UR STRIP-ACNC: 0.2 EU/DL (ref 0–1)
WBC #/AREA URNS HPF: ABNORMAL /HPF
WBC OTHER # BLD: 25.2 K/UL (ref 4.5–11.5)

## 2025-05-26 PROCEDURE — 86900 BLOOD TYPING SEROLOGIC ABO: CPT

## 2025-05-26 PROCEDURE — 83605 ASSAY OF LACTIC ACID: CPT

## 2025-05-26 PROCEDURE — 2500000003 HC RX 250 WO HCPCS: Performed by: STUDENT IN AN ORGANIZED HEALTH CARE EDUCATION/TRAINING PROGRAM

## 2025-05-26 PROCEDURE — 74177 CT ABD & PELVIS W/CONTRAST: CPT

## 2025-05-26 PROCEDURE — 71275 CT ANGIOGRAPHY CHEST: CPT

## 2025-05-26 PROCEDURE — 6360000002 HC RX W HCPCS: Performed by: HOSPITALIST

## 2025-05-26 PROCEDURE — 82962 GLUCOSE BLOOD TEST: CPT

## 2025-05-26 PROCEDURE — 87502 INFLUENZA DNA AMP PROBE: CPT

## 2025-05-26 PROCEDURE — 96361 HYDRATE IV INFUSION ADD-ON: CPT

## 2025-05-26 PROCEDURE — G0378 HOSPITAL OBSERVATION PER HR: HCPCS

## 2025-05-26 PROCEDURE — 6360000002 HC RX W HCPCS: Performed by: STUDENT IN AN ORGANIZED HEALTH CARE EDUCATION/TRAINING PROGRAM

## 2025-05-26 PROCEDURE — 82248 BILIRUBIN DIRECT: CPT

## 2025-05-26 PROCEDURE — 93005 ELECTROCARDIOGRAM TRACING: CPT | Performed by: STUDENT IN AN ORGANIZED HEALTH CARE EDUCATION/TRAINING PROGRAM

## 2025-05-26 PROCEDURE — 85014 HEMATOCRIT: CPT

## 2025-05-26 PROCEDURE — 2500000003 HC RX 250 WO HCPCS: Performed by: HOSPITALIST

## 2025-05-26 PROCEDURE — 2580000003 HC RX 258: Performed by: HOSPITALIST

## 2025-05-26 PROCEDURE — 85025 COMPLETE CBC W/AUTO DIFF WBC: CPT

## 2025-05-26 PROCEDURE — 87449 NOS EACH ORGANISM AG IA: CPT

## 2025-05-26 PROCEDURE — 99223 1ST HOSP IP/OBS HIGH 75: CPT | Performed by: HOSPITALIST

## 2025-05-26 PROCEDURE — 81001 URINALYSIS AUTO W/SCOPE: CPT

## 2025-05-26 PROCEDURE — 6360000004 HC RX CONTRAST MEDICATION: Performed by: RADIOLOGY

## 2025-05-26 PROCEDURE — 94664 DEMO&/EVAL PT USE INHALER: CPT

## 2025-05-26 PROCEDURE — 83690 ASSAY OF LIPASE: CPT

## 2025-05-26 PROCEDURE — 87635 SARS-COV-2 COVID-19 AMP PRB: CPT

## 2025-05-26 PROCEDURE — 84550 ASSAY OF BLOOD/URIC ACID: CPT

## 2025-05-26 PROCEDURE — 80053 COMPREHEN METABOLIC PANEL: CPT

## 2025-05-26 PROCEDURE — 96365 THER/PROPH/DIAG IV INF INIT: CPT

## 2025-05-26 PROCEDURE — 76770 US EXAM ABDO BACK WALL COMP: CPT

## 2025-05-26 PROCEDURE — 82550 ASSAY OF CK (CPK): CPT

## 2025-05-26 PROCEDURE — 86901 BLOOD TYPING SEROLOGIC RH(D): CPT

## 2025-05-26 PROCEDURE — 96375 TX/PRO/DX INJ NEW DRUG ADDON: CPT

## 2025-05-26 PROCEDURE — 96372 THER/PROPH/DIAG INJ SC/IM: CPT

## 2025-05-26 PROCEDURE — 85018 HEMOGLOBIN: CPT

## 2025-05-26 PROCEDURE — 87324 CLOSTRIDIUM AG IA: CPT

## 2025-05-26 PROCEDURE — 6370000000 HC RX 637 (ALT 250 FOR IP): Performed by: STUDENT IN AN ORGANIZED HEALTH CARE EDUCATION/TRAINING PROGRAM

## 2025-05-26 PROCEDURE — 6370000000 HC RX 637 (ALT 250 FOR IP): Performed by: HOSPITALIST

## 2025-05-26 PROCEDURE — 71045 X-RAY EXAM CHEST 1 VIEW: CPT

## 2025-05-26 PROCEDURE — 86850 RBC ANTIBODY SCREEN: CPT

## 2025-05-26 PROCEDURE — 2580000003 HC RX 258: Performed by: STUDENT IN AN ORGANIZED HEALTH CARE EDUCATION/TRAINING PROGRAM

## 2025-05-26 PROCEDURE — 99285 EMERGENCY DEPT VISIT HI MDM: CPT

## 2025-05-26 PROCEDURE — 96376 TX/PRO/DX INJ SAME DRUG ADON: CPT

## 2025-05-26 PROCEDURE — 84484 ASSAY OF TROPONIN QUANT: CPT

## 2025-05-26 PROCEDURE — 83880 ASSAY OF NATRIURETIC PEPTIDE: CPT

## 2025-05-26 PROCEDURE — 96366 THER/PROPH/DIAG IV INF ADDON: CPT

## 2025-05-26 PROCEDURE — 87086 URINE CULTURE/COLONY COUNT: CPT

## 2025-05-26 RX ORDER — METRONIDAZOLE 500 MG/100ML
500 INJECTION, SOLUTION INTRAVENOUS ONCE
Status: COMPLETED | OUTPATIENT
Start: 2025-05-26 | End: 2025-05-26

## 2025-05-26 RX ORDER — ATORVASTATIN CALCIUM 40 MG/1
40 TABLET, FILM COATED ORAL DAILY
Status: DISCONTINUED | OUTPATIENT
Start: 2025-05-27 | End: 2025-05-29 | Stop reason: HOSPADM

## 2025-05-26 RX ORDER — ENOXAPARIN SODIUM 100 MG/ML
40 INJECTION SUBCUTANEOUS NIGHTLY
Status: DISCONTINUED | OUTPATIENT
Start: 2025-05-26 | End: 2025-05-27

## 2025-05-26 RX ORDER — VITAMIN B COMPLEX
1 CAPSULE ORAL DAILY
COMMUNITY

## 2025-05-26 RX ORDER — POLYETHYLENE GLYCOL 3350 17 G/17G
17 POWDER, FOR SOLUTION ORAL DAILY PRN
Status: DISCONTINUED | OUTPATIENT
Start: 2025-05-26 | End: 2025-05-29 | Stop reason: HOSPADM

## 2025-05-26 RX ORDER — POTASSIUM CHLORIDE 7.45 MG/ML
10 INJECTION INTRAVENOUS PRN
Status: DISCONTINUED | OUTPATIENT
Start: 2025-05-26 | End: 2025-05-29 | Stop reason: HOSPADM

## 2025-05-26 RX ORDER — SODIUM CHLORIDE 9 MG/ML
INJECTION, SOLUTION INTRAVENOUS PRN
Status: DISCONTINUED | OUTPATIENT
Start: 2025-05-26 | End: 2025-05-29 | Stop reason: HOSPADM

## 2025-05-26 RX ORDER — ONDANSETRON 2 MG/ML
4 INJECTION INTRAMUSCULAR; INTRAVENOUS ONCE
Status: COMPLETED | OUTPATIENT
Start: 2025-05-26 | End: 2025-05-26

## 2025-05-26 RX ORDER — ASPIRIN 81 MG/1
81 TABLET ORAL DAILY
Status: DISCONTINUED | OUTPATIENT
Start: 2025-05-26 | End: 2025-05-29 | Stop reason: HOSPADM

## 2025-05-26 RX ORDER — INSULIN GLARGINE 100 [IU]/ML
30 INJECTION, SOLUTION SUBCUTANEOUS DAILY
Status: DISCONTINUED | OUTPATIENT
Start: 2025-05-27 | End: 2025-05-29 | Stop reason: HOSPADM

## 2025-05-26 RX ORDER — IPRATROPIUM BROMIDE AND ALBUTEROL SULFATE 2.5; .5 MG/3ML; MG/3ML
1 SOLUTION RESPIRATORY (INHALATION) ONCE
Status: COMPLETED | OUTPATIENT
Start: 2025-05-26 | End: 2025-05-26

## 2025-05-26 RX ORDER — ONDANSETRON 2 MG/ML
4 INJECTION INTRAMUSCULAR; INTRAVENOUS EVERY 6 HOURS PRN
Status: DISCONTINUED | OUTPATIENT
Start: 2025-05-26 | End: 2025-05-29 | Stop reason: HOSPADM

## 2025-05-26 RX ORDER — ZOLPIDEM TARTRATE 5 MG/1
10 TABLET ORAL NIGHTLY PRN
Status: DISCONTINUED | OUTPATIENT
Start: 2025-05-26 | End: 2025-05-29 | Stop reason: HOSPADM

## 2025-05-26 RX ORDER — ACETAMINOPHEN 650 MG/1
650 SUPPOSITORY RECTAL EVERY 6 HOURS PRN
Status: DISCONTINUED | OUTPATIENT
Start: 2025-05-26 | End: 2025-05-29 | Stop reason: HOSPADM

## 2025-05-26 RX ORDER — 0.9 % SODIUM CHLORIDE 0.9 %
500 INTRAVENOUS SOLUTION INTRAVENOUS ONCE
Status: COMPLETED | OUTPATIENT
Start: 2025-05-26 | End: 2025-05-26

## 2025-05-26 RX ORDER — MAGNESIUM SULFATE IN WATER 40 MG/ML
2000 INJECTION, SOLUTION INTRAVENOUS PRN
Status: DISCONTINUED | OUTPATIENT
Start: 2025-05-26 | End: 2025-05-29 | Stop reason: HOSPADM

## 2025-05-26 RX ORDER — KETOROLAC TROMETHAMINE 15 MG/ML
15 INJECTION, SOLUTION INTRAMUSCULAR; INTRAVENOUS ONCE
Status: COMPLETED | OUTPATIENT
Start: 2025-05-26 | End: 2025-05-26

## 2025-05-26 RX ORDER — INSULIN LISPRO 100 [IU]/ML
4 INJECTION, SOLUTION INTRAVENOUS; SUBCUTANEOUS
Status: DISCONTINUED | OUTPATIENT
Start: 2025-05-26 | End: 2025-05-29 | Stop reason: HOSPADM

## 2025-05-26 RX ORDER — ACETAMINOPHEN 325 MG/1
650 TABLET ORAL EVERY 6 HOURS PRN
Status: DISCONTINUED | OUTPATIENT
Start: 2025-05-26 | End: 2025-05-29 | Stop reason: HOSPADM

## 2025-05-26 RX ORDER — IOPAMIDOL 755 MG/ML
75 INJECTION, SOLUTION INTRAVASCULAR
Status: COMPLETED | OUTPATIENT
Start: 2025-05-26 | End: 2025-05-26

## 2025-05-26 RX ORDER — POTASSIUM CHLORIDE 1500 MG/1
40 TABLET, EXTENDED RELEASE ORAL PRN
Status: DISCONTINUED | OUTPATIENT
Start: 2025-05-26 | End: 2025-05-29 | Stop reason: HOSPADM

## 2025-05-26 RX ORDER — ONDANSETRON 4 MG/1
4 TABLET, ORALLY DISINTEGRATING ORAL EVERY 8 HOURS PRN
Status: DISCONTINUED | OUTPATIENT
Start: 2025-05-26 | End: 2025-05-29 | Stop reason: HOSPADM

## 2025-05-26 RX ORDER — SODIUM CHLORIDE 9 MG/ML
INJECTION, SOLUTION INTRAVENOUS CONTINUOUS
Status: ACTIVE | OUTPATIENT
Start: 2025-05-26 | End: 2025-05-27

## 2025-05-26 RX ORDER — SODIUM CHLORIDE 0.9 % (FLUSH) 0.9 %
5-40 SYRINGE (ML) INJECTION PRN
Status: DISCONTINUED | OUTPATIENT
Start: 2025-05-26 | End: 2025-05-29 | Stop reason: HOSPADM

## 2025-05-26 RX ORDER — INSULIN LISPRO 100 [IU]/ML
0-4 INJECTION, SOLUTION INTRAVENOUS; SUBCUTANEOUS
Status: DISCONTINUED | OUTPATIENT
Start: 2025-05-26 | End: 2025-05-29 | Stop reason: HOSPADM

## 2025-05-26 RX ORDER — DEXTROSE MONOHYDRATE 100 MG/ML
INJECTION, SOLUTION INTRAVENOUS CONTINUOUS PRN
Status: DISCONTINUED | OUTPATIENT
Start: 2025-05-26 | End: 2025-05-29 | Stop reason: HOSPADM

## 2025-05-26 RX ORDER — PANTOPRAZOLE SODIUM 40 MG/1
40 TABLET, DELAYED RELEASE ORAL
Status: DISCONTINUED | OUTPATIENT
Start: 2025-05-27 | End: 2025-05-27

## 2025-05-26 RX ORDER — 0.9 % SODIUM CHLORIDE 0.9 %
1000 INTRAVENOUS SOLUTION INTRAVENOUS ONCE
Status: COMPLETED | OUTPATIENT
Start: 2025-05-26 | End: 2025-05-26

## 2025-05-26 RX ORDER — METRONIDAZOLE 500 MG/100ML
500 INJECTION, SOLUTION INTRAVENOUS EVERY 8 HOURS
Status: DISCONTINUED | OUTPATIENT
Start: 2025-05-26 | End: 2025-05-29 | Stop reason: HOSPADM

## 2025-05-26 RX ORDER — GLUCAGON 1 MG/ML
1 KIT INJECTION PRN
Status: DISCONTINUED | OUTPATIENT
Start: 2025-05-26 | End: 2025-05-29 | Stop reason: HOSPADM

## 2025-05-26 RX ORDER — SODIUM CHLORIDE 0.9 % (FLUSH) 0.9 %
5-40 SYRINGE (ML) INJECTION EVERY 12 HOURS SCHEDULED
Status: DISCONTINUED | OUTPATIENT
Start: 2025-05-26 | End: 2025-05-29 | Stop reason: HOSPADM

## 2025-05-26 RX ADMIN — ENOXAPARIN SODIUM 40 MG: 100 INJECTION SUBCUTANEOUS at 21:32

## 2025-05-26 RX ADMIN — ASPIRIN 81 MG: 81 TABLET, COATED ORAL at 17:39

## 2025-05-26 RX ADMIN — SODIUM CHLORIDE 500 ML: 0.9 INJECTION, SOLUTION INTRAVENOUS at 14:49

## 2025-05-26 RX ADMIN — ZOLPIDEM TARTRATE 10 MG: 5 TABLET ORAL at 22:08

## 2025-05-26 RX ADMIN — METRONIDAZOLE 500 MG: 500 INJECTION, SOLUTION INTRAVENOUS at 16:40

## 2025-05-26 RX ADMIN — ACETAMINOPHEN 650 MG: 325 TABLET ORAL at 22:08

## 2025-05-26 RX ADMIN — ONDANSETRON 4 MG: 2 INJECTION, SOLUTION INTRAMUSCULAR; INTRAVENOUS at 22:06

## 2025-05-26 RX ADMIN — KETOROLAC TROMETHAMINE 15 MG: 15 INJECTION, SOLUTION INTRAMUSCULAR; INTRAVENOUS at 16:16

## 2025-05-26 RX ADMIN — INSULIN LISPRO 4 UNITS: 100 INJECTION, SOLUTION INTRAVENOUS; SUBCUTANEOUS at 17:39

## 2025-05-26 RX ADMIN — IOPAMIDOL 75 ML: 755 INJECTION, SOLUTION INTRAVENOUS at 14:54

## 2025-05-26 RX ADMIN — SODIUM CHLORIDE 80 MG: 9 INJECTION INTRAMUSCULAR; INTRAVENOUS; SUBCUTANEOUS at 16:15

## 2025-05-26 RX ADMIN — WATER 2000 MG: 1 INJECTION INTRAMUSCULAR; INTRAVENOUS; SUBCUTANEOUS at 16:10

## 2025-05-26 RX ADMIN — SODIUM CHLORIDE: 0.9 INJECTION, SOLUTION INTRAVENOUS at 17:44

## 2025-05-26 RX ADMIN — SODIUM CHLORIDE 1000 ML: 0.9 INJECTION, SOLUTION INTRAVENOUS at 16:35

## 2025-05-26 RX ADMIN — METRONIDAZOLE 500 MG: 500 INJECTION, SOLUTION INTRAVENOUS at 22:07

## 2025-05-26 RX ADMIN — ONDANSETRON 4 MG: 2 INJECTION, SOLUTION INTRAMUSCULAR; INTRAVENOUS at 16:38

## 2025-05-26 RX ADMIN — SODIUM CHLORIDE, PRESERVATIVE FREE 10 ML: 5 INJECTION INTRAVENOUS at 21:33

## 2025-05-26 RX ADMIN — IPRATROPIUM BROMIDE AND ALBUTEROL SULFATE 1 DOSE: .5; 2.5 SOLUTION RESPIRATORY (INHALATION) at 13:28

## 2025-05-26 ASSESSMENT — PAIN DESCRIPTION - LOCATION: LOCATION: HEAD

## 2025-05-26 ASSESSMENT — PAIN DESCRIPTION - DESCRIPTORS: DESCRIPTORS: ACHING

## 2025-05-26 ASSESSMENT — PAIN SCALES - GENERAL
PAINLEVEL_OUTOF10: 6
PAINLEVEL_OUTOF10: 2

## 2025-05-26 ASSESSMENT — PAIN - FUNCTIONAL ASSESSMENT: PAIN_FUNCTIONAL_ASSESSMENT: NONE - DENIES PAIN

## 2025-05-26 NOTE — ED NOTES
ED TO INPATIENT SBAR HANDOFF    Patient Name: Beth PEREZ Inclint   :  1950  74 y.o.   Preferred Name  Beth    Family/Caregiver Present yes   Restraints no   C-SSRS: Risk of Suicide: No Risk  Sitter no   Sepsis Risk Score Sepsis V2 Risk Score: 25.9      Situation  Chief Complaint   Patient presents with    Fatigue    Shortness of Breath     Brief Description of Patient's Condition: pt came in for weakness and diarrhea. Has hematochezia and vomiting  Mental Status: oriented  Arrived from: home    Imaging:   CTA PULMONARY W CONTRAST   Final Result   No evidence of pulmonary embolism.  No acute airspace disease.         CT ABDOMEN PELVIS W IV CONTRAST Additional Contrast? None   Final Result   1. Circumferential wall thickening of the bladder with mild surrounding inflammatory changes, suggestive of cystitis. Recommend correlation with urinalysis.   2. Hypertrophy of the submucosal fat in the ascending colon and cecum, suggestive of chronic changes. No definite evidence of acute inflammatory process of the colon.            XR CHEST PORTABLE   Final Result   No acute process.           Abnormal labs:   Abnormal Labs Reviewed   CBC WITH AUTO DIFFERENTIAL - Abnormal; Notable for the following components:       Result Value    WBC 25.2 (*)     Neutrophils % 85 (*)     Lymphocytes % 9 (*)     Neutrophils Absolute 21.47 (*)     Eosinophils Absolute 0.00 (*)     Basophils Absolute 0.22 (*)     Atypical Lymphocytes Absolute 0.88 (*)     All other components within normal limits   BASIC METABOLIC PANEL W/ REFLEX TO MG FOR LOW K - Abnormal; Notable for the following components:    CO2 20 (*)     Glucose 240 (*)     BUN 24 (*)     Creatinine 1.2 (*)     Est, Glom Filt Rate 47 (*)     All other components within normal limits   HEPATIC FUNCTION PANEL - Abnormal; Notable for the following components:    ALT 41 (*)     All other components within normal limits   TROPONIN - Abnormal; Notable for the following components:     Troponin, High Sensitivity 15 (*)     All other components within normal limits   LACTIC ACID - Abnormal; Notable for the following components:    Lactic Acid 3.4 (*)     All other components within normal limits   POCT GLUCOSE - Abnormal; Notable for the following components:    POC Glucose 176 (*)     All other components within normal limits        Background  History:   Past Medical History:   Diagnosis Date    Cervical spondylosis 12/7/2021    Class 2 obesity due to excess calories without serious comorbidity with body mass index (BMI) of 35.0 to 35.9 in adult 12/18/2019    Essential hypertension 12/18/2019    Hypomagnesemia 12/18/2019    Mixed hyperlipidemia 12/18/2019    Other insomnia 12/18/2019    Right carpal tunnel syndrome     Type 2 diabetes mellitus without complication (HCC)     Vitamin D deficiency 12/18/2019       Assessment    Vitals:    Level of Consciousness: Alert (0)   Vitals:    05/26/25 1342 05/26/25 1442 05/26/25 1445 05/26/25 1525   BP:  (!) 73/38 (!) 90/40 117/74   Pulse:  94     Resp:  18     Temp:       TempSrc:       SpO2:  97%     Weight: 86.2 kg (190 lb)      Height: 1.6 m (5' 3\")          PO Status: Regular    O2 Flow Rate: O2 Device: None (Room air)      Cardiac Rhythm: NSR    Last documented pain medication administered: 1616     NIH Score: NIH       Active LDA's:   Peripheral IV 05/26/25 Left Antecubital (Active)       Peripheral IV 05/26/25 Left Forearm (Active)   Site Assessment Clean, dry & intact 05/26/25 1449       Pertinent or High Risk Medications/Drips: no   If Yes, please provide details: N/A      Blood Product Administration: no  If Yes, please provide details: N/A    Recommendation    Incomplete orders none  Additional Comments: stool and urine just sent   If any further questions, please call Sending RN at [phone]    Electronically signed by: Electronically signed by Alexia Ridley RN on 5/26/2025 at 4:45 PM

## 2025-05-26 NOTE — PROGRESS NOTES
.4 Eyes Skin Assessment     NAME:  Beth John  YOB: 1950  MEDICAL RECORD NUMBER:  [id number]    The patient is being assessed for  Admission    I agree that at least one RN has performed a thorough Head to Toe Skin Assessment on the patient. ALL assessment sites listed below have been assessed.      Areas assessed by both nurses:    Head, Face, Ears, Shoulders, Back, Chest, Arms, Elbows, Hands, Sacrum. Buttock, Coccyx, Ischium, Legs. Feet and Heels, and Under Medical Devices         Does the Patient have a Wound? No noted wound(s)       Duane Prevention initiated by RN: Yes  Wound Care Orders initiated by RN: No    Pressure Injury (Stage 3,4, Unstageable, DTI, NWPT, and Complex wounds) if present, place Wound referral order by RN under : No    New Ostomies, if present place, Ostomy referral order under : No     Nurse 1 eSignature: Electronically signed by Chichi Abbasi RN on 5/26/25 at 5:22 PM EDT    **SHARE this note so that the co-signing nurse can place an eSignature**    Nurse 2 eSignature: Electronically signed by Virginia Naranjo RN on 5/26/25 at 5:32 PM EDT

## 2025-05-26 NOTE — ED PROVIDER NOTES
[facility] EMERGENCY DEPARTMENT  EMERGENCY DEPARTMENT ENCOUNTER        Pt Name: Beth John  MRN: [id number]  Birthdate 1950  Date of evaluation: 5/26/2025  Provider: Caleb Montoya MD  PCP: Tae Husain MD  Note Started: 12:31 PM EDT 5/26/25    CHIEF COMPLAINT       Chief Complaint   Patient presents with    Fatigue    Shortness of Breath       HISTORY OF PRESENT ILLNESS: 1 or more Elements        Limitations to history : None    Beth John is a 74 y.o. female who presents to the emergency room for shortness of breath, generalized fatigue and weakness.  Has had nausea and diarrhea.  No urinary symptoms.  Denies any chest pain.  Was feeling short of breath earlier.  Does have a history of asthmatic bronchitis per the patient.  No fevers has had chills.    Nursing Notes were all reviewed and agreed with or any disagreements were addressed in the HPI.      REVIEW OF EXTERNAL NOTE :       As below    REVIEW OF SYSTEMS :      Positives and Pertinent negatives as per HPI.     SURGICAL HISTORY     Past Surgical History:   Procedure Laterality Date    ARM SURGERY Right 5/4/2022    RIGHT ULNAR NERVE IN SITU DECOMPRESSION AT ELBOW,  RIGHT WRIST TRIANGULAR FIBROCARTILAGE COMPLEX INJECTION, RIGHT INDEX PROXIMAL INTERPHALANGEAL JOINT INJECTION performed by Kota Gonzalez MD at [facility] OR    BUNIONECTOMY Left     CARPAL TUNNEL RELEASE Right 5/4/2022    RIGHT CARPAL TUNNEL RELEASE performed by Kota Gonzalez MD at [facility] OR    CATARACT REMOVAL WITH IMPLANT      CHOLECYSTECTOMY      COLONOSCOPY      HYSTERECTOMY (CERVIX STATUS UNKNOWN)      HYSTERECTOMY, VAGINAL      partial in the 80s then bilateral oopherectome 2016    RETINAL DETACHMENT SURGERY Right     vitreous surgery    TUBAL LIGATION         CURRENTMEDICATIONS       Previous Medications    ASPIRIN 81 MG EC TABLET    Take 1 tablet by mouth daily    ATORVASTATIN (LIPITOR) 40 MG TABLET    Take 1 tablet by mouth daily    BLOOD

## 2025-05-26 NOTE — H&P
Hospital Medicine History & Physical      PCP: Tae Husain MD    Date of Admission: 5/26/2025    Date of Service: Pt seen/examined on 5/26/2025 and is Placed in Observation.    Chief Complaint:  had concerns including Fatigue and Shortness of Breath.    History Of Present Illness:    Ms. Beth John, a 74 y.o. year old female  with PMH of class I obesity, T2DM, HTN, HLD,    Pt presented to ED for evaluation of diarrhea and abdominal pain.  Patient was seen on room air, no acute cardiopulmonary distress, patient reports she started acute diarrhea earlier this morning, she did took a dose of laxative last night for constipation, she also had nausea and vomit, she had a recent Cologuard test negative, recommended follow-up in 3 years.  Patient denies blood in stool or dark stool, but told her she has had about 8 episodes of diarrhea since this morning, she denies fever, chills, cough, shortness of breath, denies chest pain, chest pressure, no focal neurological change.      I have personally reviewed and interpreted the Initial workups as summarized below:     WBC 20 5.2K, H/H normal, platelet stable normal range  Renal function creatinine 1.2, GFR 47, consistent with ROD, anion gap is normal  Glucose 240  Hepatic function   stable at baseline,  CTA pulmonary no evidence of acute cardiopulmonary process.  No PE.    CT abdomen pelvis  IMPRESSION:  1. Circumferential wall thickening of the bladder with mild surrounding inflammatory changes, suggestive of cystitis. Recommend correlation with urinalysis.  2. Hypertrophy of the submucosal fat in the ascending colon and cecum, suggestive of chronic changes. No definite evidence of acute inflammatory process of the colon.      Troponin 15, proBNP normal  EKG reviewed normal sinus rhythm with no acute ST/T wave ischemic change.  Last ECHO in August 2021 with LVEF 60 to 65%    Past Medical History:        Diagnosis Date    Cervical spondylosis 12/7/2021    Class 2  Tiffany An PA-C   Continuous Glucose Sensor (DEXCOM G7 SENSOR) MISC Change every 10 days 1/23/25   Sunshine Real APRN - CNP   Continuous Glucose Sensor (FREESTYLE BYRNO 3 PLUS SENSOR) MISC Change every 15 days 1/8/25   Sunshine Real APRN - CNP   atorvastatin (LIPITOR) 40 MG tablet Take 1 tablet by mouth daily 1/6/25   Tae Husain MD   insulin lispro, 1 Unit Dial, (HUMALOG KWIKPEN) 100 UNIT/ML SOPN Inject up to 12 units QAC TID  Patient taking differently: Indications: As of 1/15/25, future refills will need to be sent in as Humalog per patient's insurance Inject up to 12 units QAC TID 1/6/25   Tae Husain MD   Magnesium 400 MG TABS Take 400 mg by mouth 2 times daily 1/6/25   Tae Husain MD   metFORMIN (GLUCOPHAGE) 1000 MG tablet Take 1 tablet by mouth 2 times daily (with meals) 1/6/25   Tae Husain MD   vitamin D (ERGOCALCIFEROL) 1.25 MG (57070 UT) CAPS capsule Take 1 capsule by mouth once a week    ProviderShahid MD   omeprazole (PRILOSEC) 20 MG delayed release capsule Take 1 capsule by mouth daily    Shahid Mendosa MD   aspirin 81 MG EC tablet Take 1 tablet by mouth daily    Shahid Mendosa MD   Insulin Pen Needle 31G X 5 MM MISC 1 each by Does not apply route daily    ProviderShahid MD       Allergies:  Patient has no known allergies.    Social History:    TOBACCO:   reports that she quit smoking about 43 years ago. Her smoking use included cigarettes. She started smoking about 55 years ago. She has a 12 pack-year smoking history. She has never used smokeless tobacco.  ETOH:   reports current alcohol use.    Family History:    Reviewed in detail and negative for DM, CAD, Cancer, CVA. Positive as follows\"      Problem Relation Age of Onset    Diabetes Mother     Stroke Mother     Kidney Disease Father         Renal Failure       REVIEW OF SYSTEMS:   Pertinent positives as noted in the HPI. All other systems reviewed and negative.    PHYSICAL EXAM:  BP (!)

## 2025-05-27 LAB
ANION GAP SERPL CALCULATED.3IONS-SCNC: 12 MMOL/L (ref 7–16)
BASOPHILS # BLD: 0.03 K/UL (ref 0–0.2)
BASOPHILS NFR BLD: 0 % (ref 0–2)
BUN SERPL-MCNC: 16 MG/DL (ref 6–23)
C DIFF GDH + TOXINS A+B STL QL IA.RAPID: NEGATIVE
CALCIUM SERPL-MCNC: 8.5 MG/DL (ref 8.6–10.2)
CHLORIDE SERPL-SCNC: 107 MMOL/L (ref 98–107)
CO2 SERPL-SCNC: 21 MMOL/L (ref 22–29)
CREAT SERPL-MCNC: 1 MG/DL (ref 0.5–1)
EKG ATRIAL RATE: 91 BPM
EKG P AXIS: 60 DEGREES
EKG P-R INTERVAL: 160 MS
EKG Q-T INTERVAL: 396 MS
EKG QRS DURATION: 88 MS
EKG QTC CALCULATION (BAZETT): 487 MS
EKG R AXIS: 16 DEGREES
EKG T AXIS: 57 DEGREES
EKG VENTRICULAR RATE: 91 BPM
EOSINOPHIL # BLD: 0.05 K/UL (ref 0.05–0.5)
EOSINOPHILS RELATIVE PERCENT: 0 % (ref 0–6)
ERYTHROCYTE [DISTWIDTH] IN BLOOD BY AUTOMATED COUNT: 13.3 % (ref 11.5–15)
GFR, ESTIMATED: 58 ML/MIN/1.73M2
GLUCOSE BLD-MCNC: 130 MG/DL (ref 74–99)
GLUCOSE BLD-MCNC: 132 MG/DL (ref 74–99)
GLUCOSE BLD-MCNC: 235 MG/DL (ref 74–99)
GLUCOSE BLD-MCNC: 98 MG/DL (ref 74–99)
GLUCOSE SERPL-MCNC: 90 MG/DL (ref 74–99)
HBA1C MFR BLD: 7.5 % (ref 4–5.6)
HCT VFR BLD AUTO: 33.6 % (ref 34–48)
HCT VFR BLD AUTO: 33.7 % (ref 34–48)
HCT VFR BLD AUTO: 36.5 % (ref 34–48)
HGB BLD-MCNC: 10.6 G/DL (ref 11.5–15.5)
HGB BLD-MCNC: 10.9 G/DL (ref 11.5–15.5)
HGB BLD-MCNC: 11.5 G/DL (ref 11.5–15.5)
IMM GRANULOCYTES # BLD AUTO: 0.08 K/UL (ref 0–0.58)
IMM GRANULOCYTES NFR BLD: 1 % (ref 0–5)
INR PPP: 1.1
LYMPHOCYTES NFR BLD: 3.02 K/UL (ref 1.5–4)
LYMPHOCYTES RELATIVE PERCENT: 18 % (ref 20–42)
MAGNESIUM SERPL-MCNC: 1.5 MG/DL (ref 1.6–2.6)
MCH RBC QN AUTO: 28.8 PG (ref 26–35)
MCHC RBC AUTO-ENTMCNC: 32.4 G/DL (ref 32–34.5)
MCV RBC AUTO: 88.7 FL (ref 80–99.9)
MICROORGANISM SPEC CULT: ABNORMAL
MONOCYTES NFR BLD: 1.2 K/UL (ref 0.1–0.95)
MONOCYTES NFR BLD: 7 % (ref 2–12)
NEUTROPHILS NFR BLD: 74 % (ref 43–80)
NEUTS SEG NFR BLD: 12.4 K/UL (ref 1.8–7.3)
PLATELET # BLD AUTO: 399 K/UL (ref 130–450)
PMV BLD AUTO: 9.3 FL (ref 7–12)
POTASSIUM SERPL-SCNC: 4 MMOL/L (ref 3.5–5)
PROTHROMBIN TIME: 11 SEC (ref 9.3–12.4)
RBC # BLD AUTO: 3.79 M/UL (ref 3.5–5.5)
SERVICE CMNT-IMP: ABNORMAL
SODIUM SERPL-SCNC: 140 MMOL/L (ref 132–146)
SPECIMEN DESCRIPTION: ABNORMAL
SPECIMEN DESCRIPTION: NORMAL
TROPONIN I SERPL HS-MCNC: 30 NG/L (ref 0–14)
TROPONIN I SERPL HS-MCNC: 34 NG/L (ref 0–14)
WBC OTHER # BLD: 16.8 K/UL (ref 4.5–11.5)

## 2025-05-27 PROCEDURE — 6370000000 HC RX 637 (ALT 250 FOR IP): Performed by: HOSPITALIST

## 2025-05-27 PROCEDURE — 84484 ASSAY OF TROPONIN QUANT: CPT

## 2025-05-27 PROCEDURE — 2580000003 HC RX 258: Performed by: HOSPITALIST

## 2025-05-27 PROCEDURE — 87425 ROTAVIRUS AG IA: CPT

## 2025-05-27 PROCEDURE — 87045 FECES CULTURE AEROBIC BACT: CPT

## 2025-05-27 PROCEDURE — 85610 PROTHROMBIN TIME: CPT

## 2025-05-27 PROCEDURE — 6360000002 HC RX W HCPCS: Performed by: STUDENT IN AN ORGANIZED HEALTH CARE EDUCATION/TRAINING PROGRAM

## 2025-05-27 PROCEDURE — 85018 HEMOGLOBIN: CPT

## 2025-05-27 PROCEDURE — 83036 HEMOGLOBIN GLYCOSYLATED A1C: CPT

## 2025-05-27 PROCEDURE — G0378 HOSPITAL OBSERVATION PER HR: HCPCS

## 2025-05-27 PROCEDURE — 2500000003 HC RX 250 WO HCPCS: Performed by: HOSPITALIST

## 2025-05-27 PROCEDURE — 99233 SBSQ HOSP IP/OBS HIGH 50: CPT | Performed by: STUDENT IN AN ORGANIZED HEALTH CARE EDUCATION/TRAINING PROGRAM

## 2025-05-27 PROCEDURE — 83735 ASSAY OF MAGNESIUM: CPT

## 2025-05-27 PROCEDURE — 85025 COMPLETE CBC W/AUTO DIFF WBC: CPT

## 2025-05-27 PROCEDURE — 96367 TX/PROPH/DG ADDL SEQ IV INF: CPT

## 2025-05-27 PROCEDURE — 83993 ASSAY FOR CALPROTECTIN FECAL: CPT

## 2025-05-27 PROCEDURE — 96361 HYDRATE IV INFUSION ADD-ON: CPT

## 2025-05-27 PROCEDURE — 6360000002 HC RX W HCPCS: Performed by: HOSPITALIST

## 2025-05-27 PROCEDURE — 85014 HEMATOCRIT: CPT

## 2025-05-27 PROCEDURE — 93010 ELECTROCARDIOGRAM REPORT: CPT | Performed by: INTERNAL MEDICINE

## 2025-05-27 PROCEDURE — 96366 THER/PROPH/DIAG IV INF ADDON: CPT

## 2025-05-27 PROCEDURE — 87427 SHIGA-LIKE TOXIN AG IA: CPT

## 2025-05-27 PROCEDURE — 1200000000 HC SEMI PRIVATE

## 2025-05-27 PROCEDURE — 87046 STOOL CULTR AEROBIC BACT EA: CPT

## 2025-05-27 PROCEDURE — 87329 GIARDIA AG IA: CPT

## 2025-05-27 PROCEDURE — 80048 BASIC METABOLIC PNL TOTAL CA: CPT

## 2025-05-27 PROCEDURE — 82962 GLUCOSE BLOOD TEST: CPT

## 2025-05-27 PROCEDURE — 87328 CRYPTOSPORIDIUM AG IA: CPT

## 2025-05-27 RX ORDER — BISACODYL 5 MG/1
10 TABLET, DELAYED RELEASE ORAL ONCE
Status: COMPLETED | OUTPATIENT
Start: 2025-05-27 | End: 2025-05-27

## 2025-05-27 RX ORDER — LABETALOL HYDROCHLORIDE 5 MG/ML
10 INJECTION, SOLUTION INTRAVENOUS EVERY 4 HOURS PRN
Status: DISCONTINUED | OUTPATIENT
Start: 2025-05-27 | End: 2025-05-29 | Stop reason: HOSPADM

## 2025-05-27 RX ORDER — POLYETHYLENE GLYCOL 3350 17 G/17G
238 POWDER, FOR SOLUTION ORAL DAILY
Status: DISCONTINUED | OUTPATIENT
Start: 2025-05-27 | End: 2025-05-28

## 2025-05-27 RX ADMIN — SODIUM CHLORIDE, PRESERVATIVE FREE 10 ML: 5 INJECTION INTRAVENOUS at 20:18

## 2025-05-27 RX ADMIN — BISACODYL 10 MG: 5 TABLET, COATED ORAL at 15:58

## 2025-05-27 RX ADMIN — WATER 1000 MG: 1 INJECTION INTRAMUSCULAR; INTRAVENOUS; SUBCUTANEOUS at 10:17

## 2025-05-27 RX ADMIN — SODIUM CHLORIDE, PRESERVATIVE FREE 10 ML: 5 INJECTION INTRAVENOUS at 10:18

## 2025-05-27 RX ADMIN — METRONIDAZOLE 500 MG: 500 INJECTION, SOLUTION INTRAVENOUS at 14:25

## 2025-05-27 RX ADMIN — INSULIN GLARGINE 30 UNITS: 100 INJECTION, SOLUTION SUBCUTANEOUS at 11:37

## 2025-05-27 RX ADMIN — POLYETHYLENE GLYCOL 3350 238 G: 17 POWDER, FOR SOLUTION ORAL at 13:06

## 2025-05-27 RX ADMIN — INSULIN LISPRO 4 UNITS: 100 INJECTION, SOLUTION INTRAVENOUS; SUBCUTANEOUS at 16:12

## 2025-05-27 RX ADMIN — SODIUM CHLORIDE, PRESERVATIVE FREE 40 MG: 5 INJECTION INTRAVENOUS at 10:18

## 2025-05-27 RX ADMIN — INSULIN LISPRO 4 UNITS: 100 INJECTION, SOLUTION INTRAVENOUS; SUBCUTANEOUS at 11:38

## 2025-05-27 RX ADMIN — SODIUM CHLORIDE: 0.9 INJECTION, SOLUTION INTRAVENOUS at 04:04

## 2025-05-27 RX ADMIN — ATORVASTATIN CALCIUM 40 MG: 40 TABLET, FILM COATED ORAL at 10:17

## 2025-05-27 RX ADMIN — SODIUM CHLORIDE, PRESERVATIVE FREE 40 MG: 5 INJECTION INTRAVENOUS at 14:21

## 2025-05-27 RX ADMIN — MAGNESIUM SULFATE HEPTAHYDRATE 2000 MG: 40 INJECTION, SOLUTION INTRAVENOUS at 07:16

## 2025-05-27 RX ADMIN — SODIUM CHLORIDE, PRESERVATIVE FREE 40 MG: 5 INJECTION INTRAVENOUS at 18:19

## 2025-05-27 RX ADMIN — INSULIN LISPRO 1 UNITS: 100 INJECTION, SOLUTION INTRAVENOUS; SUBCUTANEOUS at 20:17

## 2025-05-27 RX ADMIN — LABETALOL HYDROCHLORIDE 10 MG: 5 INJECTION, SOLUTION INTRAVENOUS at 20:18

## 2025-05-27 RX ADMIN — LABETALOL HYDROCHLORIDE 10 MG: 5 INJECTION, SOLUTION INTRAVENOUS at 12:00

## 2025-05-27 RX ADMIN — METRONIDAZOLE 500 MG: 500 INJECTION, SOLUTION INTRAVENOUS at 22:30

## 2025-05-27 RX ADMIN — METRONIDAZOLE 500 MG: 500 INJECTION, SOLUTION INTRAVENOUS at 05:54

## 2025-05-27 ASSESSMENT — PAIN DESCRIPTION - DESCRIPTORS: DESCRIPTORS: CRAMPING

## 2025-05-27 ASSESSMENT — PAIN SCALES - GENERAL
PAINLEVEL_OUTOF10: 0
PAINLEVEL_OUTOF10: 5
PAINLEVEL_OUTOF10: 0
PAINLEVEL_OUTOF10: 0

## 2025-05-27 ASSESSMENT — PAIN DESCRIPTION - LOCATION: LOCATION: ABDOMEN

## 2025-05-27 NOTE — CARE COORDINATION
Social Work / Discharge Planning :OBSERVATION: SW met with patient and spouse and explained role as discharge planner / transition of care. Patient admitted with Colitis. GI consulted and EGD/ Colonoscopy planned. Patient verified plan at discharge is HOME where she resides independently with spouse. Patient PCP is DR Husain and she uses [facility] in [place]. Patient has transportation. No hx of SNF/HHC. Await GI treatment plan and recommendations. Checking for C-Diff. SW to follow. Electronically signed by ROBBIE Roy on 5/27/25 at 10:44 AM EDT

## 2025-05-27 NOTE — PROGRESS NOTES
Spiritual Health History and Assessment/Progress Note  AMANDA  [place]    Initial Encounter, Attempted Encounter,  ,  ,      Name: Beth John MRN: [id number]    Age: 74 y.o.     Sex: female   Language: English   Latter day: None   Colitis     Date: 5/27/2025                           Spiritual Assessment began in [facility] INTERMEDIATE        Referral/Consult From: Rounding   Encounter Overview/Reason: Initial Encounter, Attempted Encounter  Service Provided For: Patient    Emili, Belief, Meaning:   Patient unable to assess at this time  Family/Friends No family/friends present      Importance and Influence:  Patient unable to assess at this time  Family/Friends No family/friends present    Community:  Patient feels well-supported. Support system includes: Spouse/Partner  Family/Friends No family/friends present    Assessment and Plan of Care:     Patient Interventions include: Other: Patient sleeping appearing peaceful, did not disturb. Prayer offered for the patient and a prayer card was left in the room.  Family/Friends Interventions include: No family/friends present    Patient Plan of Care: Spiritual Care available upon further referral  Family/Friends Plan of Care: No family/friends present    Electronically signed by Chaplain Price on 5/27/2025 at 1:58 PM

## 2025-05-27 NOTE — CARE COORDINATION
Discussed with nursing.  Bright red blood per rectum.  Likely lower gastrointestinal bleed however brisk upper cannot be fully excluded.  Maintain n.p.o.  Monitor H&H.  High-dose IV PPI.  Consider upper endoscopy later today versus EGD and colonoscopy in 24 to 48 hours pending ability to prep.  Hold Mounjaro (previous dose Tuesday of last week).  Please, see orders for plan of care.  Full consult to follow.  Thank you    Terrence Aguero MD

## 2025-05-27 NOTE — PLAN OF CARE
Problem: Chronic Conditions and Co-morbidities  Goal: Patient's chronic conditions and co-morbidity symptoms are monitored and maintained or improved  5/27/2025 0136 by Yenny Garcia RN  Outcome: Progressing  5/26/2025 1716 by Chichi Abbasi RN  Outcome: Progressing  Flowsheets (Taken 5/26/2025 1704)  Care Plan - Patient's Chronic Conditions and Co-Morbidity Symptoms are Monitored and Maintained or Improved:   Collaborate with multidisciplinary team to address chronic and comorbid conditions and prevent exacerbation or deterioration   Monitor and assess patient's chronic conditions and comorbid symptoms for stability, deterioration, or improvement   Update acute care plan with appropriate goals if chronic or comorbid symptoms are exacerbated and prevent overall improvement and discharge     Problem: Safety - Adult  Goal: Free from fall injury  5/27/2025 0136 by Yenny Garcia RN  Outcome: Progressing  5/26/2025 1716 by Chichi Abbasi RN  Outcome: Progressing     Problem: Pain  Goal: Verbalizes/displays adequate comfort level or baseline comfort level  Outcome: Progressing     Problem: Gastrointestinal - Adult  Goal: Minimal or absence of nausea and vomiting  Outcome: Progressing  Goal: Maintains or returns to baseline bowel function  Outcome: Progressing  Goal: Maintains adequate nutritional intake  Outcome: Progressing     Problem: Hematologic - Adult  Goal: Maintains hematologic stability  Outcome: Progressing

## 2025-05-27 NOTE — CONSULTS
CONSULT  Terrence Aguero M.D.  [facility]  Dr. Emerson Knott M.D.,  Dr. Collin Penn M.D.,  Dr. Brandon Velasquez D.O.,  Dr. Sukh Yip D.O. ,  Dr. Alex Lopes M.D.,          Beth John  74 y.o.  female      Re: \"GI bleed\"  Requesting physician: Dr. Montoya, emergency department  Admitting physician: Dr. Felix  Date:1:03 PM 5/27/2025          HPI: 74-year-old female patient seen in the hospital for above described issue.  Patient has establish diagnosis of obesity, hypertension, hyperlipidemia, insomnia, diabetes mellitus and vitamin deficiencies.  Patient presented to the emergency department yesterday because of bright red blood per rectum.  Patient reports some abdominal discomfort/pain localized in the lower abdomen but no significant pain.  She reports that she started having diarrhea in the morning followed by bright red blood mixed with stool initially and subsequently bright red blood.  Patient reports also episode of emesis which she describes as nonbloody and without coffee-ground appearing material.  Patient reports taking ibuprofen at least twice weekly.  According to the patient she has colonoscopy with finding of polyps 3 years ago in [place] however does not report the exact details.  Patient also reports history of gastric ulcers.  Upon presentation CT scan of the abdomen and pelvis has been obtained reported to show significant fascial wall thickening of the urinary bladder, hypertrophy of submucosal fat in the ascending colon and cecum but no definitive acute inflammatory process.  Laboratory work reveals increased lactic acid at 3.4 on presentation.  Chemistry panel shows BUN of 16 with creatinine of 1.  Carbon dioxide 21.  Liver profile shows nonelevated alkaline phosphatase and bilirubin with minimally elevated ALT.  Stool studies negative for C. difficile with additional stool studies pending.    Information sources:   -Patient  -family ( at bedside)  -medical  IntraVENous PRN Thuan Shay MD        glucagon injection 1 mg  1 mg SubCUTAneous PRN Thuan Shay MD        dextrose 10 % infusion   IntraVENous Continuous PRN Thuan Shay MD        insulin glargine (LANTUS) injection vial 30 Units  30 Units SubCUTAneous Daily Thuan Shay MD   30 Units at 25 1137    insulin lispro (HUMALOG,ADMELOG) injection vial 4 Units  4 Units SubCUTAneous TID WC Thuan Shay MD   4 Units at 25 1138    insulin lispro (HUMALOG,ADMELOG) injection vial 0-4 Units  0-4 Units SubCUTAneous 4x Daily AC & HS Thuan Shay MD            SocHx:  Social History     Socioeconomic History    Marital status:      Spouse name: Osman    Number of children: 2    Years of education: 12    Highest education level: High school graduate   Occupational History    Not on file   Tobacco Use    Smoking status: Former     Current packs/day: 0.00     Average packs/day: 1 pack/day for 12.0 years (12.0 ttl pk-yrs)     Types: Cigarettes     Start date: 1970     Quit date: 1982     Years since quittin.3    Smokeless tobacco: Never   Vaping Use    Vaping status: Never Used   Substance and Sexual Activity    Alcohol use: Yes     Comment: rare    Drug use: Not Currently    Sexual activity: Yes     Partners: Male   Other Topics Concern    Not on file   Social History Narrative    Not on file     Social Drivers of Health     Financial Resource Strain: Patient Declined (2024)    Overall Financial Resource Strain (CARDIA)     Difficulty of Paying Living Expenses: Patient declined   Food Insecurity: No Food Insecurity (2025)    Hunger Vital Sign     Worried About Running Out of Food in the Last Year: Never true     Ran Out of Food in the Last Year: Never true   Transportation Needs: No Transportation Needs (2025)    PRAPARE - Transportation     Lack of Transportation (Medical): No     Lack of Transportation (Non-Medical): No   Physical Activity: Inactive (4/15/2025)    Exercise Vital Sign

## 2025-05-27 NOTE — PROGRESS NOTES
P Quality Flow/Interdisciplinary Rounds Progress Note        Quality Flow Rounds held on May 27, 2025    Disciplines Attending:  Bedside Nurse, , , and Nursing Unit Leadership    Beth John was admitted on 5/26/2025 12:30 PM    Anticipated Discharge Date:       Disposition:    Duane Score:  Duane Scale Score: 21    [facility] RISK OF UNPLANNED READMISSION 2.0             9.8 Total Score        Discussed patient goal for the day, patient clinical progression, and barriers to discharge.  The following Goal(s) of the Day/Commitment(s) have been identified:   discharge planning, GI, IV fluids, IV ABX, EGD?      Denny Lau, CLAUDIA  May 27, 2025

## 2025-05-27 NOTE — ACP (ADVANCE CARE PLANNING)
Advance Care Planning   Healthcare Decision Maker:    Primary Decision Maker: Joe John - [facility] - [phone]    Click here to complete Healthcare Decision Makers including selection of the Healthcare Decision Maker Relationship (ie \"Primary\").  Today we documented Decision Maker(s) consistent with Legal Next of Kin hierarchy.

## 2025-05-27 NOTE — PROGRESS NOTES
[facility] Hospitalist Progress Note    Admitting Date and Time: 5/26/2025 12:30 PM  Admit Dx: Colitis [K52.9]  Gastrointestinal hemorrhage, unspecified gastrointestinal hemorrhage type [K92.2]  Other fatigue [R53.83]  Dyspnea, unspecified type [R06.00]    Subjective:  Patient is being followed for Colitis [K52.9]  Gastrointestinal hemorrhage, unspecified gastrointestinal hemorrhage type [K92.2]  Other fatigue [R53.83]  Dyspnea, unspecified type [R06.00]   Pt was seen and examined today. Denies any new issues    ROS: denies fever, chills, cp, sob, n/v, HA unless stated above.     pantoprazole (PROTONIX) 40 mg in sodium chloride (PF) 0.9 % 10 mL injection  40 mg IntraVENous Q6H    [Held by provider] aspirin  81 mg Oral Daily    atorvastatin  40 mg Oral Daily    sodium chloride flush  5-40 mL IntraVENous 2 times per day    cefTRIAXone (ROCEPHIN) IV  1,000 mg IntraVENous Q24H    metroNIDAZOLE  500 mg IntraVENous Q8H    insulin glargine  30 Units SubCUTAneous Daily    insulin lispro  4 Units SubCUTAneous TID WC    insulin lispro  0-4 Units SubCUTAneous 4x Daily AC & HS     zolpidem, 10 mg, Nightly PRN  sodium chloride flush, 5-40 mL, PRN  sodium chloride, , PRN  potassium chloride, 40 mEq, PRN   Or  potassium alternative oral replacement, 40 mEq, PRN   Or  potassium chloride, 10 mEq, PRN  magnesium sulfate, 2,000 mg, PRN  ondansetron, 4 mg, Q8H PRN   Or  ondansetron, 4 mg, Q6H PRN  polyethylene glycol, 17 g, Daily PRN  acetaminophen, 650 mg, Q6H PRN   Or  acetaminophen, 650 mg, Q6H PRN  glucose, 4 tablet, PRN  dextrose bolus, 125 mL, PRN   Or  dextrose bolus, 250 mL, PRN  glucagon (rDNA), 1 mg, PRN  dextrose, , Continuous PRN         Objective:    BP (!) 161/65   Pulse (!) 111   Temp 98.4 °F (36.9 °C) (Infrared)   Resp 18   Ht 1.6 m (5' 3\")   Wt 86.2 kg (190 lb)   SpO2 96%   BMI 33.66 kg/m²     General Appearance: alert and oriented to person, place and time and in no acute distress  Skin: warm and  body mass index (BMI) of 33.0 to 33.9 in adult    ROD (acute kidney injury)  Resolved Problems:    * No resolved hospital problems. *      Plan:  GI consulted, appreciate recommendations, plan for EGD and colonoscopy  Having BRBPR, likely diverticular bleed  Stop Lovenox  NPO  Protonix Q6H per GI  Continue Rocephin and Flagyl  Negative C diff   mL/h  Replace magnesium  Repeat troponin, if continues to uptrend will get Cardiology consult  Resume home medications as able    Code Status: Full code  DVT/GI ppx: None/Protonix  Dispo: Continue care    Time spent reviewing chart, clinical exam, discussing case and answering questions with staff/consultants/patient/family = 50 min    NOTE: This report was transcribed using voice recognition software. Every effort was made to ensure accuracy; however, inadvertent computerized transcription errors may be present.  Electronically signed by Caio Felix MD on 5/27/2025 at 11:34 AM

## 2025-05-28 ENCOUNTER — ANESTHESIA EVENT (OUTPATIENT)
Dept: ENDOSCOPY | Age: 75
DRG: 391 | End: 2025-05-28
Payer: MEDICARE

## 2025-05-28 ENCOUNTER — ANESTHESIA (OUTPATIENT)
Dept: ENDOSCOPY | Age: 75
DRG: 391 | End: 2025-05-28
Payer: MEDICARE

## 2025-05-28 PROBLEM — K62.5 BRBPR (BRIGHT RED BLOOD PER RECTUM): Status: ACTIVE | Noted: 2025-05-28

## 2025-05-28 LAB
ALBUMIN SERPL-MCNC: 3.5 G/DL (ref 3.5–5.2)
ALP SERPL-CCNC: 67 U/L (ref 35–104)
ALT SERPL-CCNC: 26 U/L (ref 0–32)
ANION GAP SERPL CALCULATED.3IONS-SCNC: 11 MMOL/L (ref 7–16)
AST SERPL-CCNC: 17 U/L (ref 0–31)
BASOPHILS # BLD: 0.05 K/UL (ref 0–0.2)
BASOPHILS NFR BLD: 0 % (ref 0–2)
BILIRUB SERPL-MCNC: 0.3 MG/DL (ref 0–1.2)
BUN SERPL-MCNC: 6 MG/DL (ref 6–23)
C PARVUM AG STL QL IA: NEGATIVE
CALCIUM SERPL-MCNC: 9 MG/DL (ref 8.6–10.2)
CHLORIDE SERPL-SCNC: 107 MMOL/L (ref 98–107)
CO2 SERPL-SCNC: 22 MMOL/L (ref 22–29)
CREAT SERPL-MCNC: 0.8 MG/DL (ref 0.5–1)
EOSINOPHIL # BLD: 0.04 K/UL (ref 0.05–0.5)
EOSINOPHILS RELATIVE PERCENT: 0 % (ref 0–6)
ERYTHROCYTE [DISTWIDTH] IN BLOOD BY AUTOMATED COUNT: 13.4 % (ref 11.5–15)
FERRITIN SERPL-MCNC: 42 NG/ML
G LAMBLIA AG STL QL IA: NEGATIVE
GFR, ESTIMATED: 76 ML/MIN/1.73M2
GLUCOSE BLD-MCNC: 147 MG/DL (ref 74–99)
GLUCOSE BLD-MCNC: 159 MG/DL (ref 74–99)
GLUCOSE BLD-MCNC: 163 MG/DL (ref 74–99)
GLUCOSE SERPL-MCNC: 147 MG/DL (ref 74–99)
HCT VFR BLD AUTO: 32.8 % (ref 34–48)
HCT VFR BLD AUTO: 33 % (ref 34–48)
HCT VFR BLD AUTO: 33 % (ref 34–48)
HCT VFR BLD AUTO: 36.3 % (ref 34–48)
HGB BLD-MCNC: 10.3 G/DL (ref 11.5–15.5)
HGB BLD-MCNC: 10.8 G/DL (ref 11.5–15.5)
HGB BLD-MCNC: 10.9 G/DL (ref 11.5–15.5)
HGB BLD-MCNC: 11.3 G/DL (ref 11.5–15.5)
IMM GRANULOCYTES # BLD AUTO: 0.11 K/UL (ref 0–0.58)
IMM GRANULOCYTES NFR BLD: 1 % (ref 0–5)
IRON SATN MFR SERPL: 21 % (ref 15–50)
IRON SERPL-MCNC: 63 UG/DL (ref 37–145)
LACTATE BLDV-SCNC: 1.4 MMOL/L (ref 0.5–2.2)
LYMPHOCYTES NFR BLD: 3.02 K/UL (ref 1.5–4)
LYMPHOCYTES RELATIVE PERCENT: 17 % (ref 20–42)
MAGNESIUM SERPL-MCNC: 1.5 MG/DL (ref 1.6–2.6)
MCH RBC QN AUTO: 28.9 PG (ref 26–35)
MCHC RBC AUTO-ENTMCNC: 32.7 G/DL (ref 32–34.5)
MCV RBC AUTO: 88.2 FL (ref 80–99.9)
MONOCYTES NFR BLD: 1.22 K/UL (ref 0.1–0.95)
MONOCYTES NFR BLD: 7 % (ref 2–12)
NEUTROPHILS NFR BLD: 75 % (ref 43–80)
NEUTS SEG NFR BLD: 13.41 K/UL (ref 1.8–7.3)
PLATELET # BLD AUTO: 372 K/UL (ref 130–450)
PMV BLD AUTO: 9.2 FL (ref 7–12)
POTASSIUM SERPL-SCNC: 4.1 MMOL/L (ref 3.5–5)
PROT SERPL-MCNC: 6.2 G/DL (ref 6.4–8.3)
RBC # BLD AUTO: 3.74 M/UL (ref 3.5–5.5)
ROTAVIRUS ANTIGEN: NEGATIVE
SODIUM SERPL-SCNC: 140 MMOL/L (ref 132–146)
SOURCE, 60200063: NORMAL
SOURCE, 60200063: NORMAL
SOURCE: NORMAL
SPECIMEN DESCRIPTION: NORMAL
SPECIMEN DESCRIPTION: NORMAL
TIBC SERPL-MCNC: 295 UG/DL (ref 250–450)
WBC OTHER # BLD: 17.9 K/UL (ref 4.5–11.5)

## 2025-05-28 PROCEDURE — 6360000002 HC RX W HCPCS: Performed by: INTERNAL MEDICINE

## 2025-05-28 PROCEDURE — 99232 SBSQ HOSP IP/OBS MODERATE 35: CPT | Performed by: STUDENT IN AN ORGANIZED HEALTH CARE EDUCATION/TRAINING PROGRAM

## 2025-05-28 PROCEDURE — 82962 GLUCOSE BLOOD TEST: CPT

## 2025-05-28 PROCEDURE — 2500000003 HC RX 250 WO HCPCS: Performed by: HOSPITALIST

## 2025-05-28 PROCEDURE — 85014 HEMATOCRIT: CPT

## 2025-05-28 PROCEDURE — 83605 ASSAY OF LACTIC ACID: CPT

## 2025-05-28 PROCEDURE — 36415 COLL VENOUS BLD VENIPUNCTURE: CPT

## 2025-05-28 PROCEDURE — 3609017100 HC EGD: Performed by: INTERNAL MEDICINE

## 2025-05-28 PROCEDURE — 80053 COMPREHEN METABOLIC PANEL: CPT

## 2025-05-28 PROCEDURE — 0DBN8ZX EXCISION OF SIGMOID COLON, VIA NATURAL OR ARTIFICIAL OPENING ENDOSCOPIC, DIAGNOSTIC: ICD-10-PCS | Performed by: INTERNAL MEDICINE

## 2025-05-28 PROCEDURE — 0DJ08ZZ INSPECTION OF UPPER INTESTINAL TRACT, VIA NATURAL OR ARTIFICIAL OPENING ENDOSCOPIC: ICD-10-PCS | Performed by: INTERNAL MEDICINE

## 2025-05-28 PROCEDURE — 2580000003 HC RX 258: Performed by: HOSPITALIST

## 2025-05-28 PROCEDURE — 1200000000 HC SEMI PRIVATE

## 2025-05-28 PROCEDURE — 6360000002 HC RX W HCPCS: Performed by: HOSPITALIST

## 2025-05-28 PROCEDURE — 85025 COMPLETE CBC W/AUTO DIFF WBC: CPT

## 2025-05-28 PROCEDURE — 82728 ASSAY OF FERRITIN: CPT

## 2025-05-28 PROCEDURE — 7100000011 HC PHASE II RECOVERY - ADDTL 15 MIN: Performed by: INTERNAL MEDICINE

## 2025-05-28 PROCEDURE — 7100000010 HC PHASE II RECOVERY - FIRST 15 MIN: Performed by: INTERNAL MEDICINE

## 2025-05-28 PROCEDURE — 6360000002 HC RX W HCPCS: Performed by: STUDENT IN AN ORGANIZED HEALTH CARE EDUCATION/TRAINING PROGRAM

## 2025-05-28 PROCEDURE — 3700000000 HC ANESTHESIA ATTENDED CARE: Performed by: INTERNAL MEDICINE

## 2025-05-28 PROCEDURE — 83550 IRON BINDING TEST: CPT

## 2025-05-28 PROCEDURE — 6360000002 HC RX W HCPCS

## 2025-05-28 PROCEDURE — 83540 ASSAY OF IRON: CPT

## 2025-05-28 PROCEDURE — 0DBM8ZX EXCISION OF DESCENDING COLON, VIA NATURAL OR ARTIFICIAL OPENING ENDOSCOPIC, DIAGNOSTIC: ICD-10-PCS | Performed by: INTERNAL MEDICINE

## 2025-05-28 PROCEDURE — 88305 TISSUE EXAM BY PATHOLOGIST: CPT

## 2025-05-28 PROCEDURE — 0DBK8ZX EXCISION OF ASCENDING COLON, VIA NATURAL OR ARTIFICIAL OPENING ENDOSCOPIC, DIAGNOSTIC: ICD-10-PCS | Performed by: INTERNAL MEDICINE

## 2025-05-28 PROCEDURE — 83735 ASSAY OF MAGNESIUM: CPT

## 2025-05-28 PROCEDURE — 3609010300 HC COLONOSCOPY W/BIOPSY SINGLE/MULTIPLE: Performed by: INTERNAL MEDICINE

## 2025-05-28 PROCEDURE — 6370000000 HC RX 637 (ALT 250 FOR IP): Performed by: HOSPITALIST

## 2025-05-28 PROCEDURE — 2500000003 HC RX 250 WO HCPCS: Performed by: INTERNAL MEDICINE

## 2025-05-28 PROCEDURE — 85018 HEMOGLOBIN: CPT

## 2025-05-28 PROCEDURE — 3700000001 HC ADD 15 MINUTES (ANESTHESIA): Performed by: INTERNAL MEDICINE

## 2025-05-28 PROCEDURE — 6370000000 HC RX 637 (ALT 250 FOR IP): Performed by: INTERNAL MEDICINE

## 2025-05-28 PROCEDURE — 2709999900 HC NON-CHARGEABLE SUPPLY: Performed by: INTERNAL MEDICINE

## 2025-05-28 RX ORDER — LABETALOL HYDROCHLORIDE 5 MG/ML
INJECTION, SOLUTION INTRAVENOUS
Status: DISCONTINUED | OUTPATIENT
Start: 2025-05-28 | End: 2025-05-28 | Stop reason: SDUPTHER

## 2025-05-28 RX ORDER — MAGNESIUM SULFATE IN WATER 40 MG/ML
2000 INJECTION, SOLUTION INTRAVENOUS ONCE
Status: COMPLETED | OUTPATIENT
Start: 2025-05-28 | End: 2025-05-28

## 2025-05-28 RX ORDER — PROPOFOL 10 MG/ML
INJECTION, EMULSION INTRAVENOUS
Status: DISCONTINUED | OUTPATIENT
Start: 2025-05-28 | End: 2025-05-28 | Stop reason: SDUPTHER

## 2025-05-28 RX ADMIN — LABETALOL HYDROCHLORIDE 10 MG: 5 INJECTION, SOLUTION INTRAVENOUS at 05:54

## 2025-05-28 RX ADMIN — PROPOFOL 200 MCG/KG/MIN: 10 INJECTION, EMULSION INTRAVENOUS at 15:10

## 2025-05-28 RX ADMIN — METRONIDAZOLE 500 MG: 500 INJECTION, SOLUTION INTRAVENOUS at 22:00

## 2025-05-28 RX ADMIN — SODIUM CHLORIDE, PRESERVATIVE FREE 40 MG: 5 INJECTION INTRAVENOUS at 18:20

## 2025-05-28 RX ADMIN — SODIUM CHLORIDE, PRESERVATIVE FREE 10 ML: 5 INJECTION INTRAVENOUS at 21:58

## 2025-05-28 RX ADMIN — LABETALOL HYDROCHLORIDE 5 MG: 5 INJECTION INTRAVENOUS at 15:13

## 2025-05-28 RX ADMIN — METRONIDAZOLE 500 MG: 500 INJECTION, SOLUTION INTRAVENOUS at 16:35

## 2025-05-28 RX ADMIN — WATER 1000 MG: 1 INJECTION INTRAMUSCULAR; INTRAVENOUS; SUBCUTANEOUS at 09:03

## 2025-05-28 RX ADMIN — LABETALOL HYDROCHLORIDE 10 MG: 5 INJECTION, SOLUTION INTRAVENOUS at 10:57

## 2025-05-28 RX ADMIN — SODIUM CHLORIDE, PRESERVATIVE FREE 40 MG: 5 INJECTION INTRAVENOUS at 13:12

## 2025-05-28 RX ADMIN — MAGNESIUM SULFATE HEPTAHYDRATE 2000 MG: 40 INJECTION, SOLUTION INTRAVENOUS at 13:15

## 2025-05-28 RX ADMIN — LABETALOL HYDROCHLORIDE 5 MG: 5 INJECTION INTRAVENOUS at 15:08

## 2025-05-28 RX ADMIN — SODIUM CHLORIDE, PRESERVATIVE FREE 10 ML: 5 INJECTION INTRAVENOUS at 09:05

## 2025-05-28 RX ADMIN — ATORVASTATIN CALCIUM 40 MG: 40 TABLET, FILM COATED ORAL at 09:03

## 2025-05-28 RX ADMIN — SODIUM CHLORIDE, PRESERVATIVE FREE 40 MG: 5 INJECTION INTRAVENOUS at 05:51

## 2025-05-28 RX ADMIN — SODIUM CHLORIDE, PRESERVATIVE FREE 40 MG: 5 INJECTION INTRAVENOUS at 01:45

## 2025-05-28 RX ADMIN — ZOLPIDEM TARTRATE 10 MG: 5 TABLET ORAL at 22:55

## 2025-05-28 RX ADMIN — INSULIN LISPRO 4 UNITS: 100 INJECTION, SOLUTION INTRAVENOUS; SUBCUTANEOUS at 17:34

## 2025-05-28 RX ADMIN — METRONIDAZOLE 500 MG: 500 INJECTION, SOLUTION INTRAVENOUS at 05:58

## 2025-05-28 RX ADMIN — MAGNESIUM SULFATE HEPTAHYDRATE 2000 MG: 40 INJECTION, SOLUTION INTRAVENOUS at 09:07

## 2025-05-28 RX ADMIN — PROPOFOL 130 MG: 10 INJECTION, EMULSION INTRAVENOUS at 15:08

## 2025-05-28 ASSESSMENT — PAIN SCALES - GENERAL
PAINLEVEL_OUTOF10: 0
PAINLEVEL_OUTOF10: 0

## 2025-05-28 ASSESSMENT — PAIN - FUNCTIONAL ASSESSMENT: PAIN_FUNCTIONAL_ASSESSMENT: NONE - DENIES PAIN

## 2025-05-28 NOTE — ANESTHESIA PRE PROCEDURE
Department of Anesthesiology  Preprocedure Note       Name:  Beth John   Age:  74 y.o.  :  1950                                          MRN:  [id number]         Date:  2025      Surgeon: Surgeon(s):  Sukh Yip DO    Procedure: Procedure(s):  COLONOSCOPY DIAGNOSTIC  ESOPHAGOGASTRODUODENOSCOPY    Medications prior to admission:   Prior to Admission medications    Medication Sig Start Date End Date Taking? Authorizing Provider   b complex vitamins capsule Take 1 capsule by mouth daily   Yes Shahid Mendosa MD   zolpidem (AMBIEN) 10 MG tablet Take 1 tablet by mouth nightly as needed for Sleep for up to 90 days. 4/15/25 7/14/25 Yes Tae Husain MD   lisinopril (PRINIVIL;ZESTRIL) 20 MG tablet Take 1 tablet by mouth daily 4/15/25  Yes Tae Husain MD   insulin glargine (LANTUS SOLOSTAR) 100 UNIT/ML injection pen Inject 60 Units into the skin every morning 65 units in am 4/15/25  Yes Tae Husain MD   atorvastatin (LIPITOR) 40 MG tablet Take 1 tablet by mouth daily 25  Yes Tae Husain MD   insulin lispro, 1 Unit Dial, (HUMALOG KWIKPEN) 100 UNIT/ML SOPN Inject up to 12 units QAC TID  Patient taking differently: Indications: As of 1/15/25, future refills will need to be sent in as Humalog per patient's insurance Inject up to 12 units QAC TID 25  Yes Tae Husain MD   Magnesium 400 MG TABS Take 400 mg by mouth 2 times daily 25  Yes Tae Husain MD   metFORMIN (GLUCOPHAGE) 1000 MG tablet Take 1 tablet by mouth 2 times daily (with meals) 25  Yes Tae Husain MD   vitamin D (ERGOCALCIFEROL) 1.25 MG (28908 UT) CAPS capsule Take 1 capsule by mouth once a week   Yes Shahid Mendosa MD   omeprazole (PRILOSEC) 20 MG delayed release capsule Take 1 capsule by mouth daily   Yes Shahid Mendosa MD   aspirin 81 MG EC tablet Take 1 tablet by mouth daily   Yes Shahid Mendosa MD   MOUNJARO 5 MG/0.5ML SOAJ  3/4/25   Provider, MD Shahid

## 2025-05-28 NOTE — PLAN OF CARE
Problem: Chronic Conditions and Co-morbidities  Goal: Patient's chronic conditions and co-morbidity symptoms are monitored and maintained or improved  Outcome: Progressing     Problem: Safety - Adult  Goal: Free from fall injury  Outcome: Progressing  Flowsheets (Taken 5/28/2025 0900)  Free From Fall Injury: Instruct family/caregiver on patient safety     Problem: Pain  Goal: Verbalizes/displays adequate comfort level or baseline comfort level  Outcome: Progressing     Problem: Gastrointestinal - Adult  Goal: Minimal or absence of nausea and vomiting  Outcome: Progressing

## 2025-05-28 NOTE — PROGRESS NOTES
[facility] Hospitalist Progress Note    Admitting Date and Time: 5/26/2025 12:30 PM  Admit Dx: Colitis [K52.9]  Gastrointestinal hemorrhage, unspecified gastrointestinal hemorrhage type [K92.2]  Other fatigue [R53.83]  Dyspnea, unspecified type [R06.00]    Subjective:  Patient is being followed for Colitis [K52.9]  Gastrointestinal hemorrhage, unspecified gastrointestinal hemorrhage type [K92.2]  Other fatigue [R53.83]  Dyspnea, unspecified type [R06.00]   Pt was seen and examined today. Denies any new issues    ROS: denies fever, chills, cp, sob, n/v, HA unless stated above.     magnesium sulfate  2,000 mg IntraVENous Once    pantoprazole (PROTONIX) 40 mg in sodium chloride (PF) 0.9 % 10 mL injection  40 mg IntraVENous Q6H    [Held by provider] aspirin  81 mg Oral Daily    atorvastatin  40 mg Oral Daily    sodium chloride flush  5-40 mL IntraVENous 2 times per day    cefTRIAXone (ROCEPHIN) IV  1,000 mg IntraVENous Q24H    metroNIDAZOLE  500 mg IntraVENous Q8H    insulin glargine  30 Units SubCUTAneous Daily    insulin lispro  4 Units SubCUTAneous TID WC    insulin lispro  0-4 Units SubCUTAneous 4x Daily AC & HS     labetalol, 10 mg, Q4H PRN  magnesium citrate, 296 mL, Once PRN  zolpidem, 10 mg, Nightly PRN  sodium chloride flush, 5-40 mL, PRN  sodium chloride, , PRN  potassium chloride, 40 mEq, PRN   Or  potassium alternative oral replacement, 40 mEq, PRN   Or  potassium chloride, 10 mEq, PRN  magnesium sulfate, 2,000 mg, PRN  ondansetron, 4 mg, Q8H PRN   Or  ondansetron, 4 mg, Q6H PRN  polyethylene glycol, 17 g, Daily PRN  acetaminophen, 650 mg, Q6H PRN   Or  acetaminophen, 650 mg, Q6H PRN  glucose, 4 tablet, PRN  dextrose bolus, 125 mL, PRN   Or  dextrose bolus, 250 mL, PRN  glucagon (rDNA), 1 mg, PRN  dextrose, , Continuous PRN         Objective:    /63   Pulse 96   Temp 98.8 °F (37.1 °C) (Oral)   Resp 18   Ht 1.6 m (5' 3\")   Wt 86.2 kg (190 lb)   SpO2 96%   BMI 33.66 kg/m²     General  colon.            XR CHEST PORTABLE   Final Result   No acute process.             Assessment:    Principal Problem:    Colitis  Active Problems:    Type 2 diabetes mellitus with hyperglycemia, with long-term current use of insulin (HCC)    Essential hypertension    Mixed hyperlipidemia    Class 1 obesity with serious comorbidity and body mass index (BMI) of 33.0 to 33.9 in adult    ROD (acute kidney injury)    BRBPR (bright red blood per rectum)  Resolved Problems:    * No resolved hospital problems. *      Plan:  GI consulted, appreciate recommendations, plan for EGD and colonoscopy today  Having BRBPR, likely diverticular bleed  Stop Lovenox  NPO  Protonix Q6H per GI  Continue Rocephin and Flagyl  Negative C diff  Off IVF  Replace magnesium  Resume home medications as able    Code Status: Full code  DVT/GI ppx: None/Protonix  Dispo: Continue care, discharge tomorrow if scopes ok and electrolytes normalized    Time spent reviewing chart, clinical exam, discussing case and answering questions with staff/consultants/patient/family = 35 min    NOTE: This report was transcribed using voice recognition software. Every effort was made to ensure accuracy; however, inadvertent computerized transcription errors may be present.  Electronically signed by Caio Felix MD on 5/28/2025 at 10:25 AM

## 2025-05-28 NOTE — PLAN OF CARE
Problem: Chronic Conditions and Co-morbidities  Goal: Patient's chronic conditions and co-morbidity symptoms are monitored and maintained or improved  5/27/2025 2033 by Anahi Hernandez RN  Outcome: Progressing  5/27/2025 1529 by Chichi Abbasi RN  Outcome: Progressing  Flowsheets (Taken 5/27/2025 1016)  Care Plan - Patient's Chronic Conditions and Co-Morbidity Symptoms are Monitored and Maintained or Improved:   Monitor and assess patient's chronic conditions and comorbid symptoms for stability, deterioration, or improvement   Collaborate with multidisciplinary team to address chronic and comorbid conditions and prevent exacerbation or deterioration   Update acute care plan with appropriate goals if chronic or comorbid symptoms are exacerbated and prevent overall improvement and discharge     Problem: Safety - Adult  Goal: Free from fall injury  5/27/2025 2033 by Anahi Hernandez RN  Outcome: Progressing  5/27/2025 1529 by Chichi Abbasi RN  Outcome: Progressing     Problem: Pain  Goal: Verbalizes/displays adequate comfort level or baseline comfort level  5/27/2025 2033 by Anahi Hernandez RN  Outcome: Progressing  5/27/2025 1529 by Chichi Abbasi RN  Outcome: Progressing     Problem: Gastrointestinal - Adult  Goal: Minimal or absence of nausea and vomiting  5/27/2025 2033 by Anahi Hernandez RN  Outcome: Progressing  5/27/2025 1529 by Chichi Abbasi RN  Outcome: Progressing  Flowsheets (Taken 5/27/2025 1016)  Minimal or absence of nausea and vomiting:   Administer IV fluids as ordered to ensure adequate hydration   Maintain NPO status until nausea and vomiting are resolved   Nasogastric tube to low intermittent suction as ordered   Administer ordered antiemetic medications as needed   Provide nonpharmacologic comfort measures as appropriate   Advance diet as tolerated, if ordered   Nutrition consult to assist patient with adequate nutrition and appropriate food choices  Goal: Maintains or returns to

## 2025-05-28 NOTE — PROGRESS NOTES
P Quality Flow/Interdisciplinary Rounds Progress Note        Quality Flow Rounds held on May 28, 2025    Disciplines Attending:  Bedside Nurse, , , and Nursing Unit Leadership    Beth John was admitted on 5/26/2025 12:30 PM    Anticipated Discharge Date:       Disposition:    Duane Score:  Duane Scale Score: 20    [facility] RISK OF UNPLANNED READMISSION 2.0             10.3 Total Score        Discussed patient goal for the day, patient clinical progression, and barriers to discharge.  The following Goal(s) of the Day/Commitment(s) have been identified:   EGD / Cscope today      Makayla Persaud RN  May 28, 2025

## 2025-05-28 NOTE — ANESTHESIA POSTPROCEDURE EVALUATION
Department of Anesthesiology  Postprocedure Note    Patient: Beth John  MRN: [id number]  YOB: 1950  Date of evaluation: 5/28/2025    Procedure Summary       Date: 05/28/25 Room / Location: [address]    Anesthesia Start: 1503 Anesthesia Stop: 1524    Procedures:       COLONOSCOPY BIOPSY      ESOPHAGOGASTRODUODENOSCOPY Diagnosis:       Anemia, unspecified type      (Anemia, unspecified type [D64.9])    Surgeons: Sukh Yip DO Responsible Provider: Yasmin Mcneill MD    Anesthesia Type: MAC ASA Status: 3            Anesthesia Type: MAC    Kellen Phase I:      Kellen Phase II:      Anesthesia Post Evaluation    Patient location during evaluation: PACU  Patient participation: complete - patient participated  Level of consciousness: awake  Pain score: 0  Airway patency: patent  Nausea & Vomiting: no nausea and no vomiting  Cardiovascular status: blood pressure returned to baseline and hemodynamically stable  Respiratory status: acceptable and room air  Hydration status: euvolemic  Pain management: adequate        No notable events documented.

## 2025-05-29 VITALS
RESPIRATION RATE: 18 BRPM | DIASTOLIC BLOOD PRESSURE: 70 MMHG | SYSTOLIC BLOOD PRESSURE: 150 MMHG | HEIGHT: 63 IN | TEMPERATURE: 97.7 F | BODY MASS INDEX: 33.66 KG/M2 | WEIGHT: 190 LBS | OXYGEN SATURATION: 97 % | HEART RATE: 87 BPM

## 2025-05-29 LAB
GLUCOSE BLD-MCNC: 139 MG/DL (ref 74–99)
HCT VFR BLD AUTO: 32.6 % (ref 34–48)
HGB BLD-MCNC: 10.3 G/DL (ref 11.5–15.5)
MICROORGANISM SPEC CULT: NORMAL
MICROORGANISM SPEC CULT: NORMAL
SERVICE CMNT-IMP: NORMAL
SPECIMEN DESCRIPTION: NORMAL

## 2025-05-29 PROCEDURE — 6360000002 HC RX W HCPCS: Performed by: INTERNAL MEDICINE

## 2025-05-29 PROCEDURE — 99239 HOSP IP/OBS DSCHRG MGMT >30: CPT | Performed by: INTERNAL MEDICINE

## 2025-05-29 PROCEDURE — 2580000003 HC RX 258: Performed by: INTERNAL MEDICINE

## 2025-05-29 PROCEDURE — 85014 HEMATOCRIT: CPT

## 2025-05-29 PROCEDURE — 82962 GLUCOSE BLOOD TEST: CPT

## 2025-05-29 PROCEDURE — 2500000003 HC RX 250 WO HCPCS: Performed by: INTERNAL MEDICINE

## 2025-05-29 PROCEDURE — 36415 COLL VENOUS BLD VENIPUNCTURE: CPT

## 2025-05-29 PROCEDURE — 85018 HEMOGLOBIN: CPT

## 2025-05-29 PROCEDURE — 6370000000 HC RX 637 (ALT 250 FOR IP): Performed by: INTERNAL MEDICINE

## 2025-05-29 RX ORDER — PANTOPRAZOLE SODIUM 40 MG/1
40 TABLET, DELAYED RELEASE ORAL
Qty: 60 TABLET | Refills: 2 | Status: SHIPPED | OUTPATIENT
Start: 2025-05-29

## 2025-05-29 RX ORDER — INSULIN GLARGINE 100 [IU]/ML
45 INJECTION, SOLUTION SUBCUTANEOUS DAILY
Qty: 10 ML | Refills: 0
Start: 2025-05-30 | End: 2025-06-03 | Stop reason: SDUPTHER

## 2025-05-29 RX ORDER — PANTOPRAZOLE SODIUM 20 MG/1
20 TABLET, DELAYED RELEASE ORAL
Status: DISCONTINUED | OUTPATIENT
Start: 2025-05-30 | End: 2025-05-29 | Stop reason: HOSPADM

## 2025-05-29 RX ORDER — CEFDINIR 300 MG/1
300 CAPSULE ORAL 2 TIMES DAILY
Qty: 14 CAPSULE | Refills: 0 | Status: SHIPPED | OUTPATIENT
Start: 2025-05-29 | End: 2025-06-05

## 2025-05-29 RX ORDER — METRONIDAZOLE 500 MG/1
500 TABLET ORAL 3 TIMES DAILY
Qty: 21 TABLET | Refills: 0 | Status: SHIPPED | OUTPATIENT
Start: 2025-05-29 | End: 2025-06-05

## 2025-05-29 RX ADMIN — WATER 1000 MG: 1 INJECTION INTRAMUSCULAR; INTRAVENOUS; SUBCUTANEOUS at 08:39

## 2025-05-29 RX ADMIN — INSULIN GLARGINE 30 UNITS: 100 INJECTION, SOLUTION SUBCUTANEOUS at 08:39

## 2025-05-29 RX ADMIN — SODIUM CHLORIDE, PRESERVATIVE FREE 40 MG: 5 INJECTION INTRAVENOUS at 02:31

## 2025-05-29 RX ADMIN — INSULIN LISPRO 4 UNITS: 100 INJECTION, SOLUTION INTRAVENOUS; SUBCUTANEOUS at 08:40

## 2025-05-29 RX ADMIN — ATORVASTATIN CALCIUM 40 MG: 40 TABLET, FILM COATED ORAL at 08:39

## 2025-05-29 RX ADMIN — SODIUM CHLORIDE, PRESERVATIVE FREE 40 MG: 5 INJECTION INTRAVENOUS at 05:45

## 2025-05-29 RX ADMIN — SODIUM CHLORIDE, PRESERVATIVE FREE 10 ML: 5 INJECTION INTRAVENOUS at 08:40

## 2025-05-29 RX ADMIN — METRONIDAZOLE 500 MG: 500 INJECTION, SOLUTION INTRAVENOUS at 05:47

## 2025-05-29 NOTE — PLAN OF CARE
Problem: Chronic Conditions and Co-morbidities  Goal: Patient's chronic conditions and co-morbidity symptoms are monitored and maintained or improved  5/28/2025 2235 by Janie Alba RN  Outcome: Progressing  5/28/2025 1145 by Glory Albright RN  Outcome: Progressing     Problem: Safety - Adult  Goal: Free from fall injury  5/28/2025 2235 by Janie Alba RN  Outcome: Progressing  5/28/2025 1145 by Glory Albright RN  Outcome: Progressing  Flowsheets (Taken 5/28/2025 0900)  Free From Fall Injury: Instruct family/caregiver on patient safety     Problem: Pain  Goal: Verbalizes/displays adequate comfort level or baseline comfort level  5/28/2025 2235 by Janie Alba RN  Outcome: Progressing  5/28/2025 1145 by Glory Albright RN  Outcome: Progressing     Problem: Gastrointestinal - Adult  Goal: Minimal or absence of nausea and vomiting  5/28/2025 2235 by Janie Alba RN  Outcome: Progressing  5/28/2025 1145 by Glory Albright RN  Outcome: Progressing  Goal: Maintains or returns to baseline bowel function  5/28/2025 2235 by Janie Alba RN  Outcome: Progressing  5/28/2025 1145 by Glory Albright RN  Outcome: Progressing  Goal: Maintains adequate nutritional intake  5/28/2025 2235 by Janie Alba RN  Outcome: Progressing  5/28/2025 1145 by Glory Albright RN  Outcome: Progressing     Problem: Hematologic - Adult  Goal: Maintains hematologic stability  5/28/2025 2235 by Janie Alba RN  Outcome: Progressing  5/28/2025 1145 by Glory Albright RN  Outcome: Progressing

## 2025-05-29 NOTE — DISCHARGE SUMMARY
[facility] Hospitalist       Hospitalist Physician Discharge Summary       Tae Husain MD PCP - General PCP - [place] Internal Medicine [phone] [address] [address]      Next Steps: Follow up    Terrence Aguero MD  Internal Medicine [phone]  [facility] [address]     Next Steps: Follow up        Close         Activity level: As tolerated    Diet: Diabetic diet     Dispo:Home with self care     Condition On Discharge - stable     Patient ID:  Beth John  [id number]  74 y.o.  1950    Admit date: 5/26/2025    Discharge date and time:  5/29/2025  10:59 AM    Admission Diagnoses: Principal Problem:    Colitis  Active Problems:    Type 2 diabetes mellitus with hyperglycemia, with long-term current use of insulin (HCC)    Essential hypertension    Mixed hyperlipidemia    Class 1 obesity with serious comorbidity and body mass index (BMI) of 33.0 to 33.9 in adult    ROD (acute kidney injury)    BRBPR (bright red blood per rectum)  Resolved Problems:    * No resolved hospital problems. *      Discharge Diagnoses: Principal Problem:    Colitis  Active Problems:    Type 2 diabetes mellitus with hyperglycemia, with long-term current use of insulin (HCC)    Essential hypertension    Mixed hyperlipidemia    Class 1 obesity with serious comorbidity and body mass index (BMI) of 33.0 to 33.9 in adult    ROD (acute kidney injury)    BRBPR (bright red blood per rectum)  Resolved Problems:    * No resolved hospital problems. *      Consults:  IP CONSULT TO GI  IP CONSULT TO INTERNAL MEDICINE    Hospital Course:   She is a 74-year-old female with past medical history of hypertension, hyperlipidemia, type 2 diabetes came to ER with lower abdominal pain and diarrhea / BRBPR ,nausea and vomiting.  Workup in ER with CT evidence of possible colitis, lab work suggestive of ROD.  She was started on IV ceftriaxone and Flagyl, IV  review them with you.                STOP taking these medications      Lantus SoloStar 100 UNIT/ML injection pen  Generic drug: insulin glargine  Replaced by: insulin glargine 100 UNIT/ML injection vial     Mounjaro 5 MG/0.5ML Soaj pen  Generic drug: Tirzepatide     omeprazole 20 MG delayed release capsule  Commonly known as: PRILOSEC               Where to Get Your Medications        These medications were sent to [address] -  [phone] - F [phone]  [address] [place], [place] [address]      Phone: [phone]   cefdinir 300 MG capsule  metroNIDAZOLE 500 MG tablet  pantoprazole 40 MG tablet       Information about where to get these medications is not yet available    Ask your nurse or doctor about these medications  insulin glargine 100 UNIT/ML injection vial           Note that more than 30 minutes was spent in preparing discharge papers, discussing discharge with patient, medication review, etc.    Signed:  Electronically signed by Danette Monge MD on 5/29/2025 at 10:59 AM    NOTE: This report was transcribed using voice recognition software. Every effort was made to ensure accuracy; however, inadvertent computerized transcription errors may be present.

## 2025-05-29 NOTE — PROGRESS NOTES
P Quality Flow/Interdisciplinary Rounds Progress Note        Quality Flow Rounds held on May 29, 2025    Disciplines Attending:  Bedside Nurse, , , and Nursing Unit Leadership    Beth John was admitted on 5/26/2025 12:30 PM    Anticipated Discharge Date:       Disposition:    Duane Score:  Duane Scale Score: 20    [facility] RISK OF UNPLANNED READMISSION 2.0             10.5 Total Score        Discussed patient goal for the day, patient clinical progression, and barriers to discharge.  The following Goal(s) of the Day/Commitment(s) have been identified:   discharge planning, EGD / cscope, IV protonix      Denny Lau RN  May 29, 2025

## 2025-05-30 ENCOUNTER — CARE COORDINATION (OUTPATIENT)
Dept: CARE COORDINATION | Age: 75
End: 2025-05-30

## 2025-05-30 DIAGNOSIS — K52.9 COLITIS: Primary | ICD-10-CM

## 2025-05-30 LAB — CALPROTECTIN, FECAL: 209 UG/G

## 2025-05-30 PROCEDURE — 1111F DSCHRG MED/CURRENT MED MERGE: CPT | Performed by: INTERNAL MEDICINE

## 2025-05-30 NOTE — CARE COORDINATION
follow up appointment?: Yes  How are you going to get to your appointment?: Car - family or friend to transport  Do you have support at home?: Partner/Spouse/SO  Do you feel like you have everything you need to keep you well at home?: Yes  Care Transitions Interventions    Registered Dietician: Declined    Diabetes Education: Declined                Follow Up Appointment:   Discussed follow up appointments. Patient has hospital follow up appointment scheduled within 7 days of discharge.   Future Appointments         Provider Specialty Dept Phone    6/3/2025 11:00 AM Tae Husain MD Family [facility] [phone]    7/21/2025 10:00 AM Tae Husain MD Family [facility] [phone]            Care Transition Nurse provided contact information.  Plan for follow-up call in 6-10 days based on severity of symptoms and risk factors.  Plan for next call: follow-up appointment-Did patient get scheduled for f/u appt with GI?  medication management-Is Cefdinir/Flagyl finished?  Symptom check  Biopsy results from scope?    Christiane Toscano, APRN

## 2025-05-30 NOTE — PROGRESS NOTES
CLINICAL PHARMACY NOTE: MEDS TO BEDS    Total # of Prescriptions Filled: 3   The following medications were delivered to the patient:  Metronidazole 500mg  Pantoprazole 40mg  Cefdinir 300mg    Additional Documentation:

## 2025-06-02 ENCOUNTER — TELEPHONE (OUTPATIENT)
Dept: FAMILY MEDICINE CLINIC | Age: 75
End: 2025-06-02

## 2025-06-02 NOTE — TELEPHONE ENCOUNTER
Care Transitions Initial Follow Up Call    Outreach made within 2 business days of discharge: Yes    Patient: Beth John Patient : 1950   MRN: [id number]  Reason for Admission: Colitis   Discharge Date: 25       Spoke with: patient ( Tata spoke with patient)    Discharge department/facility: [facility] Interactive Patient Contact:  Was patient able to fill all prescriptions: Yes  Was patient instructed to bring all medications to the follow-up visit: Yes  Is patient taking all medications as directed in the discharge summary? Yes  Does patient understand their discharge instructions: Yes  Does patient have questions or concerns that need addressed prior to 7-14 day follow up office visit: no    Additional needs identified to be addressed with provider  No needs identified             Scheduled appointment with PCP within 7-14 days    Follow Up  Future Appointments   Date Time Provider Department Center   6/3/2025 11:00 AM Tae Husain MD [facility] DE[facility]   2025 10:00 AM Tae Husain MD [facility]       Namrata Shane

## 2025-06-03 ENCOUNTER — OFFICE VISIT (OUTPATIENT)
Dept: FAMILY MEDICINE CLINIC | Age: 75
End: 2025-06-03

## 2025-06-03 VITALS
WEIGHT: 189 LBS | SYSTOLIC BLOOD PRESSURE: 126 MMHG | RESPIRATION RATE: 20 BRPM | BODY MASS INDEX: 33.49 KG/M2 | HEART RATE: 79 BPM | OXYGEN SATURATION: 98 % | TEMPERATURE: 98.3 F | HEIGHT: 63 IN | DIASTOLIC BLOOD PRESSURE: 54 MMHG

## 2025-06-03 DIAGNOSIS — B37.9 YEAST INFECTION: ICD-10-CM

## 2025-06-03 DIAGNOSIS — Z09 HOSPITAL DISCHARGE FOLLOW-UP: ICD-10-CM

## 2025-06-03 DIAGNOSIS — K52.9 COLITIS: Primary | ICD-10-CM

## 2025-06-03 DIAGNOSIS — E78.2 MIXED HYPERLIPIDEMIA: ICD-10-CM

## 2025-06-03 DIAGNOSIS — D64.9 ANEMIA, UNSPECIFIED TYPE: ICD-10-CM

## 2025-06-03 RX ORDER — ATORVASTATIN CALCIUM 40 MG/1
40 TABLET, FILM COATED ORAL DAILY
Qty: 90 TABLET | Refills: 1 | Status: SHIPPED | OUTPATIENT
Start: 2025-06-03

## 2025-06-03 RX ORDER — INSULIN GLARGINE 100 [IU]/ML
45 INJECTION, SOLUTION SUBCUTANEOUS DAILY
Qty: 10 ML | Refills: 0 | Status: SHIPPED | OUTPATIENT
Start: 2025-06-03

## 2025-06-03 RX ORDER — FLUCONAZOLE 150 MG/1
150 TABLET ORAL
Qty: 2 TABLET | Refills: 0 | Status: SHIPPED | OUTPATIENT
Start: 2025-06-03 | End: 2025-06-09

## 2025-06-03 NOTE — PROGRESS NOTES
Post-Discharge Transitional Care  Follow Up      Beth John   YOB: 1950    Date of Office Visit:  6/3/2025  Date of Hospital Admission: 5/26/25  Date of Hospital Discharge: 5/29/25  Risk of hospital readmission (high >=14%. Medium >=10%) :Readmission Risk Score: 10.6      Care management risk score Rising risk (score 2-5) and Complex Care (Scores >=6): No Risk Score On File     Non face to face  following discharge, date last encounter closed (first attempt may have been earlier): 06/02/2025    Call initiated 2 business days of discharge: Yes    ASSESSMENT/PLAN:   Colitis  - Poss ischemic colitis - patient reports low blood pressure via EMS   - Colonoscopy (5/25) -cecum and ascending colon and rectum normal discontinuous areas of bleeding ulcerated mucosa with stigmata of recent bleeding in the distal transverse colon descending colon and proximal sigmoid colon possible ischemic colitis no other abnormalities   - on cefdinir and flagyl   - slowly improving     Yeast infection  - per patient feels has yeast infection   - will add diflucan x 2 every 72 hours     Anemia, unspecified type   - possible multifactorial (labs, bleeding, ivf dilution)   - will follow ups     Medical Decision Making: moderate complexity    No follow-ups on file.      Orders Placed This Encounter   Procedures    External Referral To Gastroenterology     Referral Priority:   Routine     Referral Type:   Eval and Treat     Referral Reason:   Specialty Services Required     Referred to Provider:   Sukh Yip DO     Requested Specialty:   Gastroenterology     Number of Visits Requested:   1    MD DISCHARGE MEDS RECONCILED W/ CURRENT OUTPATIENT MED LIST     Requested Prescriptions     Signed Prescriptions Disp Refills    atorvastatin (LIPITOR) 40 MG tablet 90 tablet 1     Sig: Take 1 tablet by mouth daily    insulin glargine (LANTUS) 100 UNIT/ML injection vial 10 mL 0     Sig: Inject 45 Units into the skin daily

## 2025-06-04 LAB — SURGICAL PATHOLOGY REPORT: NORMAL

## 2025-06-10 ENCOUNTER — CARE COORDINATION (OUTPATIENT)
Dept: CARE COORDINATION | Age: 75
End: 2025-06-10

## 2025-06-10 NOTE — CARE COORDINATION
Appointments:    Future Appointments         Provider Specialty Dept Phone    7/21/2025 10:00 AM Tae Husain MD Family Medicine [phone]            Patient has agreed to contact primary care provider and/or specialist for any further questions, concerns, or needs.    Christiane Toscano, APRN

## 2025-07-01 RX ORDER — INSULIN GLARGINE 100 [IU]/ML
45 INJECTION, SOLUTION SUBCUTANEOUS DAILY
Qty: 10 ML | Refills: 1 | Status: SHIPPED | OUTPATIENT
Start: 2025-07-01 | End: 2025-07-02

## 2025-07-01 NOTE — TELEPHONE ENCOUNTER
Beth is asking for a new script for Lantus to be sent to [facility] in [place].       Last Appointment:  6/3/2025  Future Appointments   Date Time Provider Department Center   7/21/2025 10:00 AM Tae Husain MD [facility] DEP

## 2025-07-02 ENCOUNTER — TELEPHONE (OUTPATIENT)
Dept: FAMILY MEDICINE CLINIC | Age: 75
End: 2025-07-02

## 2025-07-02 DIAGNOSIS — Z79.4 TYPE 2 DIABETES MELLITUS WITH HYPERGLYCEMIA, WITH LONG-TERM CURRENT USE OF INSULIN (HCC): Primary | ICD-10-CM

## 2025-07-02 DIAGNOSIS — E11.65 TYPE 2 DIABETES MELLITUS WITH HYPERGLYCEMIA, WITH LONG-TERM CURRENT USE OF INSULIN (HCC): Primary | ICD-10-CM

## 2025-07-02 DIAGNOSIS — E11.319 TYPE 2 DIABETES MELLITUS WITH BOTH EYES AFFECTED BY RETINOPATHY WITHOUT MACULAR EDEMA, WITH LONG-TERM CURRENT USE OF INSULIN, UNSPECIFIED RETINOPATHY SEVERITY (HCC): ICD-10-CM

## 2025-07-02 DIAGNOSIS — Z79.4 TYPE 2 DIABETES MELLITUS WITH BOTH EYES AFFECTED BY RETINOPATHY WITHOUT MACULAR EDEMA, WITH LONG-TERM CURRENT USE OF INSULIN, UNSPECIFIED RETINOPATHY SEVERITY (HCC): ICD-10-CM

## 2025-07-02 RX ORDER — INSULIN GLARGINE 100 [IU]/ML
45 INJECTION, SOLUTION SUBCUTANEOUS DAILY
Qty: 5 ADJUSTABLE DOSE PRE-FILLED PEN SYRINGE | Refills: 5 | Status: SHIPPED | OUTPATIENT
Start: 2025-07-02

## 2025-07-02 NOTE — TELEPHONE ENCOUNTER
Requested Prescriptions     Signed Prescriptions Disp Refills    insulin glargine (LANTUS SOLOSTAR) 100 UNIT/ML injection pen 5 Adjustable Dose Pre-filled Pen Syringe 5     Sig: Inject 45 Units into the skin daily     Authorizing Provider: MARISELA LATHAM

## 2025-07-02 NOTE — TELEPHONE ENCOUNTER
Pharmacist from [facility] in [place] calling about the Lantus Insulin.      He received a script for the vials, but Beth uses the pens.      He is asking for a new script for the pens.

## 2025-07-07 DIAGNOSIS — E11.319 TYPE 2 DIABETES MELLITUS WITH BOTH EYES AFFECTED BY RETINOPATHY WITHOUT MACULAR EDEMA, WITH LONG-TERM CURRENT USE OF INSULIN, UNSPECIFIED RETINOPATHY SEVERITY (HCC): ICD-10-CM

## 2025-07-07 DIAGNOSIS — Z79.4 TYPE 2 DIABETES MELLITUS WITH HYPERGLYCEMIA, WITH LONG-TERM CURRENT USE OF INSULIN (HCC): ICD-10-CM

## 2025-07-07 DIAGNOSIS — E11.65 TYPE 2 DIABETES MELLITUS WITH HYPERGLYCEMIA, WITH LONG-TERM CURRENT USE OF INSULIN (HCC): ICD-10-CM

## 2025-07-07 DIAGNOSIS — Z79.4 TYPE 2 DIABETES MELLITUS WITH BOTH EYES AFFECTED BY RETINOPATHY WITHOUT MACULAR EDEMA, WITH LONG-TERM CURRENT USE OF INSULIN, UNSPECIFIED RETINOPATHY SEVERITY (HCC): ICD-10-CM

## 2025-07-07 DIAGNOSIS — E78.2 MIXED HYPERLIPIDEMIA: ICD-10-CM

## 2025-07-07 DIAGNOSIS — Z79.899 LONG TERM CURRENT USE OF THERAPEUTIC DRUG: ICD-10-CM

## 2025-07-07 DIAGNOSIS — E55.9 VITAMIN D DEFICIENCY: ICD-10-CM

## 2025-07-07 DIAGNOSIS — R53.83 OTHER FATIGUE: ICD-10-CM

## 2025-07-07 LAB
ALBUMIN: 3.7 G/DL (ref 3.5–5.2)
ALP BLD-CCNC: 74 U/L (ref 35–104)
ALT SERPL-CCNC: 31 U/L (ref 0–35)
ANION GAP SERPL CALCULATED.3IONS-SCNC: 11 MMOL/L (ref 7–16)
AST SERPL-CCNC: 25 U/L (ref 0–35)
BACTERIA: ABNORMAL
BASOPHILS ABSOLUTE: 0.05 K/UL (ref 0–0.2)
BASOPHILS RELATIVE PERCENT: 1 % (ref 0–2)
BILIRUB SERPL-MCNC: <0.2 MG/DL (ref 0–1.2)
BILIRUBIN, URINE: NEGATIVE
BUN BLDV-MCNC: 18 MG/DL (ref 8–23)
CALCIUM SERPL-MCNC: 9.5 MG/DL (ref 8.8–10.2)
CHLORIDE BLD-SCNC: 105 MMOL/L (ref 98–107)
CHOLESTEROL, FASTING: 148 MG/DL
CO2: 24 MMOL/L (ref 22–29)
COLOR, UA: YELLOW
CREAT SERPL-MCNC: 0.9 MG/DL (ref 0.5–1)
CREATININE URINE: 50.9 MG/DL (ref 29–226)
EOSINOPHILS ABSOLUTE: 0.36 K/UL (ref 0.05–0.5)
EOSINOPHILS RELATIVE PERCENT: 4 % (ref 0–6)
EPITHELIAL CELLS, UA: ABNORMAL /HPF
FOLATE: >40 NG/ML (ref 4.6–34.8)
GFR, ESTIMATED: 65 ML/MIN/1.73M2
GLUCOSE BLD-MCNC: 135 MG/DL (ref 74–99)
GLUCOSE URINE: NEGATIVE MG/DL
HBA1C MFR BLD: 9.5 % (ref 4–5.6)
HCT VFR BLD CALC: 34.1 % (ref 34–48)
HDLC SERPL-MCNC: 53 MG/DL
HEMOGLOBIN: 10.3 G/DL (ref 11.5–15.5)
IMMATURE GRANULOCYTES %: 0 % (ref 0–5)
IMMATURE GRANULOCYTES ABSOLUTE: <0.03 K/UL (ref 0–0.58)
KETONES, URINE: NEGATIVE MG/DL
LDL CHOLESTEROL: 66 MG/DL
LEUKOCYTE ESTERASE, URINE: ABNORMAL
LYMPHOCYTES ABSOLUTE: 3.11 K/UL (ref 1.5–4)
LYMPHOCYTES RELATIVE PERCENT: 30 % (ref 20–42)
MAGNESIUM: 1.4 MG/DL (ref 1.6–2.4)
MCH RBC QN AUTO: 28.2 PG (ref 26–35)
MCHC RBC AUTO-ENTMCNC: 30.2 G/DL (ref 32–34.5)
MCV RBC AUTO: 93.4 FL (ref 80–99.9)
MICROALBUMIN/CREAT 24H UR: <12 MG/L (ref 0–20)
MICROALBUMIN/CREAT UR-RTO: <24 MCG/MG CREAT (ref 0–30)
MONOCYTES ABSOLUTE: 0.44 K/UL (ref 0.1–0.95)
MONOCYTES RELATIVE PERCENT: 4 % (ref 2–12)
NEUTROPHILS ABSOLUTE: 6.25 K/UL (ref 1.8–7.3)
NEUTROPHILS RELATIVE PERCENT: 61 % (ref 43–80)
NITRITE, URINE: NEGATIVE
PDW BLD-RTO: 13.6 % (ref 11.5–15)
PH, URINE: 6 (ref 5–8)
PLATELET # BLD: 411 K/UL (ref 130–450)
PMV BLD AUTO: 9.8 FL (ref 7–12)
POTASSIUM SERPL-SCNC: 4.6 MMOL/L (ref 3.5–5.1)
PROTEIN UA: NEGATIVE MG/DL
RBC # BLD: 3.65 M/UL (ref 3.5–5.5)
RBC UA: ABNORMAL /HPF
SODIUM BLD-SCNC: 140 MMOL/L (ref 136–145)
SPECIFIC GRAVITY UA: 1.01 (ref 1–1.03)
TOTAL PROTEIN: 6.5 G/DL (ref 6.4–8.3)
TRIGLYCERIDE, FASTING: 145 MG/DL
TSH SERPL DL<=0.05 MIU/L-ACNC: 2.74 UIU/ML (ref 0.27–4.2)
TURBIDITY: CLEAR
URINE HGB: NEGATIVE
UROBILINOGEN, URINE: 0.2 EU/DL (ref 0–1)
VITAMIN B-12: 1217 PG/ML (ref 232–1245)
VITAMIN D 25-HYDROXY: 43.6 NG/ML (ref 30–100)
VLDLC SERPL CALC-MCNC: 29 MG/DL
WBC # BLD: 10.2 K/UL (ref 4.5–11.5)
WBC UA: ABNORMAL /HPF

## 2025-07-07 RX ORDER — INSULIN GLARGINE 100 [IU]/ML
55 INJECTION, SOLUTION SUBCUTANEOUS DAILY
Qty: 5 ADJUSTABLE DOSE PRE-FILLED PEN SYRINGE | Refills: 5 | Status: SHIPPED | OUTPATIENT
Start: 2025-07-07 | End: 2025-07-21 | Stop reason: SDUPTHER

## 2025-07-08 ENCOUNTER — TELEPHONE (OUTPATIENT)
Dept: FAMILY MEDICINE CLINIC | Age: 75
End: 2025-07-08

## 2025-07-08 NOTE — TELEPHONE ENCOUNTER
Please let the patient know that blood work results showed    Sugar was elevated.  Hemoglobin A1c is a measure 3-month sugar control was more elevated and more uncontrolled now at 9.5.   - Will need to review medications and make adjustments to try to get that A1c to at least less than 7.5.   - May need to consider reevaluation with endocrinology    Blood count showed anemia with a hemoglobin level of 10.3.  This appeared unchanged when compared to previous  -Other blood counts were normal    Magnesium level was again decreased and would recommend magnesium supplements if not already taking    Urine analysis showed white blood cells slight bacteria and some skin cells.  Uncertain as to the significance of these findings and presence of skin cells may suggest slight contamination of sample.  No evidence for microscopic blood or microscopic protein    Thyroid levels were improved and back in normal range    Cholesterol levels were also improved when compared to previous    Vitamin B12 and folic acid levels were normal    Vitamin D levels were normal    Other electrolytes, liver functions, and kidney function values were normal    Thanks

## 2025-07-21 ENCOUNTER — OFFICE VISIT (OUTPATIENT)
Dept: FAMILY MEDICINE CLINIC | Age: 75
End: 2025-07-21
Payer: MEDICARE

## 2025-07-21 VITALS
OXYGEN SATURATION: 96 % | RESPIRATION RATE: 18 BRPM | BODY MASS INDEX: 33.58 KG/M2 | DIASTOLIC BLOOD PRESSURE: 58 MMHG | HEART RATE: 74 BPM | TEMPERATURE: 97.9 F | HEIGHT: 63 IN | WEIGHT: 189.5 LBS | SYSTOLIC BLOOD PRESSURE: 120 MMHG

## 2025-07-21 DIAGNOSIS — D64.9 ANEMIA, UNSPECIFIED TYPE: ICD-10-CM

## 2025-07-21 DIAGNOSIS — K52.9 COLITIS: Primary | ICD-10-CM

## 2025-07-21 DIAGNOSIS — E11.65 TYPE 2 DIABETES MELLITUS WITH HYPERGLYCEMIA, WITH LONG-TERM CURRENT USE OF INSULIN (HCC): ICD-10-CM

## 2025-07-21 DIAGNOSIS — Z79.4 TYPE 2 DIABETES MELLITUS WITH HYPERGLYCEMIA, WITH LONG-TERM CURRENT USE OF INSULIN (HCC): ICD-10-CM

## 2025-07-21 DIAGNOSIS — Z79.4 TYPE 2 DIABETES MELLITUS WITH BOTH EYES AFFECTED BY RETINOPATHY WITHOUT MACULAR EDEMA, WITH LONG-TERM CURRENT USE OF INSULIN, UNSPECIFIED RETINOPATHY SEVERITY (HCC): ICD-10-CM

## 2025-07-21 DIAGNOSIS — E11.319 TYPE 2 DIABETES MELLITUS WITH BOTH EYES AFFECTED BY RETINOPATHY WITHOUT MACULAR EDEMA, WITH LONG-TERM CURRENT USE OF INSULIN, UNSPECIFIED RETINOPATHY SEVERITY (HCC): ICD-10-CM

## 2025-07-21 DIAGNOSIS — K21.9 GASTROESOPHAGEAL REFLUX DISEASE WITHOUT ESOPHAGITIS: ICD-10-CM

## 2025-07-21 DIAGNOSIS — E78.2 MIXED HYPERLIPIDEMIA: ICD-10-CM

## 2025-07-21 DIAGNOSIS — E55.9 VITAMIN D DEFICIENCY: ICD-10-CM

## 2025-07-21 DIAGNOSIS — G47.09 OTHER INSOMNIA: ICD-10-CM

## 2025-07-21 DIAGNOSIS — E83.42 HYPOMAGNESEMIA: ICD-10-CM

## 2025-07-21 DIAGNOSIS — I10 ESSENTIAL HYPERTENSION: ICD-10-CM

## 2025-07-21 PROCEDURE — 3078F DIAST BP <80 MM HG: CPT | Performed by: INTERNAL MEDICINE

## 2025-07-21 PROCEDURE — 1160F RVW MEDS BY RX/DR IN RCRD: CPT | Performed by: INTERNAL MEDICINE

## 2025-07-21 PROCEDURE — 3074F SYST BP LT 130 MM HG: CPT | Performed by: INTERNAL MEDICINE

## 2025-07-21 PROCEDURE — 1159F MED LIST DOCD IN RCRD: CPT | Performed by: INTERNAL MEDICINE

## 2025-07-21 PROCEDURE — 3046F HEMOGLOBIN A1C LEVEL >9.0%: CPT | Performed by: INTERNAL MEDICINE

## 2025-07-21 PROCEDURE — 99214 OFFICE O/P EST MOD 30 MIN: CPT | Performed by: INTERNAL MEDICINE

## 2025-07-21 PROCEDURE — 1123F ACP DISCUSS/DSCN MKR DOCD: CPT | Performed by: INTERNAL MEDICINE

## 2025-07-21 RX ORDER — OMEPRAZOLE 20 MG/1
20 CAPSULE, DELAYED RELEASE ORAL DAILY
COMMUNITY
End: 2025-07-21 | Stop reason: SDUPTHER

## 2025-07-21 RX ORDER — OMEPRAZOLE 20 MG/1
20 CAPSULE, DELAYED RELEASE ORAL DAILY
Qty: 90 CAPSULE | Refills: 1 | Status: SHIPPED | OUTPATIENT
Start: 2025-07-21

## 2025-07-21 RX ORDER — LISINOPRIL 20 MG/1
20 TABLET ORAL DAILY
Qty: 90 TABLET | Refills: 0 | Status: SHIPPED | OUTPATIENT
Start: 2025-07-21

## 2025-07-21 RX ORDER — ZOLPIDEM TARTRATE 10 MG/1
10 TABLET ORAL NIGHTLY PRN
Qty: 90 TABLET | Refills: 0 | Status: SHIPPED | OUTPATIENT
Start: 2025-07-21 | End: 2025-10-19

## 2025-07-21 RX ORDER — CALCIUM CARBONATE 300MG(750)
400 TABLET,CHEWABLE ORAL 2 TIMES DAILY
Qty: 180 TABLET | Refills: 1 | Status: SHIPPED | OUTPATIENT
Start: 2025-07-21

## 2025-07-21 RX ORDER — INSULIN GLARGINE 100 [IU]/ML
65 INJECTION, SOLUTION SUBCUTANEOUS DAILY
Qty: 15 ADJUSTABLE DOSE PRE-FILLED PEN SYRINGE | Refills: 1 | Status: SHIPPED | OUTPATIENT
Start: 2025-07-21

## 2025-07-21 ASSESSMENT — ENCOUNTER SYMPTOMS
DIARRHEA: 0
SORE THROAT: 0
BLOOD IN STOOL: 0
ABDOMINAL PAIN: 0
VOMITING: 0
CONSTIPATION: 0
COUGH: 0
EYE PAIN: 0
CHEST TIGHTNESS: 0
RHINORRHEA: 0
SHORTNESS OF BREATH: 1
NAUSEA: 0

## 2025-07-21 NOTE — PROGRESS NOTES
[facility]   Internal Medicine     2025  Beth John : 1950 Sex: female  Age:74 y.o.    Chief Complaint   Patient presents with    3 Month Follow-Up    Diabetes     Has been off Mounjaro and blood sugar was not controlled so she increased her Lantus to 65u. Does not taking 2 doses of Humalog, states that she takes it with the one meal that she eats but does not take it with snacks.     Gastroesophageal Reflux     States she did not receive Protonix Rx that was given when she was discharged from the hospital. She has been taking Omeprazole 20mg and is now out of medication.         HPI:   Patient presents to office for evaluation of the following medical concerns    -  was admitted to hospital () -fatigue and shortness of breath nausea and diarrhea   - (CT Abdo) Circumferential wall thickening of the bladder with mild surrounding inflammatory changes, suggestive of cystitis. Recommend correlation with urinalysis. Hypertrophy of the submucosal fat in the ascending colon and cecum, suggestive of chronic changes. No definite evidence of acute inflammatory process of the colon   - stool culture, c-diff and giardia negative, urine cx < 10K mixed  - Egd () -normal esophagus stomach duodenum   - Colonoscopy () -cecum and ascending colon and rectum normal discontinuous areas of bleeding ulcerated mucosa with stigmata of recent bleeding in the distal transverse colon descending colon and proximal sigmoid colon possible ischemic colitis no other abnormalities   -  has been having diarrhea since discharge better - took anti diarrhea medicine and resolved   Surg () - h/o colon polyps plan colonoscopy. Did not follow through due to hypoglycemia. Did not want to risk the prep. Would like to use FIT testing.     -  has lost hearing.  was seen by audiology. Found to have poss perforate dear drum. ENT marissa (10/23) -small retraction pocket on right tympanic

## 2025-08-21 ENCOUNTER — OFFICE VISIT (OUTPATIENT)
Dept: CHIROPRACTIC MEDICINE | Age: 75
End: 2025-08-21

## 2025-08-21 VITALS
DIASTOLIC BLOOD PRESSURE: 60 MMHG | HEART RATE: 81 BPM | HEIGHT: 63 IN | SYSTOLIC BLOOD PRESSURE: 114 MMHG | OXYGEN SATURATION: 98 % | TEMPERATURE: 97.8 F | BODY MASS INDEX: 33.49 KG/M2 | WEIGHT: 189 LBS

## 2025-08-21 DIAGNOSIS — M99.05 SEGMENTAL AND SOMATIC DYSFUNCTION OF PELVIC REGION: ICD-10-CM

## 2025-08-21 DIAGNOSIS — M54.41 CHRONIC RIGHT-SIDED LOW BACK PAIN WITH RIGHT-SIDED SCIATICA: ICD-10-CM

## 2025-08-21 DIAGNOSIS — M99.03 SEGMENTAL AND SOMATIC DYSFUNCTION OF LUMBAR REGION: Primary | ICD-10-CM

## 2025-08-21 DIAGNOSIS — G89.29 CHRONIC RIGHT-SIDED LOW BACK PAIN WITH RIGHT-SIDED SCIATICA: ICD-10-CM

## 2025-08-21 DIAGNOSIS — M53.3 SACROCOCCYGEAL DISORDERS, NOT ELSEWHERE CLASSIFIED: ICD-10-CM

## (undated) DEVICE — GLOVE SURG SZ 65 L12IN FNGR THK79MIL GRN LTX FREE

## (undated) DEVICE — GLOVE ORANGE PI 8 1/2   MSG9085

## (undated) DEVICE — BNDG,ELSTC,MATRIX,STRL,3"X5YD,LF,HOOK&LP: Brand: MEDLINE

## (undated) DEVICE — INSTRUMENT RETRACTOR ARMY/NAVY REUSABLE

## (undated) DEVICE — Device

## (undated) DEVICE — CRADLE ARM W8.75XH12.5XL16IN FOAM SUPP ELEVATION VENT

## (undated) DEVICE — ZIMMER® STERILE DISPOSABLE TOURNIQUET CUFF WITH PLC, DUAL PORT, SINGLE BLADDER, 18 IN. (46 CM)

## (undated) DEVICE — LOOP VES W25MM THK1MM MAXI RED SIL FLD REPELLENT 100 PER

## (undated) DEVICE — FORCEPS BX 240CM 2.4MM L NDL RAD JAW 4 M00513334

## (undated) DEVICE — BLOCK BITE 60FR RUBBER ADLT DENTAL

## (undated) DEVICE — INTENDED FOR TISSUE SEPARATION, AND OTHER PROCEDURES THAT REQUIRE A SHARP SURGICAL BLADE TO PUNCTURE OR CUT.: Brand: BARD-PARKER ® STAINLESS STEEL BLADES

## (undated) DEVICE — COVER HNDL LT DISP

## (undated) DEVICE — SPONGE GZ W4XL4IN RAYON POLY CVR W/NONWOVEN FAB STRL 2/PK

## (undated) DEVICE — PADDING,UNDERCAST,COTTON, 3X4YD STERILE: Brand: MEDLINE

## (undated) DEVICE — DOUBLE BASIN SET: Brand: MEDLINE INDUSTRIES, INC.

## (undated) DEVICE — GRADUATE TRIANG MEASURE 1000ML BLK PRNT

## (undated) DEVICE — 4-PORT MANIFOLD: Brand: NEPTUNE 2

## (undated) DEVICE — PADDING UNDERCAST W4INXL4YD COT FBR LO LINTING WYTEX

## (undated) DEVICE — SURGICAL PROCEDURE PACK HND

## (undated) DEVICE — BNDG,ELSTC,MATRIX,STRL,4"X5YD,LF,HOOK&LP: Brand: MEDLINE

## (undated) DEVICE — ADHESIVE SKIN CLSR 0.7ML TOP DERMBND ADV

## (undated) DEVICE — GLOVE SURG SZ 6 THK91MIL LTX FREE SYN POLYISOPRENE ANTI

## (undated) DEVICE — GOWN,SIRUS,FABRNF,XL,20/CS: Brand: MEDLINE

## (undated) DEVICE — SOLUTION IV IRRIG POUR BRL 0.9% SODIUM CHL 2F7124

## (undated) DEVICE — TRAY SET HAND REUSABLE

## (undated) DEVICE — PADDING,UNDERCAST,COTTON, 4"X4YD STERILE: Brand: MEDLINE

## (undated) DEVICE — DRAPE,U/ SHT,SPLIT,PLAS,STERIL: Brand: MEDLINE